# Patient Record
Sex: FEMALE | Race: WHITE | Employment: OTHER | URBAN - METROPOLITAN AREA
[De-identification: names, ages, dates, MRNs, and addresses within clinical notes are randomized per-mention and may not be internally consistent; named-entity substitution may affect disease eponyms.]

---

## 2017-01-05 ENCOUNTER — OFFICE VISIT (OUTPATIENT)
Dept: ORTHOPEDIC SURGERY | Age: 67
End: 2017-01-05

## 2017-01-05 VITALS — HEIGHT: 64 IN | WEIGHT: 190 LBS | BODY MASS INDEX: 32.44 KG/M2

## 2017-01-05 DIAGNOSIS — M25.511 RIGHT SHOULDER PAIN, UNSPECIFIED CHRONICITY: ICD-10-CM

## 2017-01-05 DIAGNOSIS — Z98.890 STATUS POST ARTHROSCOPY OF SHOULDER: Primary | ICD-10-CM

## 2017-01-05 PROCEDURE — 99024 POSTOP FOLLOW-UP VISIT: CPT | Performed by: ORTHOPAEDIC SURGERY

## 2017-01-05 PROCEDURE — 73030 X-RAY EXAM OF SHOULDER: CPT | Performed by: ORTHOPAEDIC SURGERY

## 2017-01-10 ENCOUNTER — TELEPHONE (OUTPATIENT)
Dept: ORTHOPEDIC SURGERY | Age: 67
End: 2017-01-10

## 2017-01-11 ENCOUNTER — OFFICE VISIT (OUTPATIENT)
Dept: ORTHOPEDIC SURGERY | Age: 67
End: 2017-01-11

## 2017-01-11 VITALS — WEIGHT: 190 LBS | BODY MASS INDEX: 32.44 KG/M2 | HEIGHT: 64 IN

## 2017-01-11 DIAGNOSIS — Z98.890 S/P ARTHROSCOPY OF SHOULDER: Primary | ICD-10-CM

## 2017-01-11 DIAGNOSIS — T14.8XXA SUBCUTANEOUS HEMATOMA: ICD-10-CM

## 2017-01-11 PROCEDURE — 99024 POSTOP FOLLOW-UP VISIT: CPT | Performed by: ORTHOPAEDIC SURGERY

## 2017-01-11 RX ORDER — METHYLPREDNISOLONE 4 MG/1
TABLET ORAL
Qty: 1 KIT | Refills: 0 | Status: SHIPPED | OUTPATIENT
Start: 2017-01-11 | End: 2017-03-12

## 2017-01-18 ENCOUNTER — HOSPITAL ENCOUNTER (OUTPATIENT)
Dept: OTHER | Age: 67
Discharge: HOME | End: 2017-01-31
Attending: ORTHOPAEDIC SURGERY | Admitting: ORTHOPAEDIC SURGERY

## 2017-01-23 ENCOUNTER — HOSPITAL ENCOUNTER (OUTPATIENT)
Dept: PHYSICAL THERAPY | Age: 67
Discharge: HOME | End: 2017-01-23
Admitting: ORTHOPAEDIC SURGERY

## 2017-01-25 ENCOUNTER — HOSPITAL ENCOUNTER (OUTPATIENT)
Dept: PHYSICAL THERAPY | Age: 67
Discharge: HOME | End: 2017-01-25
Admitting: ORTHOPAEDIC SURGERY

## 2017-01-26 ENCOUNTER — OFFICE VISIT (OUTPATIENT)
Dept: ORTHOPEDIC SURGERY | Age: 67
End: 2017-01-26

## 2017-01-26 VITALS — HEIGHT: 64 IN | BODY MASS INDEX: 32.44 KG/M2 | WEIGHT: 190 LBS | RESPIRATION RATE: 16 BRPM

## 2017-01-26 DIAGNOSIS — Z98.890 S/P ARTHROSCOPY OF SHOULDER: ICD-10-CM

## 2017-01-26 DIAGNOSIS — T14.8XXA SUBCUTANEOUS HEMATOMA: Primary | ICD-10-CM

## 2017-01-26 PROCEDURE — 99024 POSTOP FOLLOW-UP VISIT: CPT | Performed by: ORTHOPAEDIC SURGERY

## 2017-01-26 RX ORDER — SULFAMETHOXAZOLE AND TRIMETHOPRIM 800; 160 MG/1; MG/1
1 TABLET ORAL 2 TIMES DAILY
Qty: 10 TABLET | Refills: 0 | Status: SHIPPED | OUTPATIENT
Start: 2017-01-26 | End: 2017-01-31

## 2017-01-31 ENCOUNTER — HOSPITAL ENCOUNTER (OUTPATIENT)
Dept: PHYSICAL THERAPY | Age: 67
Discharge: HOME | End: 2017-01-31
Admitting: ORTHOPAEDIC SURGERY

## 2017-02-02 ENCOUNTER — HOSPITAL ENCOUNTER (OUTPATIENT)
Dept: PHYSICAL THERAPY | Age: 67
Discharge: HOME | End: 2017-02-02
Admitting: ORTHOPAEDIC SURGERY

## 2017-02-07 ENCOUNTER — HOSPITAL ENCOUNTER (OUTPATIENT)
Dept: PHYSICAL THERAPY | Age: 67
Discharge: HOME | End: 2017-02-07
Admitting: ORTHOPAEDIC SURGERY

## 2017-02-09 ENCOUNTER — HOSPITAL ENCOUNTER (OUTPATIENT)
Dept: PHYSICAL THERAPY | Age: 67
Discharge: HOME | End: 2017-02-09
Admitting: ORTHOPAEDIC SURGERY

## 2017-02-14 ENCOUNTER — HOSPITAL ENCOUNTER (OUTPATIENT)
Dept: PHYSICAL THERAPY | Age: 67
Discharge: HOME | End: 2017-02-14
Admitting: ORTHOPAEDIC SURGERY

## 2017-02-22 ENCOUNTER — HOSPITAL ENCOUNTER (OUTPATIENT)
Dept: PHYSICAL THERAPY | Age: 67
Discharge: HOME | End: 2017-02-22
Admitting: ORTHOPAEDIC SURGERY

## 2017-02-23 ENCOUNTER — OFFICE VISIT (OUTPATIENT)
Dept: ORTHOPEDIC SURGERY | Age: 67
End: 2017-02-23

## 2017-02-23 VITALS — WEIGHT: 190.04 LBS | BODY MASS INDEX: 32.44 KG/M2 | RESPIRATION RATE: 16 BRPM | HEIGHT: 64 IN

## 2017-02-23 DIAGNOSIS — Z98.890 S/P ARTHROSCOPY OF SHOULDER: Primary | ICD-10-CM

## 2017-02-23 PROCEDURE — 99024 POSTOP FOLLOW-UP VISIT: CPT | Performed by: NURSE PRACTITIONER

## 2017-03-16 ENCOUNTER — HOSPITAL ENCOUNTER (OUTPATIENT)
Dept: OTHER | Age: 67
Discharge: HOME | End: 2017-03-16
Attending: NURSE PRACTITIONER | Admitting: NURSE PRACTITIONER

## 2017-03-16 DIAGNOSIS — J18.9 PNEUMONIA DUE TO INFECTIOUS ORGANISM, UNSPECIFIED LATERALITY, UNSPECIFIED PART OF LUNG: ICD-10-CM

## 2017-03-20 ENCOUNTER — TELEPHONE (OUTPATIENT)
Dept: CASE MANAGEMENT | Age: 67
End: 2017-03-20

## 2017-05-16 ENCOUNTER — HOSPITAL ENCOUNTER (OUTPATIENT)
Dept: WOMENS IMAGING | Age: 67
Discharge: HOME | End: 2017-05-16
Attending: OBSTETRICS & GYNECOLOGY | Admitting: OBSTETRICS & GYNECOLOGY

## 2017-05-16 DIAGNOSIS — Z12.31 ENCOUNTER FOR MAMMOGRAM TO ESTABLISH BASELINE MAMMOGRAM: ICD-10-CM

## 2017-06-08 ENCOUNTER — OFFICE VISIT (OUTPATIENT)
Dept: ORTHOPEDIC SURGERY | Age: 67
End: 2017-06-08

## 2017-06-08 VITALS
HEART RATE: 82 BPM | HEIGHT: 64 IN | WEIGHT: 194 LBS | DIASTOLIC BLOOD PRESSURE: 79 MMHG | BODY MASS INDEX: 33.12 KG/M2 | SYSTOLIC BLOOD PRESSURE: 137 MMHG

## 2017-06-08 DIAGNOSIS — Z98.890 S/P ARTHROSCOPY OF SHOULDER: Primary | ICD-10-CM

## 2017-06-08 DIAGNOSIS — M19.011 GLENOHUMERAL ARTHRITIS, RIGHT: ICD-10-CM

## 2017-06-08 DIAGNOSIS — M25.511 ACUTE PAIN OF RIGHT SHOULDER: ICD-10-CM

## 2017-06-08 DIAGNOSIS — Z98.1 S/P CERVICAL SPINAL FUSION: ICD-10-CM

## 2017-06-08 PROCEDURE — 99213 OFFICE O/P EST LOW 20 MIN: CPT | Performed by: ORTHOPAEDIC SURGERY

## 2017-06-08 PROCEDURE — 20610 DRAIN/INJ JOINT/BURSA W/O US: CPT | Performed by: ORTHOPAEDIC SURGERY

## 2017-06-08 RX ORDER — CELECOXIB 200 MG/1
200 CAPSULE ORAL DAILY
Qty: 30 CAPSULE | Refills: 0 | Status: SHIPPED | OUTPATIENT
Start: 2017-06-08 | End: 2019-01-15

## 2017-06-09 ENCOUNTER — TELEPHONE (OUTPATIENT)
Dept: PHARMACY | Facility: CLINIC | Age: 67
End: 2017-06-09

## 2017-09-29 ENCOUNTER — HOSPITAL ENCOUNTER (OUTPATIENT)
Dept: CT IMAGING | Age: 67
Discharge: HOME | End: 2017-09-29
Admitting: NURSE PRACTITIONER

## 2017-09-29 DIAGNOSIS — G89.4 CHRONIC PAIN SYNDROME: ICD-10-CM

## 2017-09-29 DIAGNOSIS — G89.4 CHRONIC PAIN ASSOCIATED WITH SIGNIFICANT PSYCHOSOCIAL DYSFUNCTION: ICD-10-CM

## 2017-09-29 LAB
A/G RATIO: 1.6 (ref 1.1–2.2)
ALBUMIN SERPL-MCNC: 4.4 G/DL (ref 3.4–5)
ALP BLD-CCNC: 70 U/L (ref 40–129)
ALT SERPL-CCNC: 14 U/L (ref 10–40)
ANION GAP SERPL CALCULATED.3IONS-SCNC: 13 MMOL/L (ref 3–16)
AST SERPL-CCNC: 14 U/L (ref 15–37)
BILIRUB SERPL-MCNC: 0.4 MG/DL (ref 0–1)
BUN BLDV-MCNC: 10 MG/DL (ref 7–20)
CALCIUM SERPL-MCNC: 9.4 MG/DL (ref 8.3–10.6)
CHLORIDE BLD-SCNC: 100 MMOL/L (ref 99–110)
CO2: 29 MMOL/L (ref 21–32)
CREAT SERPL-MCNC: 0.6 MG/DL (ref 0.6–1.2)
ESTIMATED AVERAGE GLUCOSE: 139.9 MG/DL
GFR AFRICAN AMERICAN: >60
GFR AFRICAN AMERICAN: >60
GFR NON-AFRICAN AMERICAN: >60
GFR NON-AFRICAN AMERICAN: >60
GLOBULIN: 2.8 G/DL
GLUCOSE BLD-MCNC: 77 MG/DL (ref 70–99)
HBA1C MFR BLD: 6.5 %
HCT VFR BLD CALC: 37.7 % (ref 36–48)
HEMOGLOBIN: 12.9 G/DL (ref 12–16)
INR BLD: 4.45 (ref 0.85–1.15)
MCH RBC QN AUTO: 29.4 PG (ref 26–34)
MCHC RBC AUTO-ENTMCNC: 34.1 G/DL (ref 31–36)
MCV RBC AUTO: 86.2 FL (ref 80–100)
PDW BLD-RTO: 14.4 % (ref 12.4–15.4)
PERFORMED ON: NORMAL
PLATELET # BLD: 209 K/UL (ref 135–450)
PMV BLD AUTO: 7.8 FL (ref 5–10.5)
POC CREATININE: 0.6 MG/DL (ref 0.6–1.2)
POC SAMPLE TYPE: NORMAL
POTASSIUM SERPL-SCNC: 4.4 MMOL/L (ref 3.5–5.1)
PROTHROMBIN TIME: 50.3 SEC (ref 9.6–13)
RBC # BLD: 4.38 M/UL (ref 4–5.2)
SODIUM BLD-SCNC: 142 MMOL/L (ref 136–145)
TOTAL PROTEIN: 7.2 G/DL (ref 6.4–8.2)
TSH SERPL DL<=0.05 MIU/L-ACNC: 1.67 UIU/ML (ref 0.27–4.2)
WBC # BLD: 5.3 K/UL (ref 4–11)

## 2018-03-07 ENCOUNTER — OFFICE VISIT (OUTPATIENT)
Dept: ORTHOPEDIC SURGERY | Age: 68
End: 2018-03-07

## 2018-03-07 VITALS
HEIGHT: 64 IN | WEIGHT: 190 LBS | DIASTOLIC BLOOD PRESSURE: 80 MMHG | HEART RATE: 81 BPM | SYSTOLIC BLOOD PRESSURE: 133 MMHG | BODY MASS INDEX: 32.44 KG/M2

## 2018-03-07 DIAGNOSIS — Z98.890 S/P ARTHROSCOPY OF SHOULDER: ICD-10-CM

## 2018-03-07 DIAGNOSIS — M19.011 OSTEOARTHRITIS OF RIGHT GLENOHUMERAL JOINT: Primary | ICD-10-CM

## 2018-03-07 PROCEDURE — 20610 DRAIN/INJ JOINT/BURSA W/O US: CPT | Performed by: ORTHOPAEDIC SURGERY

## 2018-03-07 PROCEDURE — 99213 OFFICE O/P EST LOW 20 MIN: CPT | Performed by: ORTHOPAEDIC SURGERY

## 2018-03-07 NOTE — PROGRESS NOTES
Sarah Saldivar  [id number]  March 7, 2018    Chief Complaint   Patient presents with    Follow-up     Left shoulder scope 12/28/16. Rigth shoulder inj 6/8/17       History: The patient is here follow-up regarding her right shoulder arthroscopy subacromial decompression, labral debridement biceps tenotomy with chondroplasty of the humeral head and glenoid and open david procedure completed on 12/28/16. She reports the injection as a last office visit provided relief for 3 months. The Celebrex unfortunately did not help her very much. She is now taking tramadol at night and Tylenol during the day. She is on Coumadin and is not able to take other anti-inflammatories. She denies any neck pain today were numbness or tingling or weakness in the right arm. Her pain is located in her right shoulder extending into her right mid humerus. It is worse with overhead activities such as doing her hair. She denies any new injuries. She does use Voltaren gel her medically with some relief as well. The patient's  past medical history, medications, allergies,  family history, social history, and review of systems have been reviewed, and dated and are recorded in the chart. /80   Pulse 81   Ht 5' 4\" (1.626 m)   Wt 190 lb (86.2 kg)   Breastfeeding? No   BMI 32.61 kg/m²     Physical:  Ms. Sarah Saldivar appears well, she is in no apparent distress, she demonstrates appropriate mood & affect. She is alert and oriented to person, place and time. She has no further swelling. The hematoma has resolved. The stitch abscess has resolved as well. There is no erythema. The wounds have closed. There is no evidence of drainage. She is neurovascularly intact distally. Sensation is intact in the axillary nerve distribution. right shoulder range of motion: 175 degrees abduction, 175 degrees forward flexion, 40 degrees external rotation and internal rotation to L5.  She has minimal discomfort with light range of

## 2018-06-14 ENCOUNTER — HOSPITAL ENCOUNTER (OUTPATIENT)
Dept: WOMENS IMAGING | Age: 68
Discharge: HOME | End: 2018-06-14
Attending: OBSTETRICS & GYNECOLOGY | Admitting: OBSTETRICS & GYNECOLOGY

## 2018-06-14 DIAGNOSIS — Z12.39 BREAST CANCER SCREENING: ICD-10-CM

## 2018-07-20 ENCOUNTER — OFFICE VISIT (OUTPATIENT)
Dept: ORTHOPEDIC SURGERY | Age: 68
End: 2018-07-20

## 2018-07-20 VITALS
DIASTOLIC BLOOD PRESSURE: 82 MMHG | SYSTOLIC BLOOD PRESSURE: 146 MMHG | HEART RATE: 83 BPM | HEIGHT: 64 IN | BODY MASS INDEX: 32.44 KG/M2 | WEIGHT: 190 LBS

## 2018-07-20 DIAGNOSIS — M75.80 ROTATOR CUFF TENDINITIS, UNSPECIFIED LATERALITY: ICD-10-CM

## 2018-07-20 DIAGNOSIS — M54.12 CERVICAL RADICULITIS: ICD-10-CM

## 2018-07-20 DIAGNOSIS — Z98.1 STATUS POST CERVICAL SPINAL FUSION: ICD-10-CM

## 2018-07-20 DIAGNOSIS — M25.511 RIGHT SHOULDER PAIN, UNSPECIFIED CHRONICITY: Primary | ICD-10-CM

## 2018-07-20 DIAGNOSIS — M19.019 OSTEOARTHRITIS OF GLENOHUMERAL JOINT, UNSPECIFIED LATERALITY: ICD-10-CM

## 2018-07-20 PROCEDURE — 99213 OFFICE O/P EST LOW 20 MIN: CPT | Performed by: NURSE PRACTITIONER

## 2018-07-20 PROCEDURE — 20610 DRAIN/INJ JOINT/BURSA W/O US: CPT | Performed by: NURSE PRACTITIONER

## 2018-07-20 NOTE — PROGRESS NOTES
Lelia Candelario  [id number]  July 20, 2018    Chief Complaint   Patient presents with    Follow-up     Right shoulder       History: The patient is here follow-up regarding her right shoulder pain. The patient tells me that she experienced a return of her shoulder pain approximately 5-6 weeks after she received her last shoulder injection on re-/7/18. She also notes an increase in her pain extending from her neck down into her right forearm and hand which is described as aching. She denies any numbness or tingling or weakness in the right arm. The pain is worse with doing overhead activities. She is currently taking Tylenol and must avoid NSAIDs as she is on Coumadin. The patient's  past medical history, medications, allergies,  family history, social history, and review of systems have been reviewed, and dated and are recorded in the chart. BP (!) 146/82   Pulse 83   Ht 5' 4\" (1.626 m)   Wt 190 lb (86.2 kg)   Breastfeeding? No   BMI 32.61 kg/m²     Physical:  Ms. Lelia Candelario appears well, she is in no apparent distress, she demonstrates appropriate mood & affect. She is alert and oriented to person, place and time. She has no further swelling. The incisions have healed. There is no evidence of drainage. She is neurovascularly intact distally. Sensation is intact in the axillary nerve distribution. right shoulder range of motion: 175 degrees abduction, 175 degrees forward flexion, 40 degrees external rotation and internal rotation to L5. She has minimal discomfort with light range of motion of the shoulder. The incisions are clean, dry and intact and without erythema. Georges impingement sign is slightly positive on the right. Strength in the shoulder is: 4+/5  Abduction and 4+/5 external rotation. Otherwise, she has 5/5 motor strength of her bilateral upper extremities. Cindy sign is negative. She has minimal to no tenderness to palpation over the right-sided pericervical musculature.

## 2018-07-25 ENCOUNTER — TELEPHONE (OUTPATIENT)
Dept: ORTHOPEDIC SURGERY | Age: 68
End: 2018-07-25

## 2018-07-25 DIAGNOSIS — F40.240 CLAUSTROPHOBIA: Primary | ICD-10-CM

## 2018-07-25 RX ORDER — DIAZEPAM 5 MG/1
5 TABLET ORAL
Qty: 2 TABLET | Refills: 0 | Status: SHIPPED | OUTPATIENT
Start: 2018-07-25 | End: 2018-07-25

## 2018-07-25 NOTE — TELEPHONE ENCOUNTER
Gomez Roberts ordered a Cervical MRI for patient  Patient states that she will need medication to help her relax for the procedure    She states Gomez Roberts told her he would prescribe valium for her but this has not been done yet( patient MRI has not scheduled as of the time of this phone call)     Patient preferred pharmacy is [facility] on [place]- [phone]    Patient can be reached at [phone]

## 2018-07-30 ENCOUNTER — TELEPHONE (OUTPATIENT)
Dept: ORTHOPEDIC SURGERY | Age: 68
End: 2018-07-30

## 2018-07-30 ENCOUNTER — HOSPITAL ENCOUNTER (OUTPATIENT)
Dept: MRI IMAGING | Age: 68
Discharge: HOME | End: 2018-07-30
Attending: NURSE PRACTITIONER | Admitting: NURSE PRACTITIONER

## 2018-07-30 DIAGNOSIS — M54.12 CERVICAL RADICULITIS: ICD-10-CM

## 2018-07-30 DIAGNOSIS — Z98.1 STATUS POST CERVICAL SPINAL FUSION: ICD-10-CM

## 2018-08-07 ENCOUNTER — OFFICE VISIT (OUTPATIENT)
Dept: ORTHOPEDIC SURGERY | Age: 68
End: 2018-08-07

## 2018-08-07 VITALS
SYSTOLIC BLOOD PRESSURE: 143 MMHG | HEART RATE: 90 BPM | DIASTOLIC BLOOD PRESSURE: 80 MMHG | BODY MASS INDEX: 32.44 KG/M2 | HEIGHT: 64 IN | WEIGHT: 190 LBS

## 2018-08-07 DIAGNOSIS — M46.92 CERVICAL SPONDYLITIS WITH RADICULITIS (HCC): Primary | ICD-10-CM

## 2018-08-07 DIAGNOSIS — M54.12 CERVICAL SPONDYLITIS WITH RADICULITIS (HCC): Primary | ICD-10-CM

## 2018-08-07 PROCEDURE — 99213 OFFICE O/P EST LOW 20 MIN: CPT | Performed by: ORTHOPAEDIC SURGERY

## 2018-11-14 ENCOUNTER — PRE-EVALUATION (OUTPATIENT)
Dept: ORTHOPEDIC SURGERY | Age: 68
End: 2018-11-14

## 2018-11-14 ENCOUNTER — OFFICE VISIT (OUTPATIENT)
Dept: ORTHOPEDIC SURGERY | Age: 68
End: 2018-11-14
Payer: MEDICARE

## 2018-11-14 VITALS
SYSTOLIC BLOOD PRESSURE: 125 MMHG | RESPIRATION RATE: 16 BRPM | BODY MASS INDEX: 32.44 KG/M2 | DIASTOLIC BLOOD PRESSURE: 85 MMHG | HEIGHT: 64 IN | WEIGHT: 190 LBS | HEART RATE: 81 BPM

## 2018-11-14 DIAGNOSIS — M19.011 GLENOHUMERAL ARTHRITIS, RIGHT: Primary | ICD-10-CM

## 2018-11-14 DIAGNOSIS — M75.81 TENDINITIS OF RIGHT ROTATOR CUFF: ICD-10-CM

## 2018-11-14 DIAGNOSIS — M19.019 GLENOHUMERAL ARTHRITIS: ICD-10-CM

## 2018-11-14 PROCEDURE — 99213 OFFICE O/P EST LOW 20 MIN: CPT | Performed by: ORTHOPAEDIC SURGERY

## 2018-11-14 PROCEDURE — APPSS15 APP SPLIT SHARED TIME 0-15 MINUTES: Performed by: NURSE PRACTITIONER

## 2018-11-14 PROCEDURE — 20610 DRAIN/INJ JOINT/BURSA W/O US: CPT | Performed by: ORTHOPAEDIC SURGERY

## 2018-11-14 RX ORDER — METHYLPREDNISOLONE ACETATE 40 MG/ML
80 INJECTION, SUSPENSION INTRA-ARTICULAR; INTRALESIONAL; INTRAMUSCULAR; SOFT TISSUE ONCE
Status: COMPLETED | OUTPATIENT
Start: 2018-11-14 | End: 2018-11-14

## 2018-11-14 RX ADMIN — METHYLPREDNISOLONE ACETATE 80 MG: 40 INJECTION, SUSPENSION INTRA-ARTICULAR; INTRALESIONAL; INTRAMUSCULAR; SOFT TISSUE at 10:34

## 2018-12-10 ENCOUNTER — HOSPITAL ENCOUNTER (OUTPATIENT)
Age: 68
Discharge: HOME | End: 2018-12-10
Payer: MEDICARE

## 2018-12-10 ENCOUNTER — HOSPITAL ENCOUNTER (OUTPATIENT)
Dept: GENERAL RADIOLOGY | Age: 68
Discharge: HOME | End: 2018-12-10
Payer: MEDICARE

## 2018-12-10 DIAGNOSIS — R06.02 BREATH SHORTNESS: ICD-10-CM

## 2018-12-10 DIAGNOSIS — R05.9 COUGH: ICD-10-CM

## 2018-12-10 PROCEDURE — 71046 X-RAY EXAM CHEST 2 VIEWS: CPT

## 2019-01-15 ENCOUNTER — OFFICE VISIT (OUTPATIENT)
Dept: ORTHOPEDIC SURGERY | Age: 69
End: 2019-01-15
Payer: MEDICARE

## 2019-01-15 VITALS
BODY MASS INDEX: 32.44 KG/M2 | HEIGHT: 64 IN | SYSTOLIC BLOOD PRESSURE: 132 MMHG | WEIGHT: 190 LBS | DIASTOLIC BLOOD PRESSURE: 81 MMHG | HEART RATE: 82 BPM

## 2019-01-15 DIAGNOSIS — M48.02 CERVICAL STENOSIS OF SPINAL CANAL: ICD-10-CM

## 2019-01-15 DIAGNOSIS — Z98.1 S/P CERVICAL SPINAL FUSION: Primary | ICD-10-CM

## 2019-01-15 PROCEDURE — 99213 OFFICE O/P EST LOW 20 MIN: CPT | Performed by: PHYSICIAN ASSISTANT

## 2019-01-15 RX ORDER — TRAMADOL HYDROCHLORIDE 50 MG/1
50 TABLET ORAL EVERY 6 HOURS PRN
COMMUNITY
End: 2022-05-02

## 2019-01-15 RX ORDER — ACETAMINOPHEN 325 MG/1
650 TABLET ORAL EVERY 6 HOURS PRN
Status: ON HOLD | COMMUNITY
End: 2019-11-04 | Stop reason: HOSPADM

## 2019-01-17 ENCOUNTER — HOSPITAL ENCOUNTER (OUTPATIENT)
Dept: PHYSICAL THERAPY | Age: 69
Setting detail: THERAPIES SERIES
Discharge: HOME | End: 2019-01-17
Payer: MEDICARE

## 2019-01-17 PROCEDURE — 97110 THERAPEUTIC EXERCISES: CPT

## 2019-01-17 PROCEDURE — 97161 PT EVAL LOW COMPLEX 20 MIN: CPT

## 2019-01-17 PROCEDURE — 97140 MANUAL THERAPY 1/> REGIONS: CPT

## 2019-01-17 PROCEDURE — G0283 ELEC STIM OTHER THAN WOUND: HCPCS

## 2019-01-22 ENCOUNTER — HOSPITAL ENCOUNTER (OUTPATIENT)
Dept: PHYSICAL THERAPY | Age: 69
Setting detail: THERAPIES SERIES
Discharge: HOME | End: 2019-01-22
Payer: MEDICARE

## 2019-01-22 PROCEDURE — 97110 THERAPEUTIC EXERCISES: CPT

## 2019-01-22 PROCEDURE — G0283 ELEC STIM OTHER THAN WOUND: HCPCS

## 2019-01-22 PROCEDURE — 97140 MANUAL THERAPY 1/> REGIONS: CPT

## 2019-01-24 ENCOUNTER — HOSPITAL ENCOUNTER (OUTPATIENT)
Dept: PHYSICAL THERAPY | Age: 69
Setting detail: THERAPIES SERIES
Discharge: HOME | End: 2019-01-24
Payer: MEDICARE

## 2019-01-24 PROCEDURE — 97110 THERAPEUTIC EXERCISES: CPT

## 2019-01-24 PROCEDURE — 97140 MANUAL THERAPY 1/> REGIONS: CPT

## 2019-01-24 PROCEDURE — G0283 ELEC STIM OTHER THAN WOUND: HCPCS

## 2019-01-29 ENCOUNTER — HOSPITAL ENCOUNTER (OUTPATIENT)
Dept: PHYSICAL THERAPY | Age: 69
Setting detail: THERAPIES SERIES
Discharge: HOME | End: 2019-01-29
Payer: MEDICARE

## 2019-01-29 PROCEDURE — 97110 THERAPEUTIC EXERCISES: CPT

## 2019-01-29 PROCEDURE — 97140 MANUAL THERAPY 1/> REGIONS: CPT

## 2019-01-29 PROCEDURE — G0283 ELEC STIM OTHER THAN WOUND: HCPCS

## 2019-01-31 ENCOUNTER — HOSPITAL ENCOUNTER (OUTPATIENT)
Dept: PHYSICAL THERAPY | Age: 69
Setting detail: THERAPIES SERIES
Discharge: HOME | End: 2019-01-31
Payer: MEDICARE

## 2019-01-31 PROCEDURE — G0283 ELEC STIM OTHER THAN WOUND: HCPCS

## 2019-01-31 PROCEDURE — 97140 MANUAL THERAPY 1/> REGIONS: CPT

## 2019-01-31 PROCEDURE — 97110 THERAPEUTIC EXERCISES: CPT

## 2019-01-31 PROCEDURE — 97530 THERAPEUTIC ACTIVITIES: CPT

## 2019-02-05 ENCOUNTER — HOSPITAL ENCOUNTER (OUTPATIENT)
Dept: PHYSICAL THERAPY | Age: 69
Setting detail: THERAPIES SERIES
Discharge: HOME | End: 2019-02-05
Payer: MEDICARE

## 2019-02-05 PROCEDURE — 97110 THERAPEUTIC EXERCISES: CPT

## 2019-02-05 PROCEDURE — G0283 ELEC STIM OTHER THAN WOUND: HCPCS

## 2019-02-05 PROCEDURE — 97140 MANUAL THERAPY 1/> REGIONS: CPT

## 2019-02-07 ENCOUNTER — HOSPITAL ENCOUNTER (OUTPATIENT)
Dept: PHYSICAL THERAPY | Age: 69
Setting detail: THERAPIES SERIES
Discharge: HOME | End: 2019-02-07
Payer: MEDICARE

## 2019-02-07 PROCEDURE — 97110 THERAPEUTIC EXERCISES: CPT

## 2019-02-07 PROCEDURE — G0283 ELEC STIM OTHER THAN WOUND: HCPCS

## 2019-02-07 PROCEDURE — 97140 MANUAL THERAPY 1/> REGIONS: CPT

## 2019-03-26 ENCOUNTER — HOSPITAL ENCOUNTER (OUTPATIENT)
Dept: PULMONOLOGY | Age: 69
Discharge: HOME | End: 2019-03-26
Payer: MEDICARE

## 2019-03-26 PROCEDURE — 94060 EVALUATION OF WHEEZING: CPT

## 2019-03-26 PROCEDURE — 94664 DEMO&/EVAL PT USE INHALER: CPT

## 2019-03-26 PROCEDURE — 94729 DIFFUSING CAPACITY: CPT | Performed by: INTERNAL MEDICINE

## 2019-03-26 PROCEDURE — 94726 PLETHYSMOGRAPHY LUNG VOLUMES: CPT

## 2019-03-26 PROCEDURE — 94729 DIFFUSING CAPACITY: CPT

## 2019-03-26 PROCEDURE — 94060 EVALUATION OF WHEEZING: CPT | Performed by: INTERNAL MEDICINE

## 2019-03-26 PROCEDURE — 94640 AIRWAY INHALATION TREATMENT: CPT

## 2019-03-26 PROCEDURE — 6370000000 HC RX 637 (ALT 250 FOR IP): Performed by: INTERNAL MEDICINE

## 2019-03-26 PROCEDURE — 94727 GAS DIL/WSHOT DETER LNG VOL: CPT | Performed by: INTERNAL MEDICINE

## 2019-03-26 RX ORDER — ALBUTEROL SULFATE 90 UG/1
4 AEROSOL, METERED RESPIRATORY (INHALATION) ONCE
Status: COMPLETED | OUTPATIENT
Start: 2019-03-26 | End: 2019-03-26

## 2019-03-26 RX ADMIN — ALBUTEROL SULFATE 4 PUFF: 90 AEROSOL, METERED RESPIRATORY (INHALATION) at 09:57

## 2019-03-27 LAB
DLCO %PRED: 87 %
DLCO PRED: NORMAL ML/MIN/MMHG
DLCO/VA %PRED: NORMAL %
DLCO/VA PRED: NORMAL ML/MIN/MMHG
DLCO/VA: NORMAL ML/MIN/MMHG
DLCO: NORMAL ML/MIN/MMHG
EXPIRATORY TIME-POST: NORMAL SEC
EXPIRATORY TIME: NORMAL SEC
FEF 25-75% %CHNG: NORMAL
FEF 25-75% %PRED-POST: NORMAL %
FEF 25-75% %PRED-PRE: NORMAL L/SEC
FEF 25-75% PRED: NORMAL L/SEC
FEF 25-75%-POST: NORMAL L/SEC
FEF 25-75%-PRE: NORMAL L/SEC
FEV1 %PRED-POST: 132 %
FEV1 %PRED-PRE: 128 %
FEV1 PRED: NORMAL L
FEV1-POST: NORMAL L
FEV1-PRE: NORMAL L
FEV1/FVC %PRED-POST: NORMAL %
FEV1/FVC %PRED-PRE: NORMAL %
FEV1/FVC PRED: NORMAL %
FEV1/FVC-POST: 87 %
FEV1/FVC-PRE: 84 %
FVC %PRED-POST: NORMAL L
FVC %PRED-PRE: NORMAL %
FVC PRED: NORMAL L
FVC-POST: NORMAL L
FVC-PRE: NORMAL L
GAW %PRED: NORMAL %
GAW PRED: NORMAL L/S/CMH2O
GAW: NORMAL L/S/CMH2O
IC %PRED: NORMAL %
IC PRED: NORMAL L
IC: NORMAL L
MEP: NORMAL
MIP: NORMAL
MVV %PRED-PRE: NORMAL %
MVV PRED: NORMAL L/MIN
MVV-PRE: NORMAL L/MIN
PEF %PRED-POST: NORMAL %
PEF %PRED-PRE: NORMAL L/SEC
PEF PRED: NORMAL L/SEC
PEF%CHNG: NORMAL
PEF-POST: NORMAL L/SEC
PEF-PRE: NORMAL L/SEC
RAW %PRED: NORMAL %
RAW PRED: NORMAL CMH2O/L/S
RAW: NORMAL CMH2O/L/S
RV %PRED: NORMAL %
RV PRED: NORMAL L
RV: NORMAL L
SVC %PRED: NORMAL %
SVC PRED: NORMAL L
SVC: NORMAL L
TLC %PRED: 138 %
TLC PRED: NORMAL L
TLC: NORMAL L
VA %PRED: NORMAL %
VA PRED: NORMAL L
VA: NORMAL L
VTG %PRED: NORMAL %
VTG PRED: NORMAL L
VTG: NORMAL L

## 2019-03-27 ASSESSMENT — PULMONARY FUNCTION TESTS
FEV1_PERCENT_PREDICTED_POST: 132
FEV1/FVC_PRE: 84
FEV1/FVC_POST: 87
FEV1_PERCENT_PREDICTED_PRE: 128

## 2019-05-09 ENCOUNTER — OFFICE VISIT (OUTPATIENT)
Dept: ORTHOPEDIC SURGERY | Age: 69
End: 2019-05-09
Payer: MEDICARE

## 2019-05-09 VITALS
WEIGHT: 190 LBS | RESPIRATION RATE: 16 BRPM | SYSTOLIC BLOOD PRESSURE: 124 MMHG | HEART RATE: 93 BPM | BODY MASS INDEX: 32.44 KG/M2 | DIASTOLIC BLOOD PRESSURE: 82 MMHG | HEIGHT: 64 IN

## 2019-05-09 DIAGNOSIS — M19.011 GLENOHUMERAL ARTHRITIS, RIGHT: Primary | ICD-10-CM

## 2019-05-09 DIAGNOSIS — M75.81 TENDINITIS OF RIGHT ROTATOR CUFF: ICD-10-CM

## 2019-05-09 PROCEDURE — 20610 DRAIN/INJ JOINT/BURSA W/O US: CPT | Performed by: ORTHOPAEDIC SURGERY

## 2019-05-09 PROCEDURE — 99213 OFFICE O/P EST LOW 20 MIN: CPT | Performed by: ORTHOPAEDIC SURGERY

## 2019-05-09 RX ORDER — METHYLPREDNISOLONE ACETATE 40 MG/ML
80 INJECTION, SUSPENSION INTRA-ARTICULAR; INTRALESIONAL; INTRAMUSCULAR; SOFT TISSUE ONCE
Status: COMPLETED | OUTPATIENT
Start: 2019-05-09 | End: 2019-05-09

## 2019-05-09 RX ADMIN — METHYLPREDNISOLONE ACETATE 80 MG: 40 INJECTION, SUSPENSION INTRA-ARTICULAR; INTRALESIONAL; INTRAMUSCULAR; SOFT TISSUE at 11:04

## 2019-05-09 NOTE — PROGRESS NOTES
Jagruti Crowder  [id number]  May 9, 2019    Chief Complaint   Patient presents with    Shoulder Pain     right shoulder        History: The patient is here follow-up regarding her right shoulder pain. Injection at last office visit provided moderate relief. She is right-hand dominant. The patient denies any new injuries. The right shoulder pain has returned. She has difficulty with overhead activities. She has difficulty sleeping on her right side. The patient's  past medical history, medications, allergies,  family history, social history, and review of systems have been reviewed, and dated and are recorded in the chart. /82   Pulse 93   Resp 16   Ht 5' 3.5\" (1.613 m)   Wt 190 lb (86.2 kg)   BMI 33.13 kg/m²     Physical:  Ms. Jagruti Crowder appears well, she is in no apparent distress, she demonstrates appropriate mood & affect. She is alert and oriented to person, place and time. She has no further swelling. The incisions have healed. There is no evidence of drainage. She is neurovascularly intact distally. Sensation is intact in the axillary nerve distribution. right shoulder range of motion: 155 degrees abduction, 155 degrees forward flexion, 40 degrees external rotation and internal rotation to L5. She has minimal discomfort with light range of motion of the shoulder. The incisions are clean, dry and intact and without erythema. Georges impingement sign is slightly positive on the right. Strength in the shoulder is: 4/5  Abduction and 4/5 external rotation. Otherwise, she has 5/5 motor strength of her bilateral upper extremities. Cindy sign is negative. She has minimal to no tenderness to palpation over the right-sided pericervical musculature. Spurling sign is negative. Imagin views right shoulder obtained previously were again reviewed in the office today. Acceptable decompression noted. There is moderate glenohumeral arthritis.      Impression: #1 right shoulder glenohumeral arthritis #2 status post right shoulder scope with subacromial decompression, open Shannon, biceps tenotomy, and chondroplasty of the humeral head/glenoid. #3 status post C5-6 fusion with C4-5 stenosis     Plan: At this time, we will inject the right shoulder under sterile conditions. After a Betadine prep of the shoulder, 3cc of 0.25% Marcaine and 2cc of 40 mg Depo-Medrol were injected into the shoulder. The patient tolerated the injection rather well. The patient was instructed to follow up immediately for any signs of infection. The patient continues to have pain, we will a reverse total shoulder arthroplasty given her ongoing arthritic pain as well as her rotator cuff arthropathy. She will follow-up with us in approximately 3 months and we will reassess her then.

## 2019-06-25 ENCOUNTER — HOSPITAL ENCOUNTER (OUTPATIENT)
Dept: WOMENS IMAGING | Age: 69
Discharge: HOME | End: 2019-06-25
Payer: MEDICARE

## 2019-06-25 DIAGNOSIS — Z12.39 BREAST CANCER SCREENING: ICD-10-CM

## 2019-06-25 PROCEDURE — 77063 BREAST TOMOSYNTHESIS BI: CPT

## 2019-08-19 ENCOUNTER — TELEPHONE (OUTPATIENT)
Dept: ORTHOPEDIC SURGERY | Age: 69
End: 2019-08-19

## 2019-08-19 DIAGNOSIS — M47.812 CERVICAL SPONDYLOSIS WITHOUT MYELOPATHY: Primary | ICD-10-CM

## 2019-08-26 ENCOUNTER — HOSPITAL ENCOUNTER (OUTPATIENT)
Dept: MRI IMAGING | Age: 69
Discharge: HOME | End: 2019-08-26
Payer: MEDICARE

## 2019-08-26 DIAGNOSIS — M47.812 CERVICAL SPONDYLOSIS WITHOUT MYELOPATHY: ICD-10-CM

## 2019-08-26 PROCEDURE — A9577 INJ MULTIHANCE: HCPCS | Performed by: NURSE PRACTITIONER

## 2019-08-26 PROCEDURE — 6360000004 HC RX CONTRAST MEDICATION: Performed by: NURSE PRACTITIONER

## 2019-08-26 PROCEDURE — 72156 MRI NECK SPINE W/O & W/DYE: CPT

## 2019-08-26 RX ADMIN — GADOBENATE DIMEGLUMINE 18 ML: 529 INJECTION, SOLUTION INTRAVENOUS at 12:23

## 2019-08-30 ENCOUNTER — TELEPHONE (OUTPATIENT)
Dept: ORTHOPEDIC SURGERY | Age: 69
End: 2019-08-30

## 2019-09-03 ENCOUNTER — HOSPITAL ENCOUNTER (OUTPATIENT)
Age: 69
Discharge: HOME | End: 2019-09-03
Payer: MEDICARE

## 2019-09-03 ENCOUNTER — HOSPITAL ENCOUNTER (OUTPATIENT)
Dept: GENERAL RADIOLOGY | Age: 69
Discharge: HOME | End: 2019-09-03
Payer: MEDICARE

## 2019-09-03 DIAGNOSIS — M79.672 LEFT FOOT PAIN: ICD-10-CM

## 2019-09-03 PROCEDURE — 73630 X-RAY EXAM OF FOOT: CPT

## 2019-09-06 ENCOUNTER — OFFICE VISIT (OUTPATIENT)
Dept: ORTHOPEDIC SURGERY | Age: 69
End: 2019-09-06
Payer: MEDICARE

## 2019-09-06 VITALS
WEIGHT: 190 LBS | DIASTOLIC BLOOD PRESSURE: 86 MMHG | TEMPERATURE: 98.2 F | BODY MASS INDEX: 32.44 KG/M2 | SYSTOLIC BLOOD PRESSURE: 144 MMHG | HEART RATE: 99 BPM | HEIGHT: 64 IN

## 2019-09-06 DIAGNOSIS — S92.412A CLOSED DISPLACED FRACTURE OF PROXIMAL PHALANX OF LEFT GREAT TOE, INITIAL ENCOUNTER: Primary | ICD-10-CM

## 2019-09-06 PROCEDURE — 99213 OFFICE O/P EST LOW 20 MIN: CPT | Performed by: PHYSICIAN ASSISTANT

## 2019-09-06 PROCEDURE — 28510 TREATMENT OF TOE FRACTURE: CPT | Performed by: PHYSICIAN ASSISTANT

## 2019-09-06 NOTE — PROGRESS NOTES
Subjective:      Patient ID: Lan Cooper is a 68 y.o.  female. Chief Complaint   Patient presents with    Foot Injury     Left        HPI: She is here for an initial evaluation of left 5th toe pain after an injury-landed on the left foot after a fall. Onset of symptoms 2 weeks ago. These symptoms have not been progressive in nature. Pain is located over the fifth toes. Pain is on average to/10. Pain is worse with weight bearing and the pain improves with elevation. There is not associated numbness/ tingling. Previous treatments have included: Evaluation by PCP, outpatient x-rays were obtained. Review of Systems:   A 14 point review of systems and history form completed by the patient has been reviewed. This form is scanned in the media tab of the patient's chart under today's date. Past Medical History:   Diagnosis Date    Asthma     Colon polyp     Depression     DVT (deep venous thrombosis) (HCC)     as a teenager    Esophageal reflux     GERD with stricture     Hx of blood clots     Hyperlipidemia     Hypertension     Insomnia     Migraine 12/29/1995    Osteoarthritis     Protein deficiency (HCC)     protein S defic.     Pulmonary embolism (HCC)     x`s 2 post-op    Thyroid adenoma     Type II or unspecified type diabetes mellitus without mention of complication, not stated as uncontrolled        Family History   Problem Relation Age of Onset    Diabetes Maternal Grandmother        Past Surgical History:   Procedure Laterality Date    APPENDECTOMY      BACK SURGERY      CERVICAL DISC SURGERY      CERVICAL FUSION  1993    COLONOSCOPY      ENDOSCOPY, COLON, DIAGNOSTIC      HIATAL HERNIA REPAIR      HYSTERECTOMY      OVARIAN CYST SURGERY      PARTIAL HYSTERECTOMY      SHOULDER ARTHROSCOPY Right 12/28/2016    SAD, labral debridement    SHOULDER SURGERY Right     THYROIDECTOMY, PARTIAL         Social History     Occupational History    Occupation: retired through the distal portion of the 2nd metatarsal from   a prior procedure.  No evidence of complication. Blu Auguste is a moderately   displaced avulsion fracture involving the medial base of the 5th proximal   phalanx.  The base of the 5th metatarsal shows a smooth, well corticated   margin.  However, there is an osseous fragment immediately adjacent that   could potentially represent an avulsion fracture.  This would be suspected to   be remote based upon margins.  A more acute avulsion fracture can not be   definitively excluded.  Diffuse osteopenia and chronic degenerative changes   are otherwise present.  Prominent enthesophyte at the origin of the plantar   aponeurosis involving the posterior calcaneus.           Impression   1. Moderately displaced avulsion fracture at the medial base of the 5th   proximal phalanx. 2. Probable secondary fracture at the base of the 5th metatarsal, although   this may be remote in nature.  Correlation with physical exam is advised.                 X Rays: not performed in the office today:     Assessment:       ICD-10-CM    1. Closed displaced fracture of proximal phalanx of left great toe, initial encounter S92.412A         Plan:   She has a moderately displaced avulsion fracture of the medial base of the fifth proximal phalanx. The natural history of the patient's diagnosis as well as the treatment options were discussed in full and questions were answered. Risks and benefits of the treatment options also reviewed in detail. The risk of delayed union, non union, mal union, the need for future surgical repair, continued pain were discussed with the patient in detail today. She understands some underlying medical conditions may also have an effect on bone healing as dose smoking. She was instructed in proper nutrition, the need to stop smoking, proper immobilization and the importance to adhere to my recommendations. PO Shoe was recommended today.    She states she has a postop shoe at home. Weight Bearing as tolerated on the affected extremity. Rest, Ice, Compression and Elevation    Extra strength Tylenol for pain. Activity Restriction/ Modification discussed. Follow Up:3 weeks. Call or return to clinic prn if these symptoms worsen or fail to improve as anticipated.

## 2019-09-11 ENCOUNTER — OFFICE VISIT (OUTPATIENT)
Dept: ORTHOPEDIC SURGERY | Age: 69
End: 2019-09-11
Payer: MEDICARE

## 2019-09-11 VITALS
WEIGHT: 186 LBS | HEIGHT: 64 IN | DIASTOLIC BLOOD PRESSURE: 78 MMHG | BODY MASS INDEX: 31.76 KG/M2 | SYSTOLIC BLOOD PRESSURE: 131 MMHG | HEART RATE: 103 BPM

## 2019-09-11 DIAGNOSIS — M47.22 OSTEOARTHRITIS OF SPINE WITH RADICULOPATHY, CERVICAL REGION: Primary | ICD-10-CM

## 2019-09-11 PROCEDURE — 99213 OFFICE O/P EST LOW 20 MIN: CPT | Performed by: ORTHOPAEDIC SURGERY

## 2019-09-20 ENCOUNTER — TELEPHONE (OUTPATIENT)
Dept: ORTHOPEDIC SURGERY | Age: 69
End: 2019-09-20

## 2019-10-01 ENCOUNTER — OFFICE VISIT (OUTPATIENT)
Dept: ORTHOPEDIC SURGERY | Age: 69
End: 2019-10-01

## 2019-10-01 VITALS — WEIGHT: 190 LBS | HEIGHT: 64 IN | BODY MASS INDEX: 32.44 KG/M2 | TEMPERATURE: 98.1 F

## 2019-10-01 DIAGNOSIS — S92.412A CLOSED DISPLACED FRACTURE OF PROXIMAL PHALANX OF LEFT GREAT TOE, INITIAL ENCOUNTER: Primary | ICD-10-CM

## 2019-10-01 PROCEDURE — 99024 POSTOP FOLLOW-UP VISIT: CPT | Performed by: PHYSICIAN ASSISTANT

## 2019-10-31 RX ORDER — DIPHENHYDRAMINE HCL 25 MG
25 CAPSULE ORAL NIGHTLY
COMMUNITY
End: 2022-06-02 | Stop reason: ALTCHOICE

## 2019-11-01 ENCOUNTER — ANESTHESIA EVENT (OUTPATIENT)
Dept: OPERATING ROOM | Age: 69
DRG: 473 | End: 2019-11-01
Payer: MEDICARE

## 2019-11-04 ENCOUNTER — APPOINTMENT (OUTPATIENT)
Dept: GENERAL RADIOLOGY | Age: 69
DRG: 473 | End: 2019-11-04
Attending: ORTHOPAEDIC SURGERY
Payer: MEDICARE

## 2019-11-04 ENCOUNTER — HOSPITAL ENCOUNTER (INPATIENT)
Age: 69
LOS: 1 days | Discharge: HOME | DRG: 473 | End: 2019-11-05
Attending: ORTHOPAEDIC SURGERY | Admitting: ORTHOPAEDIC SURGERY
Payer: MEDICARE

## 2019-11-04 ENCOUNTER — TELEPHONE (OUTPATIENT)
Dept: ORTHOPEDIC SURGERY | Age: 69
End: 2019-11-04

## 2019-11-04 ENCOUNTER — ANESTHESIA (OUTPATIENT)
Dept: OPERATING ROOM | Age: 69
DRG: 473 | End: 2019-11-04
Payer: MEDICARE

## 2019-11-04 VITALS
RESPIRATION RATE: 5 BRPM | OXYGEN SATURATION: 97 % | SYSTOLIC BLOOD PRESSURE: 160 MMHG | DIASTOLIC BLOOD PRESSURE: 67 MMHG | TEMPERATURE: 97.7 F

## 2019-11-04 DIAGNOSIS — I48.0 PAROXYSMAL ATRIAL FIBRILLATION (HCC): ICD-10-CM

## 2019-11-04 DIAGNOSIS — M48.02 CERVICAL STENOSIS OF SPINAL CANAL: Primary | ICD-10-CM

## 2019-11-04 PROBLEM — M47.22 OSTEOARTHRITIS OF SPINE WITH RADICULOPATHY, CERVICAL REGION: Status: ACTIVE | Noted: 2019-11-04

## 2019-11-04 LAB
ANION GAP SERPL CALCULATED.3IONS-SCNC: 14 MMOL/L (ref 3–16)
APTT: 32.9 SEC (ref 26–36)
BASOPHILS ABSOLUTE: 0 K/UL (ref 0–0.2)
BASOPHILS RELATIVE PERCENT: 0.4 %
BUN BLDV-MCNC: 12 MG/DL (ref 7–20)
CALCIUM SERPL-MCNC: 9.1 MG/DL (ref 8.3–10.6)
CHLORIDE BLD-SCNC: 103 MMOL/L (ref 99–110)
CO2: 23 MMOL/L (ref 21–32)
CREAT SERPL-MCNC: <0.5 MG/DL (ref 0.6–1.2)
EOSINOPHILS ABSOLUTE: 0.1 K/UL (ref 0–0.6)
EOSINOPHILS RELATIVE PERCENT: 2.3 %
GFR AFRICAN AMERICAN: >60
GFR NON-AFRICAN AMERICAN: >60
GLUCOSE BLD-MCNC: 139 MG/DL (ref 70–99)
GLUCOSE BLD-MCNC: 143 MG/DL (ref 70–99)
GLUCOSE BLD-MCNC: 155 MG/DL (ref 70–99)
GLUCOSE BLD-MCNC: 203 MG/DL (ref 70–99)
GLUCOSE BLD-MCNC: 230 MG/DL (ref 70–99)
HCT VFR BLD CALC: 40.5 % (ref 36–48)
HEMOGLOBIN: 13.4 G/DL (ref 12–16)
INR BLD: 1.02 (ref 0.86–1.14)
LYMPHOCYTES ABSOLUTE: 1.8 K/UL (ref 1–5.1)
LYMPHOCYTES RELATIVE PERCENT: 29.4 %
MCH RBC QN AUTO: 28.4 PG (ref 26–34)
MCHC RBC AUTO-ENTMCNC: 33 G/DL (ref 31–36)
MCV RBC AUTO: 85.9 FL (ref 80–100)
MONOCYTES ABSOLUTE: 0.3 K/UL (ref 0–1.3)
MONOCYTES RELATIVE PERCENT: 5.4 %
NEUTROPHILS ABSOLUTE: 3.8 K/UL (ref 1.7–7.7)
NEUTROPHILS RELATIVE PERCENT: 62.5 %
PDW BLD-RTO: 15.6 % (ref 12.4–15.4)
PERFORMED ON: ABNORMAL
PLATELET # BLD: 224 K/UL (ref 135–450)
PMV BLD AUTO: 7.3 FL (ref 5–10.5)
POTASSIUM SERPL-SCNC: 4.5 MMOL/L (ref 3.5–5.1)
PROTHROMBIN TIME: 11.6 SEC (ref 9.8–13)
RBC # BLD: 4.71 M/UL (ref 4–5.2)
SODIUM BLD-SCNC: 140 MMOL/L (ref 136–145)
WBC # BLD: 6 K/UL (ref 4–11)

## 2019-11-04 PROCEDURE — 7100000001 HC PACU RECOVERY - ADDTL 15 MIN: Performed by: ORTHOPAEDIC SURGERY

## 2019-11-04 PROCEDURE — 2780000010 HC IMPLANT OTHER: Performed by: ORTHOPAEDIC SURGERY

## 2019-11-04 PROCEDURE — 2709999900 HC NON-CHARGEABLE SUPPLY: Performed by: ORTHOPAEDIC SURGERY

## 2019-11-04 PROCEDURE — 6370000000 HC RX 637 (ALT 250 FOR IP): Performed by: ORTHOPAEDIC SURGERY

## 2019-11-04 PROCEDURE — 2500000003 HC RX 250 WO HCPCS: Performed by: NURSE ANESTHETIST, CERTIFIED REGISTERED

## 2019-11-04 PROCEDURE — 85730 THROMBOPLASTIN TIME PARTIAL: CPT

## 2019-11-04 PROCEDURE — 2580000003 HC RX 258: Performed by: ANESTHESIOLOGY

## 2019-11-04 PROCEDURE — 72020 X-RAY EXAM OF SPINE 1 VIEW: CPT

## 2019-11-04 PROCEDURE — 85025 COMPLETE CBC W/AUTO DIFF WBC: CPT

## 2019-11-04 PROCEDURE — 1200000000 HC SEMI PRIVATE

## 2019-11-04 PROCEDURE — 6370000000 HC RX 637 (ALT 250 FOR IP): Performed by: PHYSICIAN ASSISTANT

## 2019-11-04 PROCEDURE — 3700000001 HC ADD 15 MINUTES (ANESTHESIA): Performed by: ORTHOPAEDIC SURGERY

## 2019-11-04 PROCEDURE — 0RG10A0 FUSION OF CERVICAL VERTEBRAL JOINT WITH INTERBODY FUSION DEVICE, ANTERIOR APPROACH, ANTERIOR COLUMN, OPEN APPROACH: ICD-10-PCS | Performed by: ORTHOPAEDIC SURGERY

## 2019-11-04 PROCEDURE — 7100000000 HC PACU RECOVERY - FIRST 15 MIN: Performed by: ORTHOPAEDIC SURGERY

## 2019-11-04 PROCEDURE — 80048 BASIC METABOLIC PNL TOTAL CA: CPT

## 2019-11-04 PROCEDURE — 2720000010 HC SURG SUPPLY STERILE: Performed by: ORTHOPAEDIC SURGERY

## 2019-11-04 PROCEDURE — 2500000003 HC RX 250 WO HCPCS: Performed by: ORTHOPAEDIC SURGERY

## 2019-11-04 PROCEDURE — 3600000005 HC SURGERY LEVEL 5 BASE: Performed by: ORTHOPAEDIC SURGERY

## 2019-11-04 PROCEDURE — 6360000002 HC RX W HCPCS: Performed by: ORTHOPAEDIC SURGERY

## 2019-11-04 PROCEDURE — 2580000003 HC RX 258: Performed by: ORTHOPAEDIC SURGERY

## 2019-11-04 PROCEDURE — 3700000000 HC ANESTHESIA ATTENDED CARE: Performed by: ORTHOPAEDIC SURGERY

## 2019-11-04 PROCEDURE — 85610 PROTHROMBIN TIME: CPT

## 2019-11-04 PROCEDURE — 2500000003 HC RX 250 WO HCPCS: Performed by: PHYSICIAN ASSISTANT

## 2019-11-04 PROCEDURE — 2580000003 HC RX 258: Performed by: NURSE ANESTHETIST, CERTIFIED REGISTERED

## 2019-11-04 PROCEDURE — 3600000015 HC SURGERY LEVEL 5 ADDTL 15MIN: Performed by: ORTHOPAEDIC SURGERY

## 2019-11-04 PROCEDURE — 94760 N-INVAS EAR/PLS OXIMETRY 1: CPT

## 2019-11-04 PROCEDURE — 3209999900 FLUORO FOR SURGICAL PROCEDURES

## 2019-11-04 PROCEDURE — 6360000002 HC RX W HCPCS: Performed by: NURSE ANESTHETIST, CERTIFIED REGISTERED

## 2019-11-04 PROCEDURE — 0RG20K0 FUSION OF 2 OR MORE CERVICAL VERTEBRAL JOINTS WITH NONAUTOLOGOUS TISSUE SUBSTITUTE, ANTERIOR APPROACH, ANTERIOR COLUMN, OPEN APPROACH: ICD-10-PCS | Performed by: ORTHOPAEDIC SURGERY

## 2019-11-04 PROCEDURE — 6360000002 HC RX W HCPCS: Performed by: ANESTHESIOLOGY

## 2019-11-04 PROCEDURE — 2580000003 HC RX 258: Performed by: PHYSICIAN ASSISTANT

## 2019-11-04 PROCEDURE — C1713 ANCHOR/SCREW BN/BN,TIS/BN: HCPCS | Performed by: ORTHOPAEDIC SURGERY

## 2019-11-04 PROCEDURE — C1729 CATH, DRAINAGE: HCPCS | Performed by: ORTHOPAEDIC SURGERY

## 2019-11-04 PROCEDURE — 0RB30ZZ EXCISION OF CERVICAL VERTEBRAL DISC, OPEN APPROACH: ICD-10-PCS | Performed by: ORTHOPAEDIC SURGERY

## 2019-11-04 DEVICE — GRAFT HUM TISS W14XH7MM D11MM CANC CORT LORD SPNL SPCR BLK: Type: IMPLANTABLE DEVICE | Site: NECK | Status: FUNCTIONAL

## 2019-11-04 DEVICE — PLATE 3001019 ZEVO 19MM 1 LVL
Type: IMPLANTABLE DEVICE | Site: NECK | Status: FUNCTIONAL
Brand: ZEVO™ ANTERIOR CERVICAL PLATE SYSTEM

## 2019-11-04 DEVICE — GRAFT HUM TISS W14XH6MM D11MM CANC CORT LORD SPNL SPCR BLK: Type: IMPLANTABLE DEVICE | Site: NECK | Status: FUNCTIONAL

## 2019-11-04 RX ORDER — ATORVASTATIN CALCIUM 40 MG/1
40 TABLET, FILM COATED ORAL NIGHTLY
Status: DISCONTINUED | OUTPATIENT
Start: 2019-11-04 | End: 2019-11-05 | Stop reason: HOSPADM

## 2019-11-04 RX ORDER — BUPIVACAINE HYDROCHLORIDE AND EPINEPHRINE 2.5; 5 MG/ML; UG/ML
INJECTION, SOLUTION INFILTRATION; PERINEURAL
Status: COMPLETED | OUTPATIENT
Start: 2019-11-04 | End: 2019-11-04

## 2019-11-04 RX ORDER — LOSARTAN POTASSIUM 25 MG/1
25 TABLET ORAL NIGHTLY
Status: DISCONTINUED | OUTPATIENT
Start: 2019-11-04 | End: 2019-11-05 | Stop reason: HOSPADM

## 2019-11-04 RX ORDER — SERTRALINE HYDROCHLORIDE 100 MG/1
100 TABLET, FILM COATED ORAL NIGHTLY
Status: DISCONTINUED | OUTPATIENT
Start: 2019-11-04 | End: 2019-11-05 | Stop reason: HOSPADM

## 2019-11-04 RX ORDER — MONTELUKAST SODIUM 10 MG/1
10 TABLET ORAL NIGHTLY
Status: DISCONTINUED | OUTPATIENT
Start: 2019-11-04 | End: 2019-11-05 | Stop reason: HOSPADM

## 2019-11-04 RX ORDER — HYDROMORPHONE HCL 110MG/55ML
PATIENT CONTROLLED ANALGESIA SYRINGE INTRAVENOUS PRN
Status: DISCONTINUED | OUTPATIENT
Start: 2019-11-04 | End: 2019-11-04 | Stop reason: SDUPTHER

## 2019-11-04 RX ORDER — CLINDAMYCIN PHOSPHATE 900 MG/50ML
900 INJECTION INTRAVENOUS EVERY 8 HOURS
Status: COMPLETED | OUTPATIENT
Start: 2019-11-04 | End: 2019-11-05

## 2019-11-04 RX ORDER — PROPOFOL 10 MG/ML
INJECTION, EMULSION INTRAVENOUS CONTINUOUS PRN
Status: DISCONTINUED | OUTPATIENT
Start: 2019-11-04 | End: 2019-11-04 | Stop reason: SDUPTHER

## 2019-11-04 RX ORDER — DEXTROSE MONOHYDRATE 50 MG/ML
100 INJECTION, SOLUTION INTRAVENOUS PRN
Status: DISCONTINUED | OUTPATIENT
Start: 2019-11-04 | End: 2019-11-05 | Stop reason: HOSPADM

## 2019-11-04 RX ORDER — OXYCODONE HYDROCHLORIDE 5 MG/1
5 TABLET ORAL PRN
Status: DISCONTINUED | OUTPATIENT
Start: 2019-11-04 | End: 2019-11-04 | Stop reason: HOSPADM

## 2019-11-04 RX ORDER — INSULIN GLARGINE 100 [IU]/ML
50 INJECTION, SOLUTION SUBCUTANEOUS NIGHTLY
Status: DISCONTINUED | OUTPATIENT
Start: 2019-11-04 | End: 2019-11-05 | Stop reason: HOSPADM

## 2019-11-04 RX ORDER — MEPERIDINE HYDROCHLORIDE 25 MG/ML
12.5 INJECTION INTRAMUSCULAR; INTRAVENOUS; SUBCUTANEOUS EVERY 5 MIN PRN
Status: DISCONTINUED | OUTPATIENT
Start: 2019-11-04 | End: 2019-11-04 | Stop reason: HOSPADM

## 2019-11-04 RX ORDER — FENTANYL CITRATE 50 UG/ML
50 INJECTION, SOLUTION INTRAMUSCULAR; INTRAVENOUS EVERY 5 MIN PRN
Status: DISCONTINUED | OUTPATIENT
Start: 2019-11-04 | End: 2019-11-04 | Stop reason: HOSPADM

## 2019-11-04 RX ORDER — SODIUM CHLORIDE 0.9 % (FLUSH) 0.9 %
10 SYRINGE (ML) INJECTION EVERY 12 HOURS SCHEDULED
Status: DISCONTINUED | OUTPATIENT
Start: 2019-11-04 | End: 2019-11-04 | Stop reason: HOSPADM

## 2019-11-04 RX ORDER — OXYCODONE HYDROCHLORIDE AND ACETAMINOPHEN 5; 325 MG/1; MG/1
1 TABLET ORAL EVERY 4 HOURS PRN
Status: DISCONTINUED | OUTPATIENT
Start: 2019-11-04 | End: 2019-11-05 | Stop reason: HOSPADM

## 2019-11-04 RX ORDER — DIPHENHYDRAMINE HCL 25 MG
25 TABLET ORAL NIGHTLY
Status: DISCONTINUED | OUTPATIENT
Start: 2019-11-04 | End: 2019-11-05 | Stop reason: HOSPADM

## 2019-11-04 RX ORDER — ONDANSETRON 2 MG/ML
INJECTION INTRAMUSCULAR; INTRAVENOUS PRN
Status: DISCONTINUED | OUTPATIENT
Start: 2019-11-04 | End: 2019-11-04 | Stop reason: SDUPTHER

## 2019-11-04 RX ORDER — DOCUSATE SODIUM 100 MG/1
100 CAPSULE, LIQUID FILLED ORAL 2 TIMES DAILY PRN
Qty: 30 CAPSULE | Refills: 0 | Status: SHIPPED | OUTPATIENT
Start: 2019-11-04 | End: 2019-11-19

## 2019-11-04 RX ORDER — WARFARIN SODIUM 1 MG/1
TABLET ORAL
Qty: 1 TABLET | Refills: 0 | Status: SHIPPED | OUTPATIENT
Start: 2019-11-07 | End: 2022-10-05

## 2019-11-04 RX ORDER — SODIUM CHLORIDE, SODIUM LACTATE, POTASSIUM CHLORIDE, CALCIUM CHLORIDE 600; 310; 30; 20 MG/100ML; MG/100ML; MG/100ML; MG/100ML
INJECTION, SOLUTION INTRAVENOUS CONTINUOUS
Status: DISCONTINUED | OUTPATIENT
Start: 2019-11-04 | End: 2019-11-05 | Stop reason: HOSPADM

## 2019-11-04 RX ORDER — FENTANYL CITRATE 50 UG/ML
INJECTION, SOLUTION INTRAMUSCULAR; INTRAVENOUS PRN
Status: DISCONTINUED | OUTPATIENT
Start: 2019-11-04 | End: 2019-11-04 | Stop reason: SDUPTHER

## 2019-11-04 RX ORDER — NICOTINE POLACRILEX 4 MG
15 LOZENGE BUCCAL PRN
Status: DISCONTINUED | OUTPATIENT
Start: 2019-11-04 | End: 2019-11-05 | Stop reason: HOSPADM

## 2019-11-04 RX ORDER — OXYCODONE HYDROCHLORIDE 5 MG/1
10 TABLET ORAL PRN
Status: DISCONTINUED | OUTPATIENT
Start: 2019-11-04 | End: 2019-11-04 | Stop reason: HOSPADM

## 2019-11-04 RX ORDER — OXYCODONE HYDROCHLORIDE AND ACETAMINOPHEN 5; 325 MG/1; MG/1
1-2 TABLET ORAL
Qty: 50 TABLET | Refills: 0 | Status: SHIPPED | OUTPATIENT
Start: 2019-11-04 | End: 2019-11-11

## 2019-11-04 RX ORDER — FENTANYL CITRATE 50 UG/ML
25 INJECTION, SOLUTION INTRAMUSCULAR; INTRAVENOUS EVERY 5 MIN PRN
Status: DISCONTINUED | OUTPATIENT
Start: 2019-11-04 | End: 2019-11-04 | Stop reason: HOSPADM

## 2019-11-04 RX ORDER — ACETAMINOPHEN 10 MG/ML
1000 INJECTION, SOLUTION INTRAVENOUS ONCE
Status: COMPLETED | OUTPATIENT
Start: 2019-11-04 | End: 2019-11-04

## 2019-11-04 RX ORDER — SODIUM CHLORIDE 0.9 % (FLUSH) 0.9 %
10 SYRINGE (ML) INJECTION EVERY 12 HOURS SCHEDULED
Status: DISCONTINUED | OUTPATIENT
Start: 2019-11-04 | End: 2019-11-05 | Stop reason: HOSPADM

## 2019-11-04 RX ORDER — PROPOFOL 10 MG/ML
INJECTION, EMULSION INTRAVENOUS PRN
Status: DISCONTINUED | OUTPATIENT
Start: 2019-11-04 | End: 2019-11-04 | Stop reason: SDUPTHER

## 2019-11-04 RX ORDER — AMITRIPTYLINE HYDROCHLORIDE 50 MG/1
50 TABLET, FILM COATED ORAL NIGHTLY
Status: DISCONTINUED | OUTPATIENT
Start: 2019-11-04 | End: 2019-11-05 | Stop reason: HOSPADM

## 2019-11-04 RX ORDER — SUCCINYLCHOLINE/SOD CL,ISO/PF 200MG/10ML
SYRINGE (ML) INTRAVENOUS PRN
Status: DISCONTINUED | OUTPATIENT
Start: 2019-11-04 | End: 2019-11-04 | Stop reason: SDUPTHER

## 2019-11-04 RX ORDER — CYCLOBENZAPRINE HCL 5 MG
5 TABLET ORAL 2 TIMES DAILY PRN
Qty: 30 TABLET | Refills: 0 | Status: SHIPPED | OUTPATIENT
Start: 2019-11-04 | End: 2019-11-19

## 2019-11-04 RX ORDER — DEXTROSE MONOHYDRATE 25 G/50ML
12.5 INJECTION, SOLUTION INTRAVENOUS PRN
Status: DISCONTINUED | OUTPATIENT
Start: 2019-11-04 | End: 2019-11-05 | Stop reason: HOSPADM

## 2019-11-04 RX ORDER — ONDANSETRON 2 MG/ML
4 INJECTION INTRAMUSCULAR; INTRAVENOUS EVERY 6 HOURS PRN
Status: DISCONTINUED | OUTPATIENT
Start: 2019-11-04 | End: 2019-11-05 | Stop reason: HOSPADM

## 2019-11-04 RX ORDER — LIDOCAINE HYDROCHLORIDE 20 MG/ML
INJECTION, SOLUTION INFILTRATION; PERINEURAL PRN
Status: DISCONTINUED | OUTPATIENT
Start: 2019-11-04 | End: 2019-11-04 | Stop reason: SDUPTHER

## 2019-11-04 RX ORDER — ONDANSETRON 2 MG/ML
4 INJECTION INTRAMUSCULAR; INTRAVENOUS
Status: DISCONTINUED | OUTPATIENT
Start: 2019-11-04 | End: 2019-11-04 | Stop reason: HOSPADM

## 2019-11-04 RX ORDER — FLUTICASONE PROPIONATE 50 MCG
1 SPRAY, SUSPENSION (ML) NASAL NIGHTLY
Status: DISCONTINUED | OUTPATIENT
Start: 2019-11-04 | End: 2019-11-05 | Stop reason: HOSPADM

## 2019-11-04 RX ORDER — OXYCODONE HYDROCHLORIDE AND ACETAMINOPHEN 5; 325 MG/1; MG/1
2 TABLET ORAL EVERY 4 HOURS PRN
Status: DISCONTINUED | OUTPATIENT
Start: 2019-11-04 | End: 2019-11-05 | Stop reason: HOSPADM

## 2019-11-04 RX ORDER — DOCUSATE SODIUM 100 MG/1
100 CAPSULE, LIQUID FILLED ORAL 2 TIMES DAILY
Status: DISCONTINUED | OUTPATIENT
Start: 2019-11-04 | End: 2019-11-05 | Stop reason: HOSPADM

## 2019-11-04 RX ORDER — SODIUM CHLORIDE 9 MG/ML
INJECTION, SOLUTION INTRAVENOUS CONTINUOUS
Status: DISCONTINUED | OUTPATIENT
Start: 2019-11-04 | End: 2019-11-04

## 2019-11-04 RX ORDER — PANTOPRAZOLE SODIUM 40 MG/1
40 TABLET, DELAYED RELEASE ORAL
Status: DISCONTINUED | OUTPATIENT
Start: 2019-11-04 | End: 2019-11-05 | Stop reason: HOSPADM

## 2019-11-04 RX ORDER — SODIUM CHLORIDE 0.9 % (FLUSH) 0.9 %
10 SYRINGE (ML) INJECTION PRN
Status: DISCONTINUED | OUTPATIENT
Start: 2019-11-04 | End: 2019-11-04 | Stop reason: HOSPADM

## 2019-11-04 RX ORDER — SODIUM CHLORIDE 0.9 % (FLUSH) 0.9 %
10 SYRINGE (ML) INJECTION PRN
Status: DISCONTINUED | OUTPATIENT
Start: 2019-11-04 | End: 2019-11-05 | Stop reason: HOSPADM

## 2019-11-04 RX ORDER — CYCLOBENZAPRINE HCL 10 MG
10 TABLET ORAL 3 TIMES DAILY PRN
Status: DISCONTINUED | OUTPATIENT
Start: 2019-11-04 | End: 2019-11-05 | Stop reason: HOSPADM

## 2019-11-04 RX ORDER — WARFARIN SODIUM 2 MG/1
TABLET ORAL
Qty: 1 TABLET | Refills: 0 | Status: SHIPPED | OUTPATIENT
Start: 2019-11-07

## 2019-11-04 RX ORDER — DEXAMETHASONE SODIUM PHOSPHATE 10 MG/ML
INJECTION, SOLUTION INTRAMUSCULAR; INTRAVENOUS PRN
Status: DISCONTINUED | OUTPATIENT
Start: 2019-11-04 | End: 2019-11-04 | Stop reason: SDUPTHER

## 2019-11-04 RX ORDER — ONDANSETRON 4 MG/1
4 TABLET, ORALLY DISINTEGRATING ORAL EVERY 8 HOURS PRN
Status: DISCONTINUED | OUTPATIENT
Start: 2019-11-04 | End: 2019-11-05 | Stop reason: HOSPADM

## 2019-11-04 RX ADMIN — PANTOPRAZOLE SODIUM 40 MG: 40 TABLET, DELAYED RELEASE ORAL at 17:05

## 2019-11-04 RX ADMIN — INSULIN GLARGINE 50 UNITS: 100 INJECTION, SOLUTION SUBCUTANEOUS at 22:10

## 2019-11-04 RX ADMIN — DOCUSATE SODIUM 100 MG: 100 CAPSULE, LIQUID FILLED ORAL at 21:12

## 2019-11-04 RX ADMIN — FENTANYL CITRATE 50 MCG: 50 INJECTION, SOLUTION INTRAMUSCULAR; INTRAVENOUS at 13:55

## 2019-11-04 RX ADMIN — PHENYLEPHRINE HYDROCHLORIDE 200 MCG: 10 INJECTION INTRAVENOUS at 11:37

## 2019-11-04 RX ADMIN — INSULIN LISPRO 5 UNITS: 100 INJECTION, SOLUTION INTRAVENOUS; SUBCUTANEOUS at 18:00

## 2019-11-04 RX ADMIN — INSULIN LISPRO 2 UNITS: 100 INJECTION, SOLUTION INTRAVENOUS; SUBCUTANEOUS at 22:10

## 2019-11-04 RX ADMIN — PHENYLEPHRINE HYDROCHLORIDE 100 MCG: 10 INJECTION INTRAVENOUS at 11:47

## 2019-11-04 RX ADMIN — FENTANYL CITRATE 100 MCG: 50 INJECTION INTRAMUSCULAR; INTRAVENOUS at 11:22

## 2019-11-04 RX ADMIN — FENTANYL CITRATE 25 MCG: 50 INJECTION, SOLUTION INTRAMUSCULAR; INTRAVENOUS at 14:31

## 2019-11-04 RX ADMIN — LIDOCAINE HYDROCHLORIDE 100 MG: 20 INJECTION, SOLUTION INFILTRATION; PERINEURAL at 11:13

## 2019-11-04 RX ADMIN — DEXAMETHASONE SODIUM PHOSPHATE 10 MG: 10 INJECTION, SOLUTION INTRAMUSCULAR; INTRAVENOUS at 11:13

## 2019-11-04 RX ADMIN — SODIUM CHLORIDE, POTASSIUM CHLORIDE, SODIUM LACTATE AND CALCIUM CHLORIDE: 600; 310; 30; 20 INJECTION, SOLUTION INTRAVENOUS at 17:05

## 2019-11-04 RX ADMIN — METFORMIN HYDROCHLORIDE 1000 MG: 500 TABLET ORAL at 17:05

## 2019-11-04 RX ADMIN — SODIUM CHLORIDE: 9 INJECTION, SOLUTION INTRAVENOUS at 11:09

## 2019-11-04 RX ADMIN — OXYCODONE HYDROCHLORIDE AND ACETAMINOPHEN 2 TABLET: 5; 325 TABLET ORAL at 17:05

## 2019-11-04 RX ADMIN — ATORVASTATIN CALCIUM 40 MG: 40 TABLET, FILM COATED ORAL at 21:12

## 2019-11-04 RX ADMIN — CLINDAMYCIN PHOSPHATE 900 MG: 900 INJECTION, SOLUTION INTRAVENOUS at 17:54

## 2019-11-04 RX ADMIN — FENTANYL CITRATE 50 MCG: 50 INJECTION, SOLUTION INTRAMUSCULAR; INTRAVENOUS at 13:26

## 2019-11-04 RX ADMIN — Medication 100 MG: at 11:13

## 2019-11-04 RX ADMIN — ACETAMINOPHEN 1000 MG: 10 INJECTION, SOLUTION INTRAVENOUS at 10:35

## 2019-11-04 RX ADMIN — OXYCODONE HYDROCHLORIDE AND ACETAMINOPHEN 2 TABLET: 5; 325 TABLET ORAL at 21:12

## 2019-11-04 RX ADMIN — INSULIN LISPRO 4 UNITS: 100 INJECTION, SOLUTION INTRAVENOUS; SUBCUTANEOUS at 17:54

## 2019-11-04 RX ADMIN — DIPHENHYDRAMINE HCL 25 MG: 25 TABLET ORAL at 21:12

## 2019-11-04 RX ADMIN — Medication 1500 MG: at 10:35

## 2019-11-04 RX ADMIN — LOSARTAN POTASSIUM 25 MG: 25 TABLET ORAL at 21:12

## 2019-11-04 RX ADMIN — MONTELUKAST SODIUM 10 MG: 10 TABLET, FILM COATED ORAL at 21:12

## 2019-11-04 RX ADMIN — SERTRALINE 100 MG: 100 TABLET, FILM COATED ORAL at 21:12

## 2019-11-04 RX ADMIN — HYDROMORPHONE HYDROCHLORIDE 1 MG: 2 INJECTION INTRAMUSCULAR; INTRAVENOUS; SUBCUTANEOUS at 12:58

## 2019-11-04 RX ADMIN — PROPOFOL 140 MCG/KG/MIN: 10 INJECTION, EMULSION INTRAVENOUS at 11:13

## 2019-11-04 RX ADMIN — PROPOFOL 150 MG: 10 INJECTION, EMULSION INTRAVENOUS at 11:13

## 2019-11-04 RX ADMIN — PHENYLEPHRINE HYDROCHLORIDE 100 MCG: 10 INJECTION INTRAVENOUS at 11:32

## 2019-11-04 RX ADMIN — FENTANYL CITRATE 25 MCG: 50 INJECTION, SOLUTION INTRAMUSCULAR; INTRAVENOUS at 14:07

## 2019-11-04 RX ADMIN — FENTANYL CITRATE 50 MCG: 50 INJECTION, SOLUTION INTRAMUSCULAR; INTRAVENOUS at 13:37

## 2019-11-04 RX ADMIN — FLUTICASONE PROPIONATE 1 SPRAY: 50 SPRAY, METERED NASAL at 21:00

## 2019-11-04 RX ADMIN — ONDANSETRON 4 MG: 2 INJECTION INTRAMUSCULAR; INTRAVENOUS at 12:57

## 2019-11-04 RX ADMIN — AMITRIPTYLINE HYDROCHLORIDE 50 MG: 50 TABLET, FILM COATED ORAL at 21:12

## 2019-11-04 RX ADMIN — REMIFENTANIL HYDROCHLORIDE 0.12 MCG/KG/MIN: 1 INJECTION, POWDER, LYOPHILIZED, FOR SOLUTION INTRAVENOUS at 11:13

## 2019-11-04 ASSESSMENT — PULMONARY FUNCTION TESTS
PIF_VALUE: 20
PIF_VALUE: 0
PIF_VALUE: 21
PIF_VALUE: 23
PIF_VALUE: 21
PIF_VALUE: 21
PIF_VALUE: 18
PIF_VALUE: 22
PIF_VALUE: 22
PIF_VALUE: 19
PIF_VALUE: 20
PIF_VALUE: 9
PIF_VALUE: 20
PIF_VALUE: 3
PIF_VALUE: 4
PIF_VALUE: 20
PIF_VALUE: 20
PIF_VALUE: 21
PIF_VALUE: 20
PIF_VALUE: 19
PIF_VALUE: 23
PIF_VALUE: 23
PIF_VALUE: 1
PIF_VALUE: 19
PIF_VALUE: 20
PIF_VALUE: 19
PIF_VALUE: 19
PIF_VALUE: 22
PIF_VALUE: 7
PIF_VALUE: 19
PIF_VALUE: 23
PIF_VALUE: 20
PIF_VALUE: 20
PIF_VALUE: 21
PIF_VALUE: 23
PIF_VALUE: 20
PIF_VALUE: 20
PIF_VALUE: 0
PIF_VALUE: 19
PIF_VALUE: 22
PIF_VALUE: 21
PIF_VALUE: 21
PIF_VALUE: 23
PIF_VALUE: 18
PIF_VALUE: 22
PIF_VALUE: 21
PIF_VALUE: 3
PIF_VALUE: 22
PIF_VALUE: 22
PIF_VALUE: 18
PIF_VALUE: 3
PIF_VALUE: 19
PIF_VALUE: 20
PIF_VALUE: 18
PIF_VALUE: 21
PIF_VALUE: 18
PIF_VALUE: 23
PIF_VALUE: 1
PIF_VALUE: 19
PIF_VALUE: 23
PIF_VALUE: 23
PIF_VALUE: 1
PIF_VALUE: 5
PIF_VALUE: 4
PIF_VALUE: 20
PIF_VALUE: 23
PIF_VALUE: 19
PIF_VALUE: 4
PIF_VALUE: 20
PIF_VALUE: 21
PIF_VALUE: 24
PIF_VALUE: 22
PIF_VALUE: 2
PIF_VALUE: 19
PIF_VALUE: 21
PIF_VALUE: 23
PIF_VALUE: 22
PIF_VALUE: 23
PIF_VALUE: 23
PIF_VALUE: 22
PIF_VALUE: 20
PIF_VALUE: 23
PIF_VALUE: 23
PIF_VALUE: 19
PIF_VALUE: 2
PIF_VALUE: 19
PIF_VALUE: 3
PIF_VALUE: 20
PIF_VALUE: 3
PIF_VALUE: 23
PIF_VALUE: 21
PIF_VALUE: 17
PIF_VALUE: 0
PIF_VALUE: 21
PIF_VALUE: 20
PIF_VALUE: 20
PIF_VALUE: 3
PIF_VALUE: 4
PIF_VALUE: 22
PIF_VALUE: 1
PIF_VALUE: 1
PIF_VALUE: 17
PIF_VALUE: 3
PIF_VALUE: 20
PIF_VALUE: 20
PIF_VALUE: 21
PIF_VALUE: 23
PIF_VALUE: 4
PIF_VALUE: 4
PIF_VALUE: 22
PIF_VALUE: 21
PIF_VALUE: 3
PIF_VALUE: 19
PIF_VALUE: 21
PIF_VALUE: 1
PIF_VALUE: 21
PIF_VALUE: 20
PIF_VALUE: 19
PIF_VALUE: 19
PIF_VALUE: 21
PIF_VALUE: 23
PIF_VALUE: 2

## 2019-11-04 ASSESSMENT — PAIN DESCRIPTION - LOCATION
LOCATION: NECK;HEAD
LOCATION: NECK
LOCATION: NECK;SHOULDER
LOCATION: NECK
LOCATION: NECK
LOCATION: NECK;SHOULDER
LOCATION: NECK
LOCATION: NECK;SHOULDER

## 2019-11-04 ASSESSMENT — PAIN SCALES - GENERAL
PAINLEVEL_OUTOF10: 10
PAINLEVEL_OUTOF10: 5
PAINLEVEL_OUTOF10: 10
PAINLEVEL_OUTOF10: 10
PAINLEVEL_OUTOF10: 7
PAINLEVEL_OUTOF10: 3
PAINLEVEL_OUTOF10: 3
PAINLEVEL_OUTOF10: 5
PAINLEVEL_OUTOF10: 7
PAINLEVEL_OUTOF10: 0
PAINLEVEL_OUTOF10: 5
PAINLEVEL_OUTOF10: 10
PAINLEVEL_OUTOF10: 7
PAINLEVEL_OUTOF10: 4

## 2019-11-04 ASSESSMENT — PAIN DESCRIPTION - DESCRIPTORS
DESCRIPTORS: ACHING;DISCOMFORT
DESCRIPTORS: ACHING
DESCRIPTORS: ACHING
DESCRIPTORS: ACHING;DISCOMFORT
DESCRIPTORS: ACHING
DESCRIPTORS: ACHING
DESCRIPTORS: ACHING;DISCOMFORT
DESCRIPTORS: SORE
DESCRIPTORS: ACHING
DESCRIPTORS: ACHING;DISCOMFORT

## 2019-11-04 ASSESSMENT — PAIN DESCRIPTION - FREQUENCY
FREQUENCY: CONTINUOUS

## 2019-11-04 ASSESSMENT — PAIN DESCRIPTION - ORIENTATION
ORIENTATION: LEFT;ANTERIOR
ORIENTATION: LEFT;ANTERIOR
ORIENTATION: LEFT;RIGHT
ORIENTATION: LEFT
ORIENTATION: LEFT;RIGHT
ORIENTATION: LEFT;ANTERIOR
ORIENTATION: LEFT;ANTERIOR
ORIENTATION: LEFT;RIGHT
ORIENTATION: ANTERIOR;LEFT
ORIENTATION: LEFT;ANTERIOR
ORIENTATION: MID
ORIENTATION: LEFT;ANTERIOR
ORIENTATION: LEFT;ANTERIOR

## 2019-11-04 ASSESSMENT — PAIN DESCRIPTION - PROGRESSION
CLINICAL_PROGRESSION: GRADUALLY WORSENING
CLINICAL_PROGRESSION: GRADUALLY IMPROVING
CLINICAL_PROGRESSION: NOT CHANGED
CLINICAL_PROGRESSION: NOT CHANGED
CLINICAL_PROGRESSION: GRADUALLY IMPROVING
CLINICAL_PROGRESSION: GRADUALLY IMPROVING
CLINICAL_PROGRESSION: NOT CHANGED
CLINICAL_PROGRESSION: NOT CHANGED
CLINICAL_PROGRESSION: GRADUALLY IMPROVING

## 2019-11-04 ASSESSMENT — PAIN DESCRIPTION - PAIN TYPE
TYPE: SURGICAL PAIN
TYPE: ACUTE PAIN

## 2019-11-04 ASSESSMENT — PAIN - FUNCTIONAL ASSESSMENT
PAIN_FUNCTIONAL_ASSESSMENT: PREVENTS OR INTERFERES SOME ACTIVE ACTIVITIES AND ADLS
PAIN_FUNCTIONAL_ASSESSMENT: 0-10
PAIN_FUNCTIONAL_ASSESSMENT: PREVENTS OR INTERFERES SOME ACTIVE ACTIVITIES AND ADLS
PAIN_FUNCTIONAL_ASSESSMENT: PREVENTS OR INTERFERES SOME ACTIVE ACTIVITIES AND ADLS

## 2019-11-04 ASSESSMENT — PAIN DESCRIPTION - ONSET
ONSET: ON-GOING

## 2019-11-04 ASSESSMENT — LIFESTYLE VARIABLES: SMOKING_STATUS: 0

## 2019-11-04 ASSESSMENT — ENCOUNTER SYMPTOMS: SHORTNESS OF BREATH: 0

## 2019-11-05 VITALS
RESPIRATION RATE: 12 BRPM | DIASTOLIC BLOOD PRESSURE: 76 MMHG | HEART RATE: 68 BPM | SYSTOLIC BLOOD PRESSURE: 117 MMHG | HEIGHT: 63 IN | BODY MASS INDEX: 33.79 KG/M2 | OXYGEN SATURATION: 95 % | WEIGHT: 190.7 LBS | TEMPERATURE: 98.3 F

## 2019-11-05 LAB
GLUCOSE BLD-MCNC: 103 MG/DL (ref 70–99)
HCT VFR BLD CALC: 36.9 % (ref 36–48)
HEMOGLOBIN: 12.5 G/DL (ref 12–16)
PERFORMED ON: ABNORMAL

## 2019-11-05 PROCEDURE — 36415 COLL VENOUS BLD VENIPUNCTURE: CPT

## 2019-11-05 PROCEDURE — 2500000003 HC RX 250 WO HCPCS: Performed by: PHYSICIAN ASSISTANT

## 2019-11-05 PROCEDURE — 6360000002 HC RX W HCPCS: Performed by: PHYSICIAN ASSISTANT

## 2019-11-05 PROCEDURE — 97165 OT EVAL LOW COMPLEX 30 MIN: CPT

## 2019-11-05 PROCEDURE — 6370000000 HC RX 637 (ALT 250 FOR IP): Performed by: PHYSICIAN ASSISTANT

## 2019-11-05 PROCEDURE — 97530 THERAPEUTIC ACTIVITIES: CPT

## 2019-11-05 PROCEDURE — 85014 HEMATOCRIT: CPT

## 2019-11-05 PROCEDURE — 85018 HEMOGLOBIN: CPT

## 2019-11-05 PROCEDURE — 96374 THER/PROPH/DIAG INJ IV PUSH: CPT

## 2019-11-05 PROCEDURE — 97161 PT EVAL LOW COMPLEX 20 MIN: CPT

## 2019-11-05 RX ORDER — GEMFIBROZIL 600 MG/1
600 TABLET, FILM COATED ORAL
COMMUNITY

## 2019-11-05 RX ORDER — WARFARIN SODIUM 2 MG/1
2 TABLET ORAL
COMMUNITY
End: 2020-07-06

## 2019-11-05 RX ORDER — BUPROPION HYDROCHLORIDE 150 MG/1
150 TABLET ORAL EVERY MORNING
COMMUNITY

## 2019-11-05 RX ORDER — TOPIRAMATE 25 MG/1
25 TABLET ORAL 2 TIMES DAILY
COMMUNITY
End: 2022-10-05

## 2019-11-05 RX ADMIN — OXYCODONE HYDROCHLORIDE AND ACETAMINOPHEN 2 TABLET: 5; 325 TABLET ORAL at 05:14

## 2019-11-05 RX ADMIN — HYDROMORPHONE HYDROCHLORIDE 0.5 MG: 1 INJECTION, SOLUTION INTRAMUSCULAR; INTRAVENOUS; SUBCUTANEOUS at 00:11

## 2019-11-05 RX ADMIN — DOCUSATE SODIUM 100 MG: 100 CAPSULE, LIQUID FILLED ORAL at 09:23

## 2019-11-05 RX ADMIN — CLINDAMYCIN PHOSPHATE 900 MG: 900 INJECTION, SOLUTION INTRAVENOUS at 02:00

## 2019-11-05 RX ADMIN — METFORMIN HYDROCHLORIDE 1000 MG: 500 TABLET ORAL at 09:23

## 2019-11-05 RX ADMIN — CYCLOBENZAPRINE HYDROCHLORIDE 10 MG: 10 TABLET, FILM COATED ORAL at 05:15

## 2019-11-05 RX ADMIN — OXYCODONE HYDROCHLORIDE AND ACETAMINOPHEN 2 TABLET: 5; 325 TABLET ORAL at 09:23

## 2019-11-05 RX ADMIN — PANTOPRAZOLE SODIUM 40 MG: 40 TABLET, DELAYED RELEASE ORAL at 05:14

## 2019-11-05 ASSESSMENT — PAIN DESCRIPTION - LOCATION
LOCATION: NECK;SHOULDER
LOCATION: NECK
LOCATION: NECK;SHOULDER
LOCATION: HEAD;NECK

## 2019-11-05 ASSESSMENT — PAIN DESCRIPTION - FREQUENCY
FREQUENCY: CONTINUOUS

## 2019-11-05 ASSESSMENT — PAIN DESCRIPTION - PAIN TYPE
TYPE: ACUTE PAIN
TYPE: SURGICAL PAIN
TYPE: ACUTE PAIN
TYPE: SURGICAL PAIN

## 2019-11-05 ASSESSMENT — PAIN DESCRIPTION - DESCRIPTORS
DESCRIPTORS: ACHING

## 2019-11-05 ASSESSMENT — PAIN SCALES - GENERAL
PAINLEVEL_OUTOF10: 7
PAINLEVEL_OUTOF10: 7
PAINLEVEL_OUTOF10: 5
PAINLEVEL_OUTOF10: 0
PAINLEVEL_OUTOF10: 10
PAINLEVEL_OUTOF10: 0

## 2019-11-05 ASSESSMENT — PAIN DESCRIPTION - PROGRESSION
CLINICAL_PROGRESSION: GRADUALLY IMPROVING
CLINICAL_PROGRESSION: GRADUALLY WORSENING
CLINICAL_PROGRESSION: GRADUALLY WORSENING

## 2019-11-05 ASSESSMENT — PAIN DESCRIPTION - ONSET
ONSET: ON-GOING

## 2019-11-05 ASSESSMENT — PAIN - FUNCTIONAL ASSESSMENT
PAIN_FUNCTIONAL_ASSESSMENT: PREVENTS OR INTERFERES SOME ACTIVE ACTIVITIES AND ADLS
PAIN_FUNCTIONAL_ASSESSMENT: PREVENTS OR INTERFERES SOME ACTIVE ACTIVITIES AND ADLS

## 2019-11-05 ASSESSMENT — PAIN DESCRIPTION - ORIENTATION
ORIENTATION: MID
ORIENTATION: MID

## 2019-11-19 ENCOUNTER — OFFICE VISIT (OUTPATIENT)
Dept: ORTHOPEDIC SURGERY | Age: 69
End: 2019-11-19

## 2019-11-19 VITALS
HEIGHT: 63 IN | WEIGHT: 190 LBS | DIASTOLIC BLOOD PRESSURE: 74 MMHG | BODY MASS INDEX: 33.66 KG/M2 | SYSTOLIC BLOOD PRESSURE: 122 MMHG | HEART RATE: 84 BPM

## 2019-11-19 DIAGNOSIS — M47.22 OSTEOARTHRITIS OF SPINE WITH RADICULOPATHY, CERVICAL REGION: ICD-10-CM

## 2019-11-19 DIAGNOSIS — M47.812 CERVICAL SPONDYLOSIS WITHOUT MYELOPATHY: Primary | ICD-10-CM

## 2019-11-19 PROCEDURE — 99024 POSTOP FOLLOW-UP VISIT: CPT | Performed by: ORTHOPAEDIC SURGERY

## 2019-11-25 ENCOUNTER — OFFICE VISIT (OUTPATIENT)
Dept: ORTHOPEDIC SURGERY | Age: 69
End: 2019-11-25
Payer: MEDICARE

## 2019-11-25 VITALS
WEIGHT: 186 LBS | SYSTOLIC BLOOD PRESSURE: 135 MMHG | DIASTOLIC BLOOD PRESSURE: 87 MMHG | HEIGHT: 63 IN | BODY MASS INDEX: 32.96 KG/M2 | HEART RATE: 91 BPM

## 2019-11-25 DIAGNOSIS — M19.011 GLENOHUMERAL ARTHRITIS, RIGHT: Primary | ICD-10-CM

## 2019-11-25 PROCEDURE — 99213 OFFICE O/P EST LOW 20 MIN: CPT | Performed by: ORTHOPAEDIC SURGERY

## 2019-11-25 PROCEDURE — 20610 DRAIN/INJ JOINT/BURSA W/O US: CPT | Performed by: ORTHOPAEDIC SURGERY

## 2019-11-25 RX ORDER — METHYLPREDNISOLONE ACETATE 40 MG/ML
40 INJECTION, SUSPENSION INTRA-ARTICULAR; INTRALESIONAL; INTRAMUSCULAR; SOFT TISSUE ONCE
Status: COMPLETED | OUTPATIENT
Start: 2019-11-25 | End: 2019-11-25

## 2019-11-25 RX ORDER — BETAMETHASONE SODIUM PHOSPHATE AND BETAMETHASONE ACETATE 3; 3 MG/ML; MG/ML
12 INJECTION, SUSPENSION INTRA-ARTICULAR; INTRALESIONAL; INTRAMUSCULAR; SOFT TISSUE ONCE
Status: DISCONTINUED | OUTPATIENT
Start: 2019-11-25 | End: 2019-11-25

## 2019-11-25 RX ADMIN — METHYLPREDNISOLONE ACETATE 40 MG: 40 INJECTION, SUSPENSION INTRA-ARTICULAR; INTRALESIONAL; INTRAMUSCULAR; SOFT TISSUE at 11:32

## 2020-01-21 ENCOUNTER — OFFICE VISIT (OUTPATIENT)
Dept: ORTHOPEDIC SURGERY | Age: 70
End: 2020-01-21

## 2020-01-21 VITALS — WEIGHT: 186.07 LBS | HEIGHT: 63 IN | BODY MASS INDEX: 32.97 KG/M2

## 2020-01-21 PROCEDURE — 99024 POSTOP FOLLOW-UP VISIT: CPT | Performed by: ORTHOPAEDIC SURGERY

## 2020-01-21 NOTE — PROGRESS NOTES
Ms. Soumya Bianchi returns today 2.5 months s/p C4-5 and C6-7 ACDF. She reports marked improvement of her arm symptoms. She has mild interscapular pain and swallowing discomfort. Her incisions show no signs of infection. She has a normal gait and 5/5 strength of her wrist DFs/VFs and biceps/triceps bilaterally. Her sensation is intact from C5 to C8 bilaterally. I reviewed AP and LAT x-rays of her cervical spine that were obtained in the office today. They show good position of her plate, screws and bone graft. She will return prn.

## 2020-03-04 ENCOUNTER — OFFICE VISIT (OUTPATIENT)
Dept: ORTHOPEDIC SURGERY | Age: 70
End: 2020-03-04
Payer: MEDICARE

## 2020-03-04 VITALS — HEIGHT: 63 IN | BODY MASS INDEX: 32.96 KG/M2 | WEIGHT: 186 LBS

## 2020-03-04 PROCEDURE — 20610 DRAIN/INJ JOINT/BURSA W/O US: CPT | Performed by: ORTHOPAEDIC SURGERY

## 2020-03-04 PROCEDURE — 99213 OFFICE O/P EST LOW 20 MIN: CPT | Performed by: ORTHOPAEDIC SURGERY

## 2020-03-04 RX ORDER — BUPIVACAINE HYDROCHLORIDE 2.5 MG/ML
3 INJECTION, SOLUTION INFILTRATION; PERINEURAL ONCE
Status: COMPLETED | OUTPATIENT
Start: 2020-03-04 | End: 2020-03-04

## 2020-03-04 RX ORDER — METHYLPREDNISOLONE ACETATE 40 MG/ML
80 INJECTION, SUSPENSION INTRA-ARTICULAR; INTRALESIONAL; INTRAMUSCULAR; SOFT TISSUE ONCE
Status: COMPLETED | OUTPATIENT
Start: 2020-03-04 | End: 2020-03-04

## 2020-03-04 RX ADMIN — METHYLPREDNISOLONE ACETATE 80 MG: 40 INJECTION, SUSPENSION INTRA-ARTICULAR; INTRALESIONAL; INTRAMUSCULAR; SOFT TISSUE at 10:45

## 2020-03-04 RX ADMIN — BUPIVACAINE HYDROCHLORIDE 7.5 MG: 2.5 INJECTION, SOLUTION INFILTRATION; PERINEURAL at 10:44

## 2020-03-04 NOTE — PROGRESS NOTES
Maeve Bower  [id number]  2020    Chief Complaint   Patient presents with    Follow-up     right shoulder       History: The patient is here follow-up regarding her right shoulder pain. The injection received back in November provided moderate relief. Her symptoms returned a few weeks ago. She is right-hand dominant. The patient denies any new injuries. She has difficulty with overhead activities. She has difficulty sleeping on her right side. The patient's  past medical history, medications, allergies,  family history, social history, and review of systems have been reviewed, and dated and are recorded in the chart. Ht 5' 3\" (1.6 m)   Wt 186 lb (84.4 kg)   BMI 32.95 kg/m²     Physical:  Ms. Maeve Bower appears well, she is in no apparent distress, she demonstrates appropriate mood & affect. She is alert and oriented to person, place and time. She has no further swelling. The incisions have healed. There is no evidence of drainage. She is neurovascularly intact distally. Sensation is intact in the axillary nerve distribution. right shoulder range of motion: 140 degrees abduction, 155 degrees forward flexion, 40 degrees external rotation and internal rotation to L4. She has minimal discomfort with light range of motion of the shoulder. The incisions are clean, dry and intact and without erythema. Georges impingement sign is slightly positive on the right. Strength in the shoulder is: 4/5  Abduction and 4/5 external rotation. Otherwise, she has 5/5 motor strength of her bilateral upper extremities. Cindy sign is negative. She has minimal to no tenderness to palpation over the right-sided pericervical musculature. Spurling sign is negative. Imagin views right shoulder obtained previously were again reviewed in the office today. Acceptable decompression noted. There is moderate glenohumeral arthritis.      Impression: #1 right shoulder glenohumeral arthritis #2 status

## 2020-06-02 ENCOUNTER — OFFICE VISIT (OUTPATIENT)
Dept: ORTHOPEDIC SURGERY | Age: 70
End: 2020-06-02
Payer: MEDICARE

## 2020-06-02 VITALS — WEIGHT: 186 LBS | HEIGHT: 63 IN | TEMPERATURE: 96.9 F | BODY MASS INDEX: 32.96 KG/M2

## 2020-06-02 PROCEDURE — 99213 OFFICE O/P EST LOW 20 MIN: CPT | Performed by: ORTHOPAEDIC SURGERY

## 2020-06-02 PROCEDURE — 20610 DRAIN/INJ JOINT/BURSA W/O US: CPT | Performed by: ORTHOPAEDIC SURGERY

## 2020-06-02 RX ORDER — BUPIVACAINE HYDROCHLORIDE 2.5 MG/ML
3 INJECTION, SOLUTION INFILTRATION; PERINEURAL ONCE
Status: COMPLETED | OUTPATIENT
Start: 2020-06-02 | End: 2020-06-02

## 2020-06-02 RX ORDER — METHYLPREDNISOLONE ACETATE 40 MG/ML
80 INJECTION, SUSPENSION INTRA-ARTICULAR; INTRALESIONAL; INTRAMUSCULAR; SOFT TISSUE ONCE
Status: COMPLETED | OUTPATIENT
Start: 2020-06-02 | End: 2020-06-02

## 2020-06-02 RX ADMIN — BUPIVACAINE HYDROCHLORIDE 7.5 MG: 2.5 INJECTION, SOLUTION INFILTRATION; PERINEURAL at 14:53

## 2020-06-02 RX ADMIN — METHYLPREDNISOLONE ACETATE 80 MG: 40 INJECTION, SUSPENSION INTRA-ARTICULAR; INTRALESIONAL; INTRAMUSCULAR; SOFT TISSUE at 14:54

## 2020-06-02 NOTE — PROGRESS NOTES
Horace Hernandez  [id number]  2020    Chief Complaint   Patient presents with    Follow-up     right shoulder       History: The patient is here follow-up regarding her right shoulder pain. The injection received back in March provided moderate relief. Her symptoms returned a few weeks ago. She is right-hand dominant. The patient denies any new injuries. She has difficulty with overhead activities. She has difficulty sleeping on her right side. The patient's  past medical history, medications, allergies,  family history, social history, and review of systems have been reviewed, and dated and are recorded in the chart. There were no vitals taken for this visit. Physical:  Ms. Horace Hernandez appears well, she is in no apparent distress, she demonstrates appropriate mood & affect. She is alert and oriented to person, place and time. She has no further swelling. The incisions have healed. There is no evidence of drainage. She is neurovascularly intact distally. Sensation is intact in the axillary nerve distribution. right shoulder range of motion: 150 degrees abduction, 160 degrees forward flexion, 40 degrees external rotation and internal rotation to L4. She has minimal discomfort with light range of motion of the shoulder. The incisions are clean, dry and intact and without erythema. Georges impingement sign is slightly positive on the right. Strength in the shoulder is: 4/5  Abduction and 4/5 external rotation. Otherwise, she has 5/5 motor strength of her bilateral upper extremities. Cindy sign is negative. She has minimal to no tenderness to palpation over the right-sided pericervical musculature. Spurling sign is negative. Imagin views right shoulder obtained previously were again reviewed in the office today. Acceptable decompression noted. There is moderate glenohumeral arthritis.      Impression: #1 right shoulder glenohumeral arthritis #2 status post right shoulder scope with subacromial decompression, open Shannon, biceps tenotomy, and chondroplasty of the humeral head/glenoid. #3 status post C5-6 fusion with C4-5 stenosis     Plan: At this time, we will inject the right shoulder under sterile conditions. After a Betadine prep of the shoulder, 3cc of 0.25% Marcaine and 2cc of 40 mg Depo-Medrol were injected into the shoulder. The patient tolerated the injection rather well. The patient was instructed to follow up immediately for any signs of infection. If the patient continues to have pain, we will consider a reverse total shoulder arthroplasty given her ongoing arthritic pain as well as her rotator cuff arthropathy. She will follow-up with us in approximately 3 months and we will reassess her then.

## 2020-06-29 ENCOUNTER — HOSPITAL ENCOUNTER (OUTPATIENT)
Dept: WOMENS IMAGING | Age: 70
Discharge: HOME | End: 2020-06-29
Payer: MEDICARE

## 2020-06-29 PROCEDURE — 77067 SCR MAMMO BI INCL CAD: CPT

## 2020-07-03 ENCOUNTER — OFFICE VISIT (OUTPATIENT)
Dept: PRIMARY CARE CLINIC | Age: 70
End: 2020-07-03
Payer: MEDICARE

## 2020-07-03 PROCEDURE — 99211 OFF/OP EST MAY X REQ PHY/QHP: CPT | Performed by: NURSE PRACTITIONER

## 2020-07-06 NOTE — PROGRESS NOTES
[address] [facility] time_____0730_______        Surgery time____________    Take the following medications with a sip of water: Follow your Doctor's pre procedure instructions regarding medications    Do not eat or drink anything after 12:00 midnight prior to your surgery. This includes water chewing gum, mints and ice chips. You may brush your teeth and gargle the morning of your surgery, but do not swallow the water     Please see your family doctor/pediatrician for a history and physical and/or concerning medications. Bring any test results/reports from your physicians office. If you are under the care of a heart doctor or specialist doctor, please be aware that you may be asked to them for clearance    You may be asked to stop blood thinners such as Coumadin, Plavix, Fragmin, Lovenox, etc., or any anti-inflammatories such as:  Aspirin, Ibuprofen, Advil, Naproxen prior to your surgery. We also ask that you stop any OTC medications such as fish oil, vitamin E, glucosamine, garlic, Multivitamins, COQ 10, etc.    We ask that you do not smoke 24 hours prior to surgery  We ask that you do not  drink any alcoholic beverages 24 hours prior to surgery     You must make arrangements for a responsible adult to take you home after your surgery. For your safety you will not be allowed to leave alone or drive yourself home. Your surgery will be cancelled if you do not have a ride home. Also for your safety, it is strongly suggested that someone stay with you the first 24 hours after your surgery. A parent or legal guardian must accompany a child scheduled for surgery and plan to stay at the hospital until the child is discharged. Please do not bring other children with you. For your comfort, please wear simple loose fitting clothing to the hospital.  Please do not bring valuables.     Do not wear any make-up or nail polish on your fingers or toes      For your safety, please do not wear any jewelry or body piercing's on the day of surgery. All jewelry must be removed. If you have dentures, they will be removed before going to operating room. For your convenience, we will provide you with a container. If you wear contact lenses or glasses, they will be removed, please bring a case for them. If you have a living will and a durable power of  for healthcare, please bring in a copy. As part of our patient safety program to minimize surgical site infections, we ask you to do the following:    · Please notify your surgeon if you develop any illness between         now and the  day of your surgery. · This includes a cough, cold, fever, sore throat, nausea,         or vomiting, and diarrhea, etc.  ·  Please notify your surgeon if you experience dizziness, shortness         of breath or blurred vision between now and the time of your surgery. Do not shave your operative site 96 hours prior to surgery. For face and neck surgery, men may use an electric razor 48 hours   prior to surgery. You may shower the night before surgery or the morning of   your surgery with an antibacterial soap. You will need to bring a photo ID and insurance card    [facility] has an onsite pharmacy, would you like to utilize our pharmacy     If you will be staying overnight and use a C-pap machine, please bring   your C-pap to hospital     Our goal is to provide you with excellent care, therefore, visitors will be limited to two(2) in the room at a time so that we may focus on providing this care for you. Please contact pre-admission testing if you have any further questions. [facility] phone number:  [phone] PAT fax number:  [phone]  Please note these are generalized instructions for all surgical cases, you may be provided with more specific instructions according to your surgery. Preoperative Screening for Elective Surgery/Invasive Procedures While COVID-19 present in the community     Have you tested positive or have been told to self-isolate for COVID-19 like symptoms within the past 28 days? no   Do you currently have any of the following symptoms? o Fever >100.0 F or 99.9 F in immunocompromised patients?no  o New onset cough, shortness of breath or difficulty breathing?no  o New onset sore throat, myalgia (muscle aches and pains), headache, loss of taste/smell or diarrhea? no   Have you had a potential exposure to COVID-19 within the past 14 days by:  o Close contact with a confirmed case?no  o Close contact with a healthcare worker,  or essential infrastructure worker (grocery store, [employer], gas station, public utilities or transportation)? o Do you reside in a congregate setting such as; skilled nursing facility, adult home, correctional facility, homeless shelter or other institutional setting?no  o Have you had recent travel to a known COVID-19 hotspot? no    Indicate if the patient has a positive screen by answering yes to one or more of the above questions. Patients who test positive or screen positive prior to surgery or on the day of surgery should be evaluated in conjunction with the surgeon/proceduralist/anesthesiologist to determine the urgency of the procedure.

## 2020-07-07 LAB
SARS-COV-2: NOT DETECTED
SOURCE: NORMAL

## 2020-07-08 ENCOUNTER — ANESTHESIA EVENT (OUTPATIENT)
Dept: ENDOSCOPY | Age: 70
End: 2020-07-08
Payer: MEDICARE

## 2020-07-09 ENCOUNTER — HOSPITAL ENCOUNTER (OUTPATIENT)
Age: 70
Setting detail: OUTPATIENT SURGERY
Discharge: HOME | End: 2020-07-09
Attending: INTERNAL MEDICINE | Admitting: INTERNAL MEDICINE
Payer: MEDICARE

## 2020-07-09 ENCOUNTER — ANESTHESIA (OUTPATIENT)
Dept: ENDOSCOPY | Age: 70
End: 2020-07-09
Payer: MEDICARE

## 2020-07-09 VITALS
OXYGEN SATURATION: 99 % | SYSTOLIC BLOOD PRESSURE: 132 MMHG | DIASTOLIC BLOOD PRESSURE: 65 MMHG | RESPIRATION RATE: 21 BRPM

## 2020-07-09 VITALS
OXYGEN SATURATION: 98 % | WEIGHT: 185.19 LBS | DIASTOLIC BLOOD PRESSURE: 80 MMHG | BODY MASS INDEX: 31.62 KG/M2 | RESPIRATION RATE: 18 BRPM | TEMPERATURE: 97.1 F | HEIGHT: 64 IN | SYSTOLIC BLOOD PRESSURE: 133 MMHG | HEART RATE: 69 BPM

## 2020-07-09 LAB
GLUCOSE BLD-MCNC: 163 MG/DL (ref 70–99)
GLUCOSE BLD-MCNC: 168 MG/DL (ref 70–99)
PERFORMED ON: ABNORMAL
PERFORMED ON: ABNORMAL

## 2020-07-09 PROCEDURE — 3700000001 HC ADD 15 MINUTES (ANESTHESIA): Performed by: INTERNAL MEDICINE

## 2020-07-09 PROCEDURE — 2580000003 HC RX 258: Performed by: ANESTHESIOLOGY

## 2020-07-09 PROCEDURE — 2500000003 HC RX 250 WO HCPCS: Performed by: NURSE ANESTHETIST, CERTIFIED REGISTERED

## 2020-07-09 PROCEDURE — 3609010600 HC COLONOSCOPY POLYPECTOMY SNARE/COLD BIOPSY: Performed by: INTERNAL MEDICINE

## 2020-07-09 PROCEDURE — 2709999900 HC NON-CHARGEABLE SUPPLY: Performed by: INTERNAL MEDICINE

## 2020-07-09 PROCEDURE — 7100000000 HC PACU RECOVERY - FIRST 15 MIN: Performed by: INTERNAL MEDICINE

## 2020-07-09 PROCEDURE — 7100000011 HC PHASE II RECOVERY - ADDTL 15 MIN: Performed by: INTERNAL MEDICINE

## 2020-07-09 PROCEDURE — 7100000001 HC PACU RECOVERY - ADDTL 15 MIN: Performed by: INTERNAL MEDICINE

## 2020-07-09 PROCEDURE — 3700000000 HC ANESTHESIA ATTENDED CARE: Performed by: INTERNAL MEDICINE

## 2020-07-09 PROCEDURE — 88305 TISSUE EXAM BY PATHOLOGIST: CPT

## 2020-07-09 PROCEDURE — 6360000002 HC RX W HCPCS: Performed by: NURSE ANESTHETIST, CERTIFIED REGISTERED

## 2020-07-09 PROCEDURE — 7100000010 HC PHASE II RECOVERY - FIRST 15 MIN: Performed by: INTERNAL MEDICINE

## 2020-07-09 RX ORDER — SODIUM CHLORIDE 0.9 % (FLUSH) 0.9 %
10 SYRINGE (ML) INJECTION EVERY 12 HOURS SCHEDULED
Status: DISCONTINUED | OUTPATIENT
Start: 2020-07-09 | End: 2020-07-09 | Stop reason: HOSPADM

## 2020-07-09 RX ORDER — PROPOFOL 10 MG/ML
INJECTION, EMULSION INTRAVENOUS CONTINUOUS PRN
Status: DISCONTINUED | OUTPATIENT
Start: 2020-07-09 | End: 2020-07-09 | Stop reason: SDUPTHER

## 2020-07-09 RX ORDER — LIDOCAINE HYDROCHLORIDE 20 MG/ML
INJECTION, SOLUTION EPIDURAL; INFILTRATION; INTRACAUDAL; PERINEURAL PRN
Status: DISCONTINUED | OUTPATIENT
Start: 2020-07-09 | End: 2020-07-09 | Stop reason: SDUPTHER

## 2020-07-09 RX ORDER — PROPOFOL 10 MG/ML
INJECTION, EMULSION INTRAVENOUS PRN
Status: DISCONTINUED | OUTPATIENT
Start: 2020-07-09 | End: 2020-07-09 | Stop reason: SDUPTHER

## 2020-07-09 RX ORDER — SODIUM CHLORIDE 0.9 % (FLUSH) 0.9 %
10 SYRINGE (ML) INJECTION PRN
Status: DISCONTINUED | OUTPATIENT
Start: 2020-07-09 | End: 2020-07-09 | Stop reason: HOSPADM

## 2020-07-09 RX ORDER — SODIUM CHLORIDE 9 MG/ML
INJECTION, SOLUTION INTRAVENOUS CONTINUOUS
Status: DISCONTINUED | OUTPATIENT
Start: 2020-07-09 | End: 2020-07-09 | Stop reason: HOSPADM

## 2020-07-09 RX ADMIN — PROPOFOL 80 MG: 10 INJECTION, EMULSION INTRAVENOUS at 09:06

## 2020-07-09 RX ADMIN — PROPOFOL 140 MCG/KG/MIN: 10 INJECTION, EMULSION INTRAVENOUS at 09:07

## 2020-07-09 RX ADMIN — LIDOCAINE HYDROCHLORIDE 100 MG: 20 INJECTION, SOLUTION EPIDURAL; INFILTRATION; INTRACAUDAL; PERINEURAL at 09:06

## 2020-07-09 RX ADMIN — SODIUM CHLORIDE: 9 INJECTION, SOLUTION INTRAVENOUS at 07:45

## 2020-07-09 ASSESSMENT — PULMONARY FUNCTION TESTS
PIF_VALUE: 0
PIF_VALUE: 1
PIF_VALUE: 0

## 2020-07-09 ASSESSMENT — PAIN SCALES - GENERAL
PAINLEVEL_OUTOF10: 0

## 2020-07-09 ASSESSMENT — ENCOUNTER SYMPTOMS: SHORTNESS OF BREATH: 0

## 2020-07-09 ASSESSMENT — PAIN - FUNCTIONAL ASSESSMENT: PAIN_FUNCTIONAL_ASSESSMENT: 0-10

## 2020-07-09 NOTE — ANESTHESIA POSTPROCEDURE EVALUATION
12.5                11/05/2019 05:40 AM        HCT                      36.9                11/05/2019 05:40 AM        PLT                      224                 11/04/2019 10:20 AM   RENAL  Lab Results       Component                Value               Date/Time                  NA                       140                 11/04/2019 10:20 AM        K                        4.5                 11/04/2019 10:20 AM        CL                       103                 11/04/2019 10:20 AM        CO2                      23                  11/04/2019 10:20 AM        BUN                      12                  11/04/2019 10:20 AM        CREATININE               <0.5 (L)            11/04/2019 10:20 AM        GLUCOSE                  143 (H)             11/04/2019 10:20 AM   COAGS  Lab Results       Component                Value               Date/Time                  PROTIME                  11.6                11/04/2019 10:20 AM        PROTIME                  1.5                 12/16/2016                 INR                      1.02                11/04/2019 10:20 AM        APTT                     32.9                11/04/2019 10:20 AM     Intake & Output:  @21HGID@    Nausea & Vomiting:  No    Level of Consciousness:  Awake    Pain Assessment:  Adequate analgesia    Anesthesia Complications:  No apparent anesthetic complications    SUMMARY      Vital signs stable  OK to discharge from Stage I post anesthesia care.   Care transferred from Anesthesiology department on discharge from perioperative area

## 2020-07-09 NOTE — H&P
(ULTRAM) 50 MG tablet Take 50 mg by mouth every 6 hours as needed for Pain. Lorean Ro amitriptyline (ELAVIL) 50 MG tablet TAKE ONE TABLET BY MOUTH DAILY 90 tablet 2    metFORMIN (GLUCOPHAGE) 1000 MG tablet Take 1 tablet by mouth 2 times daily (with meals) 60 tablet 5    insulin detemir (LEVEMIR FLEXTOUCH) 100 UNIT/ML injection pen Inject 58 Units into the skin nightly (Patient taking differently: Inject 50 Units into the skin nightly ) 5 Pen 3    sertraline (ZOLOFT) 100 MG tablet Take 1 tablet by mouth daily 90 tablet 3    losartan (COZAAR) 25 MG tablet Take 25 mg by mouth every evening  30 tablet 0    omeprazole (PRILOSEC) 40 MG capsule Take 1 capsule by mouth 2 times daily (Patient taking differently: Take 40 mg by mouth daily ) 180 capsule 3    atorvastatin (LIPITOR) 40 MG tablet Take 1 tablet by mouth daily. 30 tablet 3    ondansetron (ZOFRAN-ODT) 4 MG disintegrating tablet DISSOLVE ONE TABLET BY MOUTH EVERY 6 HOURS AS NEEDED 90 tablet 2    JANTOVEN 1 MG tablet Restart 11/7/19. Take 2 tabs daily. On 3.5 to 4 mg daily depending on INR 1 tablet 0    JANTOVEN 2 MG tablet Restart 11/7/19. Please dispense Jantoven and not Warfarin 1 tablet 0    NOVOLOG FLEXPEN 100 UNIT/ML injection pen Inject 5 Units into the skin 3 times daily (before meals) Or as directed      glucose blood VI test strips (ASCENSIA AUTODISC VI;ONE TOUCH ULTRA TEST VI) strip 1 each by In Vitro route 3 times daily. As needed. 100 each 3    ONE TOUCH LANCETS MISC 1 each by Does not apply route 3 times daily. 100 each 3    fluticasone (FLONASE) 50 MCG/ACT nasal spray 1 spray by Nasal route daily. (Patient taking differently: 1 spray by Nasal route nightly ) 1 Bottle 3        Allergies:  Keflex [cephalexin] and Morphine    Social History:   TOBACCO:   reports that she has never smoked. She has never used smokeless tobacco.  ETOH:   reports no history of alcohol use. DRUGS:   reports no history of drug use.     PHYSICAL EXAM:      Vital Signs: BP (!) 142/92   Pulse 82   Temp 97.2 °F (36.2 °C) (Temporal)   Resp 20   Ht 5' 4\" (1.626 m)   Wt 185 lb 3 oz (84 kg)   SpO2 96%   BMI 31.79 kg/m²    Airway: No stridor or wheezing noted. Good air movement  Pulmonary: without wheezes. Clear to auscultation  Cardiac:regular rate and rhythm without loud murmurs  Abdomen:soft, nontender,  Bowel sounds present    Pre-Procedure Assessment / Plan:  1) History of colon polyps. Surveillance colonoscopy. ASA Grade:  ASA 3 - Patient with moderate systemic disease with functional limitations  Mallampati Classification:  Class II    Level of Sedation Plan:Deep sedation    Post Procedure plan: Return to same level of care    I assessed the patient and find that the patient is in satisfactory condition to proceed with the planned procedure and sedation plan. I have explained the risk, benefits, and alternatives to the procedure; the patient understands and agrees to proceed. The patient was counseled at length about the risks of calli Covid-19 during their perioperative period and any recovery window from their procedure. The patient was made aware that calli Covid-19  may worsen their prognosis for recovering from their procedure  and lend to a higher morbidity and/or mortality risk. All material risks, benefits, and reasonable alternatives including postponing the procedure were discussed. The patient does wish to proceed with the procedure at this time.       Jade Rao, DO  7/9/2020

## 2020-07-09 NOTE — PROGRESS NOTES
Assisted up to BR to void and then to chair. Discharge instructions given to patient and . Verbalize understanding.

## 2020-07-09 NOTE — ANESTHESIA PRE PROCEDURE
[facility] Department of Anesthesiology  Pre-Anesthesia Evaluation/Consultation       Name:  Vale Fagan  : 1950  Age:  65 y.o.                                            MRN:  [id number]  Date: 2020           Surgeon: Surgeon(s):  Zeyad Downing.,     Procedure: Procedure(s):  COLONOSCOPY     Allergies   Allergen Reactions    Keflex [Cephalexin] Rash     Patient states she no longer has reactions to keflex    Morphine Itching and Rash     Patient Active Problem List   Diagnosis    Migraine    Depression    Hypertension    Asthma    Insomnia    Osteoarthritis    Emphysema of lung ([facility] [address]    Hyperlipidemia    GERD with stricture    Esophageal reflux    Type II or unspecified type diabetes mellitus without mention of complication, not stated as uncontrolled    Thyroid adenoma    DVT (deep venous thrombosis) (HCC)    Colon polyp    Protein S deficiency (HCC)    Myalgia    Fracture of phalanx of toe    Need for prophylactic vaccination and inoculation against influenza    AB (asthmatic bronchitis)    Tendonitis of shoulder    Autonomic postural hypotension    Shoulder bursitis    Constipated    Need for prophylactic vaccination against Streptococcus pneumoniae (pneumococcus)    Bronchitis    Bronchospasm    Allergic rhinitis    Otitis media    Diarrhea    Bacterial gastroenteritis    GI bleed    Diverticulosis    Rotator cuff tendinitis    Biceps tendinitis    Glenohumeral arthritis    Acromioclavicular joint arthritis    Precordial pain    Systolic murmur    Coronary artery calcification seen on CAT scan    Closed displaced fracture of proximal phalanx of left great toe    Osteoarthritis of spine with radiculopathy, cervical region    Cervical stenosis of spinal canal     Past Medical History:   Diagnosis Date    Asthma     Colon polyp     Depression     DVT (deep venous thrombosis) ([facility] [address]     as a teenager    Esophageal reflux     GERD with stricture     Hx of blood clots     Hyperlipidemia     Hypertension     Insomnia     Migraine 12/29/1995    Osteoarthritis     Protein deficiency (HCC)     protein S defic.  Pulmonary embolism (HCC)     x`s 2 post-op after Hysterectomy & Gall Bladder    Thyroid adenoma     Type II or unspecified type diabetes mellitus without mention of complication, not stated as uncontrolled      Past Surgical History:   Procedure Laterality Date    APPENDECTOMY      BACK SURGERY      CERVICAL FUSION  1993    CERVICAL FUSION N/A 11/4/2019    ANTERIOR CERVICAL DISCECTOMY FUSION C4-5 AND C6-7 WITH MEDTRONIC PLATE AND SCREWS, AND DONOR BONE WITH C-ARM performed by Veronique Morris MD at [address] [place] [address], , [place], DIAGNOSTIC      HIATAL HERNIA REPAIR      HYSTERECTOMY      OVARIAN CYST SURGERY      SHOULDER ARTHROSCOPY Right 12/28/2016    SAD, labral debridement    THYROIDECTOMY, PARTIAL       Social History     Tobacco Use    Smoking status: Never Smoker    Smokeless tobacco: Never Used   Substance Use Topics    Alcohol use: No    Drug use: No     Medications  No current facility-administered medications on file prior to encounter. Current Outpatient Medications on File Prior to Encounter   Medication Sig Dispense Refill    gemfibrozil (LOPID) 600 MG tablet Take 600 mg by mouth 2 times daily (before meals)      topiramate (TOPAMAX) 25 MG tablet Take 25 mg by mouth 2 times daily      buPROPion (WELLBUTRIN XL) 150 MG extended release tablet Take 150 mg by mouth every morning      diphenhydrAMINE (BENADRYL) 25 MG capsule Take 25 mg by mouth nightly      traMADol (ULTRAM) 50 MG tablet Take 50 mg by mouth every 6 hours as needed for Pain. Johanny Lemming amitriptyline (ELAVIL) 50 MG tablet TAKE ONE TABLET BY MOUTH DAILY 90 tablet 2    metFORMIN (GLUCOPHAGE) 1000 MG tablet Take 1 tablet by mouth 2 times daily (with meals) 60 tablet 5    insulin detemir (LEVEMIR FLEXTOUCH) 100 UNIT/ML injection pen Inject 58 Units into the skin nightly (Patient taking differently: Inject 50 Units into the skin nightly ) 5 Pen 3    sertraline (ZOLOFT) 100 MG tablet Take 1 tablet by mouth daily 90 tablet 3    losartan (COZAAR) 25 MG tablet Take 25 mg by mouth every evening  30 tablet 0    omeprazole (PRILOSEC) 40 MG capsule Take 1 capsule by mouth 2 times daily (Patient taking differently: Take 40 mg by mouth daily ) 180 capsule 3    atorvastatin (LIPITOR) 40 MG tablet Take 1 tablet by mouth daily. 30 tablet 3    ondansetron (ZOFRAN-ODT) 4 MG disintegrating tablet DISSOLVE ONE TABLET BY MOUTH EVERY 6 HOURS AS NEEDED 90 tablet 2    JANTOVEN 1 MG tablet Restart 11/7/19. Take 2 tabs daily. On 3.5 to 4 mg daily depending on INR 1 tablet 0    JANTOVEN 2 MG tablet Restart 11/7/19. Please dispense Jantoven and not Warfarin 1 tablet 0    NOVOLOG FLEXPEN 100 UNIT/ML injection pen Inject 5 Units into the skin 3 times daily (before meals) Or as directed      glucose blood VI test strips (ASCENSIA AUTODISC VI;ONE TOUCH ULTRA TEST VI) strip 1 each by In Vitro route 3 times daily. As needed. 100 each 3    ONE TOUCH LANCETS MISC 1 each by Does not apply route 3 times daily. 100 each 3    fluticasone (FLONASE) 50 MCG/ACT nasal spray 1 spray by Nasal route daily.  (Patient taking differently: 1 spray by Nasal route nightly ) 1 Bottle 3     Current Facility-Administered Medications   Medication Dose Route Frequency Provider Last Rate Last Dose    0.9 % sodium chloride infusion   Intravenous Continuous Lacey Garcia MD 75 mL/hr at 07/09/20 0745      sodium chloride flush 0.9 % injection 10 mL  10 mL Intravenous 2 times per day Lacey Garcia MD        sodium chloride flush 0.9 % injection 10 mL  10 mL Intravenous PRN Lacey Garcia MD         Vital Signs (Current)   Vitals:    07/06/20 0917 07/09/20 0732   BP:  (!) 142/92   Pulse:  82   Resp:  20   Temp:  97.2 °F (36.2 °C)   TempSrc:  Temporal   SpO2:  96% Weight: 180 lb (81.6 kg) 185 lb 3 oz (84 kg)   Height: 5' 4\" (1.626 m)                                           BP Readings from Last 3 Encounters:   20 (!) 142/92   19 135/87   19 122/74     Vital Signs Statistics (for past 48 hrs)     Temp  Av.2 °F (36.2 °C)  Min: 97.2 °F (36.2 °C)   Min taken time: 20 0732  Max: 97.2 °F (36.2 °C)   Max taken time: 20 0732  Pulse  Av  Min: 82   Min taken time: 20 0732  Max: 82   Max taken time: 20 0732  Resp  Av  Min: 21   Min taken time: 20 0732  Max: 21   Max taken time: 20 0732  BP  Min: 142/92   Min taken time: 20 0732  Max: 142/92   Max taken time: 20 0732  SpO2  Av %  Min: 96 %   Min taken time: 20 0732  Max: 96 %   Max taken time: 20 0732  BP Readings from Last 3 Encounters:   20 (!) 142/92   19 135/87   19 122/74       BMI  Body mass index is 31.79 kg/m². Estimated body mass index is 31.79 kg/m² as calculated from the following:    Height as of this encounter: 5' 4\" (1.626 m). Weight as of this encounter: 185 lb 3 oz (84 kg).     CBC   Lab Results   Component Value Date    WBC 6.0 2019    RBC 4.71 2019    HGB 12.5 2019    HCT 36.9 2019    MCV 85.9 2019    RDW 15.6 2019     2019     CMP    Lab Results   Component Value Date     2019    K 4.5 2019     2019    CO2 23 2019    BUN 12 2019    CREATININE <0.5 2019    GFRAA >60 2019    GFRAA >60 2013    AGRATIO 1.6 2017    LABGLOM >60 2019    GLUCOSE 143 2019    PROT 7.2 2017    PROT 6.8 2012    CALCIUM 9.1 2019    BILITOT 0.4 2017    ALKPHOS 70 2017    AST 14 2017    ALT 14 2017     BMP    Lab Results   Component Value Date     2019    K 4.5 2019     2019    CO2 23 2019    BUN 12 2019    CREATININE <0.5 has no further questions. JOLYNN BRANDON )  Induction: intravenous. Anesthetic plan and risks discussed with patient. Plan discussed with CRNA. This pre-anesthesia assessment may be used as a history and physical.    DOS STAFF ADDENDUM:    Pt seen and examined, chart reviewed (including anesthesia, drug and allergy history). No interval changes to history and physical examination. Anesthetic plan, risks, benefits, alternatives, and personnel involved discussed with patient. Patient verbalized an understanding and agrees to proceed.       Jessica Uriarte MD  July 9, 2020  7:50 AM

## 2020-07-09 NOTE — OP NOTE
Colonoscopy Procedure Note      Patient: Miguelangel Panchal  : 1950  Acct#:     Procedure: Colonoscopy with polypectomy (cold snare), polypectomy (cold biopsy)    Date:  2020    Surgeon:  Seble Gunter DO    Referring Physician:  JACK Bourgeois CNP    Previous Colonoscopy: YES  Date:   Greater than 3 years: YES    Preoperative Diagnosis:  1) History of colon polyps. Surveillance colonoscopy. Postoperative Diagnosis:  1) A 2 mm polyp in the transverse colon was removed completely with cold snare polypectomy. 2) A 4 mm polyp in the transverse colon was removed completely with biopsy polypectomy. 3) A 4 mm polyp in the sigmoid colon was removed completely with cold snare polypectomy. 4) Moderate internal hemorrhoids were noted. Consent:  The patient or their legal guardian has signed a consent, and is aware of the potential risks, benefits, alternatives, and potential complications of this procedure. These include, but are not limited to hemorrhage, bleeding, post procedural pain, perforation, phlebitis, aspiration, hypotension, hypoxia, cardiovascular events such as arryhthmia, and possibly death. Additionally, the possibility of missed colonic polyps and interval colon cancer was discussed in the consent. Anesthesia:  The patient was administered TIVA per anesthesiology team.  Please see their operative records for full details of medications administered. Procedure: An informed consent was obtained from the patient after explanation of indications, benefits, possible risks and complications of the procedure. The patient was then taken to the endoscopy suite, placed in the left lateral decubitus position, and the above IV anesthesia was administered. A digital rectal examination was performed and revealed negative without mass, lesions or tenderness, internal hemorrhoids noted.       The Olympus video colonoscope was placed in the patient's rectum under digital direction and advanced to the cecum. The cecum was identified by characteristic anatomy and ballottment. The ileocecal valve was identified. The preparation was fair. The terminal ileum was not visualized. The scope was then withdrawn back through the cecum, ascending, transverse, descending, sigmoid colon, and rectum. Careful circumferential examination of the mucosa in these areas demonstrated:     1) A 2 mm polyp in the transverse colon was removed completely with cold snare polypectomy. 2) A 4 mm polyp in the transverse colon was removed completely with biopsy polypectomy. 3) A 4 mm polyp in the sigmoid colon was removed completely with cold snare polypectomy. The scope was then withdrawn into the rectum and retroflexed. The retroflexed view of the anal verge and rectum demonstrates moderate internal hemorrhoids. The scope was straightened, the colon was decompressed and the scope was withdrawn from the patient. The patient tolerated the procedure well and was taken to the PACU in good condition. Estimated blood loss: <5ml    ID Type Source Tests Collected by Time Destination   A : sigmoid colon polyp Tissue Colon SURGICAL PATHOLOGY Hardik Parker., DO 7/9/2020 0915    B : transverse colon polyp x 2 Tissue Colon SURGICAL PATHOLOGY Hardik Parker., DO 7/9/2020 3636          Impression:  See post-procedure diagnoses. Recommendations:  Await pathology. Repeat colonoscopy in 2 years. The patient had colon polyp(s) successfully removed today. Eleonore Nephew is a small risk for these sites to bleed for up to several weeks out from the procedure. The patient is instructed to call if there is any significant amounts of  rectal bleeding, abdominal pain, dizziness, or other complaints thought to be related to the procedure.       Hardik Parker, [address] [facility] [address] [place]  7/9/2020  [phone]

## 2020-09-02 ENCOUNTER — OFFICE VISIT (OUTPATIENT)
Dept: ORTHOPEDIC SURGERY | Age: 70
End: 2020-09-02
Payer: MEDICARE

## 2020-09-02 VITALS — TEMPERATURE: 97.2 F | WEIGHT: 185 LBS | BODY MASS INDEX: 31.58 KG/M2 | HEIGHT: 64 IN

## 2020-09-02 PROCEDURE — 20610 DRAIN/INJ JOINT/BURSA W/O US: CPT | Performed by: ORTHOPAEDIC SURGERY

## 2020-09-02 PROCEDURE — 99213 OFFICE O/P EST LOW 20 MIN: CPT | Performed by: ORTHOPAEDIC SURGERY

## 2020-09-02 RX ORDER — METHYLPREDNISOLONE ACETATE 40 MG/ML
80 INJECTION, SUSPENSION INTRA-ARTICULAR; INTRALESIONAL; INTRAMUSCULAR; SOFT TISSUE ONCE
Status: COMPLETED | OUTPATIENT
Start: 2020-09-02 | End: 2020-09-02

## 2020-09-02 RX ORDER — BUPIVACAINE HYDROCHLORIDE 2.5 MG/ML
3 INJECTION, SOLUTION INFILTRATION; PERINEURAL ONCE
Status: COMPLETED | OUTPATIENT
Start: 2020-09-02 | End: 2020-09-02

## 2020-09-02 RX ADMIN — METHYLPREDNISOLONE ACETATE 80 MG: 40 INJECTION, SUSPENSION INTRA-ARTICULAR; INTRALESIONAL; INTRAMUSCULAR; SOFT TISSUE at 09:41

## 2020-09-02 RX ADMIN — BUPIVACAINE HYDROCHLORIDE 7.5 MG: 2.5 INJECTION, SOLUTION INFILTRATION; PERINEURAL at 09:41

## 2020-09-02 NOTE — PROGRESS NOTES
Vale Fagan  [id number]  2020    Chief Complaint   Patient presents with    Follow-up     right shoulder       History: The patient is here follow-up regarding her right shoulder pain. The injection received back in  provided moderate relief. Her symptoms returned a few weeks ago. She is right-hand dominant. The patient denies any new injuries. She has difficulty with overhead activities. She has difficulty sleeping on her right side. She does remain extremely active. The patient's  past medical history, medications, allergies,  family history, social history, and review of systems have been reviewed, and dated and are recorded in the chart. Temp 97.2 °F (36.2 °C)   Ht 5' 4\" (1.626 m)   Wt 185 lb (83.9 kg)   BMI 31.76 kg/m²     Physical:  Ms. Vale Fagan appears well, she is in no apparent distress, she demonstrates appropriate mood & affect. She is alert and oriented to person, place and time. She has no further swelling. The incisions have healed. There is no evidence of drainage. She is neurovascularly intact distally. Sensation is intact in the axillary nerve distribution. right shoulder range of motion: 160 degrees abduction, 165 degrees forward flexion, 40 degrees external rotation and internal rotation to L4. She has minimal discomfort with light range of motion of the shoulder. The incisions are clean, dry and intact and without erythema. Georges impingement sign is slightly positive on the right. Strength in the shoulder is: 4/5  Abduction and 4/5 external rotation. Otherwise, she has 5/5 motor strength of her bilateral upper extremities. Cindy sign is negative. She has minimal to no tenderness to palpation over the right-sided pericervical musculature. Spurling sign is negative. Imagin views right shoulder obtained previously were again reviewed in the office today. Acceptable decompression noted. There is moderate glenohumeral arthritis. Impression: #1 right shoulder glenohumeral arthritis #2 status post right shoulder scope with subacromial decompression, open Shannon, biceps tenotomy, and chondroplasty of the humeral head/glenoid. #3 status post C5-6 fusion with C4-5 stenosis     Plan: At this time, we will inject the right shoulder under sterile conditions. After a Betadine prep of the shoulder, 3cc of 0.25% Marcaine and 2cc of 40 mg Depo-Medrol were injected into the shoulder. The patient tolerated the injection rather well. The patient was instructed to follow up immediately for any signs of infection. If the patient continues to have pain, we will consider a reverse total shoulder arthroplasty given her ongoing arthritic pain as well as her rotator cuff arthropathy. She will follow-up with us in approximately 3 months and we will reassess her then. We encouraged the patient to modify her activities. We instructed the patient on a home exercise program.  The patient would like to hold off on any surgical intervention until the pandemic has resolved.

## 2021-01-04 ENCOUNTER — HOSPITAL ENCOUNTER (OUTPATIENT)
Dept: GENERAL RADIOLOGY | Age: 71
Discharge: HOME | End: 2021-01-04
Payer: MEDICARE

## 2021-01-04 ENCOUNTER — HOSPITAL ENCOUNTER (OUTPATIENT)
Age: 71
Discharge: HOME | End: 2021-01-04
Payer: MEDICARE

## 2021-01-04 DIAGNOSIS — R06.02 SOB (SHORTNESS OF BREATH): ICD-10-CM

## 2021-01-04 PROCEDURE — 71046 X-RAY EXAM CHEST 2 VIEWS: CPT

## 2021-01-13 ENCOUNTER — OFFICE VISIT (OUTPATIENT)
Dept: ORTHOPEDIC SURGERY | Age: 71
End: 2021-01-13
Payer: MEDICARE

## 2021-01-13 VITALS — WEIGHT: 185 LBS | TEMPERATURE: 97 F | HEIGHT: 64 IN | BODY MASS INDEX: 31.58 KG/M2

## 2021-01-13 DIAGNOSIS — M19.011 GLENOHUMERAL ARTHRITIS, RIGHT: Primary | ICD-10-CM

## 2021-01-13 PROCEDURE — 99213 OFFICE O/P EST LOW 20 MIN: CPT | Performed by: ORTHOPAEDIC SURGERY

## 2021-01-13 PROCEDURE — 20610 DRAIN/INJ JOINT/BURSA W/O US: CPT | Performed by: ORTHOPAEDIC SURGERY

## 2021-01-13 RX ORDER — BUPIVACAINE HYDROCHLORIDE 2.5 MG/ML
3 INJECTION, SOLUTION INFILTRATION; PERINEURAL ONCE
Status: COMPLETED | OUTPATIENT
Start: 2021-01-13 | End: 2021-01-13

## 2021-01-13 RX ORDER — METHYLPREDNISOLONE ACETATE 40 MG/ML
80 INJECTION, SUSPENSION INTRA-ARTICULAR; INTRALESIONAL; INTRAMUSCULAR; SOFT TISSUE ONCE
Status: COMPLETED | OUTPATIENT
Start: 2021-01-13 | End: 2021-01-13

## 2021-01-13 RX ADMIN — BUPIVACAINE HYDROCHLORIDE 7.5 MG: 2.5 INJECTION, SOLUTION INFILTRATION; PERINEURAL at 11:19

## 2021-01-13 RX ADMIN — METHYLPREDNISOLONE ACETATE 80 MG: 40 INJECTION, SUSPENSION INTRA-ARTICULAR; INTRALESIONAL; INTRAMUSCULAR; SOFT TISSUE at 11:20

## 2021-01-13 NOTE — PROGRESS NOTES
Dewayne Sousa  [id number]  2021    Chief Complaint   Patient presents with    Shoulder Pain     R shld pain        History: The patient is here follow-up regarding her right shoulder pain. The injection received back in September provided moderate relief. Her symptoms returned a few weeks ago. She is right-hand dominant. The patient denies any new injuries. She has difficulty with overhead activities. She has difficulty sleeping on her right side. She does remain extremely active. The patient's  past medical history, medications, allergies,  family history, social history, and review of systems have been reviewed, and dated and are recorded in the chart. Temp 97 °F (36.1 °C) (Temporal)   Ht 5' 4\" (1.626 m)   Wt 185 lb (83.9 kg)   BMI 31.76 kg/m²     Physical:  Ms. Dewayne Sousa appears well, she is in no apparent distress, she demonstrates appropriate mood & affect. She is alert and oriented to person, place and time. She has no further swelling. The incisions have healed. There is no evidence of drainage. She is neurovascularly intact distally. Sensation is intact in the axillary nerve distribution. right shoulder range of motion: 155 degrees abduction, 160 degrees forward flexion, 40 degrees external rotation and internal rotation to L4. She has minimal discomfort with light range of motion of the shoulder. The incisions are clean, dry and intact and without erythema. Georges impingement sign is slightly positive on the right. Strength in the shoulder is: 4/5  Abduction and 4/5 external rotation. Otherwise, she has 5/5 motor strength of her bilateral upper extremities. Cindy sign is negative. She has minimal to no tenderness to palpation over the right-sided pericervical musculature. Spurling sign is negative. Imagin views right shoulder obtained previously were again reviewed in the office today. Acceptable decompression noted.   There is moderate glenohumeral

## 2021-06-03 ENCOUNTER — OFFICE VISIT (OUTPATIENT)
Dept: ORTHOPEDIC SURGERY | Age: 71
End: 2021-06-03
Payer: MEDICARE

## 2021-06-03 VITALS
HEART RATE: 79 BPM | SYSTOLIC BLOOD PRESSURE: 154 MMHG | DIASTOLIC BLOOD PRESSURE: 66 MMHG | WEIGHT: 180 LBS | HEIGHT: 63 IN | BODY MASS INDEX: 31.89 KG/M2

## 2021-06-03 DIAGNOSIS — M19.011 GLENOHUMERAL ARTHRITIS, RIGHT: Primary | ICD-10-CM

## 2021-06-03 PROCEDURE — 99214 OFFICE O/P EST MOD 30 MIN: CPT | Performed by: ORTHOPAEDIC SURGERY

## 2021-06-03 PROCEDURE — 20610 DRAIN/INJ JOINT/BURSA W/O US: CPT | Performed by: ORTHOPAEDIC SURGERY

## 2021-06-03 RX ORDER — BUPIVACAINE HYDROCHLORIDE 2.5 MG/ML
3 INJECTION, SOLUTION INFILTRATION; PERINEURAL ONCE
Status: COMPLETED | OUTPATIENT
Start: 2021-06-03 | End: 2021-06-03

## 2021-06-03 RX ORDER — METHYLPREDNISOLONE ACETATE 40 MG/ML
80 INJECTION, SUSPENSION INTRA-ARTICULAR; INTRALESIONAL; INTRAMUSCULAR; SOFT TISSUE ONCE
Status: COMPLETED | OUTPATIENT
Start: 2021-06-03 | End: 2021-06-03

## 2021-06-03 RX ADMIN — BUPIVACAINE HYDROCHLORIDE 7.5 MG: 2.5 INJECTION, SOLUTION INFILTRATION; PERINEURAL at 11:02

## 2021-06-03 RX ADMIN — METHYLPREDNISOLONE ACETATE 80 MG: 40 INJECTION, SUSPENSION INTRA-ARTICULAR; INTRALESIONAL; INTRAMUSCULAR; SOFT TISSUE at 11:02

## 2021-06-03 NOTE — PROGRESS NOTES
Dago Warner  [id number]  Diana 3, 2023    Chief Complaint   Patient presents with    Follow-up     Rt shoulder       History: The patient is here follow-up regarding her right shoulder pain. The injection received 5 months ago provided moderate relief. Her symptoms returned a few weeks ago. She is right-hand dominant. The patient denies any new injuries. She has difficulty with overhead activities. She has difficulty sleeping on her right side. She does remain extremely active. The patient's  past medical history, medications, allergies,  family history, social history, and review of systems have been reviewed, and dated and are recorded in the chart. BP (!) 154/66   Pulse 79   Ht 5' 3\" (1.6 m)   Wt 180 lb (81.6 kg)   BMI 31.89 kg/m²     Physical:  Ms. Dago Warner appears well, she is in no apparent distress, she demonstrates appropriate mood & affect. She is alert and oriented to person, place and time. She has no further swelling. The incisions have healed. There is no evidence of drainage. She is neurovascularly intact distally. Sensation is intact in the axillary nerve distribution. right shoulder range of motion: 155 degrees abduction, 160 degrees forward flexion, 40 degrees external rotation and internal rotation to L4. She has minimal discomfort with light range of motion of the shoulder. The incisions are clean, dry and intact and without erythema. Georges impingement sign is slightly positive on the right. Strength in the shoulder is: 4/5  Abduction and 4/5 external rotation. Otherwise, she has 5/5 motor strength of her bilateral upper extremities. Cindy sign is negative. She has minimal to no tenderness to palpation over the right-sided pericervical musculature. Spurling sign is negative. Imagin views right shoulder obtained in the office today were extensively reviewed. There has been a moderate progression in the glenohumeral arthritis.   The humeral head is slightly elevated. There is moderate glenohumeral arthritis. Impression: #1 right shoulder glenohumeral arthritis #2 status post right shoulder scope with subacromial decompression, open Shannon, biceps tenotomy, and chondroplasty of the humeral head/glenoid. #3 status post C5-6 fusion with C4-5 stenosis     Plan: At this time, we will inject the right shoulder under sterile conditions. After a Betadine prep of the shoulder, 3cc of 0.25% Marcaine and 2cc of 40 mg Depo-Medrol were injected into the shoulder. The patient tolerated the injection rather well. The patient was instructed to follow up immediately for any signs of infection. If the patient continues to have pain, we will consider a reverse total shoulder arthroplasty given her ongoing arthritic pain as well as her rotator cuff arthropathy. She will follow-up with us in approximately 2-3 months and we will reassess her then. We encouraged the patient to modify her activities. We instructed the patient on a home exercise program.  If she has continued pain, we will consider reverse total shoulder arthroplasty. We did discuss our treatment options at length.

## 2021-07-14 ENCOUNTER — HOSPITAL ENCOUNTER (OUTPATIENT)
Dept: WOMENS IMAGING | Age: 71
Discharge: HOME | End: 2021-07-14
Payer: MEDICARE

## 2021-07-14 DIAGNOSIS — Z12.31 VISIT FOR SCREENING MAMMOGRAM: ICD-10-CM

## 2021-07-14 PROCEDURE — 77067 SCR MAMMO BI INCL CAD: CPT

## 2021-08-05 ENCOUNTER — OFFICE VISIT (OUTPATIENT)
Dept: ORTHOPEDIC SURGERY | Age: 71
End: 2021-08-05
Payer: MEDICARE

## 2021-08-05 VITALS
BODY MASS INDEX: 31.89 KG/M2 | SYSTOLIC BLOOD PRESSURE: 141 MMHG | DIASTOLIC BLOOD PRESSURE: 70 MMHG | HEIGHT: 63 IN | WEIGHT: 180 LBS | HEART RATE: 82 BPM

## 2021-08-05 DIAGNOSIS — M19.011 GLENOHUMERAL ARTHRITIS, RIGHT: Primary | ICD-10-CM

## 2021-08-05 PROCEDURE — 99213 OFFICE O/P EST LOW 20 MIN: CPT | Performed by: ORTHOPAEDIC SURGERY

## 2021-08-05 RX ORDER — METHYLPREDNISOLONE 4 MG/1
TABLET ORAL
Qty: 1 KIT | Refills: 0 | Status: SHIPPED | OUTPATIENT
Start: 2021-08-05 | End: 2021-08-11

## 2021-08-05 NOTE — PROGRESS NOTES
Neetu Martin  [id number]  2021    Chief Complaint   Patient presents with    Follow-up     Rt shoulder       History: The patient is here follow-up regarding her right shoulder pain. The injection received back in  only provided relief briefly. Typically the injections give her a very favorable response. He has difficulty with overhead activities and doing her hair. She is right-hand dominant. The patient denies any new injuries. She has difficulty with overhead activities. She has difficulty sleeping on her right side. She does remain extremely active. The patient's  past medical history, medications, allergies,  family history, social history, and review of systems have been reviewed, and dated and are recorded in the chart. BP (!) 141/70   Pulse 82   Ht 5' 3\" (1.6 m)   Wt 180 lb (81.6 kg)   BMI 31.89 kg/m²     Physical:  Ms. Neetu Martin appears well, she is in no apparent distress, she demonstrates appropriate mood & affect. She is alert and oriented to person, place and time. She has no further swelling. The incisions have healed. There is no evidence of drainage. She is neurovascularly intact distally. Sensation is intact in the axillary nerve distribution. right shoulder range of motion: 165 degrees abduction, 165 degrees forward flexion, 40 degrees external rotation and internal rotation to L4. She has minimal discomfort with light range of motion of the shoulder. The incisions are clean, dry and intact and without erythema. Georges impingement sign is slightly positive on the right. Strength in the shoulder is: 4+/5  Abduction and 4+/5 external rotation. Otherwise, she has 5/5 motor strength of her bilateral upper extremities. Cindy sign is negative. She has minimal to no tenderness to palpation over the right-sided pericervical musculature. Spurling sign is negative.       Imagin views right shoulder obtained in the office previously were extensively

## 2021-08-18 ENCOUNTER — HOSPITAL ENCOUNTER (OUTPATIENT)
Dept: PHYSICAL THERAPY | Age: 71
Setting detail: THERAPIES SERIES
Discharge: HOME | End: 2021-08-18
Payer: MEDICARE

## 2021-08-18 PROCEDURE — 97161 PT EVAL LOW COMPLEX 20 MIN: CPT | Performed by: CHIROPRACTOR

## 2021-08-18 PROCEDURE — G0283 ELEC STIM OTHER THAN WOUND: HCPCS | Performed by: CHIROPRACTOR

## 2021-08-18 PROCEDURE — 97530 THERAPEUTIC ACTIVITIES: CPT | Performed by: CHIROPRACTOR

## 2021-08-18 NOTE — FLOWSHEET NOTE
[facility]  [address] [facility]  Phone: [phone]   Fax:     [phone]        Physical Therapy Treatment Note/ Progress Report:       Date:  2021    Patient Name:  Minh Pickard    :  1950  MRN: [id number]    Pertinent Medical History:Additional Pertinent Hx: Asthma, OA, DM, HTN, C-fusion    Medical/Treatment Diagnosis Information:  · Diagnosis: R Glenohumeral Arthritis  · Treatment Diagnosis: Shoulder pain, decreased ROM/ strength    Insurance/Certification information:  [employer] Medicare  Physician Information:   Dr. Michelle Martínez of care signed (Y/N): Inbox    Date of Patient follow up with Physician:      Progress Report: []  Yes  [x]  No     Date Range for reporting period:  Beginnin2021  Ending:      Progress report due (10 Rx/or 30 days whichever is less):     Recertification due (POC duration/ or 90 days whichever is less):      Visit # POC/Insurance Allowable Auth Needed    BOMN []Yes    [x]No     Functional Outcomes Measure:    Date Assessed: at eval  Test: UEFS  Score:  50    Pain level:  Ranges 2-10/10     History of Injury:   Had shoulder arthroscopy ~ 4 yrs ago and has been getting cortisone injections    SUBJECTIVE:   C/o R shoulder pain increased  with activity    OBJECTIVE:    Observation:    Test measurements:      RESTRICTIONS/PRECAUTIONS:     Exercises/Interventions:   Therapeutic Ex (22641)  Min: Resistance/Reps Notes/Cues        UBE          T-slide          Supine flex     Supine protraction     S/L abd     S/L Horiz abd                         Therapeutic Activity (10526) Min:                     NMR re-education (92142)  Min:                    Manual Intervention (48246) Min:      Passive stretch X 5 min              Modalities:  Min          E-stilm IFC with CP x 15 min           Other Therapeutic Activities:  Pt was educated on PT POC, Diagnosis, Prognosis, pathomechanics as well as frequency and duration of scheduling future physical therapy appointments. Time was also taken on this day to answer all patient questions and participation in PT. Reviewed appointment policy in detail with patient and patient verbalized understanding. Home Exercise Program: Patient instructed in the following for HEP:          .   Patient verbalized/demonstrated understanding and was issued written handout. Therapeutic Exercise and NMR EXR  [] (70394) Provided verbal/tactile cueing for activities related to strengthening, flexibility, endurance, ROM  for improvements in scapular, scapulothoracic and UE control with self care, reaching, carrying, lifting, house/yardwork, driving/computer work.    [] (66227) Provided verbal/tactile cueing for activities related to improving balance, coordination, kinesthetic sense, posture, motor skill, proprioception  to assist with  scapular, scapulothoracic and UE control with self care, reaching, carrying, lifting, house/yardwork, driving/computer work. Therapeutic Activities:    [] (32601 or 74455) Provided verbal/tactile cueing for activities related to improving balance, coordination, kinesthetic sense, posture, motor skill, proprioception and motor activation to allow for proper function of scapular, scapulothoracic and UE control with self care, carrying, lifting, driving/computer work.      Home Exercise Program:    [x] (93677) Reviewed/Progressed HEP activities related to strengthening, flexibility, endurance, ROM of scapular, scapulothoracic and UE control with self care, reaching, carrying, lifting, house/yardwork, driving/computer work  [] (09002) Reviewed/Progressed HEP activities related to improving balance, coordination, kinesthetic sense, posture, motor skill, proprioception of scapular, scapulothoracic and UE control with self care, reaching, carrying, lifting, house/yardwork, driving/computer work Manual Treatments:  PROM / STM / Oscillations-Mobs:  G-I, II, III, IV (PA's, Inf., Post.)  [] (78896) Provided manual therapy to mobilize soft tissue/joints of cervical/CT, scapular GHJ and UE for the purpose of modulating pain, promoting relaxation,  increasing ROM, reducing/eliminating soft tissue swelling/inflammation/restriction, improving soft tissue extensibility and allowing for proper ROM for normal function with self care, reaching, carrying, lifting, house/yardwork, driving/computer work    Charges:  Timed Code Treatment Minutes: 15   Total Treatment Minutes: 45       [x] EVAL (LOW) 50789 (typically 20 minutes face-to-face)  [] EVAL (MOD) 10091 (typically 30 minutes face-to-face)  [] EVAL (HIGH) 21283 (typically 45 minutes face-to-face)  [] RE-EVAL     [] JOHNNY(47466) x     [] Dry needle 1 or 2 Muscles (66389)  [] NMR (84124) x     [] Dry needle 3+ Muscles (08936)  [] Manual (92443) x     [] Ultrasound (75833) x  [x] TA (17395) x     [] Mech Traction (86019)  [] ES(attended) (77745)     [x] ES (un) (22379):   [] Vasopump (48155) [] Ionto (91319)   [] Other:    If [facility] Please Indicate Time In/Out  CPT Code Time in Time out                                   Approval Dates:  CPT Code Units Approved Units Used  Date Updated:                     GOALS:    Patient stated goal: Less pain  [] Progressing: [] Met: [] Not Met: [] Adjusted    Therapist goals for Patient:   Short Term Goals: To be achieved in: 2 weeks  1. Independent in HEP and progression per patient tolerance, in order to prevent re-injury. [] Progressing: [] Met: [] Not Met: [] Adjusted  2. Patient will have a decrease in pain to facilitate improvement in movement, function, and ADLs as indicated by Functional Deficits. [] Progressing: [] Met: [] Not Met: [] Adjusted    Long Term Goals: To be achieved in: 6 weeks  1. Disability index score of 20% or less for the Quick DASH to assist with reaching prior level of function.    [] Progressing: []

## 2021-08-18 NOTE — PLAN OF CARE
[facility]  [address] [place] [address]  Phone: [phone]   Fax:     [phone]                                                       Physical Therapy Certification    Dear Referring Practitioner: Dr. Pepe Hines,    We had the pleasure of evaluating the following patient for physical therapy services at [facility]. A summary of our findings can be found in the initial assessment below. This includes our plan of care. If you have any questions or concerns regarding these findings, please do not hesitate to contact me at the office phone number checked above. Thank you for the referral.       Physician Signature:_______________________________Date:__________________  By signing above (or electronic signature), therapists plan is approved by physician        Patient: Betty Pop   : 1950   MRN: [id number]  Referring Physician: Referring Practitioner: Dr. Pepe Hines      Evaluation Date: 2021      Medical Diagnosis Information:  Diagnosis: R Glenohumeral Arthritis   Treatment Diagnosis: Shoulder pain, decreased ROM/ strength                                         Insurance information: PT Insurance Information: Aetna Medicare    Precautions/ Contra-indications:   Latex Allergy:   [x]  NO      []YES  Preferred Language for Healthcare:   [x]English       []other:    C-SSRS Triggered by Intake questionnaire (Past 2 wk assessment ):   [x] No, Questionnaire did not trigger screening.   [] Yes, Patient intake triggered C-SSRS Screening      [] C-SSRS Screening completed  [] PCP notified via Epic     Subjective: C/o R shoulder pain                            Relevant Medical History:Additional Pertinent Hx: Asthma, OA, DM, HTN, C-fusion  Functional Scale/Score: UEFS= 50    Pain Scale: Ranges 2-10/10  Easing factors: Rest  Provocative factors:  Activity / Reaching    Type: []Constant   []Intermittent  []Radiating []Localized []other:     Numbness/Tingling: No    Occupation/School: Retired    Living Status/Prior Level of Function: Independent with ADLs    OBJECTIVE:   Palpation:TTP                      Sensation is grossly intact to light touch    Functional Mobility/Transfers: Independent    Posture: Good    Bandages/Dressings/Incisions: NA    Gait: (include devices/WB status): WNL     CERV ROM     Cervical Flexion WFL    Cervical Extension WFL    Cervical SB WFL    Cervical rotation WFL         ROM Left Right   Shoulder Flex    Shoulder Abd    Shoulder ER 90 80   Shoulder IR 60 55             Strength  Left Right   Shoulder Flex WNL 4/5   Shoulder Scap WNL 4/5   Shoulder ER WNL 4/5   Shoulder IR WNL 4/5           Reflexes Normal Abnormal Comments   []ALL NORMAL            S1-2 Seated achilles [] []    S1-2 Prone knee bend [] []    L3-4 Patellar tendon [] []    C5-6 Biceps [] []    C6 Brachioradialis [] []    C7-8 Triceps [] []    Clonus [] []    Babinski [] []    Nair's [] []      Reflexes/Sensation:    [x]Dermatomes/Myotomes intact    [x]Reflexes equal and normal bilaterally   []Other:    Joint mobility:    []Normal    []Hypo   []Hyper    Orthopedic Special Tests:                        [x] Patient history, allergies, meds reviewed. Medical chart reviewed. See intake form. Review Of Systems (ROS):  [x]Performed Review of systems (Integumentary, CardioPulmonary, Neurological) by intake and observation. Intake form has been scanned into medical record. Patient has been instructed to contact their primary care physician regarding ROS issues if not already being addressed at this time.       Co-morbidities/Complexities (which will affect course of rehabilitation):   []None           Arthritic conditions   []Rheumatoid arthritis (M05.9)  [x]Osteoarthritis (M19.91)   Cardiovascular conditions   []Hypertension (I10)  []Hyperlipidemia (E78.5)  []Angina pectoris (I20)  []Atherosclerosis (I70)  []CVA Musculoskeletal conditions   []Disc pathology   []Congenital spine pathologies   []Prior surgical intervention  []Osteoporosis (M81.8)  []Osteopenia (M85.8)   Endocrine conditions   []Hypothyroid (E03.9)  []Hyperthyroid Gastrointestinal conditions   []Constipation (Y39.70)   Metabolic conditions   []Morbid obesity (E66.01)  [x]Diabetes type 1(E10.65) or 2 (E11.65)   []Neuropathy (G60.9)     Pulmonary conditions   []Asthma (J45)  []Coughing   []COPD (J44.9)   Psychological Disorders  [x]Anxiety (F41.9)  [x]Depression (F32.9)   []Other:   []Other:          Barriers to/and or personal factors that will affect rehab potential:              []Age  []Sex   []Smoker              []Motivation/Lack of Motivation                        [x]Co-Morbidities              []Cognitive Function, education/learning barriers              []Environmental, home barriers              []profession/work barriers  []past PT/medical experience  []other:  Justification:     Falls Risk Assessment (30 days):   [x] Falls Risk assessed and no intervention required.   [] Falls Risk assessed and Patient requires intervention due to being higher risk   TUG score (>12s at risk):     [] Falls education provided, including         ASSESSMENT:  Functional Impairments:     []Noted spinal or UE joint hypomobility   []Noted spinal or UE joint hypermobility   [x]Decreased spinal/UE functional ROM   []Abnormal reflexes/sensation/myotomal/dermatomal deficits   []Decreased RC/scapular/core strength and neuromuscular control    [x]Decreased UE functional strength   []other:      Functional Activity Limitations (from functional questionnaire and intake)   [x]Reduced ability to tolerate prolonged functional positions   [x]Reduced ability or difficulty with changes of positions or transfers between positions   [x]Reduced ability to maintain good posture and demonstrate good body mechanics with sitting, bending, and lifting   [x] Reduced ability or tolerance with driving and/or computer work   [x]Reduced ability to perform lifting, reaching, carrying tasks   [x]Reduced ability to reach behind back   []Reduced ability to sleep    [x]Reduced ability to tolerate any impact through UE or spine   []Reduced ability to  or hold objects   [x]Reduced ability to throw or toss an object   []other:    Participation Restrictions   [x]Reduced participation in self care activities   []Reduced participation in home management activities   []Reduced participation in work activities   []Reduced participation in social activities. []Reduced participation in sport/recreational activities. Classification/Subgrouping:   []signs/symptoms consistent with post-surgical status including decreased ROM, strength and function.     []signs/symptoms consistent with joint sprain/strain    [x]signs/symptoms consistent with shoulder impingement (internal, external, primary or secondary)   []signs/symptoms consistent with shoulder/elbow/wrist tendinopathy   []Signs/symptoms consistent with Rotator cuff tear   []sign/symptoms consistent with labral tear   []signs/symptoms consistent with rib dysfunction   []signs/symptoms consistent with postural dysfunction   []signs/symptoms consistent with Glenohumeral IR Deficit - <45 degrees   []signs/symptoms consistent with facet dysfunction of cervical/thoracic spine   []signs/symptoms consistent with pathology which may benefit from Dry Needling   []signs/symptoms which may limit the use of advanced manual therapy techniques: (Elevated CV risk profile, recent trauma, intolerance to end range positions, prior TIA, visual issues, UE neurological compromise )     Prognosis/Rehab Potential:      []Excellent   [x]Good    []Fair   []Poor    Tolerance of evaluation/treatment:    []Excellent   [x]Good    []Fair   []Poor    Physical Therapy Evaluation Complexity Justification  [x] A history of present problem with:  [] no personal factors and/or comorbidities that impact the plan of care;  []1-2 personal factors and/or comorbidities that impact the plan of care  []3 personal factors and/or comorbidities that impact the plan of care  [x] An examination of body systems using standardized tests and measures addressing any of the following: body structures and functions (impairments), activity limitations, and/or participation restrictions;:  [x] a total of 1-2 or more elements   [] a total of 3 or more elements   [] a total of 4 or more elements   [x] A clinical presentation with:  [x] stable and/or uncomplicated characteristics   [] evolving clinical presentation with changing characteristics  [] unstable and unpredictable characteristics;   [x] Clinical decision making of [] low, [] moderate, [] high complexity using standardized patient assessment instrument and/or measurable assessment of functional outcome. [x] EVAL (LOW) 70937 (typically 20 minutes face-to-face)  [] EVAL (MOD) 20023 (typically 30 minutes face-to-face)  [] EVAL (HIGH) 45451 (typically 45 minutes face-to-face)  [] RE-EVAL     PLAN: ROM/ Strengthening of R shoulder  Frequency/Duration:  2 days per week for 6 Weeks:  Interventions:  [x]  Therapeutic exercise including: strength training, ROM, for scapula, core and Upper extremity, including postural re-education. [x]  NMR activation and proprioception for UE, periscapular and RC muscles and Core, including postural re-education. [x]  Manual therapy as indicated for shoulder, scapula, spine and associated soft tissue including: Dry Needling/IASTM, STM, PROM, Gr I-IV mobilizations, manipulation. [x] Modalities as needed that may include: thermal agents, E-stim, Biofeedback, US, iontophoresis as indicated  [x] Patient education on joint protection, postural re-education, activity modification, progression of HEP.   [] Aquatic exercise including: strength training, ROM, for scapula, core and Upper extremity, including postural re-education. HEP instruction: Voiced understanding  of Home exer    GOALS:  Patient stated goal: Less pain  [] Progressing: [] Met: [] Not Met: [] Adjusted    Therapist goals for Patient:   Short Term Goals: To be achieved in: 2 weeks  1. Independent in HEP and progression per patient tolerance, in order to prevent re-injury. [] Progressing: [] Met: [] Not Met: [] Adjusted  2. Patient will have a decrease in pain to facilitate improvement in movement, function, and ADLs as indicated by Functional Deficits. [] Progressing: [] Met: [] Not Met: [] Adjusted    Long Term Goals: To be achieved in: 6 weeks  1. Disability index score of 20% or less for the Quick DASH to assist with reaching prior level of function. [] Progressing: [] Met: [] Not Met: [] Adjusted  2. Patient will demonstrate increased AROM to  to allow for proper joint functioning as indicated by Functional Deficits. [] Progressing: [] Met: [] Not Met: [] Adjusted  3. Patient will demonstrate an increase in NM recruitment/activation and overall GH and scapular strength to within n5lbs HHD or WNL for proper functional mobility as indicated by patients Functional Deficits. [] Progressing: [] Met:   [] Not Met: [] Adjusted    Electronically signed by:  Vida [id number]      Note: If patient does not return for scheduled/recommended follow up visits, this note will serve as a discharge from care along with the most recent update on progress.

## 2021-08-20 ENCOUNTER — HOSPITAL ENCOUNTER (OUTPATIENT)
Dept: PHYSICAL THERAPY | Age: 71
Setting detail: THERAPIES SERIES
Discharge: HOME | End: 2021-08-20
Payer: MEDICARE

## 2021-08-20 PROCEDURE — 97140 MANUAL THERAPY 1/> REGIONS: CPT | Performed by: CHIROPRACTOR

## 2021-08-20 PROCEDURE — 97110 THERAPEUTIC EXERCISES: CPT | Performed by: CHIROPRACTOR

## 2021-08-20 NOTE — FLOWSHEET NOTE
(80099) Min:      Passive stretch X 10 min              Modalities:  Min          E-stilm IFC with CP x 15 min           Other Therapeutic Activities:  Pt was educated on PT POC, Diagnosis, Prognosis, pathomechanics as well as frequency and duration of scheduling future physical therapy appointments. Time was also taken on this day to answer all patient questions and participation in PT. Reviewed appointment policy in detail with patient and patient verbalized understanding. Home Exercise Program: Patient instructed in the following for HEP:          .   Patient verbalized/demonstrated understanding and was issued written handout. Therapeutic Exercise and NMR EXR  [] (77000) Provided verbal/tactile cueing for activities related to strengthening, flexibility, endurance, ROM  for improvements in scapular, scapulothoracic and UE control with self care, reaching, carrying, lifting, house/yardwork, driving/computer work.    [] (26175) Provided verbal/tactile cueing for activities related to improving balance, coordination, kinesthetic sense, posture, motor skill, proprioception  to assist with  scapular, scapulothoracic and UE control with self care, reaching, carrying, lifting, house/yardwork, driving/computer work. Therapeutic Activities:    [] (20925 or 81266) Provided verbal/tactile cueing for activities related to improving balance, coordination, kinesthetic sense, posture, motor skill, proprioception and motor activation to allow for proper function of scapular, scapulothoracic and UE control with self care, carrying, lifting, driving/computer work.      Home Exercise Program:    [x] (39572) Reviewed/Progressed HEP activities related to strengthening, flexibility, endurance, ROM of scapular, scapulothoracic and UE control with self care, reaching, carrying, lifting, house/yardwork, driving/computer work  [] (38061) Reviewed/Progressed HEP activities related to improving balance, coordination, kinesthetic sense, posture, motor skill, proprioception of scapular, scapulothoracic and UE control with self care, reaching, carrying, lifting, house/yardwork, driving/computer work      Manual Treatments:  PROM / STM / Oscillations-Mobs:  G-I, II, III, IV (PA's, Inf., Post.)  [] (10914) Provided manual therapy to mobilize soft tissue/joints of cervical/CT, scapular GHJ and UE for the purpose of modulating pain, promoting relaxation,  increasing ROM, reducing/eliminating soft tissue swelling/inflammation/restriction, improving soft tissue extensibility and allowing for proper ROM for normal function with self care, reaching, carrying, lifting, house/yardwork, driving/computer work    Charges:  Timed Code Treatment Minutes: 25   Total Treatment Minutes: 40       [x] EVAL (LOW) 72169 (typically 20 minutes face-to-face)  [] EVAL (MOD) 79513 (typically 30 minutes face-to-face)  [] EVAL (HIGH) 84693 (typically 45 minutes face-to-face)  [] RE-EVAL     [x] XG(75120) x     [] Dry needle 1 or 2 Muscles (07538)  [] NMR (43319) x     [] Dry needle 3+ Muscles (82478)  [x] Manual (04625) x     [] Ultrasound (94989) x  [] TA (09485) x     [] Mech Traction (18032)  [] ES(attended) (56152)     [x] ES (un) (82404):   [] Vasopump (02916) [] Ionto (11193)   [] Other:    If [facility] Please Indicate Time In/Out  CPT Code Time in Time out                                   Approval Dates:  CPT Code Units Approved Units Used  Date Updated:                     GOALS:    Patient stated goal: Less pain  [] Progressing: [] Met: [] Not Met: [] Adjusted    Therapist goals for Patient:   Short Term Goals: To be achieved in: 2 weeks  1. Independent in HEP and progression per patient tolerance, in order to prevent re-injury. [] Progressing: [] Met: [] Not Met: [] Adjusted  2. Patient will have a decrease in pain to facilitate improvement in movement, function, and ADLs as indicated by Functional Deficits.   [] Progressing: [] Met: [] Not Met: [] Adjusted    Long Term Goals: To be achieved in: 6 weeks  1. Disability index score of 20% or less for the Quick DASH to assist with reaching prior level of function. [] Progressing: [] Met: [] Not Met: [] Adjusted  2. Patient will demonstrate increased AROM to  to allow for proper joint functioning as indicated by Functional Deficits. [] Progressing: [] Met: [] Not Met: [] Adjusted  3. Patient will demonstrate an increase in NM recruitment/activation and overall GH and scapular strength to within n5lbs HHD or WNL for proper functional mobility as indicated by patients Functional Deficits. [] Progressing: [] Met:   [] Not Met: [] Adjusted  ASSESSMENT:  See eval    Treatment/Activity Tolerance:  [x] Patient tolerated treatment well [] Patient limited by fatique  [] Patient limited by pain  [] Patient limited by other medical complications  [] Other:     Overall Progression Towards Functional goals/ Treatment Progress Update:  [] Patient is progressing as expected towards functional goals listed. [] Progression is slowed due to complexities/Impairments listed. [] Progression has been slowed due to co-morbidities. [x] Plan just implemented, too soon to assess goals progression <30days   [] Goals require adjustment due to lack of progress  [] Patient is not progressing as expected and requires additional follow up with physician  [] Other    Prognosis for POC: [x] Good [] Fair  [] Poor    Patient requires continued skilled intervention: [x] Yes  [] No      PLAN: Pt wishes to switch to 1x/wk due to high Co-pay  [] Continue per plan of care [] Alter current plan (see comments)  [x] Plan of care initiated [] Hold pending MD visit [] Discharge    Electronically signed by: Esperanza CORONA#[id number]    Note: If patient does not return for scheduled/recommended follow up visits, this note will serve as a discharge from care along with the most recent update on progress.

## 2021-08-23 ENCOUNTER — APPOINTMENT (OUTPATIENT)
Dept: PHYSICAL THERAPY | Age: 71
End: 2021-08-23
Payer: MEDICARE

## 2021-08-25 ENCOUNTER — HOSPITAL ENCOUNTER (OUTPATIENT)
Dept: PHYSICAL THERAPY | Age: 71
Setting detail: THERAPIES SERIES
Discharge: HOME | End: 2021-08-25
Payer: MEDICARE

## 2021-08-25 PROCEDURE — 97140 MANUAL THERAPY 1/> REGIONS: CPT

## 2021-08-25 PROCEDURE — G0283 ELEC STIM OTHER THAN WOUND: HCPCS

## 2021-08-25 PROCEDURE — 97110 THERAPEUTIC EXERCISES: CPT

## 2021-08-25 NOTE — FLOWSHEET NOTE
[facility]  [address] [facility]  Phone: [phone]   Fax:     [phone]        Physical Therapy Treatment Note/ Progress Report:       Date:  2021    Patient Name:  Raymondo Galeazzi    :  1950  MRN: [id number]    Pertinent Medical History:Additional Pertinent Hx: Asthma, OA, DM, HTN, C-fusion    Medical/Treatment Diagnosis Information:  · Diagnosis: R Glenohumeral Arthritis  · Treatment Diagnosis: Shoulder pain, decreased ROM/ strength    Insurance/Certification information:  [employer]  Physician Information:   Dr. Estefani Perdomo of care signed (Y/N): Inbox    Date of Patient follow up with Physician:      Progress Report: []  Yes  [x]  No     Date Range for reporting period:  Beginnin2021  Ending:      Progress report due (10 Rx/or 30 days whichever is less):     Recertification due (POC duration/ or 90 days whichever is less):      Visit # POC/Insurance Allowable Auth Needed   3/12 BOMN []Yes    [x]No     Functional Outcomes Measure:    Date Assessed: at eval  Test: UEFS  Score:  50    Pain level:  Ranges 2/10     History of Injury:   Had shoulder arthroscopy ~ 4 yrs ago and has been getting cortisone injections    SUBJECTIVE:     21   Tolerated lat visit ok. Reports has no stamina with R arm. OBJECTIVE:    Observation:    Test measurements:      RESTRICTIONS/PRECAUTIONS:     Exercises/Interventions:   Therapeutic Ex (20613)  Min: Resistance/Reps Notes/Cues        UBE  fwds  X 2 min Did better with going slowly, backwards worse.          T-slide        Rows                      Ext                      IR/ER Red - 1x10 B  Red - 1x10 B  Red - 1x10  ea   R/L         Supine flex 1# - 1x10   R    Supine protraction 1# - 1x10   R    S/L abd 0# - 1x10    R    S/L Horiz abd 0# - 1x10   R    S-L ER 0# 1 x 10 R                   Therapeutic Activity (82117) Min:                     NMR re-education (02287)  Min:                    Manual Intervention (51799) Min:      Passive stretch X 10 min R              Modalities:  Min          E-stilm IFC with CP x 15 min recliner          Other Therapeutic Activities:  Pt was educated on PT POC, Diagnosis, Prognosis, pathomechanics as well as frequency and duration of scheduling future physical therapy appointments. Time was also taken on this day to answer all patient questions and participation in PT. Reviewed appointment policy in detail with patient and patient verbalized understanding. Home Exercise Program: Patient instructed in the following for HEP:          .   Patient verbalized/demonstrated understanding and was issued written handout. Therapeutic Exercise and NMR EXR  [x] (39256) Provided verbal/tactile cueing for activities related to strengthening, flexibility, endurance, ROM  for improvements in scapular, scapulothoracic and UE control with self care, reaching, carrying, lifting, house/yardwork, driving/computer work.    [] (26814) Provided verbal/tactile cueing for activities related to improving balance, coordination, kinesthetic sense, posture, motor skill, proprioception  to assist with  scapular, scapulothoracic and UE control with self care, reaching, carrying, lifting, house/yardwork, driving/computer work. Therapeutic Activities:    [] (22337 or 76834) Provided verbal/tactile cueing for activities related to improving balance, coordination, kinesthetic sense, posture, motor skill, proprioception and motor activation to allow for proper function of scapular, scapulothoracic and UE control with self care, carrying, lifting, driving/computer work.      Home Exercise Program:    [x] (05495) Reviewed/Progressed HEP activities related to strengthening, flexibility, endurance, ROM of scapular, scapulothoracic and UE control with self care, reaching, carrying, lifting, house/yardwork, driving/computer work  [] (52515) Reviewed/Progressed HEP activities related to improving balance, coordination, kinesthetic sense, posture, motor skill, proprioception of scapular, scapulothoracic and UE control with self care, reaching, carrying, lifting, house/yardwork, driving/computer work      Manual Treatments:  PROM / STM / Oscillations-Mobs:  G-I, II, III, IV (PA's, Inf., Post.)  [x] (39758) Provided manual therapy to mobilize soft tissue/joints of cervical/CT, scapular GHJ and UE for the purpose of modulating pain, promoting relaxation,  increasing ROM, reducing/eliminating soft tissue swelling/inflammation/restriction, improving soft tissue extensibility and allowing for proper ROM for normal function with self care, reaching, carrying, lifting, house/yardwork, driving/computer work    Charges:  Timed Code Treatment Minutes: 25   Total Treatment Minutes: 40       [] EVAL (LOW) 92044 (typically 20 minutes face-to-face)  [] EVAL (MOD) 88720 (typically 30 minutes face-to-face)  [] EVAL (HIGH) 13158 (typically 45 minutes face-to-face)  [] RE-EVAL     [x] YM(76078) x     [] Dry needle 1 or 2 Muscles (82777)  [] NMR (90480) x     [] Dry needle 3+ Muscles (60256)  [x] Manual (50584) x     [] Ultrasound (26703) x  [] TA (57257) x     [] Mech Traction (66832)  [] ES(attended) (93509)     [x] ES (un) (96336):   [] Vasopump (25078) [] Ionto (98655)   [] Other:        GOALS:    Patient stated goal: Less pain  [] Progressing: [] Met: [] Not Met: [] Adjusted    Therapist goals for Patient:   Short Term Goals: To be achieved in: 2 weeks  1. Independent in HEP and progression per patient tolerance, in order to prevent re-injury. [] Progressing: [] Met: [] Not Met: [] Adjusted  2. Patient will have a decrease in pain to facilitate improvement in movement, function, and ADLs as indicated by Functional Deficits. [] Progressing: [] Met: [] Not Met: [] Adjusted    Long Term Goals: To be achieved in: 6 weeks  1.  Disability index score of

## 2021-08-30 ENCOUNTER — APPOINTMENT (OUTPATIENT)
Dept: PHYSICAL THERAPY | Age: 71
End: 2021-08-30
Payer: MEDICARE

## 2021-09-01 ENCOUNTER — HOSPITAL ENCOUNTER (OUTPATIENT)
Dept: PHYSICAL THERAPY | Age: 71
Setting detail: THERAPIES SERIES
Discharge: HOME | End: 2021-09-01
Payer: MEDICARE

## 2021-09-01 PROCEDURE — G0283 ELEC STIM OTHER THAN WOUND: HCPCS

## 2021-09-01 PROCEDURE — 97110 THERAPEUTIC EXERCISES: CPT

## 2021-09-01 PROCEDURE — 97140 MANUAL THERAPY 1/> REGIONS: CPT

## 2021-09-01 NOTE — FLOWSHEET NOTE
abd 0# - 1x10    R    S/L Horiz abd 0# - 1x10   R    S-L ER 0# 1 x 10 R    Supine Chest press / cane  0# x 10     Standing cane ext  x10    Scapular retraction 3 sec x 10     Therapeutic Activity (64354) Min:                       NMR re-education (61451)  Min:                    Manual Intervention (85964) Min:      Passive stretch X 10 min R              Modalities:  Min          E-stilm IFC with CP x 15 min recliner          Other Therapeutic Activities:  Pt was educated on PT POC, Diagnosis, Prognosis, pathomechanics as well as frequency and duration of scheduling future physical therapy appointments. Time was also taken on this day to answer all patient questions and participation in PT. Reviewed appointment policy in detail with patient and patient verbalized understanding. Home Exercise Program: Patient instructed in the following for HEP:          .   Patient verbalized/demonstrated understanding and was issued written handout. Therapeutic Exercise and NMR EXR  [x] (13412) Provided verbal/tactile cueing for activities related to strengthening, flexibility, endurance, ROM  for improvements in scapular, scapulothoracic and UE control with self care, reaching, carrying, lifting, house/yardwork, driving/computer work.    [] (07642) Provided verbal/tactile cueing for activities related to improving balance, coordination, kinesthetic sense, posture, motor skill, proprioception  to assist with  scapular, scapulothoracic and UE control with self care, reaching, carrying, lifting, house/yardwork, driving/computer work. Therapeutic Activities:    [] (43880 or 51856) Provided verbal/tactile cueing for activities related to improving balance, coordination, kinesthetic sense, posture, motor skill, proprioception and motor activation to allow for proper function of scapular, scapulothoracic and UE control with self care, carrying, lifting, driving/computer work.      Home Exercise Program:    [x] (16070) Reviewed/Progressed HEP activities related to strengthening, flexibility, endurance, ROM of scapular, scapulothoracic and UE control with self care, reaching, carrying, lifting, house/yardwork, driving/computer work  [] (73246) Reviewed/Progressed HEP activities related to improving balance, coordination, kinesthetic sense, posture, motor skill, proprioception of scapular, scapulothoracic and UE control with self care, reaching, carrying, lifting, house/yardwork, driving/computer work      Manual Treatments:  PROM / STM / Oscillations-Mobs:  G-I, II, III, IV (PA's, Inf., Post.)  [x] (16949) Provided manual therapy to mobilize soft tissue/joints of cervical/CT, scapular GHJ and UE for the purpose of modulating pain, promoting relaxation,  increasing ROM, reducing/eliminating soft tissue swelling/inflammation/restriction, improving soft tissue extensibility and allowing for proper ROM for normal function with self care, reaching, carrying, lifting, house/yardwork, driving/computer work    Charges:  Timed Code Treatment Minutes: 35   Total Treatment Minutes: 50       [] EVAL (LOW) 14188 (typically 20 minutes face-to-face)  [] EVAL (MOD) 37323 (typically 30 minutes face-to-face)  [] EVAL (HIGH) 30334 (typically 45 minutes face-to-face)  [] RE-EVAL     [x] RT(81821) x     [] Dry needle 1 or 2 Muscles (70515)  [] NMR (52265) x     [] Dry needle 3+ Muscles (19836)  [x] Manual (73236) x     [] Ultrasound (82516) x  [] TA (69511) x     [] Mech Traction (19107)  [] ES(attended) (23774)     [x] ES (un) (70836):   [] Vasopump (07195) [] Ionto (29108)   [] Other:        GOALS:    Patient stated goal: Less pain  [] Progressing: [] Met: [] Not Met: [] Adjusted    Therapist goals for Patient:   Short Term Goals: To be achieved in: 2 weeks  1. Independent in HEP and progression per patient tolerance, in order to prevent re-injury. [] Progressing: [] Met: [] Not Met: [] Adjusted  2.  Patient will have a decrease in pain to facilitate improvement in movement, function, and ADLs as indicated by Functional Deficits. [] Progressing: [] Met: [] Not Met: [] Adjusted    Long Term Goals: To be achieved in: 6 weeks  1. Disability index score of 20% or less for the Quick DASH to assist with reaching prior level of function. [] Progressing: [] Met: [] Not Met: [] Adjusted  2. Patient will demonstrate increased AROM to  to allow for proper joint functioning as indicated by Functional Deficits. [] Progressing: [] Met: [] Not Met: [] Adjusted  3. Patient will demonstrate an increase in NM recruitment/activation and overall GH and scapular strength to within n5lbs HHD or WNL for proper functional mobility as indicated by patients Functional Deficits. [] Progressing: [] Met:   [] Not Met: [] Adjusted    ASSESSMENT:  See eval    Treatment/Activity Tolerance:  [x] Patient tolerated treatment well [] Patient limited by fatique  [] Patient limited by pain  [] Patient limited by other medical complications  [x] Other:  Some relief after E-stim/ CP    Overall Progression Towards Functional goals/ Treatment Progress Update:  [] Patient is progressing as expected towards functional goals listed. [] Progression is slowed due to complexities/Impairments listed. [] Progression has been slowed due to co-morbidities.   [x] Plan just implemented, too soon to assess goals progression <30days   [] Goals require adjustment due to lack of progress  [] Patient is not progressing as expected and requires additional follow up with physician  [] Other    Prognosis for POC: [x] Good [] Fair  [] Poor    Patient requires continued skilled intervention: [x] Yes  [] No      PLAN: Pt wishes to switch to 1x/wk due to high Co-pay  [] Continue per plan of care [] Alter current plan (see comments)  [x] Plan of care initiated [] Hold pending MD visit [] Discharge    Electronically signed by: Aleyda Remy, PTA   [id number]    Note: If patient does not return for

## 2021-09-08 ENCOUNTER — HOSPITAL ENCOUNTER (OUTPATIENT)
Dept: PHYSICAL THERAPY | Age: 71
Setting detail: THERAPIES SERIES
Discharge: HOME | End: 2021-09-08
Payer: MEDICARE

## 2021-09-08 PROCEDURE — G0283 ELEC STIM OTHER THAN WOUND: HCPCS | Performed by: CHIROPRACTOR

## 2021-09-08 PROCEDURE — 97140 MANUAL THERAPY 1/> REGIONS: CPT | Performed by: CHIROPRACTOR

## 2021-09-08 PROCEDURE — 97110 THERAPEUTIC EXERCISES: CPT | Performed by: CHIROPRACTOR

## 2021-09-08 NOTE — FLOWSHEET NOTE
[place] [facility], [facility]  [address] [facility] [place], [facility]  Phone: [phone]   Fax:     [phone]        Physical Therapy Treatment Note/ Progress Report:       Date:  2021    Patient Name:  Rocio Vasquez    :  1950  MRN: [id number]    Pertinent Medical History:Additional Pertinent Hx: Asthma, OA, DM, HTN, C-fusion    Medical/Treatment Diagnosis Information:  · Diagnosis: R Glenohumeral Arthritis  · Treatment Diagnosis: Shoulder pain, decreased ROM/ strength    Insurance/Certification information:  [employer] Medicare  Physician Information:   Dr. Venkata Griggs of care signed (Y/N): Inbox    Date of Patient follow up with Physician:      Progress Report: []  Yes  [x]  No     Date Range for reporting period:  Beginnin2021  Ending:      Progress report due (10 Rx/or 30 days whichever is less):     Recertification due (POC duration/ or 90 days whichever is less):      Visit # POC/Insurance Allowable Auth Needed    BOMN []Yes    [x]No     Functional Outcomes Measure:    Date Assessed: at eval  Test: UEFS  Score:  50     Pain level:   5/10     History of Injury:   Had shoulder arthroscopy ~ 4 yrs ago and has been getting cortisone injections    SUBJECTIVE:     21   Tolerated lat visit ok. Reports has no stamina with R arm.   21 about the same , depends on day and direction moving. Difficulty fixing hair and reaching behind back   21 - Attributes increased pain to making cookies yesterday      OBJECTIVE:    Observation:    Test measurements:  Shoulder flex 155, abd 130, ER 95, IR 55 - Strength 4+/5 (21)    RESTRICTIONS/PRECAUTIONS:     Exercises/Interventions:   Therapeutic Ex (42322)  Min: Resistance/Reps Notes/Cues        UBE  fwds  X 2 min Did better with going slowly, backwards worse.     Pulleys flex  x10    T-slide        Rows                      Ext IR/ER Red - 1x10 B  Red - 1x10 B  Red - 1x10  ea   R         Supine flex 1# - 1x10   R    Supine single arm press 1# - 1x10   R    Supine protraction 1# - 1x10   R    S/L abd 0# - 1x10    R    S/L Horiz abd 0# - 1x10   R    S-L ER 0# 1 x 10 R         Standing cane ext  x10    Scapular retraction 3 sec x 10     Therapeutic Activity (29935) Min:                       NMR re-education (55533)  Min:                    Manual Intervention (01780) Min:      Passive stretch X 10 min R              Modalities:  Min          E-stilm IFC with CP x 15 min recliner          Other Therapeutic Activities:  Pt was educated on PT POC, Diagnosis, Prognosis, pathomechanics as well as frequency and duration of scheduling future physical therapy appointments. Time was also taken on this day to answer all patient questions and participation in PT. Reviewed appointment policy in detail with patient and patient verbalized understanding. Home Exercise Program: Patient instructed in the following for HEP:          .   Patient verbalized/demonstrated understanding and was issued written handout. Therapeutic Exercise and NMR EXR  [x] (20336) Provided verbal/tactile cueing for activities related to strengthening, flexibility, endurance, ROM  for improvements in scapular, scapulothoracic and UE control with self care, reaching, carrying, lifting, house/yardwork, driving/computer work.    [] (63448) Provided verbal/tactile cueing for activities related to improving balance, coordination, kinesthetic sense, posture, motor skill, proprioception  to assist with  scapular, scapulothoracic and UE control with self care, reaching, carrying, lifting, house/yardwork, driving/computer work.     Therapeutic Activities:    [] (78255 or 19769) Provided verbal/tactile cueing for activities related to improving balance, coordination, kinesthetic sense, posture, motor skill, proprioception and motor activation to allow for proper function of scapular, scapulothoracic and UE control with self care, carrying, lifting, driving/computer work. Home Exercise Program:    [x] (86406) Reviewed/Progressed HEP activities related to strengthening, flexibility, endurance, ROM of scapular, scapulothoracic and UE control with self care, reaching, carrying, lifting, house/yardwork, driving/computer work  [] (26152) Reviewed/Progressed HEP activities related to improving balance, coordination, kinesthetic sense, posture, motor skill, proprioception of scapular, scapulothoracic and UE control with self care, reaching, carrying, lifting, house/yardwork, driving/computer work      Manual Treatments:  PROM / STM / Oscillations-Mobs:  G-I, II, III, IV (PA's, Inf., Post.)  [x] (75612) Provided manual therapy to mobilize soft tissue/joints of cervical/CT, scapular GHJ and UE for the purpose of modulating pain, promoting relaxation,  increasing ROM, reducing/eliminating soft tissue swelling/inflammation/restriction, improving soft tissue extensibility and allowing for proper ROM for normal function with self care, reaching, carrying, lifting, house/yardwork, driving/computer work    Charges:  Timed Code Treatment Minutes: 35   Total Treatment Minutes: 50       [] EVAL (LOW) 69864 (typically 20 minutes face-to-face)  [] EVAL (MOD) 43454 (typically 30 minutes face-to-face)  [] EVAL (HIGH) 87220 (typically 45 minutes face-to-face)  [] RE-EVAL     [x] UY(55593) x     [] Dry needle 1 or 2 Muscles (14486)  [] NMR (00283) x     [] Dry needle 3+ Muscles (74339)  [x] Manual (52985) x     [] Ultrasound (46514) x  [] TA (50273) x     [] Mech Traction (12763)  [] ES(attended) (86507)     [x] ES (un) (54839):   [] Vasopump (61108) [] Ionto (99396)   [] Other:        GOALS:    Patient stated goal: Less pain  [] Progressing: [] Met: [] Not Met: [] Adjusted    Therapist goals for Patient:   Short Term Goals: To be achieved in: 2 weeks  1.  Independent in HEP and progression per patient tolerance, in order to prevent re-injury. [] Progressing: [] Met: [] Not Met: [] Adjusted  2. Patient will have a decrease in pain to facilitate improvement in movement, function, and ADLs as indicated by Functional Deficits. [] Progressing: [] Met: [] Not Met: [] Adjusted    Long Term Goals: To be achieved in: 6 weeks  1. Disability index score of 20% or less for the Quick DASH to assist with reaching prior level of function. [] Progressing: [] Met: [] Not Met: [] Adjusted  2. Patient will demonstrate increased AROM to  to allow for proper joint functioning as indicated by Functional Deficits. [] Progressing: [] Met: [] Not Met: [] Adjusted  3. Patient will demonstrate an increase in NM recruitment/activation and overall GH and scapular strength to within n5lbs HHD or WNL for proper functional mobility as indicated by patients Functional Deficits. [] Progressing: [] Met:   [] Not Met: [] Adjusted    ASSESSMENT:  See eval    Treatment/Activity Tolerance:  [x] Patient tolerated treatment well [] Patient limited by fatique  [] Patient limited by pain  [] Patient limited by other medical complications  [x] Other:  Some relief after E-stim/ CP    Overall Progression Towards Functional goals/ Treatment Progress Update:  [] Patient is progressing as expected towards functional goals listed. [] Progression is slowed due to complexities/Impairments listed. [] Progression has been slowed due to co-morbidities.   [x] Plan just implemented, too soon to assess goals progression <30days   [] Goals require adjustment due to lack of progress  [] Patient is not progressing as expected and requires additional follow up with physician  [] Other    Prognosis for POC: [x] Good [] Fair  [] Poor    Patient requires continued skilled intervention: [x] Yes  [] No      PLAN: Pt wishes to switch to 1x/wk due to high Co-pay  [] Continue per plan of care [] Alter current plan (see comments)  [x] Plan of care initiated [] Hold pending MD visit [] Discharge    Electronically signed by: Angelina Edmondson    Note: If patient does not return for scheduled/recommended follow up visits, this note will serve as a discharge from care along with the most recent update on progress.

## 2021-09-15 ENCOUNTER — HOSPITAL ENCOUNTER (OUTPATIENT)
Dept: PHYSICAL THERAPY | Age: 71
Setting detail: THERAPIES SERIES
Discharge: HOME | End: 2021-09-15
Payer: MEDICARE

## 2021-09-15 PROCEDURE — 97110 THERAPEUTIC EXERCISES: CPT | Performed by: CHIROPRACTOR

## 2021-09-15 PROCEDURE — G0283 ELEC STIM OTHER THAN WOUND: HCPCS | Performed by: CHIROPRACTOR

## 2021-09-15 PROCEDURE — 97140 MANUAL THERAPY 1/> REGIONS: CPT | Performed by: CHIROPRACTOR

## 2021-09-15 NOTE — FLOWSHEET NOTE
[facility]  [address] [facility]  Phone: [phone]   Fax:     [phone]        Physical Therapy Treatment Note/ Progress Report:       Date:  9/15/2021    Patient Name:  Debbie Hemphill    :  1950  MRN: [id number]    Pertinent Medical History:Additional Pertinent Hx: Asthma, OA, DM, HTN, C-fusion    Medical/Treatment Diagnosis Information:  · Diagnosis: R Glenohumeral Arthritis  · Treatment Diagnosis: Shoulder pain, decreased ROM/ strength    Insurance/Certification information:  [employer] Medicare  Physician Information:   Dr. Ashish Lyn of care signed (Y/N): Inbox    Date of Patient follow up with Physician:      Progress Report: []  Yes  [x]  No     Date Range for reporting period:  Beginnin/15/2021  Ending:      Progress report due (10 Rx/or 30 days whichever is less):     Recertification due (POC duration/ or 90 days whichever is less):      Visit # POC/Insurance Allowable Auth Needed    BOMN []Yes    [x]No     Functional Outcomes Measure:    Date Assessed: at eval  Test: UEFS  Score:  50     Pain level:   6/10     History of Injury:   Had shoulder arthroscopy ~ 4 yrs ago and has been getting cortisone injections    SUBJECTIVE:     21   Tolerated lat visit ok. Reports has no stamina with R arm.   21 about the same , depends on day and direction moving. Difficulty fixing hair and reaching behind back   21 - Attributes increased pain to making cookies yesterday  9/15/21 - Slight increased pain from increased use      OBJECTIVE:    Observation:    Test measurements:  Shoulder flex 165, abd 165, ER 95, IR 65 - Strength 4+/5 (9/15/21)    RESTRICTIONS/PRECAUTIONS:     Exercises/Interventions:   Therapeutic Ex (43679)  Min: Resistance/Reps Notes/Cues        UBE  fwds  X 2 min Did better with going slowly, backwards worse.     Pulleys flex  x10    T-slide Rows                      Ext                      IR/ER Red - 1x10 B  Red - 1x10 B  Red - 1x10  ea   R         Supine flex 1# - 1x10   R    Supine single arm press 1# - 1x10   R    Supine protraction 1# - 1x10   R    S/L abd 0# - 1x10    R    S/L Horiz abd 0# - 1x10   R    S-L ER 0# 1 x 10 R         Standing cane ext  x10    Scapular retraction 3 sec x 10     Therapeutic Activity (03118) Min:                       NMR re-education (26890)  Min:                    Manual Intervention (92418) Min:      Passive stretch X 10 min R              Modalities:  Min          E-stilm IFC with CP x 15 min recliner          Other Therapeutic Activities:  Pt was educated on PT POC, Diagnosis, Prognosis, pathomechanics as well as frequency and duration of scheduling future physical therapy appointments. Time was also taken on this day to answer all patient questions and participation in PT. Reviewed appointment policy in detail with patient and patient verbalized understanding. Home Exercise Program: Patient instructed in the following for HEP:          .   Patient verbalized/demonstrated understanding and was issued written handout. Therapeutic Exercise and NMR EXR  [x] (17834) Provided verbal/tactile cueing for activities related to strengthening, flexibility, endurance, ROM  for improvements in scapular, scapulothoracic and UE control with self care, reaching, carrying, lifting, house/yardwork, driving/computer work.    [] (49007) Provided verbal/tactile cueing for activities related to improving balance, coordination, kinesthetic sense, posture, motor skill, proprioception  to assist with  scapular, scapulothoracic and UE control with self care, reaching, carrying, lifting, house/yardwork, driving/computer work.     Therapeutic Activities:    [] (51043 or 47831) Provided verbal/tactile cueing for activities related to improving balance, coordination, kinesthetic sense, posture, motor skill, proprioception and motor activation to allow for proper function of scapular, scapulothoracic and UE control with self care, carrying, lifting, driving/computer work. Home Exercise Program:    [x] (97160) Reviewed/Progressed HEP activities related to strengthening, flexibility, endurance, ROM of scapular, scapulothoracic and UE control with self care, reaching, carrying, lifting, house/yardwork, driving/computer work  [] (61610) Reviewed/Progressed HEP activities related to improving balance, coordination, kinesthetic sense, posture, motor skill, proprioception of scapular, scapulothoracic and UE control with self care, reaching, carrying, lifting, house/yardwork, driving/computer work      Manual Treatments:  PROM / STM / Oscillations-Mobs:  G-I, II, III, IV (PA's, Inf., Post.)  [x] (14885) Provided manual therapy to mobilize soft tissue/joints of cervical/CT, scapular GHJ and UE for the purpose of modulating pain, promoting relaxation,  increasing ROM, reducing/eliminating soft tissue swelling/inflammation/restriction, improving soft tissue extensibility and allowing for proper ROM for normal function with self care, reaching, carrying, lifting, house/yardwork, driving/computer work    Charges:  Timed Code Treatment Minutes: 35   Total Treatment Minutes: 50       [] EVAL (LOW) 03258 (typically 20 minutes face-to-face)  [] EVAL (MOD) 61475 (typically 30 minutes face-to-face)  [] EVAL (HIGH) 38321 (typically 45 minutes face-to-face)  [] RE-EVAL     [x] UX(21656) x     [] Dry needle 1 or 2 Muscles (27162)  [] NMR (83725) x     [] Dry needle 3+ Muscles (46449)  [x] Manual (59634) x     [] Ultrasound (18070) x  [] TA (19365) x     [] Mech Traction (92119)  [] ES(attended) (26966)     [x] ES (un) (92940):   [] Vasopump (53951) [] Ionto (24857)   [] Other:        GOALS:    Patient stated goal: Less pain  [] Progressing: [] Met: [] Not Met: [] Adjusted    Therapist goals for Patient:   Short Term Goals: To be achieved in: 2 weeks  1. Independent in HEP and progression per patient tolerance, in order to prevent re-injury. [] Progressing: [] Met: [] Not Met: [] Adjusted  2. Patient will have a decrease in pain to facilitate improvement in movement, function, and ADLs as indicated by Functional Deficits. [] Progressing: [] Met: [] Not Met: [] Adjusted    Long Term Goals: To be achieved in: 6 weeks  1. Disability index score of 20% or less for the Quick DASH to assist with reaching prior level of function. [] Progressing: [] Met: [] Not Met: [] Adjusted  2. Patient will demonstrate increased AROM to  to allow for proper joint functioning as indicated by Functional Deficits. [] Progressing: [] Met: [] Not Met: [] Adjusted  3. Patient will demonstrate an increase in NM recruitment/activation and overall GH and scapular strength to within n5lbs HHD or WNL for proper functional mobility as indicated by patients Functional Deficits. [] Progressing: [] Met:   [] Not Met: [] Adjusted    ASSESSMENT:  Strength and ROM improving, but still with a fair amount of pain    Treatment/Activity Tolerance:  [x] Patient tolerated treatment well [] Patient limited by fatique  [] Patient limited by pain  [] Patient limited by other medical complications  [] Other:     Overall Progression Towards Functional goals/ Treatment Progress Update:  [] Patient is progressing as expected towards functional goals listed. [] Progression is slowed due to complexities/Impairments listed. [] Progression has been slowed due to co-morbidities.   [x] Plan just implemented, too soon to assess goals progression <30days   [] Goals require adjustment due to lack of progress  [] Patient is not progressing as expected and requires additional follow up with physician  [] Other    Prognosis for POC: [x] Good [] Fair  [] Poor    Patient requires continued skilled intervention: [x] Yes  [] No      PLAN: Return to MD    [] Continue per plan of care [] Alter current plan (see comments)  [x] Plan of care initiated [] Hold pending MD visit [] Discharge    Electronically signed by: Solange JENSEN#[id number]    Note: If patient does not return for scheduled/recommended follow up visits, this note will serve as a discharge from care along with the most recent update on progress.

## 2021-09-16 ENCOUNTER — OFFICE VISIT (OUTPATIENT)
Dept: ORTHOPEDIC SURGERY | Age: 71
End: 2021-09-16
Payer: MEDICARE

## 2021-09-16 VITALS — WEIGHT: 180 LBS | BODY MASS INDEX: 31.89 KG/M2 | HEIGHT: 63 IN

## 2021-09-16 DIAGNOSIS — M19.011 GLENOHUMERAL ARTHRITIS, RIGHT: Primary | ICD-10-CM

## 2021-09-16 PROCEDURE — 99213 OFFICE O/P EST LOW 20 MIN: CPT | Performed by: ORTHOPAEDIC SURGERY

## 2021-09-16 PROCEDURE — 20610 DRAIN/INJ JOINT/BURSA W/O US: CPT | Performed by: ORTHOPAEDIC SURGERY

## 2021-09-16 RX ORDER — BUPIVACAINE HYDROCHLORIDE 2.5 MG/ML
3 INJECTION, SOLUTION INFILTRATION; PERINEURAL ONCE
Status: COMPLETED | OUTPATIENT
Start: 2021-09-16 | End: 2021-09-16

## 2021-09-16 RX ORDER — METHYLPREDNISOLONE ACETATE 40 MG/ML
80 INJECTION, SUSPENSION INTRA-ARTICULAR; INTRALESIONAL; INTRAMUSCULAR; SOFT TISSUE ONCE
Status: COMPLETED | OUTPATIENT
Start: 2021-09-16 | End: 2021-09-16

## 2021-09-16 RX ADMIN — METHYLPREDNISOLONE ACETATE 80 MG: 40 INJECTION, SUSPENSION INTRA-ARTICULAR; INTRALESIONAL; INTRAMUSCULAR; SOFT TISSUE at 11:34

## 2021-09-16 RX ADMIN — BUPIVACAINE HYDROCHLORIDE 7.5 MG: 2.5 INJECTION, SOLUTION INFILTRATION; PERINEURAL at 11:34

## 2021-09-16 NOTE — PROGRESS NOTES
Tosin Denney  [id number]  2021    Chief Complaint   Patient presents with    Follow-up     Right shoulder       History: The patient is here follow-up regarding her right shoulder pain. The Medrol Dosepak provided minimal relief after last visit. The injection received back in  only provided relief briefly. Typically the injections give her a very favorable response. She does have difficulty with overhead activities and doing her hair. She is right-hand dominant. The patient denies any new injuries. She does remain extremely active. She has been participating in therapy. The patient's  past medical history, medications, allergies,  family history, social history, and review of systems have been reviewed, and dated and are recorded in the chart. Ht 5' 3\" (1.6 m)   Wt 180 lb (81.6 kg)   BMI 31.89 kg/m²     Physical:  Ms. Tosin Denney appears well, she is in no apparent distress, she demonstrates appropriate mood & affect. She is alert and oriented to person, place and time. She has no further swelling. The incisions have healed. There is no evidence of drainage. She is neurovascularly intact distally. Sensation is intact in the axillary nerve distribution. right shoulder range of motion: 165 degrees abduction, 165 degrees forward flexion, 40 degrees external rotation and internal rotation to L4. She has minimal discomfort with light range of motion of the shoulder. The incisions are clean, dry and intact and without erythema. Georges impingement sign is slightly positive on the right. Strength in the shoulder is: 4+/5  Abduction and 4+ to 5/5 external rotation. Otherwise, she has 5/5 motor strength of her bilateral upper extremities. Cindy sign is negative. She has minimal to no tenderness to palpation over the right-sided pericervical musculature. Spurling sign is negative.       Imagin views right shoulder obtained in the office previously were extensively reviewed. There has been a moderate progression in the glenohumeral arthritis. The humeral head is slightly elevated. There is moderate glenohumeral arthritis. Impression: #1 right shoulder glenohumeral arthritis #2 status post right shoulder scope with subacromial decompression, open Shannon, biceps tenotomy, and chondroplasty of the humeral head/glenoid. 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 After a Betadine prep of the shoulder, 3cc of 0.25% Marcaine and 2cc of 40 mg Depo-Medrol were injected into the shoulder. The patient tolerated the injection rather well. The patient was instructed to follow up immediately for any signs of infection. The patient will follow up with me in 3 month(s). The treatment plan was discussed in detail with the patient and includes activity modification and avoidance of repetitive overhead activities. Home exercises were explained to the patient. Considering her range of motion and strength, I do not feel reverse total shoulder arthroplasty is indicated for the patient. If the patient has continued pain, we certainly could consider referring her to Dr. Charmaine Banda for the [facility] procedure on the shoulder.

## 2021-09-21 ENCOUNTER — APPOINTMENT (OUTPATIENT)
Dept: PHYSICAL THERAPY | Age: 71
End: 2021-09-21
Payer: MEDICARE

## 2021-09-29 ENCOUNTER — APPOINTMENT (OUTPATIENT)
Dept: PHYSICAL THERAPY | Age: 71
End: 2021-09-29
Payer: MEDICARE

## 2021-12-16 ENCOUNTER — OFFICE VISIT (OUTPATIENT)
Dept: ORTHOPEDIC SURGERY | Age: 71
End: 2021-12-16
Payer: MEDICARE

## 2021-12-16 VITALS — WEIGHT: 180 LBS | BODY MASS INDEX: 31.89 KG/M2 | HEIGHT: 63 IN

## 2021-12-16 DIAGNOSIS — M19.011 GLENOHUMERAL ARTHRITIS, RIGHT: Primary | ICD-10-CM

## 2021-12-16 PROCEDURE — 20610 DRAIN/INJ JOINT/BURSA W/O US: CPT | Performed by: ORTHOPAEDIC SURGERY

## 2021-12-16 RX ORDER — BUPIVACAINE HYDROCHLORIDE 2.5 MG/ML
3 INJECTION, SOLUTION INFILTRATION; PERINEURAL ONCE
Status: COMPLETED | OUTPATIENT
Start: 2021-12-16 | End: 2021-12-16

## 2021-12-16 RX ORDER — TRIAMCINOLONE ACETONIDE 40 MG/ML
80 INJECTION, SUSPENSION INTRA-ARTICULAR; INTRAMUSCULAR ONCE
Status: COMPLETED | OUTPATIENT
Start: 2021-12-16 | End: 2021-12-16

## 2021-12-16 RX ADMIN — TRIAMCINOLONE ACETONIDE 80 MG: 40 INJECTION, SUSPENSION INTRA-ARTICULAR; INTRAMUSCULAR at 11:02

## 2021-12-16 RX ADMIN — BUPIVACAINE HYDROCHLORIDE 7.5 MG: 2.5 INJECTION, SOLUTION INFILTRATION; PERINEURAL at 11:01

## 2021-12-16 NOTE — PROGRESS NOTES
Lazaro Bautista  [id number]  2021    Chief Complaint   Patient presents with    Follow-up     [facility] shoulder        History: The patient is here follow-up regarding her right shoulder pain. She received a right shoulder injection back in September. The injection provided significant relief for approximately 2 months. The pain returned after she was doing some leaf work in her yard. The pain has been rather severe and progressive. It is much worse than it has ever been. She does have difficulty with overhead activities and doing her hair. She is right-hand dominant. The patient denies any new injuries. She does remain extremely active. The patient's  past medical history, medications, allergies,  family history, social history, and review of systems have been reviewed, and dated and are recorded in the chart. Ht 5' 3\" (1.6 m)   Wt 180 lb (81.6 kg)   BMI 31.89 kg/m²     Physical:  Ms. Lazaro Bautista appears well, she is in no apparent distress, she demonstrates appropriate mood & affect. She is alert and oriented to person, place and time. She has no further swelling. The incisions have healed. There is no evidence of drainage. She is neurovascularly intact distally. Sensation is intact in the axillary nerve distribution. right shoulder range of motion: 125 degrees abduction, 145 degrees forward flexion, 40 degrees external rotation and internal rotation to L4. She has severe pain with abduction of the right arm. The incisions are clean, dry and intact and without erythema. Georges impingement sign is slightly positive on the right. Strength in the shoulder is: 3+/5  Abduction and 4+ to 5/5 external rotation. Otherwise, she has 5/5 motor strength of her bilateral upper extremities. Cindy sign is negative. She has minimal to no tenderness to palpation over the right-sided pericervical musculature. Spurling sign is negative.       Imagin views right shoulder obtained in the office previously were extensively reviewed. There has been a moderate progression in the glenohumeral arthritis. The humeral head is slightly elevated. There is moderate glenohumeral arthritis. Impression: #1 right shoulder glenohumeral arthritis #2 status post right shoulder scope with subacromial decompression, open Shannon, biceps tenotomy, and chondroplasty of the humeral head/glenoid. 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 After a Betadine prep of the shoulder, 3cc of 0.25% Marcaine and 2cc of 40 mg Depo-Medrol were injected into the shoulder. The patient tolerated the injection rather well. The patient was instructed to follow up immediately for any signs of infection. The patient will follow up with me in 3 month(s). The treatment plan was discussed in detail with the patient and includes activity modification and avoidance of repetitive overhead activities. Home exercises were explained to the patient. If the patient continues to have severe pain, we will consider reverse total shoulder arthroplasty.

## 2022-03-21 ENCOUNTER — HOSPITAL ENCOUNTER (OUTPATIENT)
Dept: CT IMAGING | Age: 72
Discharge: HOME | End: 2022-03-21
Payer: MEDICARE

## 2022-03-21 DIAGNOSIS — R42 DIZZINESS AND GIDDINESS: ICD-10-CM

## 2022-03-21 DIAGNOSIS — R51.0 ORTHOSTATIC HEADACHE: ICD-10-CM

## 2022-03-21 DIAGNOSIS — R68.84 JAW PAIN: ICD-10-CM

## 2022-03-21 PROCEDURE — 70450 CT HEAD/BRAIN W/O DYE: CPT

## 2022-04-07 ENCOUNTER — OFFICE VISIT (OUTPATIENT)
Dept: CARDIOLOGY CLINIC | Age: 72
End: 2022-04-07
Payer: MEDICARE

## 2022-04-07 ENCOUNTER — TELEPHONE (OUTPATIENT)
Dept: CARDIOLOGY CLINIC | Age: 72
End: 2022-04-07

## 2022-04-07 VITALS
HEIGHT: 63 IN | OXYGEN SATURATION: 100 % | SYSTOLIC BLOOD PRESSURE: 136 MMHG | BODY MASS INDEX: 33.31 KG/M2 | WEIGHT: 188 LBS | DIASTOLIC BLOOD PRESSURE: 70 MMHG | HEART RATE: 89 BPM

## 2022-04-07 DIAGNOSIS — R94.31 ABNORMAL EKG: ICD-10-CM

## 2022-04-07 DIAGNOSIS — R68.84 JAW PAIN: ICD-10-CM

## 2022-04-07 DIAGNOSIS — E11.9 TYPE 2 DIABETES MELLITUS WITHOUT COMPLICATION, WITH LONG-TERM CURRENT USE OF INSULIN (HCC): ICD-10-CM

## 2022-04-07 DIAGNOSIS — R06.09 DOE (DYSPNEA ON EXERTION): ICD-10-CM

## 2022-04-07 DIAGNOSIS — E66.9 CLASS 1 OBESITY WITH SERIOUS COMORBIDITY AND BODY MASS INDEX (BMI) OF 33.0 TO 33.9 IN ADULT, UNSPECIFIED OBESITY TYPE: ICD-10-CM

## 2022-04-07 DIAGNOSIS — Z79.4 TYPE 2 DIABETES MELLITUS WITHOUT COMPLICATION, WITH LONG-TERM CURRENT USE OF INSULIN (HCC): ICD-10-CM

## 2022-04-07 DIAGNOSIS — I10 HYPERTENSION, UNSPECIFIED TYPE: Primary | ICD-10-CM

## 2022-04-07 DIAGNOSIS — Z91.89 AT RISK FOR CORONARY ARTERY DISEASE: Primary | ICD-10-CM

## 2022-04-07 PROCEDURE — 99205 OFFICE O/P NEW HI 60 MIN: CPT | Performed by: INTERNAL MEDICINE

## 2022-04-07 PROCEDURE — 93000 ELECTROCARDIOGRAM COMPLETE: CPT | Performed by: INTERNAL MEDICINE

## 2022-04-07 NOTE — PATIENT INSTRUCTIONS
Patient Education        Transthoracic Echocardiogram: About This Test  What is it? An echocardiogram (also called an echo) uses sound waves to make an image of your heart. A device called a transducer sends sound waves that echo off your heart and back to the transducer. These echoes are turned into moving picturesof your heart that can be seen on a video screen. In a transthoracic echocardiogram (TTE), the transducer is moved across yourchest or belly. A TTE is the most common type of echocardiogram.  Why is this test done? This test is done to check your heart health. It's used for many reasons. Forexample, it may be done to:   Check a heart murmur.  Look for the cause of shortness of breath or unexplained chest pain or pressure.  Check how well your heart is pumping blood.  Check to see how well your heart valves are working.  Look for blood clots inside your heart.  Measure the size and shape of the heart's chambers.  Measure the blood pressure and speed of blood flow through the heart. How is the test done?  You may be asked to remove your clothes above your waist. You may be given a cloth or paper covering to use during the test.   You will lie on your back or on your left side on a bed or table.  You may receive medicine through a vein (intravenously, or IV). The IV can be used to give you a contrast material. This helps your doctor get good views of your heart.  Small pads or patches (electrodes) will be placed on the skin of your chest to record your heart rate during the test.   A small amount of gel will be rubbed on the side of your chest to help  the sound waves.  The transducer will be pressed firmly against your chest and moved slowly back and forth. It is usually moved to different areas on your chest or belly to get specific views of your heart.    You will be asked to do several things, such as hold very still, breathe in and out very slowly, hold your breath, or lie on your left side.  When the test is over, the gel is wiped off and the electrodes are removed. What are the risks of the test?  There are no known risks from having this test.   No electricity passes through your body during the test. There is no danger of getting an electrical shock.  You do not receive any radiation. What happens after the test?   You will probably be able to go home right away. It depends on the reason for the test.   You can go back to your usual activities right away. Follow-up care is a key part of your treatment and safety. Be sure to make and go to all appointments, and call your doctor if you are having problems. It's also a good idea to keep a list of the medicines youtake. Ask your doctor when you can expect to have your test results. Where can you learn more? Go to https://Qui.ltpesharmineweb.TuManitas. org and sign in to your Toopher account. Enter T235 in the [employer] box to learn more about \"Transthoracic Echocardiogram: About This Test.\"     If you do not have an account, please click on the \"Sign Up Now\" link. Current as of: January 10, 2022               Content Version: 13.2  © 4831-5240 [employer]. Care instructions adapted under license by [facility] ([facility]). If you have questions about a medical condition or this instruction, always ask your healthcare professional. [place] [address] any warranty or liability for your use of this information. Patient Education        Cardiac Perfusion Scan (Medicine): About This Test  What is it? A cardiac perfusion scan measures the amount of blood in your heart muscle at rest and after your heart has been made to work hard. Either medicine or exercise can be used to stress the heart. This information is about using medicine for this test.  During the scan, a camera takes pictures of your heart after a radioactive tracer is injected into a vein in your arm.  The tracer travels through the blood and into your heart. As the tracer moves through your heart, areas that have good blood flow absorb the tracer. Areas that don't absorb the tracer may not be getting enough blood or may have been damaged by a heart attack. The pictures show this difference. Two sets of pictures may be made during the test. One set is taken while you are resting. Another set is taken after your heart has been made to work harder (called a stress test). Why is this test done? The test is often done to find out what may be causing chest pain or pressure. It may be done after a heart attack to see if areas of the heart are not getting enough blood or to find out how much your heart has been damaged from the heart attack. How do you prepare for the test?  Tell your doctor ALL the medicines, vitamins, supplements, and herbal remedies you take. Some may increase the risk of problems during your test. Your doctor will tell you if you should stop taking any of them before the test and how soon to do it. If you take aspirin or some other blood thinner, ask your doctor if you should stop taking it before your test. Make sure that you understand exactly what your doctor wants you to do. These medicines increase the risk of bleeding. Tell your doctor if you are taking any erection-enhancing medicines (such as Cialis, Levitra, or Viagra). You may need to take nitroglycerin during this test, which can cause a serious reaction if you have taken an erection-enhancing medicine within the previous 48 hours. Do not have any caffeine, such as coffee or tea, for 24 hours before the test.  If you are breastfeeding, you may want to pump enough breast milk before the test to get through 1 to 2 days of feeding. The radioactive tracer used in this test can get into your breast milk and is not good for the baby. How is the test done?   Resting or baseline scan  · You will take your top off and be given a gown to wear.  · Electrodes will be attached to your chest to keep track of your heartbeats. · You will have a tube, called an IV, going into your arm. A small amount of the radioactive tracer will be put in the IV. · You will lie on your back or your stomach on a table with a large camera positioned above your chest. The camera records the tracer's signals as it moves through your blood. · You will be asked to remain very still during each scan, which takes about 5 to 10 minutes. Several scans will be taken. Stress scan using medicine  The stress scan is done in two parts. In many hospitals, you first have the resting scan. You are then given a medicine that makes your heart work harder and you have another scan. Sometimes the stress scan is done first.  · You will have a test called an electrocardiogram (EKG or ECG), which takes about 5 to 10 minutes. You may have other EKGs during and after the stress test.  · Medicine will be put in your IV. It will make your heart work harder. You may get a headache and feel dizzy, flushed, and nauseated from the medicine, but these symptoms usually do not last long. · Your heartbeat and blood pressure may be checked. · Your symptoms such as chest pain and shortness of breath will be checked. · A few minutes after you get the medicine, another small amount of radioactive tracer is injected. You may be given something to reverse the medicine used to make your heart work harder. · You will wait for 30 to 40 minutes and then have another resting scan. What else should you know about the test?  · Sometimes more pictures are taken 2 to 4 hours after the stress scan. · No electricity passes through your body during the test. There is no danger of getting an electrical shock. · Anytime you're exposed to radiation, there's a small chance of damage to cells or tissue.  That's the case even with the low-level radioactive tracer used for this test. But the chance of damage is very low compared with the benefits of the test.  · Most of the tracer will leave your body through your urine or stool within a day. So be sure to flush the toilet right after you use it, and wash your hands well with soap and water. The amount of radiation in the tracer is very small. This means it isn't a risk for people to be around you after the test.  What happens after the test?  · You will probably be able to go home right away. · You can go back to your usual activities right away. When should you call for help? Call 911 anytime you think you may need emergency care. For example, call if:  · You passed out (lost consciousness). · You have been diagnosed with angina, and you have angina symptoms that do not go away with rest or are not getting better within 5 minutes after you take a dose of nitroglycerin. · You have symptoms of a heart attack. These may include:  ? Chest pain or pressure, or a strange feeling in the chest.  ? Sweating. ? Shortness of breath. ? Nausea or vomiting. ? Pain, pressure, or a strange feeling in the back, neck, jaw, or upper belly or in one or both shoulders or arms. ? Lightheadedness or sudden weakness. ? A fast or irregular heartbeat. After you call 911, the  may tell you to chew 1 adult-strength or 2 to 4 low-dose aspirin. Wait for an ambulance. Do not try to drive yourself. Watch closely for changes in your health, and be sure to contact your doctor if you have any problems. Follow-up care is a key part of your treatment and safety. Be sure to make and go to all appointments, and call your doctor if you are having problems. It's also a good idea to keep a list of the medicines you take. Ask your doctor when you can expect to have your test results. Where can you learn more? Go to https://SpoonRocketvirginia.Nipendo. org and sign in to your fuseSPORTt account.  Enter R320 in the StrangeLogic box to learn more about \"Cardiac Perfusion Scan (Medicine): About This Test.\"     If you do not have an account, please click on the \"Sign Up Now\" link. Current as of: April 29, 2021               Content Version: 13.1  © 6833-4373 Healthwise, Incorporated. Care instructions adapted under license by [facility] ([facility]). If you have questions about a medical condition or this instruction, always ask your healthcare professional. [address] any warranty or liability for your use of this information.

## 2022-04-07 NOTE — TELEPHONE ENCOUNTER
Called Alanis Crespo NP office and had to leave a message to call the office regarding faxing over lab results.    Will try back tomorrow

## 2022-04-07 NOTE — PROGRESS NOTES
[address]      Cardiology Consult    Tiffanie De La Rosa  1950    April 7, 2022    Referring Physician: Jovani Calix CNP  Reason for Referral: abnormal EKG and chest pain    Chief Complaint   Patient presents with    New Patient     SOB, couple weeks ago had some jaw pain, abnormal EKG     HPI:  The patient is 74 y.o. female with a past medical history significant for obesity, DM, PE, protein S deficient, lung nodules and HTN. She initially presented 10/2016 for  evaluation of chest pain and abnormal EKG. Patient states she has had dull left sided chest pain. She denies any radiating pain to her jaw. She does get some shortness of breath. She states she was on a hay ride over the past weekend and after sitting she began to walk and became very short of breath. She denies orthopnea. She states she takes Coumadin for her history of PE after a hysterectomy over 20 yrs ago. She denies a history of atrial fibrillation. She has a \"spot\" on her lung and has yearly scans to monitor this, she has never smoked. She denies any other cardiac symptoms. Systolic murmur per physical exam. Completed an echo and stress test 10/2016 which were both normal.     Today, returns 6 years who has been referred back to our office for an abnormal EKG and jaw pain, lasting for 2 week, off/on with complete resolution. MAXWELL with climbing stairs and also cant walk and cough at the same time, reports chronic Pain neck/back. Otherwise, patient specifically denies exertional chest pain/pressure, dyspnea at rest, MAXWELL, PND, orthopnea, palpitations, lightheadedness, weight changes, changes in LE edema, and syncope. Non smoker, socially ETOH use. Father MI -massive, age 67 y.o. Reviewed allergies and PSH.        Past Medical History:   Diagnosis Date    Asthma     Colon polyp     Depression     DVT (deep venous thrombosis) (HCC)     as a teenager    Esophageal reflux     GERD with stricture     Hx of blood clots     Hyperlipidemia     Hypertension     Insomnia     Migraine 12/29/1995    Osteoarthritis     Protein deficiency (HCC)     protein S defic.  Pulmonary embolism (HCC)     x`s 2 post-op after Hysterectomy & Gall Bladder    Thyroid adenoma     Type II or unspecified type diabetes mellitus without mention of complication, not stated as uncontrolled      Past Surgical History:   Procedure Laterality Date    APPENDECTOMY      BACK SURGERY      CERVICAL FUSION  1993    CERVICAL FUSION N/A 11/4/2019    ANTERIOR CERVICAL DISCECTOMY FUSION C4-5 AND C6-7 WITH MEDTRONIC PLATE AND SCREWS, AND DONOR BONE WITH C-ARM performed by Skye Arce MD at [address] [place] [address] COLONOSCOPY N/A 7/9/2020    COLONOSCOPY POLYPECTOMY SNARE/COLD BIOPSY performed by Ab Koch DO at [facility] ENDOSCOPY    ENDOSCOPY, COLON, DIAGNOSTIC      HIATAL HERNIA REPAIR      HYSTERECTOMY      OVARIAN CYST SURGERY      SHOULDER ARTHROSCOPY Right 12/28/2016    SAD, labral debridement    THYROIDECTOMY, PARTIAL       Family History   Problem Relation Age of Onset    Diabetes Maternal Grandmother      Social History     Tobacco Use    Smoking status: Never Smoker    Smokeless tobacco: Never Used   Vaping Use    Vaping Use: Never used   Substance Use Topics    Alcohol use: No    Drug use: No       Allergies   Allergen Reactions    Keflex [Cephalexin] Rash     Patient states she no longer has reactions to keflex    Morphine Itching and Rash     Current Outpatient Medications   Medication Sig Dispense Refill    gemfibrozil (LOPID) 600 MG tablet Take 600 mg by mouth 2 times daily (before meals)      topiramate (TOPAMAX) 25 MG tablet Take 25 mg by mouth 2 times daily      buPROPion (WELLBUTRIN XL) 150 MG extended release tablet Take 150 mg by mouth every morning      JANTOVEN 1 MG tablet Restart 11/7/19. Take 2 tabs daily. On 3.5 to 4 mg daily depending on INR 1 tablet 0    JANTOVEN 2 MG tablet Restart 11/7/19.  Please dispense Jantoven and not Warfarin 1 tablet 0    diphenhydrAMINE (BENADRYL) 25 MG capsule Take 25 mg by mouth nightly      traMADol (ULTRAM) 50 MG tablet Take 50 mg by mouth every 6 hours as needed for Pain. Yohana Xie amitriptyline (ELAVIL) 50 MG tablet TAKE ONE TABLET BY MOUTH DAILY 90 tablet 2    metFORMIN (GLUCOPHAGE) 1000 MG tablet Take 1 tablet by mouth 2 times daily (with meals) 60 tablet 5    insulin detemir (LEVEMIR FLEXTOUCH) 100 UNIT/ML injection pen Inject 58 Units into the skin nightly (Patient taking differently: Inject 50 Units into the skin nightly ) 5 Pen 3    sertraline (ZOLOFT) 100 MG tablet Take 1 tablet by mouth daily 90 tablet 3    losartan (COZAAR) 25 MG tablet Take 25 mg by mouth every evening  30 tablet 0    NOVOLOG FLEXPEN 100 UNIT/ML injection pen Inject 5 Units into the skin 3 times daily (before meals) Or as directed      omeprazole (PRILOSEC) 40 MG capsule Take 1 capsule by mouth 2 times daily (Patient taking differently: Take 40 mg by mouth daily ) 180 capsule 3    glucose blood VI test strips (ASCENSIA AUTODISC VI;ONE TOUCH ULTRA TEST VI) strip 1 each by In Vitro route 3 times daily. As needed. 100 each 3    ONE TOUCH LANCETS MISC 1 each by Does not apply route 3 times daily. 100 each 3    atorvastatin (LIPITOR) 40 MG tablet Take 1 tablet by mouth daily. 30 tablet 3    fluticasone (FLONASE) 50 MCG/ACT nasal spray 1 spray by Nasal route daily. (Patient taking differently: 1 spray by Nasal route nightly ) 1 Bottle 3    ondansetron (ZOFRAN-ODT) 4 MG disintegrating tablet DISSOLVE ONE TABLET BY MOUTH EVERY 6 HOURS AS NEEDED 90 tablet 2     No current facility-administered medications for this visit. Review of Systems:  · Constitutional: no unanticipated weight loss. There's been no change in energy level, sleep pattern, or activity level. No fevers, chills. · Eyes: No visual changes or diplopia. No scleral icterus. · ENT: No Headaches, hearing loss or vertigo.  No mouth sores or sore throat. · Cardiovascular: as reviewed in HPI  · Respiratory: No cough or wheezing, no sputum production. No hematemesis. · Gastrointestinal: No abdominal pain, appetite loss, blood in stools. No change in bowel or bladder habits. · Genitourinary: No dysuria, trouble voiding, or hematuria. · Musculoskeletal:  No gait disturbance, no joint complaints. · Integumentary: No rash or pruritis. · Neurological: No headache, diplopia, change in muscle strength, numbness or tingling. · Psychiatric: No anxiety or depression. · Endocrine: No temperature intolerance. No excessive thirst, fluid intake, or urination. No tremor. · Hematologic/Lymphatic: No abnormal bruising or bleeding, blood clots or swollen lymph nodes. · Allergic/Immunologic: No nasal congestion or hives. Physical Exam:   /70 (Site: Left Upper Arm, Position: Sitting)   Pulse 89   Ht 5' 3\" (1.6 m)   Wt 188 lb (85.3 kg)   SpO2 100%   BMI 33.30 kg/m²   Wt Readings from Last 3 Encounters:   04/07/22 188 lb (85.3 kg)   12/16/21 180 lb (81.6 kg)   09/16/21 180 lb (81.6 kg)     Constitutional: She is oriented to person, place, and time. She appears well-developed and well-nourished. In no acute distress. Head: Normocephalic and atraumatic. Pupils equal and round. Neck: Neck supple. No JVP or carotid bruit appreciated. No mass and no thyromegaly present. No lymphadenopathy present. Cardiovascular: Normal rate. Normal heart sounds. Exam reveals no gallop and no friction rub. 2/6 SM heard at the base of the heart. Pulmonary/Chest: Effort normal and breath sounds normal. No respiratory distress. She has no wheezes, rhonchi or rales. Abdominal: Soft, non-tender. Bowel sounds are normal. She exhibits no organomegaly, mass or bruit. Extremities: No edema, cyanosis, or clubbing. Pulses are 2+ radial/dorsalis pedis/posterior tibial/carotid bilaterally. Neurological: No gross cranial nerve deficit.  Coordination normal. Skin: Skin is warm and dry. There is no rash or diaphoresis. Psychiatric: She has a normal mood and affect. Her speech is normal and behavior is normal.     Lab Review:     Lab Results   Component Value Date    TRIG 154 11/14/2016    HDL 48 11/14/2016    LDLCALC 75 11/14/2016    LDLDIRECT 192 04/15/2014    LABVLDL 31 11/14/2016     Lab Results   Component Value Date    BUN 12 11/04/2019    CREATININE <0.5 11/04/2019     EKG Interpretation: 4/7/22:  Sinus  Rhythm  -First degree A-V block, -Right sided conduction defect and right axis -possible right ventricular hypertrophy or posterior fascicular block  Low voltage -possible pulmonary disease. ~87 bpm.     Image Review:     Echo: 2016   Left ventricle size is normal.   Normal left ventricular wall thickness. Ejection fraction is visually estimated to be 65%. No regional wall motion abnormalities are noted. There is reversal of E/A inflow velocities across the mitral valve   suggesting impaired left ventricular relaxation. Calcification of the mitral valve noted. Mitral annular calcification is present. The aortic valve appears sclerotic but opens well. No evidence of aortic valve regurgitation. Tricuspid aortic valve. Stress test: 2016      Normal myocardial perfusion study.    Normal LV size and systolic function.  ECG portion of stress test is normal.    Tucker score equals 6.    Overall findings represent a low risk scan. Assessment/Plan:     Jaw pain/MAXWELL   Seen today as a new patient. Returns per PCP after 6 years for an abnormal EKG and jaw pain, lasting for 2 week, off/on with complete resolution. MAXWELL with climbing stairs and also cant walk and cough at the same time, reports chronic Pain neck/back. Luis Hdez echo and stress tst 2016 normal. Will complete Lexiscan myoview to assess MAXWELL/Jaw pain to rule out ischemic heart disease.   Since she has multiple risk factors(DM, hypertension, obesity)    Abnormal EKG   Initially evaluated 2016  CP at that time as well  Normal stress test and echo 2016  EKG today Sinus  Rhythm  -First degree A-V block, -Right sided conduction defect and right axis -possible right ventricular hypertrophy or posterior fascicular block  Low voltage -possible pulmonary disease. ~87 bpm.   Will complete an echo     The ASCVD Risk score (Rhea Wang., et al., 2013) failed to calculate for the following reasons:    Cannot find a previous HDL lab    Cannot find a previous total cholesterol lab    Systolic murmur  Patient has 2/6 systolic murmur on clinical exam which appears benign in nature. Prior physical exam 9-6/9 systolic murmur 4253. Echo 2016 normal.  Will repeat an echo to rule out structural and valvular heart disease. Hypertension  BP is stable today. Hyperlipidemia  Patient had lipid profile done at [facility] office. Obesity  Encouraged weight management. DM-II  On medical therapy. Managed per PCP. We will obtain a copy from [place], [place] of recent labs. We will call with test results and plan f/u as needed. Thank you very much for allowing me to participate in the care of your patient. Please do not hesitate to contact me if you have any questions. Sincerely,  Yuliet Stark MD      [place] [address]  Ph: [phone]  Fax: [phone]    This note was scribed in the presence of Dr. Bernardo Alfonso MD by Brandy Dockery RN.

## 2022-04-08 NOTE — TELEPHONE ENCOUNTER
Called Vibha Bae's, NP office, spoke to Layton Sutherland who stated she will be faxing over Jillian's lab results. Once I receive them, I will scan them into her chart.

## 2022-04-13 NOTE — TELEPHONE ENCOUNTER
Please call patient to let her know that we received labs and her blood counts and kidney function, electrolytes are wnl. We do need a liver and lipid profile, I placed orders. Remind her that she will need to fast 10-12 hour prior over night and let her know our lab days/hours. Any questions, let me know. Thanks.

## 2022-04-15 DIAGNOSIS — R06.09 DOE (DYSPNEA ON EXERTION): ICD-10-CM

## 2022-04-15 DIAGNOSIS — E66.9 CLASS 1 OBESITY WITH SERIOUS COMORBIDITY AND BODY MASS INDEX (BMI) OF 33.0 TO 33.9 IN ADULT, UNSPECIFIED OBESITY TYPE: ICD-10-CM

## 2022-04-15 DIAGNOSIS — R94.31 ABNORMAL EKG: ICD-10-CM

## 2022-04-15 DIAGNOSIS — R68.84 JAW PAIN: ICD-10-CM

## 2022-04-15 DIAGNOSIS — Z79.4 TYPE 2 DIABETES MELLITUS WITHOUT COMPLICATION, WITH LONG-TERM CURRENT USE OF INSULIN (HCC): ICD-10-CM

## 2022-04-15 DIAGNOSIS — E11.9 TYPE 2 DIABETES MELLITUS WITHOUT COMPLICATION, WITH LONG-TERM CURRENT USE OF INSULIN (HCC): ICD-10-CM

## 2022-04-15 LAB
ALBUMIN SERPL-MCNC: 4.5 G/DL (ref 3.4–5)
ALP BLD-CCNC: 83 U/L (ref 40–129)
ALT SERPL-CCNC: 25 U/L (ref 10–40)
AST SERPL-CCNC: 23 U/L (ref 15–37)
BILIRUB SERPL-MCNC: 0.3 MG/DL (ref 0–1)
BILIRUBIN DIRECT: <0.2 MG/DL (ref 0–0.3)
BILIRUBIN, INDIRECT: NORMAL MG/DL (ref 0–1)
CHOLESTEROL, FASTING: 173 MG/DL (ref 0–199)
HDLC SERPL-MCNC: 59 MG/DL (ref 40–60)
LDL CHOLESTEROL CALCULATED: 97 MG/DL
TOTAL PROTEIN: 6.8 G/DL (ref 6.4–8.2)
TRIGLYCERIDE, FASTING: 87 MG/DL (ref 0–150)
VLDLC SERPL CALC-MCNC: 17 MG/DL

## 2022-04-18 ENCOUNTER — HOSPITAL ENCOUNTER (OUTPATIENT)
Dept: GENERAL RADIOLOGY | Age: 72
Discharge: HOME | End: 2022-04-18
Payer: MEDICARE

## 2022-04-18 ENCOUNTER — HOSPITAL ENCOUNTER (OUTPATIENT)
Age: 72
Discharge: HOME | End: 2022-04-18
Payer: MEDICARE

## 2022-04-18 DIAGNOSIS — M25.551 RIGHT HIP PAIN: ICD-10-CM

## 2022-04-18 DIAGNOSIS — M54.50 LOW BACK PAIN, UNSPECIFIED BACK PAIN LATERALITY, UNSPECIFIED CHRONICITY, UNSPECIFIED WHETHER SCIATICA PRESENT: ICD-10-CM

## 2022-04-18 PROCEDURE — 73502 X-RAY EXAM HIP UNI 2-3 VIEWS: CPT

## 2022-04-18 PROCEDURE — 72100 X-RAY EXAM L-S SPINE 2/3 VWS: CPT

## 2022-04-20 ENCOUNTER — HOSPITAL ENCOUNTER (OUTPATIENT)
Dept: NON INVASIVE DIAGNOSTICS | Age: 72
Discharge: HOME | End: 2022-04-20
Payer: MEDICARE

## 2022-04-20 LAB
LV EF: 75 %
LVEF MODALITY: NORMAL

## 2022-04-20 PROCEDURE — 93017 CV STRESS TEST TRACING ONLY: CPT

## 2022-04-20 PROCEDURE — 6360000002 HC RX W HCPCS: Performed by: INTERNAL MEDICINE

## 2022-04-20 PROCEDURE — 78452 HT MUSCLE IMAGE SPECT MULT: CPT

## 2022-04-20 PROCEDURE — 3430000000 HC RX DIAGNOSTIC RADIOPHARMACEUTICAL: Performed by: INTERNAL MEDICINE

## 2022-04-20 PROCEDURE — A9502 TC99M TETROFOSMIN: HCPCS | Performed by: INTERNAL MEDICINE

## 2022-04-20 RX ADMIN — TETROFOSMIN 10 MILLICURIE: 1.38 INJECTION, POWDER, LYOPHILIZED, FOR SOLUTION INTRAVENOUS at 09:08

## 2022-04-20 RX ADMIN — TETROFOSMIN 30 MILLICURIE: 1.38 INJECTION, POWDER, LYOPHILIZED, FOR SOLUTION INTRAVENOUS at 10:17

## 2022-04-20 RX ADMIN — REGADENOSON 0.4 MG: 0.08 INJECTION, SOLUTION INTRAVENOUS at 10:15

## 2022-04-25 ENCOUNTER — HOSPITAL ENCOUNTER (OUTPATIENT)
Dept: NON INVASIVE DIAGNOSTICS | Age: 72
Discharge: HOME | End: 2022-04-25
Payer: MEDICARE

## 2022-04-25 DIAGNOSIS — R06.09 DOE (DYSPNEA ON EXERTION): ICD-10-CM

## 2022-04-25 DIAGNOSIS — I10 HYPERTENSION, UNSPECIFIED TYPE: ICD-10-CM

## 2022-04-25 DIAGNOSIS — R68.84 JAW PAIN: ICD-10-CM

## 2022-04-25 DIAGNOSIS — Z79.4 TYPE 2 DIABETES MELLITUS WITHOUT COMPLICATION, WITH LONG-TERM CURRENT USE OF INSULIN (HCC): ICD-10-CM

## 2022-04-25 DIAGNOSIS — R94.31 ABNORMAL EKG: ICD-10-CM

## 2022-04-25 DIAGNOSIS — E11.9 TYPE 2 DIABETES MELLITUS WITHOUT COMPLICATION, WITH LONG-TERM CURRENT USE OF INSULIN (HCC): ICD-10-CM

## 2022-04-25 LAB
LV EF: 63 %
LVEF MODALITY: NORMAL

## 2022-04-25 PROCEDURE — 93306 TTE W/DOPPLER COMPLETE: CPT

## 2022-05-02 ENCOUNTER — APPOINTMENT (OUTPATIENT)
Dept: GENERAL RADIOLOGY | Age: 72
End: 2022-05-02
Payer: MEDICARE

## 2022-05-02 ENCOUNTER — HOSPITAL ENCOUNTER (EMERGENCY)
Age: 72
Discharge: HOME | End: 2022-05-02
Payer: MEDICARE

## 2022-05-02 VITALS
HEART RATE: 83 BPM | SYSTOLIC BLOOD PRESSURE: 146 MMHG | DIASTOLIC BLOOD PRESSURE: 86 MMHG | RESPIRATION RATE: 18 BRPM | HEIGHT: 63 IN | OXYGEN SATURATION: 97 % | TEMPERATURE: 99.1 F | WEIGHT: 187.83 LBS | BODY MASS INDEX: 33.28 KG/M2

## 2022-05-02 DIAGNOSIS — S20.211A CONTUSION OF RIGHT CHEST WALL, INITIAL ENCOUNTER: Primary | ICD-10-CM

## 2022-05-02 DIAGNOSIS — M79.641 RIGHT HAND PAIN: ICD-10-CM
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
## 2022-05-02 PROCEDURE — 71046 X-RAY EXAM CHEST 2 VIEWS: CPT

## 2022-05-02 PROCEDURE — 73130 X-RAY EXAM OF HAND: CPT

## 2022-05-02 PROCEDURE — 6370000000 HC RX 637 (ALT 250 FOR IP): Performed by: NURSE PRACTITIONER

## 2022-05-02 PROCEDURE — 99283 EMERGENCY DEPT VISIT LOW MDM: CPT

## 2022-05-02 RX ORDER — LIDOCAINE 4 G/G
1 PATCH TOPICAL ONCE
Status: DISCONTINUED | OUTPATIENT
Start: 2022-05-02 | End: 2022-05-02 | Stop reason: HOSPADM

## 2022-05-02 RX ORDER — HYDROCODONE BITARTRATE AND ACETAMINOPHEN 5; 325 MG/1; MG/1
1 TABLET ORAL EVERY 6 HOURS PRN
Qty: 12 TABLET | Refills: 0 | Status: SHIPPED | OUTPATIENT
Start: 2022-05-02 | End: 2022-05-05

## 2022-05-02 RX ORDER — LIDOCAINE 50 MG/G
1 PATCH TOPICAL DAILY
Qty: 10 PATCH | Refills: 0 | Status: SHIPPED | OUTPATIENT
Start: 2022-05-02 | End: 2022-05-12

## 2022-05-02 RX ORDER — METHOCARBAMOL 500 MG/1
500 TABLET, FILM COATED ORAL 3 TIMES DAILY PRN
Qty: 30 TABLET | Refills: 0 | Status: SHIPPED | OUTPATIENT
Start: 2022-05-02 | End: 2022-05-12

## 2022-05-02 RX ORDER — HYDROCODONE BITARTRATE AND ACETAMINOPHEN 5; 325 MG/1; MG/1
1 TABLET ORAL ONCE
Status: COMPLETED | OUTPATIENT
Start: 2022-05-02 | End: 2022-05-02

## 2022-05-02 RX ORDER — NAPROXEN 250 MG/1
500 TABLET ORAL ONCE
Status: DISCONTINUED | OUTPATIENT
Start: 2022-05-02 | End: 2022-05-02

## 2022-05-02 RX ADMIN — HYDROCODONE BITARTRATE AND ACETAMINOPHEN 1 TABLET: 5; 325 TABLET ORAL at 18:59

## 2022-05-02 ASSESSMENT — PAIN DESCRIPTION - ORIENTATION
ORIENTATION: RIGHT
ORIENTATION: MID

## 2022-05-02 ASSESSMENT — PAIN DESCRIPTION - LOCATION
LOCATION: CHEST
LOCATION: CHEST

## 2022-05-02 ASSESSMENT — PAIN - FUNCTIONAL ASSESSMENT: PAIN_FUNCTIONAL_ASSESSMENT: 0-10

## 2022-05-02 ASSESSMENT — PAIN SCALES - GENERAL
PAINLEVEL_OUTOF10: 10
PAINLEVEL_OUTOF10: 8

## 2022-05-02 ASSESSMENT — PAIN DESCRIPTION - DESCRIPTORS
DESCRIPTORS: PATIENT UNABLE TO DESCRIBE
DESCRIPTORS: SHARP

## 2022-05-02 NOTE — ED PROVIDER NOTES
[address]  [address] [address]  Dept: [phone]  Loc: [address] ENCOUNTER        This patient was not seen or evaluated by the attending physician. I evaluated this patient, the attending physician was available for consultation. CHIEF COMPLAINT    Chief Complaint   Patient presents with    Chest Injury     Pt c/o chest pain  and right hand pain d/t injury when she was pulled down by her dog on a leash and landed on a chair with chest. Visible bruising, is on blood thinners.  Hand Pain       HPI    Leti Diamond is a 74 y.o. female who presents with chest pain. Onset was just pTA. The duration has been constant since the onset. The quality of the pain is sharp. The pain is localized in the right chest and breast, has some bruising in this area per pt. Also some right hand pain. Severity is 10/10. The pain is aggravated by respirations. The pain is not associated with a cough. There are no alleviating factors. The patient denies any associated radiation of the pain, vomiting, diaphoresis, or increased pain with exertion. Context as the patient was walking her family members [facility], she states that it wanted to run, and pulled her down and she fell onto the right chest wall onto a chair. She tried to catch herself with her right hand. Symptoms are aggravated by movements. Alleviated by rest.  No cough, fevers or chills. No substernal chest pain. Came to the emergency department for further evaluation and treatment. No specific head injury or trauma. No associated neck pain. No post injury nausea or vomiting, retrograde amnesia. No bowel or bladder dysfunction or saddle anesthesia.     REVIEW OF SYSTEMS    Cardiac: see HPI, No syncope  Respiratory: no cough or sputum production, No hemoptysis  GI: No Vomiting or Diarrhea  General: No Fever  All other systems reviewed and are negative. PAST MEDICAL & SURGICAL HISTORY    Past Medical History:   Diagnosis Date    Asthma     Colon polyp     Depression     DVT (deep venous thrombosis) ([facility] [address]     as a teenager    Esophageal reflux     GERD with stricture     Hx of blood clots     Hyperlipidemia     Hypertension     Insomnia     Migraine 12/29/1995    Osteoarthritis     Protein deficiency (HCC)     protein S defic.  Pulmonary embolism (HCC)     x`s 2 post-op after Hysterectomy & Gall Bladder    Thyroid adenoma     Type II or unspecified type diabetes mellitus without mention of complication, not stated as uncontrolled      Past Surgical History:   Procedure Laterality Date    APPENDECTOMY      BACK SURGERY      CERVICAL FUSION  1993    CERVICAL FUSION N/A 11/4/2019    ANTERIOR CERVICAL DISCECTOMY FUSION C4-5 AND C6-7 WITH MEDTRONIC PLATE AND SCREWS, AND DONOR BONE WITH C-ARM performed by Skye Arce MD at [address]  COLONOSCOPY N/A 7/9/2020    COLONOSCOPY POLYPECTOMY SNARE/COLD BIOPSY performed by Ab Kcoh DO at [facility], COLON, DIAGNOSTIC      HIATAL HERNIA REPAIR      HYSTERECTOMY      OVARIAN CYST SURGERY      SHOULDER ARTHROSCOPY Right 12/28/2016    SAD, labral debridement    THYROIDECTOMY, PARTIAL         CURRENT MEDICATIONS  (may include discharge medications prescribed in the ED)  Current Outpatient Rx   Medication Sig Dispense Refill    HYDROcodone-acetaminophen (NORCO) 5-325 MG per tablet Take 1 tablet by mouth every 6 hours as needed for Pain for up to 3 days. Intended supply: 3 days. Take lowest dose possible to manage pain 12 tablet 0    lidocaine (LIDODERM) 5 % Place 1 patch onto the skin daily for 10 days 12 hours on, 12 hours off.  10 patch 0    methocarbamol (ROBAXIN) 500 MG tablet Take 1 tablet by mouth 3 times daily as needed (muscle pain/spasm) 30 tablet 0    gemfibrozil (LOPID) 600 MG tablet Take 600 mg by mouth 2 times daily (before meals)      topiramate (TOPAMAX) 25 MG tablet Take 25 mg by mouth 2 times daily      buPROPion (WELLBUTRIN XL) 150 MG extended release tablet Take 150 mg by mouth every morning      JANTOVEN 1 MG tablet Restart 11/7/19. Take 2 tabs daily. On 3.5 to 4 mg daily depending on INR 1 tablet 0    JANTOVEN 2 MG tablet Restart 11/7/19. Please dispense Jantoven and not Warfarin 1 tablet 0    diphenhydrAMINE (BENADRYL) 25 MG capsule Take 25 mg by mouth nightly      amitriptyline (ELAVIL) 50 MG tablet TAKE ONE TABLET BY MOUTH DAILY 90 tablet 2    metFORMIN (GLUCOPHAGE) 1000 MG tablet Take 1 tablet by mouth 2 times daily (with meals) 60 tablet 5    insulin detemir (LEVEMIR FLEXTOUCH) 100 UNIT/ML injection pen Inject 58 Units into the skin nightly (Patient taking differently: Inject 50 Units into the skin nightly ) 5 Pen 3    sertraline (ZOLOFT) 100 MG tablet Take 1 tablet by mouth daily 90 tablet 3    losartan (COZAAR) 25 MG tablet Take 25 mg by mouth every evening  30 tablet 0    NOVOLOG FLEXPEN 100 UNIT/ML injection pen Inject 5 Units into the skin 3 times daily (before meals) Or as directed      omeprazole (PRILOSEC) 40 MG capsule Take 1 capsule by mouth 2 times daily (Patient taking differently: Take 40 mg by mouth daily ) 180 capsule 3    glucose blood VI test strips (ASCENSIA AUTODISC VI;ONE TOUCH ULTRA TEST VI) strip 1 each by In Vitro route 3 times daily. As needed. 100 each 3    ONE TOUCH LANCETS MISC 1 each by Does not apply route 3 times daily. 100 each 3    atorvastatin (LIPITOR) 40 MG tablet Take 1 tablet by mouth daily. 30 tablet 3    fluticasone (FLONASE) 50 MCG/ACT nasal spray 1 spray by Nasal route daily.  (Patient taking differently: 1 spray by Nasal route nightly ) 1 Bottle 3    ondansetron (ZOFRAN-ODT) 4 MG disintegrating tablet DISSOLVE ONE TABLET BY MOUTH EVERY 6 HOURS AS NEEDED 90 tablet 2       ALLERGIES    Allergies   Allergen Reactions    Keflex [Cephalexin] Rash     Patient states she no longer has reactions to keflex    Morphine Itching and Rash       SOCIAL & FAMILY HISTORY    Social History     Socioeconomic History    Marital status:      Spouse name: None    Number of children: None    Years of education: None    Highest education level: None   Occupational History    Occupation: retired   Tobacco Use    Smoking status: Never Smoker    Smokeless tobacco: Never Used   Vaping Use    Vaping Use: Never used   Substance and Sexual Activity    Alcohol use: No    Drug use: No    Sexual activity: Yes   Other Topics Concern    None   Social History Narrative    None     Social Determinants of Health     Financial Resource Strain:     Difficulty of Paying Living Expenses: Not on file   Food Insecurity:     Worried About Running Out of Food in the Last Year: Not on file    Gregoria of Food in the Last Year: Not on file   Transportation Needs:     Lack of Transportation (Medical): Not on file    Lack of Transportation (Non-Medical):  Not on file   Physical Activity:     Days of Exercise per Week: Not on file    Minutes of Exercise per Session: Not on file   Stress:     Feeling of Stress : Not on file   Social Connections:     Frequency of Communication with Friends and Family: Not on file    Frequency of Social Gatherings with Friends and Family: Not on file    Attends Mandaen Services: Not on file    Active Member of [address] [place] [address] Crocs or Organizations: Not on file    Attends Club or Organization Meetings: Not on file    Marital Status: Not on file   Intimate Partner Violence:     Fear of Current or Ex-Partner: Not on file    Emotionally Abused: Not on file    Physically Abused: Not on file    Sexually Abused: Not on file   Housing Stability:     Unable to Pay for Housing in the Last Year: Not on file    Number of [place] in the Last Year: Not on file    Unstable Housing in the Last Year: Not on file     Family History   Problem Relation Age of Onset    Diabetes Maternal Grandmother        PHYSICAL EXAM    VITAL SIGNS: BP (!) 146/86   Pulse 83   Temp 99.1 °F (37.3 °C) (Oral)   Resp 18   Ht 5' 3\" (1.6 m)   Wt 187 lb 13.3 oz (85.2 kg)   SpO2 97%   BMI 33.27 kg/m²    Constitutional:  Well developed, well nourished, no acute distress   HENT:  Atraumatic, moist mucus membranes  Neck: supple, no JVD   Respiratory:  Lungs clear to auscultation bilaterally, no retractions. Reproducible chest wall tenderness along the right breast area with no crepitus, some mild bruising of the right breast, but no deformities noted in this area. Cardiovascular:  regular rate, no murmurs, rubs or gallops  Vascular: Radial and DP pulses 2+ and equal bilaterally  GI:  Soft, nontender, normal bowel sounds  Musculoskeletal:  no acute deformities, no lower extremity edema, no lower extremity asymmetry, no calf tenderness, no thigh tenderness  Integument:  Skin warm and dry, no petechiae   Neurologic:  Alert & oriented, no slurred speech  Psych: Pleasant affect, no hallucinations      RADIOLOGY/PROCEDURES    XR CHEST (2 VW)   Final Result   No acute cardiopulmonary disease. No fractures. XR HAND RIGHT (MIN 3 VIEWS)   Final Result   No acute fracture. Severe osteoarthritis. ED COURSE & MEDICAL DECISION MAKING    Pertinent tests interpreted. (See chart for details)  See chart for details of medications given during the ED stay.     Vitals:    05/02/22 1748 05/02/22 1750   BP:  (!) 146/86   Pulse: 83    Resp: 18    Temp: 99.1 °F (37.3 °C)    TempSrc: Oral    SpO2: 97%    Weight: 187 lb 13.3 oz (85.2 kg)    Height: 5' 3\" (1.6 m)        Differential Diagnosis: URI, Musculoskeletal chest pain, Pleurisy, Pulmonary edema, Congestive Heart Failure, ACS, Pulmonary Embolus, Thoracic Dissection, Pericarditis, Pericardial Effusion, Pneumonia, Pneumothorax, Anxiety, GERD, arrhythmia, electrolyte derangement, anemia, ligamental injury, pleurisy, rib fracture, contusion, other Patient is afebrile and nontoxic in appearance. Plain films as read by the radiologist as above. Given that this pain started after a specific direct injury to the chest wall I do not believe that further evaluation and imaging is warranted at this point in time. I have a low suspicion for any cardiac etiology for the patient's pain as it is very much reproducible, vital signs are stable and there is no clinical signs of hypoxia or any respiratory compromise. No evidence clinically to suggest any additional trauma was sustained. I discussed this with the patient, through shared decision making between the patient myself they are in agreement that no further work-up in the form of blood work or further imaging is warranted. I do believe that the patient is stable for discharge home . has remained afebrile and hemodynamically stable throughout her entire ED course. Her pain is consistent with her fall trauma of the chest wall. Lung sounds are clear to auscultation bilaterally, no evidence for pneumothorax or hemopneumothorax on chest x-ray. I do believe that she is stable for discharge home and she is in agreement. We will give her medications for symptoms and have her follow-up with PCP. She was given strict return parameters back to the ED for any new or worsening symptoms. Has no further questions or concerns, verbalized understanding of all the above will be discharged home in stable condition    I estimate there is LOW risk for PULMONARY EMBOLISM, ACUTE CORONARY SYNDROME, OR THORACIC AORTIC DISSECTION, thus I consider the discharge disposition reasonable. Arian Renner and I have discussed the diagnosis and risks, and we agree with discharging home to follow-up with their primary doctor in 2-3 days. We also discussed returning to the Emergency Department immediately if new or worsening symptoms occur.  We have discussed the symptoms which are most concerning (e.g., bloody sputum, fever, worsening pain or shortness of breath, vomiting) that necessitate immediate return. The patient verbalized understanding, has no further questions or concerns and her agreement with this plan as well as the plan of discharge. FINAL Impression    1. Contusion of right chest wall, initial encounter    2. Right hand pain    3. Fall, initial encounter        Blood pressure (!) 146/86, pulse 83, temperature 99.1 °F (37.3 °C), temperature source Oral, resp. rate 18, height 5' 3\" (1.6 m), weight 187 lb 13.3 oz (85.2 kg), SpO2 97 %, not currently breastfeeding.        PLAN  Discharge with outpatient follow-up      (Please note that this note was completed with a voice recognition program.  Every attempt was made to edit the dictations, but inevitably there remain words that are mis-transcribed.)             JACK Encarnacion - IGOR  05/02/22 [id number]

## 2022-05-02 NOTE — ED NOTES
Discharge and education instructions reviewed. Patient verbalized understanding, teach-back successful. Patient denied questions at this time. No acute distress noted. Patient instructed to follow-up as noted - return to emergency department if symptoms worsen. Patient verbalized understanding. Discharged per EDMD with discharge instructions.           Sunni Morrissey RN  05/02/22 [id number]

## 2022-05-03 ENCOUNTER — TELEPHONE (OUTPATIENT)
Dept: ORTHOPEDIC SURGERY | Age: 72
End: 2022-05-03

## 2022-05-03 ENCOUNTER — PREP FOR PROCEDURE (OUTPATIENT)
Dept: ORTHOPEDIC SURGERY | Age: 72
End: 2022-05-03

## 2022-05-03 ENCOUNTER — OFFICE VISIT (OUTPATIENT)
Dept: ORTHOPEDIC SURGERY | Age: 72
End: 2022-05-03

## 2022-05-03 VITALS — BODY MASS INDEX: 31.89 KG/M2 | HEIGHT: 63 IN | WEIGHT: 180 LBS

## 2022-05-03 DIAGNOSIS — M12.811 ROTATOR CUFF ARTHROPATHY OF RIGHT SHOULDER: Primary | ICD-10-CM

## 2022-05-03 DIAGNOSIS — Z01.818 PREOP TESTING: Primary | ICD-10-CM

## 2022-05-03 PROCEDURE — 99214 OFFICE O/P EST MOD 30 MIN: CPT | Performed by: ORTHOPAEDIC SURGERY

## 2022-05-03 NOTE — TELEPHONE ENCOUNTER
CT RIGHT SHOULDER approved Auth# [id number] - [id number]       Patient informed her CT scan was approved. She was instructed to have it done this week for next.

## 2022-05-03 NOTE — LETTER
[facility]: [phone]: [phone]  Surgery Scheduling Form:  DEMOGRAPHICS:                                                                                                              .    Patient Name:  Valerie Hewitt    Patient :  1950   Patient SS#:  [id number]      Patient Phone:  [phone] (home)      Patient Address:  [address] [place] [address]    Insurance:   Payor/Plan Subscr  Sex Relation Sub. Ins. ID Effective Group Num   1. Aleene Councilman* Miquel Kidney 1950 Female Self [id number]                                   [id number]     DIAGNOSIS & PROCEDURE:                                                                                            .    Diagnosis:  Rotator cuff arthropathy- right shoulder M12.811    Operation:  Reverse total shoulder replacement- right shoulder [id number]    Location:  [address] [place] [address]    Surgeon:  Moy Boggs    SCHEDULING INFORMATION:                                                                                         .    Requested Date:  22 OR Time: 7:30 am  Patient Arrival Time: 6:00 am     OR Time Required:  105  Minutes     1 hour 45 minutes    Anesthesia:  General    SA Required:  Yes x 2    Equipment:  Tornier    Status:  Same day admit      PAT Required:  Yes COVID19 test:  N/A    Latex Allergy:  no Defibrilator or Pacemaker:  no    Isolation Precautions:  no                      Yohan PBradley Griffin MD     5/3/22   BILLING INFORMATION:                                                                                                    .                          CPT Code Modifier  Total shoulder    Prior auth:pending   Name: Valerie Hewitt  : 1950  Procedure:  Total shoulder replacement- right        Date of Surgery:22                Date of JET:22    Allergies: Keflex [cephalexin] and Morphine    Ht Readings from Last 1 Encounters:   22 5' 3\" (1.6 m)      Wt Readings from Last 1 Encounters: 05/03/22 180 lb (81.6 kg)       TOTAL SHOULDER REPLACEMENT PHYSICIANS ORDERS   I hereby authorize the pharmacy, under the formulary system to dispense a different brand of drug identical composition and comparable quality unless the brand name I have prescribed is circled on this order sheet  All orders without checkboxes will be processed automatically unless crossed out. Orders with checkboxes MUST be checked in order to be carried out. PRE-OP [place]  [ ] X-ray operative site-standing view  Qi.Jihan ] CBC without differential [X] Albumin  Qi.Stuck ] BMP      [ ] Coag profile  [X] PT/INR   [ ] Sed rate on revisions of total joints only  Qi.Jihan ] Type and screen match 2 units  Qi.Stuck ] EKG      Qi.Stuck ] UAR     [X] A1C  Qi.Stuck ] Clean catch urine for routine analysis, if positive for nitrites and/or leukocytes, do urine for C & S  Qi.Jihan ] Nasal culture for MRSA  Qi.Jihan ] Physical therapy evaluation and teaching  Qi.Jihan ] Occupational therapy evaluation and teaching  Qi.Jihan ] Inform patient to stop all anti-inflammatory medications including aspirin for 7 days before surgery    DAY OF SURGERY  Qi.Jihan ] Cefazolin 1 gram IVPB; if patient weighs > 80 kg and serum creatinine <2.5 mg/dl give 2 gram dose within 1 hour of incision. If patient has a minor allergy to Penicillin, still give this. OR  If the pre-op nasal culture for MRSA was positive, repeat nasal swab before surgery and give: Vancomycin 2 gram IVPB, reduce dose of Vancomycin to 500 mg IVPB if PT < 55 kg or serum creatinine > 2 mg/dl (Vancomycin must be administered over 1 hour& Cefazolin 1 gram IVPB; if patient weighs>80 kg and serum creatinine <2.3 mg/dl give 2 gram dose within 1 hour of incision)  OR  If patient has a true severe allergy and underwent allergy testing  to Penicillin or Cephalosporins give: Clindamycin 1 gram IVPB, then administer 2 more doses post op.     Qi.Stuck ] Apply thigh high antiembolic hose and pneumonboots to unoperative leg    Other order: Mobic 15 mg pre-op         Qi.Jihan ] Type and screen match 2 units    Other order: Incentive spirometry nursing: initiate in preoperative area to obtain patient baseline    T.O: _____________________________/___________________Date:_______Time:_______   Physician    RN    Physician Signature:               Date/Time:  5/3/2022 2:17 PM

## 2022-05-03 NOTE — PROGRESS NOTES
Pasquale Rodriguez  [id number]  May 3, 2022    Chief Complaint   Patient presents with    Follow-up     Right shoulder       History: The patient is here follow-up regarding her right shoulder pain. The right shoulder injection received back in December provided relief for approximately 3 weeks. Her symptoms have returned. She is quite discouraged with the pain. She has pain at rest.  She has severe pain with motion of the shoulder. She cannot perform daily tasks due to the right shoulder issues. She does remain extremely active. The patient's  past medical history, medications, allergies,  family history, social history, and review of systems have been reviewed, and dated and are recorded in the chart. Ht 5' 3\" (1.6 m)   Wt 180 lb (81.6 kg)   BMI 31.89 kg/m²     Physical:  Ms. Pasquale Rodriguez appears well, she is in no apparent distress, she demonstrates appropriate mood & affect. She is alert and oriented to person, place and time. She has no further swelling. The incisions have healed. There is no evidence of drainage. She is neurovascularly intact distally. Sensation is intact in the axillary nerve distribution. right shoulder range of motion: 95 degrees abduction, 115 degrees forward flexion, 40 degrees external rotation and internal rotation to L4. She has severe pain with abduction of the right arm. The incisions are clean, dry and intact and without erythema. Georges impingement sign is slightly positive on the right. Strength in the shoulder is: 3+/5  Abduction and 4+ to 5/5 external rotation. Otherwise, she has 5/5 motor strength of her bilateral upper extremities. Cindy sign is negative. She has minimal to no tenderness to palpation over the right-sided pericervical musculature. Spurling sign is negative. Imagin views of the right shoulder obtained in the office today were extensively reviewed. There has been a moderate progression in the glenohumeral arthritis.   The humeral head is elevated. There is severe glenohumeral arthritis. Impression: #1 right shoulder rotator cuff arthropathy  #2 status post right shoulder scope with subacromial decompression, open Shannon, biceps tenotomy, and chondroplasty of the humeral head/glenoid. #3 status post C5-6 fusion with C4-5 stenosis     Plan: The patient clearly has failed all conservative measures. Despite these measures she has severe persistent pain. At this time we will schedule the patient for a right reverse total shoulder arthroplasty. All risks including but not limited to blood loss, infection, persistent pain, increased pain, stiffness, weakness, neurovascular injury and the risks of anesthesia were discussed. The patient understands all risks and benefits of the procedure and agrees to proceed. A total of 25 minutes was spent on this patient. The time spent consisted of the following: MRI/X-ray review, patient examination and patient counseling. A total of approximately 18 minutes was spent with the patient reviewing the MRI and preoperative counseling.

## 2022-05-10 ENCOUNTER — HOSPITAL ENCOUNTER (OUTPATIENT)
Dept: CT IMAGING | Age: 72
Discharge: HOME | End: 2022-05-10
Payer: MEDICARE

## 2022-05-10 DIAGNOSIS — M12.811 ROTATOR CUFF ARTHROPATHY OF RIGHT SHOULDER: ICD-10-CM

## 2022-05-10 PROCEDURE — 73200 CT UPPER EXTREMITY W/O DYE: CPT

## 2022-05-23 ENCOUNTER — TELEPHONE (OUTPATIENT)
Dept: ORTHOPEDIC SURGERY | Age: 72
End: 2022-05-23

## 2022-05-23 NOTE — TELEPHONE ENCOUNTER
CPT: [id number]  BODY PART: right shoulder  STATUS: outpatient  LOCATION: [facility]  AUTHORIZATION: approved    Submitted online with [employer] 5/23/22    Surgery approved # [id number]  5/25/22-11/25/22

## 2022-05-25 ENCOUNTER — PREP FOR PROCEDURE (OUTPATIENT)
Dept: ORTHOPEDICS UNIT | Age: 72
End: 2022-05-25

## 2022-05-25 RX ORDER — MELOXICAM 7.5 MG/1
15 TABLET ORAL ONCE
Status: CANCELLED | OUTPATIENT
Start: 2022-05-25 | End: 2022-05-25

## 2022-05-26 NOTE — DISCHARGE INSTR - ACTIVITY
Activity:  Elevate your leg if swelling occurs in your ankle. Use elastic wraps/hose until swelling decreases. Continue the exercise program as prescribed by physical therapists. Take frequent walks. Use sling with weight bearing instructions as indicated by the physical therapists. Take rest periods often. Elevate leg during rest period.

## 2022-06-02 RX ORDER — ASCORBIC ACID 500 MG
1000 TABLET ORAL DAILY
COMMUNITY

## 2022-06-02 RX ORDER — ALBUTEROL SULFATE 90 UG/1
2 AEROSOL, METERED RESPIRATORY (INHALATION) EVERY 4 HOURS PRN
COMMUNITY
End: 2022-10-05

## 2022-06-02 RX ORDER — MULTIVIT-MIN/IRON/FOLIC ACID/K 18-600-40
1 CAPSULE ORAL DAILY
COMMUNITY
End: 2022-10-05

## 2022-06-02 RX ORDER — MULTIVIT WITH MINERALS/LUTEIN
1000 TABLET ORAL DAILY
Status: ON HOLD | COMMUNITY
End: 2022-06-13 | Stop reason: SDUPTHER

## 2022-06-02 RX ORDER — CHLORAL HYDRATE 500 MG
1000 CAPSULE ORAL DAILY
Status: ON HOLD | COMMUNITY
End: 2022-06-13 | Stop reason: SDUPTHER

## 2022-06-02 NOTE — PROGRESS NOTES
C-Difficile admission screening and protocol:       * Admitted with diarrhea? [] YES    [x]  NOFollow your MD/Surgeons pre-procedure instructions regarding your medications   *Prior history of C-Diff. In last 3 months? [] YES    [x]  NO     *Antibiotic use in the past 6-8 weeks? [x]  NO    []  YES                 If yes, which ANTIBIOTIC AND REASON______     *Prior hospitalization or nursing home in the last month? []  YES    [x]  NO  PRE-OP INSTRUCTIONS     · Do not eat or drink anything after 12:00 midnight prior to surgery. This includes water, chewing gum, mints and ice chips. You may brush your teeth and gargle the morning of surgery but DO  NOT SWALLOW THE WATER. Take the following medications with a small sip of water on the morning of surgery: Follow your MD/Surgeons pre-procedure instructions regarding your medications    · If you use an inhaler, please use it the morning of surgery and bring with you to hospital.    · You may be asked to stop blood thinners such as:  Coumadin, Plavix, Fragmin and lovenox. Please check with your doctor before stopping these or any other medications. · Aspirin, ibuprofen, advil and naproxen, any anti-inflammatory products should be stopped for a week prior to your surgery. · Do not smoke and do not drink any alcoholic beverages 24 hours prior to your surgery. · Please do not wear any jewelry or body piercings on the day of surgery. · Please wear something simple, loose fitting clothing to the hospital.  Do not wear any make-up(including eye make-up) or nail polish on your fingers and toes. · As part of our patient safety program to minimize surgical infections, we ask you to do the followin. Please notify your surgeon if you develop any illness between now                          and the day of your surgery.   This includes a cough, cold, fever, sore                       throat, nausea, vomiting, diarrhea, etc.  Also please notify your                                  surgeon if you experience dizziness, shortness of breath or                       Blurred vision between now and the time of your surgery. 2.  Please notify your surgeon of any open or redden areas that may                        look infected                     3.  DO NOT shave your operative site 96 hours(four days) prior to                                  surgery. 4.  Shower the week before surgery with an antibacterial soap, such as                       dial, safeguard, etc.                         5.  Three(3) days prior to your surgery, cleanse the operative site with                          Hibiclens(anti-microbial soap). This soap may dry your skin, please                          do not apply any oils or lotions 6/10,6/11,6/12    · Please bring your insurance card and picture ID day of surgery    · If you have a living will or durable power of attorny. Please bring in a copy of your advanced directives. · If you have dentures, they will be removed before going to the OR, we will provide you with a container. If you wear contact lenses or glasses, they will be removes, please bring a case for them. · Have you seen your family doctor for a pre-op history and physical.      · Surgery scheduler will call you 48 hours prior to your surgery to notify you of the time of your surgery and the time you will need to be at hospital...patients are asked to arrive 21/2 hours prior to surgery. ·  Please call Pre-Admission testing if you have any further questions.                   [id number] [facility] Melanie testing phone number:  [phone]      Thank You for choosing [facility]!!

## 2022-06-02 NOTE — PROGRESS NOTES
22--JET labs checked--UAR showing E-coli-Dr. Kaitlin Serna office notified    DOS 22   [birth year]    H&P IN Epic UNDER MEDIA PATIENT CLEARED FOR SURGERY.    JET CLASS ON 22 PLEASE MAKE SURE ALL LABS ARE DONE AND REVIEW

## 2022-06-06 ENCOUNTER — HOSPITAL ENCOUNTER (OUTPATIENT)
Dept: PREADMISSION TESTING | Age: 72
Discharge: HOME | End: 2022-06-06
Payer: MEDICARE

## 2022-06-06 DIAGNOSIS — Z01.818 PREOP TESTING: ICD-10-CM

## 2022-06-06 LAB
ABO/RH: NORMAL
ALBUMIN SERPL-MCNC: 4.9 G/DL (ref 3.4–5)
ANION GAP SERPL CALCULATED.3IONS-SCNC: 13 MMOL/L (ref 3–16)
ANTIBODY SCREEN: NORMAL
BACTERIA: ABNORMAL /HPF
BASOPHILS ABSOLUTE: 0 K/UL (ref 0–0.2)
BASOPHILS RELATIVE PERCENT: 0.6 %
BILIRUBIN URINE: NEGATIVE
BLOOD, URINE: NEGATIVE
BUN BLDV-MCNC: 11 MG/DL (ref 7–20)
CALCIUM SERPL-MCNC: 9.5 MG/DL (ref 8.3–10.6)
CHLORIDE BLD-SCNC: 103 MMOL/L (ref 99–110)
CLARITY: CLEAR
CO2: 22 MMOL/L (ref 21–32)
COLOR: YELLOW
CREAT SERPL-MCNC: <0.5 MG/DL (ref 0.6–1.2)
EOSINOPHILS ABSOLUTE: 0.1 K/UL (ref 0–0.6)
EOSINOPHILS RELATIVE PERCENT: 1.8 %
EPITHELIAL CELLS, UA: 0 /HPF (ref 0–5)
ESTIMATED AVERAGE GLUCOSE: 154.2 MG/DL
GFR AFRICAN AMERICAN: >60
GFR NON-AFRICAN AMERICAN: >60
GLUCOSE BLD-MCNC: 207 MG/DL (ref 70–99)
GLUCOSE URINE: NEGATIVE MG/DL
HBA1C MFR BLD: 7 %
HCT VFR BLD CALC: 38.3 % (ref 36–48)
HEMOGLOBIN: 12.6 G/DL (ref 12–16)
HYALINE CASTS: 0 /LPF (ref 0–8)
INR BLD: 1.43 (ref 0.87–1.14)
KETONES, URINE: NEGATIVE MG/DL
LEUKOCYTE ESTERASE, URINE: ABNORMAL
LYMPHOCYTES ABSOLUTE: 2.1 K/UL (ref 1–5.1)
LYMPHOCYTES RELATIVE PERCENT: 36.8 %
MCH RBC QN AUTO: 26.5 PG (ref 26–34)
MCHC RBC AUTO-ENTMCNC: 32.8 G/DL (ref 31–36)
MCV RBC AUTO: 80.9 FL (ref 80–100)
MICROSCOPIC EXAMINATION: YES
MONOCYTES ABSOLUTE: 0.4 K/UL (ref 0–1.3)
MONOCYTES RELATIVE PERCENT: 6.9 %
NEUTROPHILS ABSOLUTE: 3.1 K/UL (ref 1.7–7.7)
NEUTROPHILS RELATIVE PERCENT: 53.9 %
NITRITE, URINE: NEGATIVE
PDW BLD-RTO: 16.1 % (ref 12.4–15.4)
PH UA: 7 (ref 5–8)
PLATELET # BLD: 242 K/UL (ref 135–450)
PMV BLD AUTO: 7 FL (ref 5–10.5)
POTASSIUM SERPL-SCNC: 4.1 MMOL/L (ref 3.5–5.1)
PROTEIN UA: NEGATIVE MG/DL
PROTHROMBIN TIME: 17.4 SEC (ref 11.7–14.5)
RBC # BLD: 4.73 M/UL (ref 4–5.2)
RBC UA: 1 /HPF (ref 0–4)
SODIUM BLD-SCNC: 138 MMOL/L (ref 136–145)
SPECIFIC GRAVITY UA: 1.01 (ref 1–1.03)
URINE REFLEX TO CULTURE: YES
URINE TYPE: ABNORMAL
UROBILINOGEN, URINE: 0.2 E.U./DL
WBC # BLD: 5.7 K/UL (ref 4–11)
WBC UA: 59 /HPF (ref 0–5)

## 2022-06-06 PROCEDURE — 85610 PROTHROMBIN TIME: CPT

## 2022-06-06 PROCEDURE — 87186 SC STD MICRODIL/AGAR DIL: CPT

## 2022-06-06 PROCEDURE — 80048 BASIC METABOLIC PNL TOTAL CA: CPT

## 2022-06-06 PROCEDURE — 86850 RBC ANTIBODY SCREEN: CPT

## 2022-06-06 PROCEDURE — 87641 MR-STAPH DNA AMP PROBE: CPT

## 2022-06-06 PROCEDURE — 83036 HEMOGLOBIN GLYCOSYLATED A1C: CPT

## 2022-06-06 PROCEDURE — 87077 CULTURE AEROBIC IDENTIFY: CPT

## 2022-06-06 PROCEDURE — 86901 BLOOD TYPING SEROLOGIC RH(D): CPT

## 2022-06-06 PROCEDURE — 82040 ASSAY OF SERUM ALBUMIN: CPT

## 2022-06-06 PROCEDURE — 86900 BLOOD TYPING SEROLOGIC ABO: CPT

## 2022-06-06 PROCEDURE — 87086 URINE CULTURE/COLONY COUNT: CPT

## 2022-06-06 PROCEDURE — 81001 URINALYSIS AUTO W/SCOPE: CPT

## 2022-06-06 PROCEDURE — 85025 COMPLETE CBC W/AUTO DIFF WBC: CPT

## 2022-06-06 NOTE — PROGRESS NOTES
Patient attended JET class on 6/6/22. Patient verified surgery for Total shoulder replacement. Patient received patient information and educational JET folder including the following handouts: jet powerpoint, covid-19 restrictions, ERAS, incentive spirometry including purpose and how to perform, case management contact information, hand hygiene, preventing constipation, home health care agency list, skilled nursing facility list, pre-operative showering techniques and the use of anti-septic 3 days before surgery. Interviews completed by PT, OT, and PAT. Labs and Tests completed as ordered/necessary. Anti-septic bottle given to patient to take home. Patient states no further questions or concerns. Patient provided orthopedic office and nurse navigator contact information. DOS: 6/13/22  Dr Jb Salazar: Home ;if applicable. Patient open to home care covered by insurance  Transportation:Harley ACEVEDO needs:none  Per Patient Will see/Saw PCP on 6/16/22.

## 2022-06-07 LAB — MRSA SCREEN RT-PCR: NORMAL

## 2022-06-07 NOTE — CARE COORDINATION
DATE OF JET PHONE INTERVIEW: 6/7/22    HISTORY OF JOINT REPLACEMENT(S):  None    DC TRANSPORTATION:  [facility]  Ph:  [phone]    STEPS INTO HOME:  None    STEPS TO BATHROOM/BEDROOM:  2-story with handrail    DME NEEDS:  None    HOME ASSISTANCE - WHO WILL BE STAYING WITH YOU AT HOME FOR FIRST SEVERAL DAYS? , Sunita Felix and daughter will be home. LENGTH OF STAY HAS BEEN DISCUSSED WITH THE PT, APPROPRIATE TO HIS/ HER PROCEDURE. PT HAS BEEN INFORMED THAT THEY WILL BE DISCHARGED WHEN THE PHYSICIAN DEEMS THEM MEDICALLY READY. MOST PATIENTS CAN EXPECT  TO BE IN THE HOSPITAL ONE NIGHT AS AN OBSERVATION ONLY, OR 1-2 DAYS AS AN ADMISSION FOR THOSE WITH MEDICAL HEALTH  ISSUES/COMPLICATIONS. CHOICES FOR HHC, DME VENDORS AND SKILLED/ REHAB FACILITIES PROVIDED TO PT DURING THIS INTERVIEW. HOME CARE CHOICE(S):  OP therapy. Will call therapy department to set up appointments. SNF/REHAB CHOICES (S): n/a    MEDS TO BEDS FROM [facility]:  Yes     Contact information for case management provided to pt. Will follow with therapies and social service. Geovanna Arana Sr.  Administrative Assist, Case Management  [phone]  Electronically signed by Geovanna Arana on 6/7/2022 at 10:11 AM

## 2022-06-08 LAB
ORGANISM: ABNORMAL
URINE CULTURE, ROUTINE: ABNORMAL

## 2022-06-10 ENCOUNTER — ANESTHESIA EVENT (OUTPATIENT)
Dept: OPERATING ROOM | Age: 72
End: 2022-06-10
Payer: MEDICARE

## 2022-06-13 ENCOUNTER — ANESTHESIA (OUTPATIENT)
Dept: OPERATING ROOM | Age: 72
End: 2022-06-13
Payer: MEDICARE

## 2022-06-13 ENCOUNTER — APPOINTMENT (OUTPATIENT)
Dept: GENERAL RADIOLOGY | Age: 72
End: 2022-06-13
Attending: ORTHOPAEDIC SURGERY
Payer: MEDICARE

## 2022-06-13 ENCOUNTER — HOSPITAL ENCOUNTER (OUTPATIENT)
Age: 72
Setting detail: OBSERVATION
Discharge: HOME | End: 2022-06-14
Attending: ORTHOPAEDIC SURGERY | Admitting: ORTHOPAEDIC SURGERY
Payer: MEDICARE

## 2022-06-13 DIAGNOSIS — M12.811 ROTATOR CUFF ARTHROPATHY OF RIGHT SHOULDER: Primary | ICD-10-CM

## 2022-06-13 DIAGNOSIS — M12.811 ROTATOR CUFF ARTHROPATHY, RIGHT: ICD-10-CM

## 2022-06-13 LAB
ABO/RH: NORMAL
ANTIBODY SCREEN: NORMAL
ESTIMATED AVERAGE GLUCOSE: 151.3 MG/DL
GLUCOSE BLD-MCNC: 141 MG/DL (ref 70–99)
GLUCOSE BLD-MCNC: 181 MG/DL (ref 70–99)
GLUCOSE BLD-MCNC: 192 MG/DL (ref 70–99)
GLUCOSE BLD-MCNC: 230 MG/DL (ref 70–99)
GLUCOSE BLD-MCNC: 287 MG/DL (ref 70–99)
HBA1C MFR BLD: 6.9 %
PERFORMED ON: ABNORMAL

## 2022-06-13 PROCEDURE — 7100000001 HC PACU RECOVERY - ADDTL 15 MIN: Performed by: ORTHOPAEDIC SURGERY

## 2022-06-13 PROCEDURE — 6370000000 HC RX 637 (ALT 250 FOR IP): Performed by: ORTHOPAEDIC SURGERY

## 2022-06-13 PROCEDURE — 2580000003 HC RX 258: Performed by: ANESTHESIOLOGY

## 2022-06-13 PROCEDURE — 83036 HEMOGLOBIN GLYCOSYLATED A1C: CPT

## 2022-06-13 PROCEDURE — 6360000002 HC RX W HCPCS: Performed by: NURSE PRACTITIONER

## 2022-06-13 PROCEDURE — 2580000003 HC RX 258: Performed by: ORTHOPAEDIC SURGERY

## 2022-06-13 PROCEDURE — 86850 RBC ANTIBODY SCREEN: CPT

## 2022-06-13 PROCEDURE — 6360000002 HC RX W HCPCS: Performed by: ANESTHESIOLOGY

## 2022-06-13 PROCEDURE — 86900 BLOOD TYPING SEROLOGIC ABO: CPT

## 2022-06-13 PROCEDURE — 2709999900 HC NON-CHARGEABLE SUPPLY: Performed by: ORTHOPAEDIC SURGERY

## 2022-06-13 PROCEDURE — 6360000002 HC RX W HCPCS: Performed by: NURSE ANESTHETIST, CERTIFIED REGISTERED

## 2022-06-13 PROCEDURE — 2580000003 HC RX 258: Performed by: NURSE PRACTITIONER

## 2022-06-13 PROCEDURE — A4217 STERILE WATER/SALINE, 500 ML: HCPCS | Performed by: ORTHOPAEDIC SURGERY

## 2022-06-13 PROCEDURE — 97162 PT EVAL MOD COMPLEX 30 MIN: CPT

## 2022-06-13 PROCEDURE — 97166 OT EVAL MOD COMPLEX 45 MIN: CPT

## 2022-06-13 PROCEDURE — 97530 THERAPEUTIC ACTIVITIES: CPT

## 2022-06-13 PROCEDURE — 6360000002 HC RX W HCPCS: Performed by: ORTHOPAEDIC SURGERY

## 2022-06-13 PROCEDURE — 88304 TISSUE EXAM BY PATHOLOGIST: CPT

## 2022-06-13 PROCEDURE — C1776 JOINT DEVICE (IMPLANTABLE): HCPCS | Performed by: ORTHOPAEDIC SURGERY

## 2022-06-13 PROCEDURE — 73030 X-RAY EXAM OF SHOULDER: CPT

## 2022-06-13 PROCEDURE — 3700000001 HC ADD 15 MINUTES (ANESTHESIA): Performed by: ORTHOPAEDIC SURGERY

## 2022-06-13 PROCEDURE — 2700000000 HC OXYGEN THERAPY PER DAY

## 2022-06-13 PROCEDURE — L3650 SO 8 ABD RESTRAINT PRE OTS: HCPCS | Performed by: ORTHOPAEDIC SURGERY

## 2022-06-13 PROCEDURE — 2500000003 HC RX 250 WO HCPCS: Performed by: NURSE ANESTHETIST, CERTIFIED REGISTERED

## 2022-06-13 PROCEDURE — 2500000003 HC RX 250 WO HCPCS: Performed by: ORTHOPAEDIC SURGERY

## 2022-06-13 PROCEDURE — 23472 RECONSTRUCT SHOULDER JOINT: CPT | Performed by: ORTHOPAEDIC SURGERY

## 2022-06-13 PROCEDURE — 7100000000 HC PACU RECOVERY - FIRST 15 MIN: Performed by: ORTHOPAEDIC SURGERY

## 2022-06-13 PROCEDURE — 3600000015 HC SURGERY LEVEL 5 ADDTL 15MIN: Performed by: ORTHOPAEDIC SURGERY

## 2022-06-13 PROCEDURE — 3700000000 HC ANESTHESIA ATTENDED CARE: Performed by: ORTHOPAEDIC SURGERY

## 2022-06-13 PROCEDURE — 64415 NJX AA&/STRD BRCH PLXS IMG: CPT | Performed by: ANESTHESIOLOGY

## 2022-06-13 PROCEDURE — 3600000005 HC SURGERY LEVEL 5 BASE: Performed by: ORTHOPAEDIC SURGERY

## 2022-06-13 PROCEDURE — G0378 HOSPITAL OBSERVATION PER HR: HCPCS

## 2022-06-13 PROCEDURE — 88311 DECALCIFY TISSUE: CPT

## 2022-06-13 PROCEDURE — 94761 N-INVAS EAR/PLS OXIMETRY MLT: CPT

## 2022-06-13 PROCEDURE — 86901 BLOOD TYPING SEROLOGIC RH(D): CPT

## 2022-06-13 PROCEDURE — 36415 COLL VENOUS BLD VENIPUNCTURE: CPT

## 2022-06-13 DEVICE — TRAY HUM THK+0MM 3.5MM OFFSET SHLDR HI REVERSED AEQUALIS: Type: IMPLANTABLE DEVICE | Site: SHOULDER | Status: FUNCTIONAL

## 2022-06-13 DEVICE — SCREW BNE PERIPH 5X38 MM SHLDR REVERSED NS AEQUALIS PERFORM: Type: IMPLANTABLE DEVICE | Site: SHOULDER | Status: FUNCTIONAL

## 2022-06-13 DEVICE — BASEPLATE GLEN DIA25MM STD REVERSED AEQUALIS PERFORM: Type: IMPLANTABLE DEVICE | Site: SHOULDER | Status: FUNCTIONAL

## 2022-06-13 DEVICE — SCREW BONE L35MM DIA6.5MM TI ST FULL THRD CTRL FOR GLEN: Type: IMPLANTABLE DEVICE | Site: SHOULDER | Status: FUNCTIONAL

## 2022-06-13 DEVICE — IMPLANTABLE DEVICE: Type: IMPLANTABLE DEVICE | Site: SHOULDER | Status: FUNCTIONAL

## 2022-06-13 DEVICE — SCREW BONE L26MM DIA5MM TI ST FULL THRD PERIPH FOR GLEN: Type: IMPLANTABLE DEVICE | Site: SHOULDER | Status: FUNCTIONAL

## 2022-06-13 DEVICE — SPHERE GLEN DIA36MM STD REVERSED AEQUALIS PERFORM: Type: IMPLANTABLE DEVICE | Site: SHOULDER | Status: FUNCTIONAL

## 2022-06-13 DEVICE — INSERT HUM DIA36MM THK+6MM 12.5DEG RETENTIVE REVERSED SHLDR: Type: IMPLANTABLE DEVICE | Site: SHOULDER | Status: FUNCTIONAL

## 2022-06-13 DEVICE — SCREW BONE L18MM DIA5MM TI ST FULL THRD PERIPH FOR GLEN: Type: IMPLANTABLE DEVICE | Site: SHOULDER | Status: FUNCTIONAL

## 2022-06-13 RX ORDER — SODIUM CHLORIDE 9 MG/ML
INJECTION, SOLUTION INTRAVENOUS PRN
Status: DISCONTINUED | OUTPATIENT
Start: 2022-06-13 | End: 2022-06-13 | Stop reason: HOSPADM

## 2022-06-13 RX ORDER — MEPERIDINE HYDROCHLORIDE 25 MG/ML
12.5 INJECTION INTRAMUSCULAR; INTRAVENOUS; SUBCUTANEOUS
Status: DISCONTINUED | OUTPATIENT
Start: 2022-06-13 | End: 2022-06-13 | Stop reason: HOSPADM

## 2022-06-13 RX ORDER — ALBUTEROL SULFATE 90 UG/1
2 AEROSOL, METERED RESPIRATORY (INHALATION) EVERY 4 HOURS PRN
Status: DISCONTINUED | OUTPATIENT
Start: 2022-06-13 | End: 2022-06-14 | Stop reason: HOSPADM

## 2022-06-13 RX ORDER — MELOXICAM 7.5 MG/1
7.5 TABLET ORAL DAILY
Qty: 2 TABLET | Refills: 0 | Status: SHIPPED | OUTPATIENT
Start: 2022-06-14 | End: 2022-10-05

## 2022-06-13 RX ORDER — ASPIRIN 81 MG/1
81 TABLET ORAL 2 TIMES DAILY
Qty: 28 TABLET | Refills: 0 | Status: SHIPPED | OUTPATIENT
Start: 2022-06-14 | End: 2022-06-14 | Stop reason: HOSPADM

## 2022-06-13 RX ORDER — TRANEXAMIC ACID 100 MG/ML
INJECTION, SOLUTION INTRAVENOUS
Status: COMPLETED | OUTPATIENT
Start: 2022-06-13 | End: 2022-06-13

## 2022-06-13 RX ORDER — GRANULES FOR ORAL 3 G/1
3 POWDER ORAL ONCE
Qty: 1 EACH | Refills: 0 | Status: SHIPPED | OUTPATIENT
Start: 2022-06-13 | End: 2022-06-13

## 2022-06-13 RX ORDER — OXYCODONE HYDROCHLORIDE 5 MG/1
5-10 TABLET ORAL EVERY 6 HOURS PRN
Qty: 40 TABLET | Refills: 0 | Status: SHIPPED | OUTPATIENT
Start: 2022-06-13 | End: 2022-06-20 | Stop reason: SDUPTHER

## 2022-06-13 RX ORDER — FLUTICASONE PROPIONATE 50 MCG
1 SPRAY, SUSPENSION (ML) NASAL DAILY PRN
Status: DISCONTINUED | OUTPATIENT
Start: 2022-06-14 | End: 2022-06-14 | Stop reason: HOSPADM

## 2022-06-13 RX ORDER — BUPIVACAINE HYDROCHLORIDE AND EPINEPHRINE 5; 5 MG/ML; UG/ML
INJECTION, SOLUTION EPIDURAL; INTRACAUDAL; PERINEURAL
Status: COMPLETED | OUTPATIENT
Start: 2022-06-13 | End: 2022-06-13

## 2022-06-13 RX ORDER — DEXTROSE MONOHYDRATE 50 MG/ML
100 INJECTION, SOLUTION INTRAVENOUS PRN
Status: DISCONTINUED | OUTPATIENT
Start: 2022-06-13 | End: 2022-06-14 | Stop reason: HOSPADM

## 2022-06-13 RX ORDER — ONDANSETRON 4 MG/1
4 TABLET, ORALLY DISINTEGRATING ORAL EVERY 8 HOURS PRN
Status: DISCONTINUED | OUTPATIENT
Start: 2022-06-13 | End: 2022-06-14 | Stop reason: HOSPADM

## 2022-06-13 RX ORDER — ACETAMINOPHEN 325 MG/1
650 TABLET ORAL EVERY 6 HOURS
Status: DISCONTINUED | OUTPATIENT
Start: 2022-06-13 | End: 2022-06-14 | Stop reason: HOSPADM

## 2022-06-13 RX ORDER — INSULIN LISPRO 100 [IU]/ML
0-9 INJECTION, SOLUTION INTRAVENOUS; SUBCUTANEOUS NIGHTLY
Status: DISCONTINUED | OUTPATIENT
Start: 2022-06-13 | End: 2022-06-14 | Stop reason: HOSPADM

## 2022-06-13 RX ORDER — SODIUM CHLORIDE 0.9 % (FLUSH) 0.9 %
5-40 SYRINGE (ML) INJECTION PRN
Status: DISCONTINUED | OUTPATIENT
Start: 2022-06-13 | End: 2022-06-14 | Stop reason: HOSPADM

## 2022-06-13 RX ORDER — SODIUM CHLORIDE 9 MG/ML
INJECTION, SOLUTION INTRAVENOUS PRN
Status: DISCONTINUED | OUTPATIENT
Start: 2022-06-13 | End: 2022-06-14 | Stop reason: HOSPADM

## 2022-06-13 RX ORDER — SODIUM CHLORIDE 9 MG/ML
INJECTION, SOLUTION INTRAVENOUS CONTINUOUS
Status: DISCONTINUED | OUTPATIENT
Start: 2022-06-13 | End: 2022-06-14

## 2022-06-13 RX ORDER — SENNA AND DOCUSATE SODIUM 50; 8.6 MG/1; MG/1
1 TABLET, FILM COATED ORAL 2 TIMES DAILY
Status: DISCONTINUED | OUTPATIENT
Start: 2022-06-13 | End: 2022-06-14 | Stop reason: HOSPADM

## 2022-06-13 RX ORDER — SODIUM CHLORIDE 0.9 % (FLUSH) 0.9 %
5-40 SYRINGE (ML) INJECTION EVERY 12 HOURS SCHEDULED
Status: DISCONTINUED | OUTPATIENT
Start: 2022-06-13 | End: 2022-06-13 | Stop reason: HOSPADM

## 2022-06-13 RX ORDER — ONDANSETRON 2 MG/ML
4 INJECTION INTRAMUSCULAR; INTRAVENOUS
Status: DISCONTINUED | OUTPATIENT
Start: 2022-06-13 | End: 2022-06-13 | Stop reason: HOSPADM

## 2022-06-13 RX ORDER — ASPIRIN 81 MG/1
81 TABLET ORAL 2 TIMES DAILY
Status: DISCONTINUED | OUTPATIENT
Start: 2022-06-14 | End: 2022-06-14 | Stop reason: HOSPADM

## 2022-06-13 RX ORDER — SERTRALINE HYDROCHLORIDE 100 MG/1
100 TABLET, FILM COATED ORAL DAILY
Status: DISCONTINUED | OUTPATIENT
Start: 2022-06-13 | End: 2022-06-14 | Stop reason: HOSPADM

## 2022-06-13 RX ORDER — LOSARTAN POTASSIUM 25 MG/1
25 TABLET ORAL NIGHTLY
Status: DISCONTINUED | OUTPATIENT
Start: 2022-06-13 | End: 2022-06-14 | Stop reason: HOSPADM

## 2022-06-13 RX ORDER — SODIUM CHLORIDE 0.9 % (FLUSH) 0.9 %
5-40 SYRINGE (ML) INJECTION EVERY 12 HOURS SCHEDULED
Status: DISCONTINUED | OUTPATIENT
Start: 2022-06-13 | End: 2022-06-14 | Stop reason: HOSPADM

## 2022-06-13 RX ORDER — PANTOPRAZOLE SODIUM 40 MG/1
40 TABLET, DELAYED RELEASE ORAL
Status: DISCONTINUED | OUTPATIENT
Start: 2022-06-14 | End: 2022-06-14 | Stop reason: HOSPADM

## 2022-06-13 RX ORDER — ONDANSETRON 2 MG/ML
4 INJECTION INTRAMUSCULAR; INTRAVENOUS EVERY 6 HOURS PRN
Status: DISCONTINUED | OUTPATIENT
Start: 2022-06-13 | End: 2022-06-14 | Stop reason: HOSPADM

## 2022-06-13 RX ORDER — MAGNESIUM HYDROXIDE 1200 MG/15ML
LIQUID ORAL CONTINUOUS PRN
Status: DISCONTINUED | OUTPATIENT
Start: 2022-06-13 | End: 2022-06-13 | Stop reason: HOSPADM

## 2022-06-13 RX ORDER — MIDAZOLAM HYDROCHLORIDE 1 MG/ML
INJECTION INTRAMUSCULAR; INTRAVENOUS PRN
Status: DISCONTINUED | OUTPATIENT
Start: 2022-06-13 | End: 2022-06-13 | Stop reason: SDUPTHER

## 2022-06-13 RX ORDER — TOPIRAMATE 25 MG/1
25 TABLET ORAL 2 TIMES DAILY
Status: DISCONTINUED | OUTPATIENT
Start: 2022-06-13 | End: 2022-06-14 | Stop reason: HOSPADM

## 2022-06-13 RX ORDER — SUCCINYLCHOLINE/SOD CL,ISO/PF 200MG/10ML
SYRINGE (ML) INTRAVENOUS PRN
Status: DISCONTINUED | OUTPATIENT
Start: 2022-06-13 | End: 2022-06-13 | Stop reason: SDUPTHER

## 2022-06-13 RX ORDER — SODIUM CHLORIDE 0.9 % (FLUSH) 0.9 %
5-40 SYRINGE (ML) INJECTION PRN
Status: DISCONTINUED | OUTPATIENT
Start: 2022-06-13 | End: 2022-06-13 | Stop reason: HOSPADM

## 2022-06-13 RX ORDER — GEMFIBROZIL 600 MG/1
600 TABLET, FILM COATED ORAL
Status: DISCONTINUED | OUTPATIENT
Start: 2022-06-13 | End: 2022-06-14 | Stop reason: HOSPADM

## 2022-06-13 RX ORDER — INSULIN LISPRO 100 [IU]/ML
0.08 INJECTION, SOLUTION INTRAVENOUS; SUBCUTANEOUS
Status: DISCONTINUED | OUTPATIENT
Start: 2022-06-13 | End: 2022-06-14 | Stop reason: HOSPADM

## 2022-06-13 RX ORDER — ONDANSETRON 2 MG/ML
INJECTION INTRAMUSCULAR; INTRAVENOUS PRN
Status: DISCONTINUED | OUTPATIENT
Start: 2022-06-13 | End: 2022-06-13 | Stop reason: SDUPTHER

## 2022-06-13 RX ORDER — HYDROXYZINE HYDROCHLORIDE 10 MG/1
10 TABLET, FILM COATED ORAL EVERY 8 HOURS PRN
Status: DISCONTINUED | OUTPATIENT
Start: 2022-06-13 | End: 2022-06-14 | Stop reason: HOSPADM

## 2022-06-13 RX ORDER — MELOXICAM 7.5 MG/1
3.75 TABLET ORAL DAILY
Status: DISCONTINUED | OUTPATIENT
Start: 2022-06-13 | End: 2022-06-13

## 2022-06-13 RX ORDER — INSULIN LISPRO 100 [IU]/ML
0-18 INJECTION, SOLUTION INTRAVENOUS; SUBCUTANEOUS
Status: DISCONTINUED | OUTPATIENT
Start: 2022-06-13 | End: 2022-06-14 | Stop reason: HOSPADM

## 2022-06-13 RX ORDER — BUPROPION HYDROCHLORIDE 150 MG/1
150 TABLET ORAL EVERY MORNING
Status: DISCONTINUED | OUTPATIENT
Start: 2022-06-13 | End: 2022-06-14 | Stop reason: HOSPADM

## 2022-06-13 RX ORDER — FENTANYL CITRATE 50 UG/ML
50 INJECTION, SOLUTION INTRAMUSCULAR; INTRAVENOUS EVERY 5 MIN PRN
Status: DISCONTINUED | OUTPATIENT
Start: 2022-06-13 | End: 2022-06-13 | Stop reason: HOSPADM

## 2022-06-13 RX ORDER — ATORVASTATIN CALCIUM 40 MG/1
40 TABLET, FILM COATED ORAL
Status: DISCONTINUED | OUTPATIENT
Start: 2022-06-13 | End: 2022-06-14 | Stop reason: HOSPADM

## 2022-06-13 RX ORDER — PROPOFOL 10 MG/ML
INJECTION, EMULSION INTRAVENOUS PRN
Status: DISCONTINUED | OUTPATIENT
Start: 2022-06-13 | End: 2022-06-13 | Stop reason: SDUPTHER

## 2022-06-13 RX ORDER — MELOXICAM 7.5 MG/1
7.5 TABLET ORAL DAILY
Status: DISCONTINUED | OUTPATIENT
Start: 2022-06-14 | End: 2022-06-14 | Stop reason: HOSPADM

## 2022-06-13 RX ORDER — OXYCODONE HYDROCHLORIDE 5 MG/1
5 TABLET ORAL EVERY 4 HOURS PRN
Status: DISCONTINUED | OUTPATIENT
Start: 2022-06-13 | End: 2022-06-14 | Stop reason: HOSPADM

## 2022-06-13 RX ORDER — OXYCODONE HYDROCHLORIDE 10 MG/1
10 TABLET ORAL EVERY 4 HOURS PRN
Status: DISCONTINUED | OUTPATIENT
Start: 2022-06-13 | End: 2022-06-14 | Stop reason: HOSPADM

## 2022-06-13 RX ORDER — ROPIVACAINE HYDROCHLORIDE 5 MG/ML
INJECTION, SOLUTION EPIDURAL; INFILTRATION; PERINEURAL
Status: COMPLETED | OUTPATIENT
Start: 2022-06-13 | End: 2022-06-13

## 2022-06-13 RX ORDER — LIDOCAINE HYDROCHLORIDE 20 MG/ML
INJECTION, SOLUTION EPIDURAL; INFILTRATION; INTRACAUDAL; PERINEURAL PRN
Status: DISCONTINUED | OUTPATIENT
Start: 2022-06-13 | End: 2022-06-13 | Stop reason: SDUPTHER

## 2022-06-13 RX ORDER — FENTANYL CITRATE 50 UG/ML
INJECTION, SOLUTION INTRAMUSCULAR; INTRAVENOUS PRN
Status: DISCONTINUED | OUTPATIENT
Start: 2022-06-13 | End: 2022-06-13 | Stop reason: SDUPTHER

## 2022-06-13 RX ORDER — INSULIN GLARGINE 100 [IU]/ML
0.25 INJECTION, SOLUTION SUBCUTANEOUS NIGHTLY
Status: DISCONTINUED | OUTPATIENT
Start: 2022-06-13 | End: 2022-06-14 | Stop reason: HOSPADM

## 2022-06-13 RX ORDER — FENTANYL CITRATE 50 UG/ML
25 INJECTION, SOLUTION INTRAMUSCULAR; INTRAVENOUS EVERY 5 MIN PRN
Status: DISCONTINUED | OUTPATIENT
Start: 2022-06-13 | End: 2022-06-13 | Stop reason: HOSPADM

## 2022-06-13 RX ORDER — CHLORAL HYDRATE 500 MG
1000 CAPSULE ORAL DAILY
Qty: 90 CAPSULE | Refills: 3
Start: 2022-06-13

## 2022-06-13 RX ORDER — ROCURONIUM BROMIDE 10 MG/ML
INJECTION, SOLUTION INTRAVENOUS PRN
Status: DISCONTINUED | OUTPATIENT
Start: 2022-06-13 | End: 2022-06-13 | Stop reason: SDUPTHER

## 2022-06-13 RX ORDER — MELOXICAM 7.5 MG/1
15 TABLET ORAL ONCE
Status: COMPLETED | OUTPATIENT
Start: 2022-06-13 | End: 2022-06-13

## 2022-06-13 RX ORDER — DEXAMETHASONE SODIUM PHOSPHATE 4 MG/ML
INJECTION, SOLUTION INTRA-ARTICULAR; INTRALESIONAL; INTRAMUSCULAR; INTRAVENOUS; SOFT TISSUE PRN
Status: DISCONTINUED | OUTPATIENT
Start: 2022-06-13 | End: 2022-06-13 | Stop reason: SDUPTHER

## 2022-06-13 RX ORDER — MULTIVIT WITH MINERALS/LUTEIN
1000 TABLET ORAL DAILY
Qty: 30 CAPSULE | Refills: 3
Start: 2022-06-13

## 2022-06-13 RX ORDER — SODIUM CHLORIDE 9 MG/ML
INJECTION, SOLUTION INTRAVENOUS CONTINUOUS
Status: DISCONTINUED | OUTPATIENT
Start: 2022-06-13 | End: 2022-06-13 | Stop reason: HOSPADM

## 2022-06-13 RX ORDER — AMITRIPTYLINE HYDROCHLORIDE 50 MG/1
50 TABLET, FILM COATED ORAL NIGHTLY
Status: DISCONTINUED | OUTPATIENT
Start: 2022-06-13 | End: 2022-06-14 | Stop reason: HOSPADM

## 2022-06-13 RX ADMIN — FENTANYL CITRATE 50 MCG: 50 INJECTION, SOLUTION INTRAMUSCULAR; INTRAVENOUS at 09:42

## 2022-06-13 RX ADMIN — TOPIRAMATE 25 MG: 25 TABLET, FILM COATED ORAL at 12:18

## 2022-06-13 RX ADMIN — ATORVASTATIN CALCIUM 40 MG: 40 TABLET, FILM COATED ORAL at 18:06

## 2022-06-13 RX ADMIN — MELOXICAM 3.75 MG: 7.5 TABLET ORAL at 12:18

## 2022-06-13 RX ADMIN — TOPIRAMATE 25 MG: 25 TABLET, FILM COATED ORAL at 21:16

## 2022-06-13 RX ADMIN — MIDAZOLAM 2 MG: 1 INJECTION INTRAMUSCULAR; INTRAVENOUS at 07:10

## 2022-06-13 RX ADMIN — INSULIN LISPRO 2 UNITS: 100 INJECTION, SOLUTION INTRAVENOUS; SUBCUTANEOUS at 21:29

## 2022-06-13 RX ADMIN — CEFAZOLIN 2000 MG: 10 INJECTION, POWDER, FOR SOLUTION INTRAVENOUS at 07:31

## 2022-06-13 RX ADMIN — ROCURONIUM BROMIDE 20 MG: 10 INJECTION INTRAVENOUS at 08:28

## 2022-06-13 RX ADMIN — SODIUM CHLORIDE: 9 INJECTION, SOLUTION INTRAVENOUS at 12:19

## 2022-06-13 RX ADMIN — MEROPENEM 500 MG: 500 INJECTION, POWDER, FOR SOLUTION INTRAVENOUS at 16:10

## 2022-06-13 RX ADMIN — DEXAMETHASONE SODIUM PHOSPHATE 8 MG: 4 INJECTION, SOLUTION INTRAMUSCULAR; INTRAVENOUS at 07:51

## 2022-06-13 RX ADMIN — MEROPENEM 500 MG: 500 INJECTION, POWDER, FOR SOLUTION INTRAVENOUS at 21:51

## 2022-06-13 RX ADMIN — SENNOSIDES AND DOCUSATE SODIUM 1 TABLET: 50; 8.6 TABLET ORAL at 12:18

## 2022-06-13 RX ADMIN — Medication 10 ML: at 21:18

## 2022-06-13 RX ADMIN — PROPOFOL 150 MG: 10 INJECTION, EMULSION INTRAVENOUS at 07:28

## 2022-06-13 RX ADMIN — SODIUM CHLORIDE: 9 INJECTION, SOLUTION INTRAVENOUS at 09:06

## 2022-06-13 RX ADMIN — INSULIN LISPRO 9 UNITS: 100 INJECTION, SOLUTION INTRAVENOUS; SUBCUTANEOUS at 18:07

## 2022-06-13 RX ADMIN — LOSARTAN POTASSIUM 25 MG: 25 TABLET, FILM COATED ORAL at 21:16

## 2022-06-13 RX ADMIN — FENTANYL CITRATE 50 MCG: 50 INJECTION INTRAMUSCULAR; INTRAVENOUS at 07:10

## 2022-06-13 RX ADMIN — ROCURONIUM BROMIDE 20 MG: 10 INJECTION INTRAVENOUS at 07:40

## 2022-06-13 RX ADMIN — Medication 120 MG: at 07:28

## 2022-06-13 RX ADMIN — LIDOCAINE HYDROCHLORIDE 100 MG: 20 INJECTION, SOLUTION EPIDURAL; INFILTRATION; INTRACAUDAL; PERINEURAL at 07:28

## 2022-06-13 RX ADMIN — ACETAMINOPHEN 650 MG: 325 TABLET ORAL at 12:18

## 2022-06-13 RX ADMIN — ROCURONIUM BROMIDE 10 MG: 10 INJECTION INTRAVENOUS at 07:28

## 2022-06-13 RX ADMIN — ROPIVACAINE HYDROCHLORIDE 30 ML: 5 INJECTION, SOLUTION EPIDURAL; INFILTRATION; PERINEURAL at 07:33

## 2022-06-13 RX ADMIN — BUPROPION HYDROCHLORIDE 150 MG: 150 TABLET, EXTENDED RELEASE ORAL at 12:18

## 2022-06-13 RX ADMIN — GEMFIBROZIL 600 MG: 600 TABLET ORAL at 16:09

## 2022-06-13 RX ADMIN — INSULIN LISPRO 7 UNITS: 100 INJECTION, SOLUTION INTRAVENOUS; SUBCUTANEOUS at 18:08

## 2022-06-13 RX ADMIN — SUGAMMADEX 200 MG: 100 INJECTION, SOLUTION INTRAVENOUS at 09:06

## 2022-06-13 RX ADMIN — AMITRIPTYLINE HYDROCHLORIDE 50 MG: 50 TABLET, FILM COATED ORAL at 21:16

## 2022-06-13 RX ADMIN — MELOXICAM 15 MG: 7.5 TABLET ORAL at 06:27

## 2022-06-13 RX ADMIN — INSULIN LISPRO 3 UNITS: 100 INJECTION, SOLUTION INTRAVENOUS; SUBCUTANEOUS at 13:10

## 2022-06-13 RX ADMIN — INSULIN GLARGINE 20 UNITS: 100 INJECTION, SOLUTION SUBCUTANEOUS at 21:29

## 2022-06-13 RX ADMIN — ACETAMINOPHEN 650 MG: 325 TABLET ORAL at 18:06

## 2022-06-13 RX ADMIN — SERTRALINE 100 MG: 100 TABLET, FILM COATED ORAL at 12:18

## 2022-06-13 RX ADMIN — ONDANSETRON 4 MG: 2 INJECTION INTRAMUSCULAR; INTRAVENOUS at 09:05

## 2022-06-13 RX ADMIN — SENNOSIDES AND DOCUSATE SODIUM 1 TABLET: 50; 8.6 TABLET ORAL at 21:16

## 2022-06-13 RX ADMIN — OXYCODONE HYDROCHLORIDE 10 MG: 10 TABLET ORAL at 21:48

## 2022-06-13 RX ADMIN — SODIUM CHLORIDE: 9 INJECTION, SOLUTION INTRAVENOUS at 06:42

## 2022-06-13 ASSESSMENT — PAIN DESCRIPTION - ONSET
ONSET: ON-GOING

## 2022-06-13 ASSESSMENT — PAIN DESCRIPTION - DESCRIPTORS
DESCRIPTORS: BURNING
DESCRIPTORS: ACHING;BURNING
DESCRIPTORS: BURNING
DESCRIPTORS: ACHING;BURNING
DESCRIPTORS: ACHING
DESCRIPTORS: ACHING;BURNING
DESCRIPTORS: ACHING

## 2022-06-13 ASSESSMENT — PAIN SCALES - GENERAL
PAINLEVEL_OUTOF10: 5
PAINLEVEL_OUTOF10: 4
PAINLEVEL_OUTOF10: 3
PAINLEVEL_OUTOF10: 0
PAINLEVEL_OUTOF10: 4
PAINLEVEL_OUTOF10: 4
PAINLEVEL_OUTOF10: 8
PAINLEVEL_OUTOF10: 3
PAINLEVEL_OUTOF10: 3
PAINLEVEL_OUTOF10: 0
PAINLEVEL_OUTOF10: 7

## 2022-06-13 ASSESSMENT — PAIN DESCRIPTION - FREQUENCY
FREQUENCY: CONTINUOUS

## 2022-06-13 ASSESSMENT — PAIN DESCRIPTION - PAIN TYPE
TYPE: SURGICAL PAIN
TYPE: ACUTE PAIN

## 2022-06-13 ASSESSMENT — PAIN DESCRIPTION - LOCATION
LOCATION: SHOULDER
LOCATION: ARM
LOCATION: SHOULDER
LOCATION: ARM

## 2022-06-13 ASSESSMENT — LIFESTYLE VARIABLES: SMOKING_STATUS: 0

## 2022-06-13 ASSESSMENT — PAIN DESCRIPTION - ORIENTATION
ORIENTATION: RIGHT
ORIENTATION: INNER
ORIENTATION: INNER;UPPER
ORIENTATION: RIGHT

## 2022-06-13 ASSESSMENT — ENCOUNTER SYMPTOMS: SHORTNESS OF BREATH: 0

## 2022-06-13 NOTE — PLAN OF CARE
Problem: Chronic Conditions and Co-morbidities  Goal: Patient's chronic conditions and co-morbidity symptoms are monitored and maintained or improved  Outcome: Progressing     Problem: Discharge Planning  Goal: Discharge to home or other facility with appropriate resources  Outcome: Progressing     Problem: Cardiovascular - Adult  Goal: Maintains optimal cardiac output and hemodynamic stability  Outcome: Progressing     Problem: Skin/Tissue Integrity - Adult  Goal: Incisions, wounds, or drain sites healing without S/S of infection  Outcome: Progressing  Flowsheets (Taken 6/13/2022 1157)  Incisions, Wounds, or Drain Sites Healing Without Sign and Symptoms of Infection: ADMISSION and DAILY: Assess and document risk factors for pressure ulcer development     Problem: Musculoskeletal - Adult  Goal: Return mobility to safest level of function  Outcome: Progressing     Problem: Musculoskeletal - Adult  Goal: Maintain proper alignment of affected body part  Outcome: Progressing     Problem: Musculoskeletal - Adult  Goal: Return ADL status to a safe level of function  Outcome: Progressing     Problem: Infection - Adult  Goal: Absence of infection at discharge  Outcome: Progressing     Problem: Metabolic/Fluid and Electrolytes - Adult  Goal: Glucose maintained within prescribed range  Outcome: Progressing     Problem: Hematologic - Adult  Goal: Maintains hematologic stability  Outcome: Progressing     Problem: ABCDS Injury Assessment  Goal: Absence of physical injury  Outcome: Progressing  Flowsheets (Taken 6/13/2022 1157)  Absence of Physical Injury: Implement safety measures based on patient assessment     Problem: Pain  Goal: Verbalizes/displays adequate comfort level or baseline comfort level  Outcome: Progressing     Problem: Safety - Adult  Goal: Free from fall injury  Outcome: Progressing

## 2022-06-13 NOTE — ANESTHESIA PROCEDURE NOTES
Peripheral Block    Patient location during procedure: pre-op  Staffing  Performed: anesthesiologist   Anesthesiologist: Ootniel Yates MD  Preanesthetic Checklist  Completed: patient identified, IV checked, site marked, risks and benefits discussed, surgical/procedural consents, equipment checked, pre-op evaluation, timeout performed, anesthesia consent given, oxygen available and monitors applied/VS acknowledged  Peripheral Block  Patient position: sitting  Prep: ChloraPrep  Patient monitoring: cardiac monitor, continuous pulse ox, frequent blood pressure checks and IV access  Block type: Brachial plexus  Laterality: right  Injection technique: single-shot  Guidance: ultrasound guided  Interscalene  Provider prep: mask and sterile gloves  Needle  Needle gauge: 21 G  Needle length: 10 cm  Needle localization: ultrasound guidance  Assessment  Injection assessment: negative aspiration for heme, no paresthesia on injection and local visualized surrounding nerve on ultrasound  Paresthesia pain: none  Slow fractionated injection: yes  Hemodynamics: stablepermanent images obtainedOutcomes: uncomplicated  Additional Notes  Immediately prior to procedure a \"time out\" was called to verify the correct patient, allergies, laterality, procedure and equipment. Time out performed with Kathy Castellanos CRNA    Local Anesthetic: 0.5 %  Ropivacaine   Amount: 30 ml  in 5 ml increments after negative aspiration each time. Anterior scalene and middle scalene muscles, upper, middle and lower trunks of the brachial plexus are identified, the tip of the needle and the spread of the local anesthetic around the brachial plexus are visualized. The Brachial plexus appeared to be anatomically normal and there were no abnormal pathologically findings seen.          Medications Administered  Ropivacaine (NAROPIN) injection 0.5%, 30 mL  Reason for block: post-op pain management and at surgeon's request

## 2022-06-13 NOTE — PROGRESS NOTES
Patient awake and alert. Resp easy unlabored on room air O2 with SaO2 95%. Patient denies C/O pain or nausea. VSS. IV patent. Moving all to command. Right arm numb and minimally moving fingers to right hand. Patient stable to transfer to room 3129.

## 2022-06-13 NOTE — PROGRESS NOTES
Pt arrived to floor from PACU via bed. Pt oriented to room, call light, policies and procedures, the menu and ordering. Call light within reach. Bed in lowest position, bed alarm on, and wheels locked. Pt verbalized understanding. No complaints, questions or concerns at this time.   Electronically signed by Giovana Davis RN on 6/13/2022

## 2022-06-13 NOTE — PROGRESS NOTES
Patient A&O. Tolerating PO. Patient denies pain at this time. Call light within reach. Will continue to monitor and reassess.  Electronically signed by Lisset Wei RN on 6/13/2022

## 2022-06-13 NOTE — PROGRESS NOTES
UTI+ ESBL preop noted, notified Carlos Villasenor NP and stated she would order an antibiotic.  Electronically signed by Yeimi Rios RN on 6/13/2022 at 2:56 PM

## 2022-06-13 NOTE — PROGRESS NOTES
Patient opens eyes to name. Resp easy unlabored on 3LNC with SAO2 92%. Right shoulder dressing dry and intact with ice pack on. IV patent to left hand. Right shoulder dressing dry and intact with ice pack on. Right arm numb per patient. Patient able to weakly move fingers to right hand and weak hand grasp. Sling intact to right arm. HOB up VSS.

## 2022-06-13 NOTE — PROGRESS NOTES
Patient resting comfortably in bed. Patient denies C/O pain or nausea. Right shoulder dressing dry and intact with ice pack on and sling intact. Patient denies C/O pain or nausea. Taking ice chips prn. Nursing supervisor called and can transfer patient to room 3129 in 45 minutes. VSS  IV patent.

## 2022-06-13 NOTE — DISCHARGE INSTR - COC
[facility] ([facility]) Continuity of Care Form    Patient Name:  Tiffanie De La Rosa  : 1950    MRN:  [id number]    Admit date:  2022  Discharge date:  2022    Code Status Order: Full Code  Advance Directives: Yes    Admitting Physician: Dl Rivera MD  PCP: Kaylee Kayser, APRN - CNP    Discharging Nurse: TORRES CROWE [facility] Unit/Room#: K5I-6874/2693-65  Discharging Unit Phone Number: [phone]    Emergency Contact:        Past Surgical History:  Past Surgical History:   Procedure Laterality Date    APPENDECTOMY      BACK SURGERY      LUMBAR BULGING [address] N/A 2019    ANTERIOR CERVICAL DISCECTOMY FUSION C4-5 AND C6-7 WITH MEDTRONIC PLATE AND SCREWS, AND DONOR BONE WITH C-ARM performed by Rubio Villafuerte MD at [place] [address] 2020    COLONOSCOPY POLYPECTOMY SNARE/COLD BIOPSY performed by Angelito Richards.DO at [address] [place] [address], [place], DIAGNOSTIC      HIATAL HERNIA REPAIR      HYSTERECTOMY (CERVIX STATUS UNKNOWN)      OVARIAN CYST SURGERY      SHOULDER ARTHROSCOPY Right 2016    SAD, labral debridement    SHOULDER SURGERY Right 2022    RIGHT REVERSE TOTAL SHOULDER REPLACEMENT performed by Dl Rivera MD at [address] [place] [address], [facility]         Immunization History:   Immunization History   Administered Date(s) Administered    COVID-19, Pfizer Purple top, DILUTE for use, 12+ yrs, 30mcg/0.3mL dose 2021, 2021    Influenza 10/05/2012, 2013    Influenza Virus Vaccine 10/17/2014       Active Problems:  Principal Problem:    Rotator cuff arthropathy of right shoulder  Active Problems:    Rotator cuff arthropathy, right  Resolved Problems:    * No resolved hospital problems.  *      Isolation/Infection:       Nurse Assessment:  Last Vital Signs:BP (!) 146/82   Pulse 83   Temp 98 °F (36.7 °C) (Oral)   Resp 18   Ht 5' 3\" (1.6 m)   Wt 179 lb 9 oz (81.4 kg)   SpO2 95%   BMI 31.81 kg/m²   Last documented pain score (0-10 scale): Pain Level: 0  Last Weight:   Wt Readings from Last 1 Encounters:   06/13/22 179 lb 9 oz (81.4 kg)     Mental Status:  oriented and alert     IV Access:  - None    Nursing Mobility/ADLs:  Walking   Independent  Transfer  Independent  Bathing  Assisted  Dressing  Assisted  Toileting  Independent  Feeding  Assisted setup  Med [facility] Delivery   whole    Wound Care Documentation and Therapy:  Keep glued Prineo dressing clean and intact. DO NOT remove. Prineo is waterproof for showering. Doctor will evaluate dressing for removal at office visit 2 weeks after surgery        Elimination:  Urinary Catheter: None   Colostomy/Ileostomy: No  Continence: Bowel: Yes  Bladder: Yes  Date of Last BM: 6/12/2022    Intake/Output Summary (Last 24 hours)     Intake/Output Summary (Last 24 hours) at 6/13/2022 1502  Last data filed at 6/13/2022 1025  Gross per 24 hour   Intake 1200 ml   Output --   Net 1200 ml     Safety Concerns:     History of Falls (last 30 days) and At Risk for Falls    Impairments/Disabilities:      Vision    Nutrition Therapy:  Current Nutrition Therapy: ADULT DIET; Regular  Routes of Feeding: Oral  Liquids: No Restrictions  Daily Fluid Restriction: no  Last Modified Barium Swallow with Video (Video Swallowing Test): not done    Treatments at the Time of Hospital Discharge:   Respiratory Treatments:   Oxygen Therapy:  is not on home oxygen therapy. Ventilator:    - No ventilator support        Lab orders for discharge:PT/INR q Mon and Thurs        Rehab Therapies: Physical Therapy, Occupational Therapy and nursing care  Weight Bearing Status/Restrictions: Non weight bearing restrictions operative arm  Other Medical Equipment (for information only, NOT a DME order): Keep arm sling on at all times unless bathing or exercising. Keep elbow close to side, NO REACHING any direction.    Other Treatments: Codman exercises operative arm 3x a day. Resume Alexiven at home. Management per PCP    Patient's personal belongings (please select all that are sent with patient):  Glasses    RN SIGNATURE:  Electronically signed by Saul Vicente RN on 6/13/22 at 3:03 PM EDT    PHYSICIAN SECTION    Prognosis: Good    Condition at Discharge: Stable    Rehab Potential (if transferring to Rehab): Good    Physician Certification: I certify the above orders, information, and transfer of Lilly Soler is necessary for the continuing treatment of the diagnosis listed and that he requires {Admit to Appropriate Level of Care:29201} for {GREATER/LESS:566078704} 30 days. Update Admission H&P: No change in H&P    PHYSICIAN SIGNATURE:  {Esignature:[id number]}    Followup Dr Erven Jeans in office 2 weeks post surgery. [facility], [address] [place] [address],   [phone]    CASE MANAGEMENT/SOCIAL WORK SECTION    Inpatient Status Date: 6/13/22    [facility] Readmission Risk Assessment Score:    Discharging to Facility/ Agency   Discharging to Facility/ Agency   Name: [address]  Address:  [address] , [facility] [address]  Phone:  [phone]  Fax:  [phone]    / signature: Electronically signed by John Lee on 6/14/22 at 9:33 AM EDT      Postoperative Shoulder Replacement Surgery Instructions    Wound Care:  Cover the wound with a sterile gauze dressing and change daily as long as there is drainage. Do not scrub wound. Pat it dry with a soft towel. Dont apply any lotions or creams to your wound. Check the incision every day for redness, swelling, or increase in drainage. Diet:  You can resume your normal diet. There are no limits on your diet due to your surgery. Pain pills and activity changes may lead to constipation. To prevent this, use prune juice or bran cereals liberally. You may need to use a laxative such as Dulcolax, Senokot, or Milk of Magnesia.   Drink plenty of fluids. Medications: Take pain pills if needed to maintain comfort. Never drive while taking pain medicine. Avoid over the counter medications until checking with your doctor. Resume previous medications as instructed by your doctor. Call Your Doctor If:  You have increased pain not controlled by medications. Excessive swelling in your ankles or wrists. You develop numbness, tingling, or decreased movement. You have a fever greater than 100 degrees for a day or over 101 degrees at any one time. Your wound becomes more reddened, starts draining, or opens. If you fall. You have any questions about your recovery. Inform your family doctor/dentist or any other doctor who cares for you in the future that you have a joint replacement. You may need antibiotics for dental or surgical procedures if there is any evidence of infection present. If you have required the use of insulin to control your blood sugar after surgery, follow up with your family doctor. Call your surgeons office to schedule your appointment to be seen after surgery. Make your appointment to continue physical therapy per doctors orders. Smoking cessation assistance can be obtained from your family doctor or by [address] @ [phone]    _______________________________   _____   _______________________  ____                Patient Signature              Date      Witness                               Date                  CODMAN EXERCISES:  Side to Side Swing      With one hand placed on the surface of a table or chair bring both legs to a parallel position to each other,  bend the knees, and lean the torso forward letting the free arm dangle toward the floor. Begin to use the body to swing the arm side to side. As stated above, note this is to be done without use of the shoulder or surrounding muscles.     Back and [facility]    With one hand placed on the surface of a table or chair reach one leg back and bend the other knee and torso forward letting the free arm dangle toward the floor begin to move the body so that the free arm swings forward and backward. Circular Swing    Using the same position as the back and forth swing shown above, begin to move the body so that the free arm swings in a [place].

## 2022-06-13 NOTE — ANESTHESIA PRE PROCEDURE
[facility] Department of Anesthesiology  Pre-Anesthesia Evaluation/Consultation       Name:  Tereso Graham  : 1950  Age:  74 y.o.                                            MRN:  [id number]  Date: 2022           Surgeon: Surgeon(s):  Linsey Roe MD    Procedure: Procedure(s):  RIGHT REVERSE TOTAL SHOULDER REPLACEMENT     Allergies   Allergen Reactions    Morphine Itching, Rash and Hives     Patient Active Problem List   Diagnosis    Migraine    Depression    Hypertension    Asthma    Insomnia    Osteoarthritis    Emphysema of lung ([facility]    Hyperlipidemia    GERD with stricture    Esophageal reflux    Type II or unspecified type diabetes mellitus without mention of complication, not stated as uncontrolled    Thyroid adenoma    DVT (deep venous thrombosis) (HCC)    Colon polyp    Protein S deficiency ([facility]    Myalgia    Fracture of phalanx of toe    Need for prophylactic vaccination and inoculation against influenza    AB (asthmatic bronchitis)    Tendonitis of shoulder    Autonomic postural hypotension    Shoulder bursitis    Constipated    Need for prophylactic vaccination against Streptococcus pneumoniae (pneumococcus)    Bronchitis    Bronchospasm    Allergic rhinitis    Otitis media    Diarrhea    Bacterial gastroenteritis    GI bleed    Diverticulosis    Rotator cuff tendinitis    Biceps tendinitis    Glenohumeral arthritis    Acromioclavicular joint arthritis    Precordial pain    Systolic murmur    Coronary artery calcification seen on CAT scan    Closed displaced fracture of proximal phalanx of left great toe    Osteoarthritis of spine with radiculopathy, cervical region    Cervical stenosis of spinal canal     Past Medical History:   Diagnosis Date    Asthma     Colon polyp     Depression     DVT (deep venous thrombosis) ([facility]     as a teenager    Esophageal reflux     GERD with stricture     Hx of blood clots     Hyperlipidemia  Hypertension     Insomnia     Migraine 12/29/1995    Osteoarthritis     Protein deficiency (HCC)     protein S defic.  Pulmonary embolism (HCC)     x`s 2 post-op after Hysterectomy & Gall Bladder    Thyroid adenoma     NO MEDS    Type II or unspecified type diabetes mellitus without mention of complication, not stated as uncontrolled      Past Surgical History:   Procedure Laterality Date    APPENDECTOMY      BACK SURGERY      LUMBAR BULGING DISC    CERVICAL FUSION  1993    CERVICAL FUSION N/A 11/4/2019    ANTERIOR CERVICAL DISCECTOMY FUSION C4-5 AND C6-7 WITH MEDTRONIC PLATE AND SCREWS, AND DONOR BONE WITH C-ARM performed by Hyun Alcantar MD at [address]  COLONOSCOPY N/A 7/9/2020    COLONOSCOPY POLYPECTOMY SNARE/COLD BIOPSY performed by Stacey Braswell.DO at [facility], COLON, DIAGNOSTIC      HIATAL HERNIA REPAIR      HYSTERECTOMY (CERVIX STATUS UNKNOWN)      OVARIAN CYST SURGERY      SHOULDER ARTHROSCOPY Right 12/28/2016    SAD, labral debridement    THYROIDECTOMY, PARTIAL       Social History     Tobacco Use    Smoking status: Never Smoker    Smokeless tobacco: Never Used   Vaping Use    Vaping Use: Never used   Substance Use Topics    Alcohol use: No    Drug use: Never     Medications  No current facility-administered medications on file prior to encounter.      Current Outpatient Medications on File Prior to Encounter   Medication Sig Dispense Refill    Omega-3 Fatty Acids (FISH OIL) 1000 MG CAPS Take 1,000 mg by mouth daily      Multiple Vitamin (MULTIVITAMIN ADULT PO) Take 1 tablet by mouth daily      zinc 50 MG CAPS Take 1 capsule by mouth daily      vitamin C (ASCORBIC ACID) 500 MG tablet Take 1,000 mg by mouth daily      Cholecalciferol (VITAMIN D) 50 MCG (2000 UT) CAPS capsule Take 1 capsule by mouth daily      vitamin E 1000 units capsule Take 1,000 Units by mouth daily      Calcium Citrate-Vitamin D (CALCIUM CITRATE + D PO) Take 1,200 mg by mouth daily      albuterol sulfate  (90 Base) MCG/ACT inhaler Inhale 2 puffs into the lungs every 4 hours as needed for Wheezing      gemfibrozil (LOPID) 600 MG tablet Take 600 mg by mouth 2 times daily (before meals)      topiramate (TOPAMAX) 25 MG tablet Take 25 mg by mouth 2 times daily      buPROPion (WELLBUTRIN XL) 150 MG extended release tablet Take 150 mg by mouth every morning      JANTOVEN 1 MG tablet Restart 11/7/19. Take 2 tabs daily. On 3.5 to 4 mg daily depending on INR 1 tablet 0    JANTOVEN 2 MG tablet Restart 11/7/19. Please dispense Jantoven and not Warfarin 1 tablet 0    amitriptyline (ELAVIL) 50 MG tablet TAKE ONE TABLET BY MOUTH DAILY (Patient taking differently: Take 50 mg by mouth nightly ) 90 tablet 2    metFORMIN (GLUCOPHAGE) 1000 MG tablet Take 1 tablet by mouth 2 times daily (with meals) (Patient taking differently: Take 1,000 mg by mouth at bedtime ) 60 tablet 5    insulin detemir (LEVEMIR FLEXTOUCH) 100 UNIT/ML injection pen Inject 58 Units into the skin nightly (Patient taking differently: Inject 50 Units into the skin nightly ) 5 Pen 3    sertraline (ZOLOFT) 100 MG tablet Take 1 tablet by mouth daily 90 tablet 3    losartan (COZAAR) 25 MG tablet Take 25 mg by mouth every evening  30 tablet 0    NOVOLOG FLEXPEN 100 UNIT/ML injection pen Inject 5 Units into the skin 3 times daily (before meals) Or as directed      omeprazole (PRILOSEC) 40 MG capsule Take 1 capsule by mouth 2 times daily (Patient taking differently: Take 40 mg by mouth at bedtime ) 180 capsule 3    glucose blood VI test strips (ASCENSIA AUTODISC VI;ONE TOUCH ULTRA TEST VI) strip 1 each by In Vitro route 3 times daily. As needed. 100 each 3    ONE TOUCH LANCETS MISC 1 each by Does not apply route 3 times daily. 100 each 3    atorvastatin (LIPITOR) 40 MG tablet Take 1 tablet by mouth daily.  (Patient taking differently: Take 40 mg by mouth Daily with supper ) 30 tablet 3    fluticasone (FLONASE) 50 MCG/ACT nasal spray 1 spray by Nasal route daily. (Patient taking differently: 1 spray by Nasal route nightly ) 1 Bottle 3    ondansetron (ZOFRAN-ODT) 4 MG disintegrating tablet DISSOLVE ONE TABLET BY MOUTH EVERY 6 HOURS AS NEEDED 90 tablet 2     Current Facility-Administered Medications   Medication Dose Route Frequency Provider Last Rate Last Admin    0.9 % sodium chloride infusion   IntraVENous Continuous Ruben Greer MD        sodium chloride flush 0.9 % injection 5-40 mL  5-40 mL IntraVENous 2 times per day Ruben Greer MD        sodium chloride flush 0.9 % injection 5-40 mL  5-40 mL IntraVENous PRN Ruben Greer MD        0.9 % sodium chloride infusion   IntraVENous PRN Ruben Greer MD        ceFAZolin (ANCEF) 2000 mg in dextrose 5 % 100 mL IVPB  2,000 mg IntraVENous Once Dax Tucker MD         Vital Signs (Current)   Vitals:    22 1218 22 0600 22   BP:   (!) 162/77   Pulse:   87   Resp:   18   Temp:   97.4 °F (36.3 °C)   TempSrc:   Temporal   SpO2:   96%   Weight: 185 lb (83.9 kg) 179 lb 9 oz (81.4 kg)    Height: 5' 3\" (1.6 m) 5' 3\" (1.6 m)                                             Vital Signs Statistics (for past 48 hrs)     Temp  Av.4 °F (36.3 °C)  Min: 97.4 °F (36.3 °C)   Min taken time: 22  Max: 97.4 °F (36.3 °C)   Max taken time: 22  Pulse  Av  Min: 87   Min taken time: 22  Max: 80   Max taken time: 22  Resp  Av  Min: 25   Min taken time: 22  Max: 25   Max taken time: 22  BP  Min: 162/77   Min taken time: 22  Max: 162/77   Max taken time: 22  SpO2  Av %  Min: 96 %   Min taken time: 06/13/22 0614  Max: 96 %   Max taken time: 22  BP Readings from Last 3 Encounters:   22 (!) 162/77   22 (!) 146/86   22 136/70       BMI  Body mass index is 31.81 kg/m².   Estimated body mass index is 31.81 kg/m² as calculated from the following:    Height as of this encounter: 5' 3\" (1.6 m). Weight as of this encounter: 179 lb 9 oz (81.4 kg). CBC   Lab Results   Component Value Date    WBC 5.7 06/06/2022    RBC 4.73 06/06/2022    HGB 12.6 06/06/2022    HCT 38.3 06/06/2022    MCV 80.9 06/06/2022    RDW 16.1 06/06/2022     06/06/2022     CMP    Lab Results   Component Value Date     06/06/2022    K 4.1 06/06/2022     06/06/2022    CO2 22 06/06/2022    BUN 11 06/06/2022    CREATININE <0.5 06/06/2022    GFRAA >60 06/06/2022    GFRAA >60 05/03/2013    AGRATIO 1.6 09/29/2017    LABGLOM >60 06/06/2022    GLUCOSE 207 06/06/2022    PROT 6.8 04/15/2022    PROT 6.8 12/17/2012    CALCIUM 9.5 06/06/2022    BILITOT 0.3 04/15/2022    ALKPHOS 83 04/15/2022    AST 23 04/15/2022    ALT 25 04/15/2022     BMP    Lab Results   Component Value Date     06/06/2022    K 4.1 06/06/2022     06/06/2022    CO2 22 06/06/2022    BUN 11 06/06/2022    CREATININE <0.5 06/06/2022    CALCIUM 9.5 06/06/2022    GFRAA >60 06/06/2022    GFRAA >60 05/03/2013    LABGLOM >60 06/06/2022    GLUCOSE 207 06/06/2022     POCGlucose  No results for input(s): GLUCOSE in the last 72 hours.    Coags    Lab Results   Component Value Date    PROTIME 17.4 06/06/2022    PROTIME 1.5 12/16/2016    INR 1.43 06/06/2022    APTT 32.9 39/15/4053     HCG (If Applicable) No results found for: Kady Jeremy, HCG, HCGQUANT   ABGs   Lab Results   Component Value Date    PHART 7.384 05/10/2010    PO2ART 97.1 05/10/2010    HVK8GNB 44.9 05/10/2010    DJR3QMQ 26.2 05/10/2010    BEART 1.3 05/10/2010    Q1KXUMPR 97.8 05/10/2010      Type & Screen (If Applicable)  No results found for: LABABO, LABRH                         BMI: Wt Readings from Last 3 Encounters:       NPO Status:   Date of last liquid consumption: 06/12/22   Time of last liquid consumption: 2300   Date of last solid food consumption: 06/12/22      Time of last solid consumption: 2000       Anesthesia Evaluation  Patient summary reviewed no history of anesthetic complications:   Airway: Mallampati: II  TM distance: >3 FB   Neck ROM: full  Mouth opening: > = 3 FB   Dental: normal exam         Pulmonary: breath sounds clear to auscultation  (+) COPD:  asthma:     (-) shortness of breath, sleep apnea and not a current smoker                           Cardiovascular:  Exercise tolerance: good (>4 METS),   (+) hypertension:, CAD:, hyperlipidemia    (-) past MI and  angina        Rate: normal                    Neuro/Psych:   (+) neuromuscular disease:, headaches:, psychiatric history:   (-) seizures, TIA and CVA           GI/Hepatic/Renal:   (+) GERD:,      (-) liver disease, no renal disease and no morbid obesity       Endo/Other:    (+) Diabetes, .    (-) hypothyroidism               Abdominal:             Vascular:   + DVT, PE. Other Findings:           Anesthesia Plan      general and regional     ASA 3       Induction: intravenous. MIPS: Postoperative opioids intended and Prophylactic antiemetics administered. Anesthetic plan and risks discussed with patient. Plan discussed with CRNA. This pre-anesthesia assessment may be used as a history and physical.    DOS STAFF ADDENDUM:    Pt seen and examined, chart reviewed (including anesthesia, drug and allergy history). No interval changes to history and physical examination. Anesthetic plan, risks, benefits, alternatives, and personnel involved discussed with patient. Patient verbalized an understanding and agrees to proceed.       Bee Diego MD  June 13, 2022  6:35 AM

## 2022-06-13 NOTE — PROGRESS NOTES
Met with patient and family at bedside, patient is alert and orientedx4 up in chair. discussed role of nurse navigator. Reviewed reasons to call with questions or concerns, importance of TEDS, Incentive spirometer, pain medication, and physical and occupational therapy. 2/4 bed rails up, bed in lowest position, fall precautions in place, call light within reach. Pulses present bilaterally +2 radial, no drainage or odor noted at surgical dressing right anterior dressing. dry Dressing clean, dry, and intact. Ice in place. Angel and scds on BLEs. Neurovascular checks performed and weak  to right hand noted and left hand moderate , fingers appear appropriate to ethnicity in color, warm to touch, patient denies numbness but does report some tingling in right hand, had block. DC Plan: home with steffanie and santos, therapy recommending [facility] and patient in agreement. Steffanie to transport patient. DME needs:denies.     Annabella Hernandez  Orthopedic Nurse Navigator  Phone number: [phone]    Future Appointments   Date Time Provider Maria C Mehta   6/28/2022  1:00 PM MD Alda Bolden     Electronically signed by Flako Gonzalez RN on 6/13/2022 at 2:56 PM

## 2022-06-13 NOTE — PROGRESS NOTES
Patient assisted to bedpan to void. Patient awake and alert. Resp easy unlabored. Pain level 5 of 10 and tolerable per patient.

## 2022-06-13 NOTE — H&P
The patient was interviewed and examined and there have been no changes since the documented History and Physical.  I have presented reasonable alternatives to the patient's proposed care, treatment and services. The discussion I have done encompassed risks, benefits and side effects related to the alternatives and the risks related to not receiving the proposed care, treatment and services.       Electronically signed by Tavia Gagnon MD on 6/13/2022 at 6:54 AM

## 2022-06-13 NOTE — PROGRESS NOTES
Pt noted with ESBL UTI preop and was not treated. Will order IV antibx here and give one time dose fosfomycin christos at [place]. Discussed with Roman Juarez in Pharmacy.

## 2022-06-13 NOTE — PROGRESS NOTES
Occupational Therapy  Facility/Department: [address] ORTHOPEDICS  Occupational Therapy Initial Assessment    Name: Anne Giordano  : 1950  MRN: [id number]  Date of Service: 2022    Discharge Recommendations:  2-3 sessions per week,Patient would benefit from continued therapy after discharge,Home with Home health OT,24 hour supervision or assist  OT Equipment Recommendations  Equipment Needed: No     Anne Giordano scored a 17/24 on the AM-PAC ADL Inpatient form. Current research shows that an AM-PAC score of 18 or greater is typically associated with a discharge to the patient's home setting. However, family able to assist as needed. Based on this and the patient's AM-PAC score, and their current ADL deficits, it is recommended that the patient have 2-3 sessions per week of Occupational Therapy at d/c to increase the patient's independence. At this time, this patient demonstrates the endurance and safety to discharge home with [address] [place] [address] (home vs OP services) and a follow up treatment frequency of 2-3x/wk. Please see assessment section for further patient specific details. If patient discharges prior to next session this note will serve as a discharge summary. Please see below for the latest assessment towards goals. Patient Diagnosis(es): The primary encounter diagnosis was Rotator cuff arthropathy of right shoulder. A diagnosis of Rotator cuff arthropathy, right was also pertinent to this visit. Past Medical History:  has a past medical history of Asthma, Colon polyp, Depression, DVT (deep venous thrombosis) (HCC), Esophageal reflux, GERD with stricture, Hx of blood clots, Hyperlipidemia, Hypertension, Insomnia, Migraine, Osteoarthritis, Protein deficiency ([address] Pulmonary embolism ([address] Thyroid adenoma, and Type II or unspecified type diabetes mellitus without mention of complication, not stated as uncontrolled.   Past Surgical History:  has a past surgical history that includes Thyroidectomy, partial; Appendectomy; Ovarian cyst surgery; hiatal hernia repair; Endoscopy, colon, diagnostic; Colonoscopy; Shoulder arthroscopy (Right, 12/28/2016); cervical fusion (1993); Hysterectomy; cervical fusion (N/A, 11/4/2019); Colonoscopy (N/A, 7/9/2020); back surgery; and shoulder surgery (Right, 6/13/2022). Treatment Diagnosis: impaired ADL/fxl mobility    Assessment   Performance deficits / Impairments: Decreased functional mobility ; Decreased ADL status; Decreased balance;Decreased ROM; Decreased sensation;Decreased fine motor control;Decreased coordination  Assessment: 75 yo female admitted for elective R TSA. PMH: HTN, Depression, OA, DM, DVT, cervical stenosis. PTA, pt lives with spouse and full IND no AD. Today, pt functioning below baseline most limited by RUE NWB and sling wear. Sling adjusted for proper alignment. Pt and spouse educated on RUE NWB, no active shoulder movement, sling all times except bathing/dressing/exercises, and beginning digit AROM as nerve block wears off over night. Pt completes fxl tx and mobility without AD household distances with SBA/CGA. Anticipate Mod A LB and Min/Mod A UB ADL based on RLE NWB and sling wear, balance, endurance, cognition, and PLOF. Cont acute OT to address above deficits. Pt would benefit from OT 2-3x/week to facilitate safe transition home with family for 24 hr SUP  Treatment Diagnosis: impaired ADL/fxl mobility  Prognosis: Good  Decision Making: Medium Complexity  REQUIRES OT FOLLOW-UP: Yes  Activity Tolerance  Activity Tolerance: Patient Tolerated treatment well        Plan   Plan  Times per Week: 2-3 sessions  Specific Instructions for Next Treatment: Pendulums.  UB dressing  Current Treatment Recommendations: Strengthening,Balance training,ROM,Functional mobility training,Endurance training,Pain management,Safety education & training,Patient/Caregiver education & training,Modalities,Positioning,Self-Care / ADL,Home management training Restrictions  Restrictions/Precautions  Restrictions/Precautions: Fall Risk,Weight Bearing,ROM Restrictions  Required Braces or Orthoses?: Yes  Upper Extremity Weight Bearing Restrictions  Right Upper Extremity Weight Bearing: Non Weight Bearing  Required Braces or Orthoses  Right Upper Extremity Brace/Splint: Sling (all times except bathing, dressing and exercises)    Subjective   General  Chart Reviewed: Yes  Patient assessed for rehabilitation services?: Yes  Additional Pertinent Hx: 73 yo female admitted for elective R TSA. PMH: HTN, Depression, OA, DM, DVT, cervical stenosis  Family / Caregiver Present: Yes (spouse)  Referring Practitioner: Jesse Joseph MD  Diagnosis: R TSA  Subjective  Subjective: Pt resting at EOB upon arrival with RN present. Pt just used restroom prior to arrival. Pt agreeable to OT/PT eval. RUE numb from nerve block, able to move digits some  General Comment  Comments: RN ok to see     Social/Functional History  Social/Functional History  Lives With: Spouse,Daughter (spouse works part time, daughter works full time)  Type of Home: House  Home Layout: Two level,Laundry in basement,1/2 bath on main level,Bed/Bath upstairs  Home Access: Level entry  Bathroom Shower/Tub: Tub/Shower unit,Shower chair without back  Bathroom Toilet: Standard  Bathroom Accessibility: Accessible  Has the patient had two or more falls in the past year or any fall with injury in the past year?: Yes (2 falls, dog)  ADL Assistance: Independent  Homemaking Assistance: Independent  Ambulation Assistance: Independent  Transfer Assistance: Independent  Active : Yes  Additional Comments: Here for elective Right TSR: Reports left shoulder \"ok\"       Objective   Vision Exceptions: Wears glasses for reading  Hearing: Within functional limits       Observation/Palpation  Observation: RUE within sling, sling too large, adjusted for support at this time.  [place], Ortho NP to obtain proper fitting sling  Safety Devices  Type of Devices: Call light within reach; Chair alarm in place;Gait belt;Patient at risk for falls; Left in chair;Nurse notified  Balance  Sitting:  (seated at EOB and recliner with SUP/SBA for sling adjustment)  Standing:  (PRN tx with CGA)  Gait  Overall Level of Assistance: Contact-guard assistance;Stand-by assistance (EOB>around foot of bed>recliner with no AD little to no unsteadiness, no LOB. RUE in sling.)     AROM: Within functional limits (RUE not tested d/t nerve block)  Strength: Within functional limits (RUE not tested d/t nerve block)  Coordination: Within functional limits (RUE not tested d/t nerve block)  Tone: Normal  ADL  Feeding: Setup (needs assist opening items)  Additional Comments: Anticipate Mod A LB and Min/Mod A UB ADL based on RLE NWB and sling wear, balance, endurance, cognition, and PLOF     Activity Tolerance  Activity Tolerance: Patient tolerated treatment well     Transfers  Sit to stand: Contact guard assistance  Stand to sit: Contact guard assistance  Transfer Comments: no AD. cues     Cognition  Overall Cognitive Status: [facility]                  Education Given To: Patient; Family  Education Provided: Role of Therapy;Plan of Care;Transfer Training;Precautions  Education Provided Comments: Pt and spouse educated on RUE NWB, no active shoulder movement, sling all times except bathing/dressing/exercises, and beginning digit AROM as nerve block wears off over night  Education Method: Verbal  Education Outcome: Verbalized understanding                      AM-PAC Score        AM-[facility] Inpatient Daily Activity Raw Score: 17 (06/13/22 1400)  AM-PAC Inpatient ADL T-Scale Score : 37.26 (06/13/22 1400)  ADL Inpatient CMS 0-100% Score: 50.11 (06/13/22 1400)  ADL Inpatient CMS G-Code Modifier : CK (06/13/22 1400)    Goals  Short Term Goals  Time Frame for Short term goals: prior to d/c  Short Term Goal 1: toileting CGA  Short Term Goal 2: UB dressing Min A (family assist)  Short Term Goal 3: LB dressing Mod A (family assist)  Short Term Goal 4: Tolerates 3 min fxl standing task with SBA  Short Term Goal 5: RUE exercises (AROM digits to elbow) (pendulums shoulder) to improve circulation and prevent stiffness for ADLs  Additional Goals?: Yes  Patient Goals   Patient goals : return home with family and get home therapy       Therapy Time   Individual Concurrent Group Co-treatment   Time In 1310         Time Out 1350         Minutes 40         Timed Code Treatment Minutes: 25 Minutes (15 eval. 25 TA)       SAVI Menjivar, OTR/L

## 2022-06-13 NOTE — CARE COORDINATION
6/13 met with patient and her  and daughter at bedside to discuss DC needs. We discussed PT/OT recommendation for continued therapy in the home- .  patient thas no preference for home care agency and is agreeable to referral to [address] -   Will follow for final DC orders.   Electronically signed by Maco Brewer on 6/13/2022 at 3:36 PM  #[phone]

## 2022-06-13 NOTE — OP NOTE
Operative Note      Patient: Allison Darnell  YOB: 1950  MRN: [id number]    Date of Procedure: 6/13/2022    Pre-Op Diagnosis: ROTATOR CUFF ARTHROPATHY RIGHT SHOULDER    Post-Op Diagnosis: Same       Procedure(s):  RIGHT REVERSE TOTAL SHOULDER REPLACEMENT    Surgeon(s):  Joan Harry MD    Assistant:   * No surgical staff found *    Anesthesia: General    Estimated Blood Loss (mL): 888     Complications: None    Specimens:   * No specimens in log *    Implants:  * No implants in log *      Drains: * No LDAs found *    Findings: Severe glenohumeral arthritis noted. Detailed Description of Procedure:   Patient:  Allison Darnell ([birth year])  MRN:  [id number]  Date:  6/13/2022    Preoperative Diagnosis:   Severe right shoulder glenohumeral arthritis with rotator cuff deficiency. Postoperative Diagnosis:  Same    Procedure:  Right shoulder reverse total shoulder arthroplasty. Surgeon:  India Singleton. Santiago Burleson MD    Anesthesia:  Interscalene block and General endotracheal anesthesia    IV Fluids: Crystalloid. Estimated blood loss:  475EH    Complications: None     Implants:        Tornier     Diaphysis-  3 B long trabecular metal reverse humeral stem    Standard trabecular metal baseplate 25 mm with 6.5 mm x 35 mm central screw 26 mm 18 mm and 38 mm peripheral screws    Glenosphere- 36 mm    Humeral/polyethylene thickness - +6 B retentive and a high offset tray    Cement- none                         CT guidance/computer assisted technique      Surgical Indications: The patient had persistent pain and limitations of motion. Preoperative physical exam and imaging studies confirmed the aforementioned diagnosis. The patient chose to proceed with reverse total shoulder arthroplasty and biceps tenodesis if required. Risks, benefits, expected outcomes and potential complications were discussed. At no time were any guarantees implied or stated.   The patient electively signed the consent form.    The patient is a 74y.o. year old female who has had right shoulder pain for several years. She had tried some conservative measures in the remote past but has had continued pain. The patient was seen in my office and evaluated. X-rays confirmed severe glenohumeral arthritis. Due to the findings the patient was ultimately cleared and scheduled for right reverse total shoulder arthroplasty. Patient Positioning and Surgical Prep:    The patient was identified preoperatively. The patient was then taken to the operating room and general anesthesia was administered by Anesthesia. The patient was administered an interscalene block in the preoperative holding area. The patient was then positioned in the beach chair position in a standard fashion. Appropriate antibiotics were administered per SCIP measures. All bony prominences were padded. The operative shoulder and upper extremity was then chloraprepped in a standard fashion. We then draped out in a standard fashion. We did seal off the skin with the  Ioban. We then were ready for incision. The patient was seen in the holding area and the appropriate extremity marked with an indelible pen. The anesthesia department administered an interscalene block. The patient was taken to the operative suite, identified and transferred to the OR table. General anesthesia was administered. The patient was placed in the semi beach chair position with the head of the bed elevated to 30 degrees. The shoulder was isolated with a sterile U drape. The upper extremity was prepped from the neck to the wrist with Chloraprep. The shoulder was draped in the normal sterile fashion. Lala Antu was secured to the exposed skin to isolate the axilla. Procedure    We did infiltrate out incision site with 0.25% marcaine with epinephrine. We used a standard deltopectoral approach. We incised skin and coagulated all bleeders with Bovie  electrocautery.  We dissected down and identified the cephalic vein. We then identified the deltoid and the pectoralis major. We retracted the cephalic vein and the deltoid laterally. We then retracted the pectoralis major inferomedially. We did release the very proximal border of the pectoralis major without difficulty. We then identified the strap muscles and retracted the strap muscles medially. We then identified the biceps tendon and took down the subscapularis and capsule approximately 1 cm medial to the biceps  tendon. We then continued to externally rotate the shoulder and completely released the subscapularis and the capsule off the humerus all the way down to the surgical neck. The axillary nerve was palpated and protected at all times. We then went ahead and extended and externally rotated the shoulder and dislocated the shoulder. We then were ready for preparation of the humerus . Using an extramedullary guide set at 10 degrees of retroversion, we were ready for humeral preparation. The guide was pinned at 10 degrees of retroversion and we were satisfied with the height of our cut. We then made a nice flat cut. All soft tissues were protected. We then opened up the humeral canal with a starter reamer. We then sequentially reamed up to 3 with the hand held reamers. We then sequentially broached the humerus up to 3. Appropriate retroversion was maintained throughout the broaching process. This worked out extremely well. We then inserted our trial stem and then began preparation of the glenoid. We did use our Darrach retractor posteriorly. We then used our 3 prong retractor anteriorly. We then circumferentially released the capsule and labrum around the glenoid. We did obtain excellent exposure. We then were ready for passage of our center hole for our glenoid. It should be noted we preoperatively obtained a CT and we referenced our templates and referenced our model intraoperatively.  We then went ahead and passed our center pin without difficulty. The Tornitavon patient specific guide based off the CT was then utilized. We then passed our pin in proper position. We then went ahead and reamed without difficulty. We then drilled and depth gauged for our central screw. We then went ahead and seated the standard 25 mm baseplate. The baseplate was seated flush. We then went ahead and drilled and depth gauged for our 2 locking screws. These were seated well. We then went ahead and seated our 36 mm glenosphere. This seated rather well. We then expressed the proximal humerus out of the wound. We removed our trial stem. We then went ahead and seated the actual long 3 B (145 degrees). We again maintained 10 degrees of retroversion. We then went ahead and attached our + 6 retentive polyethylene liner. This snapped in and was seated well. We then reduced the shoulder and the shoulder was extremely stable. We did have approximately 1 to 2 mm of shuck with longitudinal traction. We irrigated all our layers rather copiously. We closed the subscapularis with interrupted vertical mattress #2 Ethibond sutures. We then loosely reapproximated the deltoid back to the pectoralis with figure-of-eight #1 Vicryl stitches. We closed the subcutaneous tissues with running strata fix. We then closed the skin with the Prineo. Sterile dressings were applied. There were no complications. The patient was put in a shoulder immobilizer. All sponge and needle counts were correct. The patient was awakened and taken to the postoperative area in stable condition. I spoke with the patients family in the consultation room postoperatively.     Jeyson Horton MD    Electronically signed by Jeyson Horton MD on 6/13/2022 at 6:56 AM

## 2022-06-13 NOTE — ANESTHESIA POSTPROCEDURE EVALUATION
Department of Anesthesiology  Postprocedure Note    Patient: Betty Pop  MRN: [id number]  YOB: 1950  Date of evaluation: 6/13/2022  Time:  10:15 AM     Procedure Summary     Date: 06/13/22 Room / Location: [address]    Anesthesia Start: 1795 Anesthesia Stop: 9373    Procedure: RIGHT REVERSE TOTAL SHOULDER REPLACEMENT (Right Shoulder) Diagnosis:       Rotator cuff arthropathy, right      (ROTATOR CUFF ARTHROPATHY RIGHT SHOULDER)    Surgeons: Tavia Gagnon MD Responsible Provider: Zora Coffey MD    Anesthesia Type: general, regional ASA Status: 3          Anesthesia Type: No value filed. Kristie Phase I: Kristie Score: 8    Kristie Phase II:      Last vitals: Reviewed and per EMR flowsheets.        Anesthesia Post Evaluation    Patient location during evaluation: PACU  Patient participation: complete - patient participated  Level of consciousness: awake and alert  Pain score: 1  Airway patency: patent  Nausea & Vomiting: no nausea and no vomiting  Complications: no  Cardiovascular status: blood pressure returned to baseline  Respiratory status: acceptable  Hydration status: euvolemic

## 2022-06-13 NOTE — PROGRESS NOTES
Right arm sling switched to medium size. ... fits well. Pt aware of ESBL UTI and requiring fosfomycin po at home.  Aware price per retail pharmacy is 115.00 and willing to pay cost. Hopeful for discounts

## 2022-06-13 NOTE — PROGRESS NOTES
Daily PRN  gemfibrozil (LOPID) tablet 600 mg, 600 mg, Oral, BID AC  losartan (COZAAR) tablet 25 mg, 25 mg, Oral, Nightly  [START ON 6/14/2022] pantoprazole (PROTONIX) tablet 40 mg, 40 mg, Oral, QAM AC  sertraline (ZOLOFT) tablet 100 mg, 100 mg, Oral, Daily  topiramate (TOPAMAX) tablet 25 mg, 25 mg, Oral, BID  insulin glargine (LANTUS) injection vial 20 Units, 0.25 Units/kg, SubCUTAneous, Nightly  insulin lispro (HUMALOG) injection vial 7 Units, 0.08 Units/kg, SubCUTAneous, TID WC  glucose chewable tablet 16 g, 4 tablet, Oral, PRN  dextrose bolus 10% 125 mL, 125 mL, IntraVENous, PRN **OR** dextrose bolus 10% 250 mL, 250 mL, IntraVENous, PRN  glucagon (rDNA) injection 1 mg, 1 mg, IntraMUSCular, PRN  dextrose 5 % solution, 100 mL/hr, IntraVENous, PRN  sodium chloride flush 0.9 % injection 5-40 mL, 5-40 mL, IntraVENous, 2 times per day  sodium chloride flush 0.9 % injection 5-40 mL, 5-40 mL, IntraVENous, PRN  0.9 % sodium chloride infusion, , IntraVENous, PRN  acetaminophen (TYLENOL) tablet 650 mg, 650 mg, Oral, Q6H  ondansetron (ZOFRAN-ODT) disintegrating tablet 4 mg, 4 mg, Oral, Q8H PRN **OR** ondansetron (ZOFRAN) injection 4 mg, 4 mg, IntraVENous, Q6H PRN  0.9 % sodium chloride infusion, , IntraVENous, Continuous  ceFAZolin (ANCEF) 2,000 mg in dextrose 5 % 50 mL IVPB (mini-bag), 2,000 mg, IntraVENous, Q8H  oxyCODONE (ROXICODONE) immediate release tablet 5 mg, 5 mg, Oral, Q4H PRN **OR** oxyCODONE HCl (OXY-IR) immediate release tablet 10 mg, 10 mg, Oral, Q4H PRN  HYDROmorphone (DILAUDID) injection 0.25 mg, 0.25 mg, IntraVENous, Q3H PRN **OR** HYDROmorphone (DILAUDID) injection 0.5 mg, 0.5 mg, IntraVENous, Q3H PRN  hydrOXYzine HCl (ATARAX) tablet 10 mg, 10 mg, Oral, Q8H PRN  sennosides-docusate sodium (SENOKOT-S) 8.6-50 MG tablet 1 tablet, 1 tablet, Oral, BID  [START ON 6/14/2022] aspirin EC tablet 81 mg, 81 mg, Oral, BID  insulin lispro (HUMALOG) injection vial 0-18 Units, 0-18 Units, SubCUTAneous, TID WC  insulin lispro (HUMALOG) injection vial 0-9 Units, 0-9 Units, SubCUTAneous, Nightly  [START ON 6/14/2022] meloxicam (MOBIC) tablet 7.5 mg, 7.5 mg, Oral, Daily    ASSESSMENT AND PLAN      Post right reverse TSA, stable exam  DVT prophylaxis ordered, ASA 81mg twice at day for 14 days for DVT prophylaxis  PT OT for ADL's and ambulation as tolerated  SS for DC planning, home with home care tomorrow  IV or PO pain med as ordered,  Mobic added for postop pain control in an effort to reduce narcotic use per Dr Kelly Bell, APRN - CNP  6/13/2022  1:13 PM

## 2022-06-13 NOTE — PROGRESS NOTES
Physical Therapy  Facility/Department: [address] ORTHOPEDICS  Physical Therapy Initial Assessment    Name: Melissa Bansal  : 1950  MRN: [id number]  Date of Service: 2022    Assessment  / Discharge Recommendations:  -plans home tomorrow with family assist   -recommend Home PT OT for short duration to help assure initial safety with mobility in home    Melissa Bansal scored a 18/24 on the AM-PAC short mobility form. Current research shows that an AM-PAC score of 18 or greater is typically associated with a discharge to the patient's home setting. Patient Diagnosis(es): The primary encounter diagnosis was Rotator cuff arthropathy of right shoulder. A diagnosis of Rotator cuff arthropathy, right was also pertinent to this visit. Past Medical History:  has a past medical history of Asthma, Colon polyp, Depression, DVT (deep venous thrombosis) (HCC), Esophageal reflux, GERD with stricture, Hx of blood clots, Hyperlipidemia, Hypertension, Insomnia, Migraine, Osteoarthritis, Protein deficiency ([address] Pulmonary embolism ([address] Thyroid adenoma, and Type II or unspecified type diabetes mellitus without mention of complication, not stated as uncontrolled. Past Surgical History:  has a past surgical history that includes Thyroidectomy, partial; Appendectomy; Ovarian cyst surgery; hiatal hernia repair; Endoscopy, colon, diagnostic; Colonoscopy; Shoulder arthroscopy (Right, 2016); cervical fusion (); Hysterectomy; cervical fusion (N/A, 2019); Colonoscopy (N/A, 2020); back surgery; and shoulder surgery (Right, 2022). Body Structures, Functions, Activity Limitations Requiring Skilled Therapeutic Intervention: Decreased ADL status; Decreased ROM  Therapy Prognosis: Good  Decision Making: Medium Complexity  Requires PT Follow-Up: Yes  Activity Tolerance  Activity Tolerance: Patient tolerated treatment well     Plan   Plan  Current Treatment Recommendations: Functional mobility training,Therapeutic activities,Positioning,Modalities,Patient/Caregiver education & training  Plan Comment: 1-2 sessions as needed  Safety Devices  Type of Devices: Call light within reach,Chair alarm in place,Gait belt,Patient at risk for falls,Left in chair,Nurse notified     Restrictions  Restrictions/Precautions  Restrictions/Precautions: Fall Risk,Weight Bearing,ROM Restrictions  Required Braces or Orthoses?: Yes  Upper Extremity Weight Bearing Restrictions  Right Upper Extremity Weight Bearing: Non Weight Bearing  Required Braces or Orthoses  Right Upper Extremity Brace/Splint: Sling (all times except bathing, dressing and exercises)     Subjective   General  Chart Reviewed: Yes  Patient assessed for rehabilitation services?: Yes  Additional Pertinent Hx: here for elective right TSR   PMH is extensive - see chart  Response To Previous Treatment: Not applicable  Family / Caregiver Present: Yes ()  Follows Commands: Within Functional Limits  Subjective  Subjective: arrived to room to patient sitting at EOB - just returned from bathroom with nursing assist - patient agreeable to PTOT assessments in this context and to sit up OOB in recliner         Social/Functional History  Social/Functional History  Lives With: Spouse,Daughter (spouse works part time, daughter works full time)  Type of Home: House  Home Layout: Two level,Laundry in basement,1/2 bath on main level,Bed/Bath upstairs  Home Access: Level entry  Bathroom Shower/Tub: Tub/Shower unit,Shower chair without back  Bathroom Toilet: Standard  Bathroom Accessibility: Accessible  Has the patient had two or more falls in the past year or any fall with injury in the past year?: Yes (2 falls, dog)  ADL Assistance: Independent  Homemaking Assistance: Independent  Ambulation Assistance: Independent  Transfer Assistance: Independent  Active :  Yes  Additional Comments: Here for elective Right TSR: Reports left shoulder \"ok\"  Vision/Hearing  Vision  Vision: Impaired  Vision Exceptions: Wears glasses for reading  Hearing  Hearing: Within functional limits    Cognition   Orientation  Overall Orientation Status: Within Functional Limits  Cognition  Overall Cognitive Status: WFL     Objective   Observation/Palpation  Observation: RUE within sling, sling too large, adjusted for support at this time.  [place], Ortho NP to obtain proper fitting sling  Gross Assessment  Tone: Normal  Sensation: Intact (states right shoulder and arm still \"numb\")   Bed mobility  Bed Mobility Comments: not observed  Transfers  Sit to Stand: Stand by assistance;Contact guard assistance  Stand to sit: Stand by assistance;Contact guard assistance  Ambulation  Surface: level tile  Device: No Device  Assistance: Stand by assistance;Contact guard assistance  Quality of Gait: step through pattern with good base of support and symmetric step length  Distance: in room for ~30 feet  Comments: steady  Stairs/Curb  Stairs?: No  Balance  Comments: midline in sitting at the EOB and in static stance with close SBA/light cga    -dynamic is good with ambulation at light cga for safety  Goals  Short Term Goals  Time Frame for Short term goals: 1-2 days  Short term goal 1: bed mobility at [facility]  Short term goal 2: transfers at [address] term goal 3: ambulation at sba/light cga for household distances    -steps as needed for home entry at [facility] for safety  Patient Goals   Patient goals : relief of chronic right shoulder pain and good function  Education  Patient Education  Education Given To: Patient  Education Provided: Role of Therapy;Plan of Care;Precautions  Education Method: Demonstration;Verbal  Barriers to Learning: None  Education Outcome: Verbalized understanding;Continued education needed      Therapy Time   Individual Concurrent Group Co-treatment   Time In 1310         Time Out 1350         Minutes [address] Xiang Rice, PT

## 2022-06-14 VITALS
RESPIRATION RATE: 18 BRPM | OXYGEN SATURATION: 93 % | HEIGHT: 63 IN | SYSTOLIC BLOOD PRESSURE: 134 MMHG | WEIGHT: 178.35 LBS | BODY MASS INDEX: 31.6 KG/M2 | DIASTOLIC BLOOD PRESSURE: 59 MMHG | HEART RATE: 81 BPM | TEMPERATURE: 98 F

## 2022-06-14 LAB
GLUCOSE BLD-MCNC: 134 MG/DL (ref 70–99)
HCT VFR BLD CALC: 33.4 % (ref 36–48)
HEMOGLOBIN: 11 G/DL (ref 12–16)
INR BLD: 1.06 (ref 0.87–1.14)
PERFORMED ON: ABNORMAL
PROTHROMBIN TIME: 13.8 SEC (ref 11.7–14.5)

## 2022-06-14 PROCEDURE — G0378 HOSPITAL OBSERVATION PER HR: HCPCS

## 2022-06-14 PROCEDURE — 97535 SELF CARE MNGMENT TRAINING: CPT

## 2022-06-14 PROCEDURE — 36415 COLL VENOUS BLD VENIPUNCTURE: CPT

## 2022-06-14 PROCEDURE — 97530 THERAPEUTIC ACTIVITIES: CPT

## 2022-06-14 PROCEDURE — 6360000002 HC RX W HCPCS: Performed by: ORTHOPAEDIC SURGERY

## 2022-06-14 PROCEDURE — 6370000000 HC RX 637 (ALT 250 FOR IP): Performed by: ORTHOPAEDIC SURGERY

## 2022-06-14 PROCEDURE — 6360000002 HC RX W HCPCS: Performed by: NURSE PRACTITIONER

## 2022-06-14 PROCEDURE — 2580000003 HC RX 258: Performed by: NURSE PRACTITIONER

## 2022-06-14 PROCEDURE — 2580000003 HC RX 258: Performed by: ORTHOPAEDIC SURGERY

## 2022-06-14 PROCEDURE — 85018 HEMOGLOBIN: CPT

## 2022-06-14 PROCEDURE — 85014 HEMATOCRIT: CPT

## 2022-06-14 PROCEDURE — 6370000000 HC RX 637 (ALT 250 FOR IP): Performed by: NURSE PRACTITIONER

## 2022-06-14 PROCEDURE — 85610 PROTHROMBIN TIME: CPT

## 2022-06-14 RX ORDER — CYCLOBENZAPRINE HCL 5 MG
5 TABLET ORAL 3 TIMES DAILY PRN
Qty: 15 TABLET | Refills: 0 | Status: SHIPPED | OUTPATIENT
Start: 2022-06-14 | End: 2022-06-20 | Stop reason: SDUPTHER

## 2022-06-14 RX ORDER — TRAMADOL HYDROCHLORIDE 50 MG/1
50 TABLET ORAL EVERY 6 HOURS PRN
Status: ON HOLD | COMMUNITY
End: 2022-06-14 | Stop reason: HOSPADM

## 2022-06-14 RX ORDER — CYCLOBENZAPRINE HCL 10 MG
5 TABLET ORAL 3 TIMES DAILY PRN
Status: DISCONTINUED | OUTPATIENT
Start: 2022-06-14 | End: 2022-06-14 | Stop reason: HOSPADM

## 2022-06-14 RX ORDER — RIZATRIPTAN BENZOATE 10 MG/1
10 TABLET ORAL
COMMUNITY
End: 2022-10-05

## 2022-06-14 RX ORDER — ENOXAPARIN SODIUM 100 MG/ML
30 INJECTION SUBCUTANEOUS 2 TIMES DAILY
Qty: 10 ML | Refills: 0 | Status: SHIPPED | OUTPATIENT
Start: 2022-06-14 | End: 2022-06-19

## 2022-06-14 RX ORDER — MONTELUKAST SODIUM 10 MG/1
10 TABLET ORAL NIGHTLY
COMMUNITY

## 2022-06-14 RX ADMIN — OXYCODONE HYDROCHLORIDE 10 MG: 10 TABLET ORAL at 04:45

## 2022-06-14 RX ADMIN — ONDANSETRON 4 MG: 2 INJECTION INTRAMUSCULAR; INTRAVENOUS at 04:59

## 2022-06-14 RX ADMIN — ACETAMINOPHEN 650 MG: 325 TABLET ORAL at 02:04

## 2022-06-14 RX ADMIN — Medication 10 ML: at 08:57

## 2022-06-14 RX ADMIN — MEROPENEM 500 MG: 500 INJECTION, POWDER, FOR SOLUTION INTRAVENOUS at 05:02

## 2022-06-14 RX ADMIN — PANTOPRAZOLE SODIUM 40 MG: 40 TABLET, DELAYED RELEASE ORAL at 05:48

## 2022-06-14 RX ADMIN — GEMFIBROZIL 600 MG: 600 TABLET ORAL at 05:48

## 2022-06-14 RX ADMIN — HYDROMORPHONE HYDROCHLORIDE 0.5 MG: 1 INJECTION, SOLUTION INTRAMUSCULAR; INTRAVENOUS; SUBCUTANEOUS at 02:00

## 2022-06-14 RX ADMIN — ACETAMINOPHEN 650 MG: 325 TABLET ORAL at 08:57

## 2022-06-14 RX ADMIN — BUPROPION HYDROCHLORIDE 150 MG: 150 TABLET, EXTENDED RELEASE ORAL at 08:56

## 2022-06-14 RX ADMIN — MEROPENEM 500 MG: 500 INJECTION, POWDER, FOR SOLUTION INTRAVENOUS at 09:44

## 2022-06-14 RX ADMIN — INSULIN LISPRO 7 UNITS: 100 INJECTION, SOLUTION INTRAVENOUS; SUBCUTANEOUS at 08:58

## 2022-06-14 RX ADMIN — CYCLOBENZAPRINE 5 MG: 10 TABLET, FILM COATED ORAL at 09:41

## 2022-06-14 RX ADMIN — TOPIRAMATE 25 MG: 25 TABLET, FILM COATED ORAL at 09:02

## 2022-06-14 RX ADMIN — HYDROMORPHONE HYDROCHLORIDE 0.5 MG: 1 INJECTION, SOLUTION INTRAMUSCULAR; INTRAVENOUS; SUBCUTANEOUS at 06:25

## 2022-06-14 RX ADMIN — SERTRALINE 100 MG: 100 TABLET, FILM COATED ORAL at 08:56

## 2022-06-14 RX ADMIN — OXYCODONE HYDROCHLORIDE 10 MG: 10 TABLET ORAL at 08:57

## 2022-06-14 RX ADMIN — ASPIRIN 81 MG: 81 TABLET, COATED ORAL at 08:56

## 2022-06-14 RX ADMIN — SENNOSIDES AND DOCUSATE SODIUM 1 TABLET: 50; 8.6 TABLET ORAL at 08:56

## 2022-06-14 RX ADMIN — MELOXICAM 7.5 MG: 7.5 TABLET ORAL at 08:56

## 2022-06-14 ASSESSMENT — PAIN DESCRIPTION - ONSET
ONSET: ON-GOING
ONSET: GRADUAL
ONSET: ON-GOING

## 2022-06-14 ASSESSMENT — PAIN DESCRIPTION - FREQUENCY
FREQUENCY: CONTINUOUS

## 2022-06-14 ASSESSMENT — PAIN DESCRIPTION - ORIENTATION
ORIENTATION: RIGHT

## 2022-06-14 ASSESSMENT — PAIN DESCRIPTION - PAIN TYPE
TYPE: SURGICAL PAIN

## 2022-06-14 ASSESSMENT — PAIN DESCRIPTION - DESCRIPTORS
DESCRIPTORS: ACHING

## 2022-06-14 ASSESSMENT — PAIN DESCRIPTION - LOCATION
LOCATION: SHOULDER

## 2022-06-14 ASSESSMENT — PAIN SCALES - GENERAL
PAINLEVEL_OUTOF10: 10
PAINLEVEL_OUTOF10: 8
PAINLEVEL_OUTOF10: 10
PAINLEVEL_OUTOF10: 10
PAINLEVEL_OUTOF10: 8
PAINLEVEL_OUTOF10: 8
PAINLEVEL_OUTOF10: 10

## 2022-06-14 ASSESSMENT — PAIN - FUNCTIONAL ASSESSMENT
PAIN_FUNCTIONAL_ASSESSMENT: PREVENTS OR INTERFERES SOME ACTIVE ACTIVITIES AND ADLS

## 2022-06-14 ASSESSMENT — PAIN SCALES - WONG BAKER
WONGBAKER_NUMERICALRESPONSE: 0
WONGBAKER_NUMERICALRESPONSE: 0

## 2022-06-14 NOTE — PROGRESS NOTES
Tristan performed this morning including Nurse navigator Tang Quick, Physical therapist terence, and Occupational therapist delmy. Discussed plan of care, discharge plan, and dme needs if applicable for orthopedic total joint patient.

## 2022-06-14 NOTE — PROGRESS NOTES
Patient A&O. Tolerating PO. Call light within reach. Will continue to monitor and reassess.  Electronically signed by Nica Nava RN on 6/14/2022

## 2022-06-14 NOTE — PROGRESS NOTES
Occupational Therapy  Facility/Department: [address] ORTHOPEDICS  Occupational Daily Treatment Note      Name: Arian Renner  : 1950  MRN: [id number]  Date of Service: 2022    Discharge Recommendations:  2-3 sessions per week,Patient would benefit from continued therapy after discharge,Home with Home health OT,24 hour supervision or assist          Patient Diagnosis(es): The primary encounter diagnosis was Rotator cuff arthropathy of right shoulder. A diagnosis of Rotator cuff arthropathy, right was also pertinent to this visit. Past Medical History:  has a past medical history of Asthma, Colon polyp, Depression, DVT (deep venous thrombosis) (HCC), Esophageal reflux, GERD with stricture, Hx of blood clots, Hyperlipidemia, Hypertension, Insomnia, Migraine, Osteoarthritis, Protein deficiency ([address] Pulmonary embolism ([address] Thyroid adenoma, and Type II or unspecified type diabetes mellitus without mention of complication, not stated as uncontrolled. Past Surgical History:  has a past surgical history that includes Thyroidectomy, partial; Appendectomy; Ovarian cyst surgery; hiatal hernia repair; Endoscopy, colon, diagnostic; Colonoscopy; Shoulder arthroscopy (Right, 2016); cervical fusion (); Hysterectomy; cervical fusion (N/A, 2019); Colonoscopy (N/A, 2020); back surgery; and shoulder surgery (Right, 2022). Treatment Diagnosis: impaired ADL/fxl mobility    Arian Renner scored a 17/24 on the AM-PAC ADL Inpatient form. Current research shows that an AM-PAC score of 18 or greater is typically associated with a discharge to the patient's home setting. Based on the patient's AM-PAC score, and their current ADL deficits, it is recommended that the patient have 2-3 sessions per week of Occupational Therapy at d/c to increase the patient's independence.   At this time, this patient demonstrates the endurance and safety to discharge home with home (home vs OP services) and a follow up treatment frequency of 2-3x/wk. Please see assessment section for further patient specific details. If patient discharges prior to next session this note will serve as a discharge summary. Please see below for the latest assessment towards goals. HOME HEALTH CARE: LEVEL 1 STANDARD     -Initial home health evaluation to occur within 24-48 hours, in patient home    -Home health agency to establish plan of care for patient over 60 day period    -Medication Reconciliation    -PCP Visit scheduled within seven days of discharge    -PT/OT to evaluate with goal of regaining prior level of functioning    -OT to evaluate if patient has [address] needs for personal care         Assessment   Performance deficits / Impairments: Decreased functional mobility ; Decreased ADL status; Decreased balance;Decreased ROM; Decreased sensation;Decreased fine motor control;Decreased coordination  Assessment: Discussed with OTR am pac score is 17. Despite am pac score plan is to return home with family to provide 24/7 assist and home OT. Patient completed bed mobiltiy with Min A. SBA for sit<>stand from EOB to commode to recliner chair. Functional mobility without device with SBA, slow steady gait with no overt LOB noted. Dressed lower body with CGA/Min A. Reviewed HEP with patient and spouse. Plan is for discharge to home today. REQUIRES OT FOLLOW-UP: Yes  Activity Tolerance  Activity Tolerance: Patient limited by pain        Plan   Plan  Times per Week: 2-3 sessions  Specific Instructions for Next Treatment: Pendulums.  UB dressing  Current Treatment Recommendations: Strengthening,Balance training,ROM,Functional mobility training,Endurance training,Pain management,Safety education & training,Patient/Caregiver education & training,Modalities,Positioning,Self-Care / ADL,Home management training     Restrictions  Restrictions/Precautions  Restrictions/Precautions: Fall Risk,Weight Bearing,ROM Restrictions  Required Braces or Orthoses?: Yes  Upper Extremity Weight Bearing Restrictions  Right Upper Extremity Weight Bearing: Non Weight Bearing  Required Braces or Orthoses  Right Upper Extremity Brace/Splint: Sling (all times except for ADL and excercise)    Subjective   General  Chart Reviewed: Yes  Patient assessed for rehabilitation services?: Yes  Additional Pertinent Hx: 75 yo female admitted for elective R TSA. PMH: HTN, Depression, OA, DM, DVT, cervical stenosis  Response to previous treatment: Patient with no complaints from previous session  Family / Caregiver Present: Yes (spouse)  Referring Practitioner: Braydon Gunter MD  Diagnosis: R TSA  Subjective  Subjective: Patient supine in bed upon arrival to room. Patient agreeable to therapy. Reports pain 10/10. RN and ortho NP in room and aware. General Comment  Comments: RN ok to see     Social/Functional History  Social/Functional History  Lives With: Spouse,Daughter (spouse works part time, daughter works full time)  Type of Home: House  Home Layout: Two level,Laundry in basement,1/2 bath on main level,Bed/Bath upstairs  Home Access: Level entry  Bathroom Shower/Tub: Tub/Shower unit,Shower chair without back  [address]: Standard  Bathroom Accessibility: Accessible  Has the patient had two or more falls in the past year or any fall with injury in the past year?: Yes (2 falls, dog)  ADL Assistance: Independent  Homemaking Assistance: Independent  Ambulation Assistance: Independent  Transfer Assistance: Independent  Active : Yes  Additional Comments: Here for elective Right TSR: Reports left shoulder \"ok\"       Objective        Safety Devices  Type of Devices: Call light within reach; Chair alarm in place;Gait belt;Patient at risk for falls; Left in chair;Nurse notified  Balance  Sitting: With support (SBA seated on edge of bed)  Standing: With support (SBA for static standing)  Toilet Transfers  Toilet - Technique: Ambulating  Equipment Used: Grab bars  Toilet Transfer: Stand by assistance     ADL  Grooming: Setup;Stand by assistance  Grooming Skilled Clinical Factors: seated in recliner chair to wash face and hands  LE Dressing: Contact guard assistance;Minimal assistance  LE Dressing Skilled Clinical Factors: CGA/Min A to don shorts over feet and hips  Toileting: Contact guard assistance;Minimal assistance  Toileting Skilled Clinical Factors: for clothing management        Bed mobility  Supine to Sit: Minimal assistance  Sit to Supine: Unable to assess (up in chair at end of session)  Transfers  Sit to stand: Stand by assistance  Stand to sit: Stand by assistance  Transfer Comments: SBA for sit<>stand. Functional mobility without device with SBA, slow steady gait with no overt LOB noted. Cognition  Overall Cognitive Status: WFL               Exercise Treatment: HEP issussed and reviewed with patient and spouse, not completed this am due to increased pain  Education Given To: Patient; Family  Education Provided: Role of Therapy;Plan of Care;Transfer Training;Precautions; ADL Adaptive Strategies  Education Provided Comments: Importance of mobility, ice therapy and panda hose          AM-PAC Score        AM-[facility] Inpatient Daily Activity Raw Score: 17 (06/14/22 0925)  AM-PAC Inpatient ADL T-Scale Score : 37.26 (06/14/22 0925)  ADL Inpatient CMS 0-100% Score: 50.11 (06/14/22 0925)  ADL Inpatient CMS G-Code Modifier : CK (06/14/22 0925)    Goals  Short Term Goals  Time Frame for Short term goals: prior to d/c: all goals ongoing except where noted  Short Term Goal 1: toileting CGA: goal met  Short Term Goal 2: UB dressing Min A (family assist)  Short Term Goal 3: LB dressing Mod A (family assist): goal met  Short Term Goal 4: Tolerates 3 min fxl standing task with SBA  Short Term Goal 5: RUE exercises (AROM digits to elbow) (pendulums shoulder) to improve circulation and prevent stiffness for ADLs  Additional Goals?: Yes  Patient Goals   Patient goals : return home with family and get home therapy       Therapy Time   Individual Concurrent Group Co-treatment   Time In 0825         Time Out 2183         Minutes 60              Electronically signed by Jennifer Zaragoza, [id number] on 6/14/2022 at 9:39 AM

## 2022-06-14 NOTE — PROGRESS NOTES
Clinical Pharmacy Note  Medication Counseling    Reviewed new medications started during hospital admission: oxycodone, , . Indications and side effects were emphasized during counseling. All medication-related questions addressed. Patient verbalized understanding of education. Should the patient express any additional questions or concerns regarding their medications, please do not hesitate to contact the pharmacy department. Patient/caregiver aware they may refuse medications during hospital stay. 10 minutes spent educating patient regarding medications.   2

## 2022-06-14 NOTE — PLAN OF CARE
Problem: Chronic Conditions and Co-morbidities  Goal: Patient's chronic conditions and co-morbidity symptoms are monitored and maintained or improved  6/14/2022 0248 by Efrain Daniel RN  Outcome: Progressing  Flowsheets (Taken 6/14/2022 0248)  Care Plan - Patient's Chronic Conditions and Co-Morbidity Symptoms are Monitored and Maintained or Improved:   Monitor and assess patient's chronic conditions and comorbid symptoms for stability, deterioration, or improvement   Collaborate with multidisciplinary team to address chronic and comorbid conditions and prevent exacerbation or deterioration   Update acute care plan with appropriate goals if chronic or comorbid symptoms are exacerbated and prevent overall improvement and discharge  6/13/2022 1250 by Irene Mendez RN  Outcome: Progressing  Flowsheets (Taken 6/13/2022 1145)  Care Plan - Patient's Chronic Conditions and Co-Morbidity Symptoms are Monitored and Maintained or Improved: Monitor and assess patient's chronic conditions and comorbid symptoms for stability, deterioration, or improvement     Problem: Discharge Planning  Goal: Discharge to home or other facility with appropriate resources  6/14/2022 0248 by Efrain Daniel RN  Outcome: Progressing  Flowsheets (Taken 6/14/2022 0248)  Discharge to home or other facility with appropriate resources:   Identify barriers to discharge with patient and caregiver   Refer to discharge planning if patient needs post-hospital services based on physician order or complex needs related to functional status, cognitive ability or social support system  6/13/2022 1250 by Irene Mendez RN  Outcome: Progressing  Flowsheets (Taken 6/13/2022 1145)  Discharge to home or other facility with appropriate resources:   Identify barriers to discharge with patient and caregiver   Identify discharge learning needs (meds, wound care, etc)     Problem: Cardiovascular - Adult  Goal: Maintains optimal cardiac output and hemodynamic stability  6/14/2022 0248 by Arina Peñaloza RN  Outcome: Progressing  Flowsheets (Taken 6/14/2022 0248)  Maintains optimal cardiac output and hemodynamic stability:   Monitor blood pressure and heart rate   Assess for signs of decreased cardiac output   Administer fluid and/or volume expanders as ordered  6/13/2022 1250 by Azeb Kam RN  Outcome: Progressing  Flowsheets (Taken 6/13/2022 1145)  Maintains optimal cardiac output and hemodynamic stability: Monitor blood pressure and heart rate     Problem: Skin/Tissue Integrity - Adult  Goal: Incisions, wounds, or drain sites healing without S/S of infection  6/14/2022 0248 by Arina Peñaloza RN  Outcome: Progressing  Flowsheets (Taken 6/14/2022 0248)  Incisions, Wounds, or Drain Sites Healing Without Sign and Symptoms of Infection: TWICE DAILY: Assess and document skin integrity  6/13/2022 1250 by Azeb Kam RN  Outcome: Progressing  Flowsheets (Taken 6/13/2022 1157)  Incisions, Wounds, or Drain Sites Healing Without Sign and Symptoms of Infection: ADMISSION and DAILY: Assess and document risk factors for pressure ulcer development     Problem: Musculoskeletal - Adult  Goal: Return mobility to safest level of function  6/14/2022 0248 by Arina Peñaloza RN  Outcome: Progressing  Flowsheets (Taken 6/14/2022 0248)  Return Mobility to Safest Level of Function:   Assess patient stability and activity tolerance for standing, transferring and ambulating with or without assistive devices   Assist with transfers and ambulation using safe patient handling equipment as needed   Ensure adequate protection for wounds/incisions during mobilization   Obtain physical therapy/occupational therapy consults as needed  6/13/2022 1250 by Azeb Kam RN  Outcome: Progressing  Flowsheets (Taken 6/13/2022 1145)  Return Mobility to Safest Level of Function: Assess patient stability and activity tolerance for standing, transferring and ambulating with or without assistive devices  Goal: Maintain proper alignment of affected body part  6/14/2022 0248 by Pam Spatz, RN  Outcome: Progressing  Flowsheets (Taken 6/14/2022 0248)  Maintain proper alignment of affected body part:   Support and protect limb and body alignment per provider's orders   Instruct and reinforce with patient and family use of appropriate assistive device and precautions (e.g. spinal or hip dislocation precautions)  6/13/2022 1250 by Dc Cassidy RN  Outcome: Progressing  Flowsheets (Taken 6/13/2022 1145)  Maintain proper alignment of affected body part: Support and protect limb and body alignment per provider's orders  Goal: Return ADL status to a safe level of function  6/14/2022 0248 by Pam Spatz, RN  Outcome: Progressing  Flowsheets (Taken 6/14/2022 0248)  Return ADL Status to a Safe Level of Function:   Assess activities of daily living deficits and provide assistive devices as needed   Obtain physical therapy/occupational therapy consults as needed   Administer medication as ordered  6/13/2022 1250 by Dc Cassidy RN  Outcome: Progressing  Flowsheets (Taken 6/13/2022 1145)  Return ADL Status to a Safe Level of Function: Assess activities of daily living deficits and provide assistive devices as needed     Problem: Infection - Adult  Goal: Absence of infection at discharge  6/14/2022 0248 by Pam Spatz, RN  Outcome: Progressing  Flowsheets (Taken 6/14/2022 0248)  Absence of infection at discharge:   Assess and monitor for signs and symptoms of infection   Monitor lab/diagnostic results   Monitor all insertion sites i.e., indwelling lines, tubes and drains   Administer medications as ordered  6/13/2022 1250 by Dc Cassidy RN  Outcome: Progressing  Flowsheets (Taken 6/13/2022 1145)  Absence of infection at discharge: Assess and monitor for signs and symptoms of infection  Goal: Absence of infection during hospitalization  6/14/2022 0248 by Pam Spatz, RN  Outcome: Progressing  Flowsheets (Taken 6/14/2022 0248)  Absence of infection during hospitalization:   Assess and monitor for signs and symptoms of infection   Monitor lab/diagnostic results   Administer medications as ordered  6/13/2022 1250 by Carrie Ayers RN  Outcome: Progressing  Flowsheets (Taken 6/13/2022 1145)  Absence of infection during hospitalization: Assess and monitor for signs and symptoms of infection     Problem: Metabolic/Fluid and Electrolytes - Adult  Goal: Glucose maintained within prescribed range  6/14/2022 0248 by Loren Blount RN  Outcome: Progressing  Flowsheets (Taken 6/14/2022 0248)  Glucose maintained within prescribed range:   Assess for signs and symptoms of hyperglycemia and hypoglycemia   Monitor blood glucose as ordered   Administer ordered medications to maintain glucose within target range  6/13/2022 1250 by Carrie Ayers RN  Outcome: Progressing  Flowsheets (Taken 6/13/2022 1145)  Glucose maintained within prescribed range: Monitor blood glucose as ordered     Problem: Hematologic - Adult  Goal: Maintains hematologic stability  6/14/2022 0248 by Loren Blount RN  Outcome: Progressing  Flowsheets (Taken 6/14/2022 0248)  Maintains hematologic stability: Assess for signs and symptoms of bleeding or hemorrhage  6/13/2022 1250 by Carrie Ayers RN  Outcome: Progressing  Flowsheets (Taken 6/13/2022 1145)  Maintains hematologic stability: Assess for signs and symptoms of bleeding or hemorrhage     Problem: ABCDS Injury Assessment  Goal: Absence of physical injury  6/14/2022 0248 by Loren Blount RN  Outcome: Progressing  Flowsheets (Taken 6/14/2022 0248)  Absence of Physical Injury: Implement safety measures based on patient assessment  6/13/2022 1250 by Carrie Ayers RN  Outcome: Progressing  Flowsheets (Taken 6/13/2022 1157)  Absence of Physical Injury: Implement safety measures based on patient assessment     Problem: Pain  Goal: Verbalizes/displays adequate comfort level or baseline comfort level  6/14/2022 0248 by Ash Cabello, RN  Outcome: Progressing  Flowsheets (Taken 6/14/2022 0248)  Verbalizes/displays adequate comfort level or baseline comfort level:   Encourage patient to monitor pain and request assistance   Administer analgesics based on type and severity of pain and evaluate response   Assess pain using appropriate pain scale   Implement non-pharmacological measures as appropriate and evaluate response  6/13/2022 1250 by Ortega Sousa RN  Outcome: Progressing     Problem: Safety - Adult  Goal: Free from fall injury  6/14/2022 0248 by Ash Cabello, RN  Outcome: Progressing  Flowsheets (Taken 6/14/2022 0248)  Free From Fall Injury:   Based on caregiver fall risk screen, instruct family/caregiver to ask for assistance with transferring infant if caregiver noted to have fall risk factors   Instruct family/caregiver on patient safety  6/13/2022 1250 by Ortega Sousa RN  Outcome: Progressing

## 2022-06-14 NOTE — PLAN OF CARE
Problem: Chronic Conditions and Co-morbidities  Goal: Patient's chronic conditions and co-morbidity symptoms are monitored and maintained or improved  6/14/2022 1417 by Cara Ty RN  Outcome: Completed     Problem: Discharge Planning  Goal: Discharge to home or other facility with appropriate resources  6/14/2022 1417 by Cara Ty RN  Outcome: Completed     Problem: Cardiovascular - Adult  Goal: Maintains optimal cardiac output and hemodynamic stability  6/14/2022 1417 by Cara Ty RN  Outcome: Completed     Problem: Skin/Tissue Integrity - Adult  Goal: Incisions, wounds, or drain sites healing without S/S of infection  6/14/2022 1417 by Cara Ty RN  Outcome: Completed     Problem: Musculoskeletal - Adult  Goal: Return mobility to safest level of function  6/14/2022 1417 by Cara Ty RN  Outcome: Completed     Problem: Musculoskeletal - Adult  Goal: Maintain proper alignment of affected body part  6/14/2022 1417 by Cara Ty RN  Outcome: Completed     Problem: Musculoskeletal - Adult  Goal: Return ADL status to a safe level of function  6/14/2022 1417 by Cara Ty RN  Outcome: Completed     Problem: Infection - Adult  Goal: Absence of infection at discharge  6/14/2022 1417 by Cara Ty RN  Outcome: Completed     Problem: Infection - Adult  Goal: Absence of infection during hospitalization  6/14/2022 1417 by Cara Ty RN  Outcome: Completed     Problem: Metabolic/Fluid and Electrolytes - Adult  Goal: Glucose maintained within prescribed range  6/14/2022 1417 by Cara Ty RN  Outcome: Completed     Problem: Hematologic - Adult  Goal: Maintains hematologic stability  6/14/2022 1417 by Cara Ty RN  Outcome: Completed     Problem: ABCDS Injury Assessment  Goal: Absence of physical injury  6/14/2022 1417 by Cara Ty RN  Outcome: Completed     Problem: Pain  Goal: Verbalizes/displays adequate comfort level or baseline comfort level  6/14/2022 1417

## 2022-06-14 NOTE — PROGRESS NOTES
Pt discharged to home. Transported via wheelchair. Accompanied by spouse. Transported in personal vehicle. Discharge instructions, Rx, and personal belongings given to pt. Explanation of discharge medications and instructions understood by verbal statement. No questions, comments or concerns at this time.  Electronically signed by Ashley Villanueva RN on 6/14/2022

## 2022-06-14 NOTE — PROGRESS NOTES
Data- discharge order received, patient verbalized agreement to discharge, disposition to previous residence, needs noted for Oliver Gary and informed Hetal Vega NP. Action- discharge instructions prepared and given to patient and , patient and  verbalized understanding. Medication information packet given r/t NEW and/or CHANGED prescriptions emphasizing name/purpose/side effects, pt verbalized understanding. Discharge instruction summary: Diet- general, Activity- NWB right arm, Primary Care Physician as follows: JACK Brown - [phone]. f/u appointment with orthopedic office noted below, immunizations reviewed and discussed with patient, prescription medications to be filled by retail pharmacy and then delivered. Inpatient surgical procedure precautions reviewed: codman exercises. Neurovascular check performed and patient is WNLs, denies numbness/tingling in extremties. Incision site  prineo glue dressing assessed and is  clean,dry, and intact, no signs of redness, drainage, or odor noted. patient's bedside RN Phuc Webster notified of patient completing discharge instructions and iv removal. Nurse Navigator and Orthopedic Office contact information on discharge instructions and provided to patient. Patient and  both familiar and confident in performing lovenox injections. Response- IV removed. Medications to be delivered to patient via meds to bed program. Disposition is home with Oliver Gary , to be transported by .      Future Appointments   Date Time Provider Maria C Mehta   6/28/2022  1:00 PM MD Maribell Gilmore [facility]         Electronically signed by Sergio Raman RN on 6/14/2022 at 11:32 AM

## 2022-06-14 NOTE — CARE COORDINATION
DISCHARGE SUMMARY     DATE OF DISCHARGE: 6/14/22    DISCHARGE DESTINATION: home    HOME CARE: Yes    Agency Name:   Discharging to Facility/ Agency   · Name: [address]  · Address:  [address] [facility] [address]  · Phone:  [phone]  · Fax:  [phone]    TRANSPORTATION: Private Car    NEW DME ORDERED: no    Electronically signed by Nam Esparza on 6/14/2022 at 9:35 AM  #[phone]

## 2022-06-14 NOTE — PROGRESS NOTES
The patient is postoperative day #1 status post right reverse total shoulder arthroplasty. She is having moderate pain. On examination, she is alert and oriented. She is neurovascularly intact. The dressing is intact. She may be discharged later this morning. She will follow-up with me in 2 weeks.

## 2022-06-14 NOTE — PROGRESS NOTES
[facility] Orthopedic Surgery   Progress Note      S/P :  SUBJECTIVE  Working with OTBradley Bain with complaint of right shoulder pain. Pain is   described in right shoulder and with the intensity of severe. Pain is described as aching. States feels like has to help hold arm for relief. REports she takes [place] at home and stopped for 7 days prior to surgery. Plans to resume it tonight. OBJECTIVE              Physical                      VITALS:  BP (!) 134/59   Pulse 81   Temp 98 °F (36.7 °C) (Oral)   Resp 18   Ht 5' 3\" (1.6 m)   Wt 178 lb 5.6 oz (80.9 kg)   SpO2 93%   BMI 31.59 kg/m²                     MUSCULOSKELETAL:  wrist with intact flex/ext and hand grasp and extension and thumbs up                   NEUROLOGIC:                                  Sensory:  Touch:  Right Upper Extremity:  normal                                                 Surgical wound appears clean and dry with Prineo dressing. Right shoulder is bruised and moderately swollen but soft. Arm in sling.      Data       CBC:   Lab Results   Component Value Date    WBC 5.7 06/06/2022    RBC 4.73 06/06/2022    HGB 11.0 06/14/2022    HCT 33.4 06/14/2022    MCV 80.9 06/06/2022    MCH 26.5 06/06/2022    MCHC 32.8 06/06/2022    RDW 16.1 06/06/2022     06/06/2022    MPV 7.0 06/06/2022        WBC:    Lab Results   Component Value Date    WBC 5.7 06/06/2022        Hemoglobin/Hematocrit:    Lab Results   Component Value Date    HGB 11.0 06/14/2022    HCT 33.4 06/14/2022        PT/INR:    Lab Results   Component Value Date    PROTIME 17.4 06/06/2022    PROTIME 1.5 12/16/2016    INR 1.43 06/06/2022              Current Inpatient Medications             Current Facility-Administered Medications: cyclobenzaprine (FLEXERIL) tablet 5 mg, 5 mg, Oral, TID PRN  albuterol sulfate HFA (PROVENTIL;VENTOLIN;PROAIR) 108 (90 Base) MCG/ACT inhaler 2 puff, 2 puff, Inhalation, Q4H PRN  amitriptyline (ELAVIL) tablet 50 mg, 50 mg, Oral, Nightly  atorvastatin (LIPITOR) tablet 40 mg, 40 mg, Oral, Dinner  buPROPion (WELLBUTRIN XL) extended release tablet 150 mg, 150 mg, Oral, QAM  fluticasone (FLONASE) 50 MCG/ACT nasal spray 1 spray, 1 spray, Nasal, Daily PRN  gemfibrozil (LOPID) tablet 600 mg, 600 mg, Oral, BID AC  losartan (COZAAR) tablet 25 mg, 25 mg, Oral, Nightly  pantoprazole (PROTONIX) tablet 40 mg, 40 mg, Oral, QAM AC  sertraline (ZOLOFT) tablet 100 mg, 100 mg, Oral, Daily  topiramate (TOPAMAX) tablet 25 mg, 25 mg, Oral, BID  insulin glargine (LANTUS) injection vial 20 Units, 0.25 Units/kg, SubCUTAneous, Nightly  insulin lispro (HUMALOG) injection vial 7 Units, 0.08 Units/kg, SubCUTAneous, TID WC  glucose chewable tablet 16 g, 4 tablet, Oral, PRN  dextrose bolus 10% 125 mL, 125 mL, IntraVENous, PRN **OR** dextrose bolus 10% 250 mL, 250 mL, IntraVENous, PRN  glucagon (rDNA) injection 1 mg, 1 mg, IntraMUSCular, PRN  dextrose 5 % solution, 100 mL/hr, IntraVENous, PRN  sodium chloride flush 0.9 % injection 5-40 mL, 5-40 mL, IntraVENous, 2 times per day  sodium chloride flush 0.9 % injection 5-40 mL, 5-40 mL, IntraVENous, PRN  0.9 % sodium chloride infusion, , IntraVENous, PRN  acetaminophen (TYLENOL) tablet 650 mg, 650 mg, Oral, Q6H  ondansetron (ZOFRAN-ODT) disintegrating tablet 4 mg, 4 mg, Oral, Q8H PRN **OR** ondansetron (ZOFRAN) injection 4 mg, 4 mg, IntraVENous, Q6H PRN  oxyCODONE (ROXICODONE) immediate release tablet 5 mg, 5 mg, Oral, Q4H PRN **OR** oxyCODONE HCl (OXY-IR) immediate release tablet 10 mg, 10 mg, Oral, Q4H PRN  HYDROmorphone (DILAUDID) injection 0.25 mg, 0.25 mg, IntraVENous, Q3H PRN **OR** HYDROmorphone (DILAUDID) injection 0.5 mg, 0.5 mg, IntraVENous, Q3H PRN  hydrOXYzine HCl (ATARAX) tablet 10 mg, 10 mg, Oral, Q8H PRN  sennosides-docusate sodium (SENOKOT-S) 8.6-50 MG tablet 1 tablet, 1 tablet, Oral, BID  aspirin EC tablet 81 mg, 81 mg, Oral, BID  insulin lispro (HUMALOG) injection vial 0-18 Units, 0-18 Units, SubCUTAneous, TID WC  insulin lispro (HUMALOG) injection vial 0-9 Units, 0-9 Units, SubCUTAneous, Nightly  meloxicam (MOBIC) tablet 7.5 mg, 7.5 mg, Oral, Daily  meropenem (MERREM) 500 mg in sodium chloride 0.9 % 100 mL IVPB, 500 mg, IntraVENous, Q6H    ASSESSMENT AND PLAN    Post right reverse TSA, stable exam  DVT prophylaxis ordered, Resume [place] at home  PT OT for ADL's and ambulation as tolerated  SS for DC planning, home with home care today if pain improves  Add Flexeril prn shoulder pain  IV or PO pain med as ordered,  Mobic added for postop pain control in an effort to reduce narcotic use per Dr Savanna Santana  Pt noted with ESBL UTI preop and was not treated. Will order IV antibx here and give one time dose fosfomycin christos at DC. Discussed with Cassandra Ortega in Pharmacy.      JACK Díaz - CNP  6/14/2022  9:06 AM

## 2022-06-14 NOTE — PROGRESS NOTES
PT AAO x4 per this shift. VSS. Pt able to ambulate to bathroom independently. Dry dressing removed per this shift. Scant serous drainage. Prineo CDI. Pulse remains palpable. Right arm remains in sling with ice. NWB status remains. Pt tearful this shift d/t pain when full feeling returns. Pt refusing PO pain medication at times d/t severity of pain. Managed with Prn medication per MD orders, rest, elevation, ice. No further needs voiced at this time. Fall precautions in place. Bed alarm on. Call light within reach. Will continue to round.  Electronically signed by Suha Saldana RN on 6/14/2022 at 8:02 AM

## 2022-06-15 ENCOUNTER — CARE COORDINATION (OUTPATIENT)
Dept: CASE MANAGEMENT | Age: 72
End: 2022-06-15

## 2022-06-15 DIAGNOSIS — M12.811 ROTATOR CUFF ARTHROPATHY, RIGHT: Primary | ICD-10-CM

## 2022-06-15 NOTE — CARE COORDINATION
[facility] Transitions Initial Follow Up Call    Call within 2 business days of discharge: Yes    Patient: Arian Renner Patient : 1950   MRN: [id number]  Reason for Admission: Right reverse total shoulder  Discharge Date: 22 RARS: Readmission Risk Score: 6.9 ( )      Last Discharge [facility]       Complaint Diagnosis Description Type Department Provider    22  Rotator cuff arthropathy of right shoulder . .. Admission (Discharged) [place] [address] Agustin Abdullahi MD           Spoke with: Shelton Dennis 90: [address]    Non-face-to-face services provided:  Obtained and reviewed discharge summary and/or continuity of care documents  Education of patient/family/caregiver/guardian to support self-management-DM management, s/s infection   Assessment and support for treatment adherence and medication management-med rec and 1111F, what to hold and what to stop     Transitions of Care Initial Call    Was this an external facility discharge? No    Challenges to be reviewed by the provider   Additional needs identified to be addressed with provider: No  none             Method of communication with provider : none      Advance Care Planning:   Does patient have an Advance Directive: reviewed and current and decision maker updated. Advance Care Planning   Healthcare Decision Maker:    Primary Decision Maker: [facility] - [phone]    Secondary Decision Maker: Jenni Mom - Child - [phone]    Click here to complete Healthcare Decision Makers including selection of the Healthcare Decision Maker Relationship (ie \"Primary\"). Today we documented Decision Maker(s) consistent with Legal Next of Kin hierarchy. If the relationship to the patient does NOT follow our state's Next of Kin hierarchy, the patient MUST complete an ACP Document to allow him/her to act on the patient's behalf.:      Was this a readmission?  No  Patient stated reason for admission: shoulder replacement  Patients top risk factors for readmission: medical condition-infection    Care Transition Nurse (CTN) contacted the patient by telephone to perform post hospital discharge assessment. Verified name and  with patient as identifiers. Provided introduction to self, and explanation of the CTN role. CTN reviewed discharge instructions, medical action plan and red flags with patient who verbalized understanding. Patient given an opportunity to ask questions and does not have any further questions or concerns at this time. Were discharge instructions available to patient? Yes. Reviewed appropriate site of care based on symptoms and resources available to patient including: PCP  Specialist  Home health. The patient agrees to contact the PCP office for questions related to their healthcare. Medication reconciliation was performed with patient, who verbalizes understanding of administration of home medications. Reviewed and educated patient on any new and changed medications related to discharge diagnosis. CTN provided contact information. Care Transitions 24 Hour Call    Do you have a copy of your discharge instructions?: Yes  Do you have all of your prescriptions and are they filled?: Yes  Have you been contacted by a [employer]?: No  Have you scheduled your follow up appointment?: Yes  How are you going to get to your appointment?: Car - family or friend to transport  Do you have support at home?: Partner/Spouse/SO  Do you feel like you have everything you need to keep you well at home?: Yes  Are you an active caregiver in your home?: No  Care Transitions Interventions         Follow Up  Future Appointments   Date Time Provider Maria C Mehta   2022  1:00 PM Jozef Davidson MD [facility]       Pt states she is doing well today. Denies any nausea, vomiting, chills, cp or sob. States she had a fever last evening of 100.9.  Pt took tylenol for pain and fever went away. Advised to notify surgeon if fever continues. Pt has not checked temp yet today. States her arm is very sore. Pt using Ice, elevation for support of shoulder, oxycodone, flexeril, tylenol and mobic. Encouraged pt that pain and soreness is normal but to notify surgeon if pain becomes worse or unmanageable. Pt denies any feelings of UTI and states she took her antibiotic medication. States LBM was Saturday 6/11. Pt taking miralax daily and colace at night. Encouraged hydration, mobility, warm prune juice, senna S or milk of mag. Pt verbalized understanding. No questions pertaining to lovenox. Encouraged IS use. Encouraged PCP HFU. ACP discussed and on file. Med rec reviewed but 1111F could not be placed as [facility]. [facility] has reached out to pt but has not scheduled first visit yet. Pt has [facility] contact info. Will send a message to surgeon office about constipation and fever. Will make a call in a few days to follow up with pt before resolving episode.      MATTHIEU Pillai, RN   Care Transition Nurse  Mobile: [phone]

## 2022-06-16 NOTE — DISCHARGE SUMMARY
Notified Dr. Jamesetta Simmonds that pt has absent right pedal pulse before and after the procedure. Notified him also that post tib pulse can be heard with doppler. Awaiting response. [address] Jamesetta Simmonds aware of above findings with no new order. Physician Discharge Summary     Patient ID:  Chastity Marie  [id number]  53 y.o.  1950    Admit date: 6/13/2022    Discharge date and time: 6/14/2022 11:36 AM     Admitting Physician: Steffi Greenwood MD     Discharge Physician: Marylene Blocker    Admission Diagnoses: Rotator cuff arthropathy, right [M12.811]  Rotator cuff arthropathy of right shoulder [M12.811]    Discharge Diagnoses: right rotator cuff arthropathy    Admission Condition: good    Discharged Condition: good    Indication for Admission: Failed conservative treatment as outpatient for joint pain including PT and pain meds. This patient was then electively scheduled for total joint replacement surgery    Surgical procedure: right reverse total shoulder arthroplasty    Consults: PT OT SS    This patient had no postoperative complications. She was noted with ESBL in urine preop but not treated and repeat confirmed UTI. She was treated with IV antibiotics as inpt then PO at home. They has PT and OT for ADL's . IV and PO pain med for pain control and was eventually DC in stable condition    Treatments: analgesia,  therapies: PT OT,  and surgery      Disposition: home    Patient Instructions:   [unfilled]  Activity: activity as tolerated  Diet: regular diet  Wound Care: keep wound clean and dry    Follow-up with Marylene Blocker in 2 weeks.     Signed:  JACK Tiwari CNP  6/16/2022  1:11 PM

## 2022-06-17 ENCOUNTER — CARE COORDINATION (OUTPATIENT)
Dept: CASE MANAGEMENT | Age: 72
End: 2022-06-17

## 2022-06-17 NOTE — CARE COORDINATION
[facility] Transitions Follow Up Call    2022    Patient: Emmanuelle Brown  Patient : 1950   MRN: [id number]  Reason for Admission: reverse total shoulder  Discharge Date: 22 RARS: Readmission Risk Score: 6.9 ( )         Spoke with: Emmanuelle Brown who states she is doing ok. States each day is better. Denies any more fever. Denies any s/s of infection to shoulder. States she is still having constipation with no relief since . Pt states she is taking her regular regime of metamucil daily and 1 colace daily. States one evening she took 3 colace but nothing happened. Advised to use miralax, colace, senna or milk of mag. Pt states she is eating and drinking plenty. No issues with mobility. Active with home care. Advised pt of possible outcomes of prolonged constipation. Pt v/u. Will continue to follow. Care Transitions Follow Up Call    Needs to be reviewed by the provider   Additional needs identified to be addressed with provider: No  none             Method of communication with provider : none      Care Transition Nurse contacted the patient by telephone to follow up after admission. Verified name and  with patient as identifiers. Addressed changes since last contact: constipation  Discussed follow-up appointments. If no appointment was previously scheduled, appointment scheduling offered: No.   Is follow up appointment scheduled within 7 days of discharge? No.    CTN reviewed discharge instructions, medical action plan and red flags with patient and discussed any barriers to care and/or understanding of plan of care after discharge. Discussed appropriate site of care based on symptoms and resources available to patient including: PCP  Specialist. The patient agrees to contact the PCP office for questions related to their healthcare.      Patients top risk factors for readmission: constipation  Interventions to address risk factors: Education of patient/family/caregiver/guardian to support self-management-pain management and Assessment and support for treatment adherence and medication management-colace, miralax, senna or milk of mag      Non-[facility] follow up appointment(s): n/a    CTN provided contact information for future needs. Plan for follow-up call in 3-5 days based on severity of symptoms and risk factors. Plan for next call: symptom management-.  self management-.  follow up appointment-.            Care Transitions Subsequent and Final Call    Subsequent and Final Calls  Do you have any ongoing symptoms?: Yes  Patient-reported symptoms: Constipation  Have your medications changed?: No  Do you have any questions related to your medications?: No  Do you currently have any active services?: Yes  Are you currently active with any services?: Home Health  Do you have any needs or concerns that I can assist you with?: No  Identified Barriers: None  Care Transitions Interventions  Other Interventions:            Follow Up  Future Appointments   Date Time Provider Maria C Mehta   6/28/2022  1:00 PM Tavia Gagnon MD  [facility]       MATTHIEU Pillai, RN   Care Transition Nurse  Mobile: [phone]

## 2022-06-20 ENCOUNTER — CARE COORDINATION (OUTPATIENT)
Dept: CASE MANAGEMENT | Age: 72
End: 2022-06-20

## 2022-06-20 DIAGNOSIS — Z96.611 STATUS POST REVERSE TOTAL REPLACEMENT OF RIGHT SHOULDER: Primary | ICD-10-CM

## 2022-06-20 NOTE — TELEPHONE ENCOUNTER
She had a right reverse TSA on 6/13/22. The patient was informed this would be sent to Dr. Purnima Herman to refill. She was informed he is in surgery and will check with her pharmacy by the end of the day.

## 2022-06-20 NOTE — TELEPHONE ENCOUNTER
Prescription Refill     Medication Name:  OXYCODONE & [address] Eifgayathri: PHARMACY ON FILE  Patient Contact Number: [phone]    PT IS RUNNING LOW ON RX. REQ REFILL OF BOTH OXYCODONE AND MUSCLE RELAXERS. REQ CALLBACK AT NUMBER LISTED TO CONFIRM REFILL ORDER IS SENT.

## 2022-06-20 NOTE — CARE COORDINATION
[facility] Trans[facility] Follow Up Call    2022    Patient: Debbie Hemphill  Patient : 1950   MRN: [id number]  Reason for Admission: Right reverse total shoulder  Discharge Date: 22 RARS: Readmission Risk Score: 6.9 ( )      Spoke with: [facility] Transitions Follow Up Call  States doing better each day but still has some pain, thought it would be better than it is. PT/OT is coming into the home. Pt states incision looks good per , still has \"plastic\" over the incision, no redness, edema, drainage or fever. Pt states she had a bowel movement Saturday and today. Needs to be reviewed by the provider   Additional needs identified to be addressed with provider: No  none             Method of communication with provider : none      Care Transition Nurse contacted the patient by telephone to follow up after admission on 22. Verified name and  with patient as identifiers. Addressed changes since last contact: symptoms related to shoulder surgery. Discussed follow-up appointments. If no appointment was previously scheduled, appointment scheduling offered: No.   Is follow up appointment scheduled within 7 days of discharge? Yes. CTN reviewed discharge instructions, medical action plan and red flags with patient and discussed any barriers to care and/or understanding of plan of care after discharge. Discussed appropriate site of care based on symptoms and resources available to patient including: PCP  Specialist  Home health  When to call 911. The patient agrees to contact the PCP office for questions related to their healthcare. Patients top risk factors for readmission: falls  Interventions to address risk factors: Obtained and reviewed discharge summary and/or continuity of care documents      Non-[facility] follow up appointment(s): na      CTN provided contact information for future needs.  No further follow-up call indicated based on severity of symptoms and risk factors. Care Transitions Subsequent and Final Call    Schedule Follow Up Appointment with PCP: Completed  Subsequent and Final Calls  Do you have any ongoing symptoms?: No  Have your medications changed?: No  Do you have any questions related to your medications?: No  Do you currently have any active services?: Yes  Are you currently active with any services?: Home Health  Do you have any needs or concerns that I can assist you with?: No  Identified Barriers: None  Care Transitions Interventions  No Identified Needs  Other Interventions:            Follow Up  Future Appointments   Date Time Provider Maria C Mehta   6/28/2022  1:00 PM MD Alda Bolden RN

## 2022-06-21 ENCOUNTER — TELEPHONE (OUTPATIENT)
Dept: ORTHOPEDIC SURGERY | Age: 72
End: 2022-06-21

## 2022-06-21 RX ORDER — CYCLOBENZAPRINE HCL 5 MG
5 TABLET ORAL 2 TIMES DAILY PRN
Qty: 14 TABLET | Refills: 0 | Status: SHIPPED | OUTPATIENT
Start: 2022-06-21 | End: 2022-06-28 | Stop reason: SDUPTHER

## 2022-06-21 RX ORDER — OXYCODONE HYDROCHLORIDE 5 MG/1
5-10 TABLET ORAL
Qty: 40 TABLET | Refills: 0 | Status: SHIPPED | OUTPATIENT
Start: 2022-06-21 | End: 2022-06-30 | Stop reason: SDUPTHER

## 2022-06-28 ENCOUNTER — OFFICE VISIT (OUTPATIENT)
Dept: ORTHOPEDIC SURGERY | Age: 72
End: 2022-06-28

## 2022-06-28 VITALS — BODY MASS INDEX: 31.54 KG/M2 | WEIGHT: 178 LBS | HEIGHT: 63 IN

## 2022-06-28 DIAGNOSIS — Z96.611 STATUS POST REVERSE TOTAL REPLACEMENT OF RIGHT SHOULDER: Primary | ICD-10-CM

## 2022-06-28 DIAGNOSIS — Z96.611 STATUS POST REVERSE TOTAL REPLACEMENT OF RIGHT SHOULDER: ICD-10-CM

## 2022-06-28 PROCEDURE — 99024 POSTOP FOLLOW-UP VISIT: CPT | Performed by: ORTHOPAEDIC SURGERY

## 2022-06-28 RX ORDER — CYCLOBENZAPRINE HCL 5 MG
5 TABLET ORAL 2 TIMES DAILY PRN
Qty: 14 TABLET | Refills: 0 | Status: SHIPPED | OUTPATIENT
Start: 2022-06-28 | End: 2022-07-05

## 2022-06-28 RX ORDER — METHYLPREDNISOLONE 4 MG/1
TABLET ORAL
Qty: 1 KIT | Refills: 0 | Status: SHIPPED | OUTPATIENT
Start: 2022-06-28 | End: 2022-07-04

## 2022-06-28 RX ORDER — METHYLPREDNISOLONE 4 MG/1
TABLET ORAL
Qty: 1 KIT | Refills: 0 | Status: SHIPPED
Start: 2022-06-28 | End: 2022-06-28 | Stop reason: CLARIF

## 2022-06-28 NOTE — TELEPHONE ENCOUNTER
Prescription Refill     Medication Name:  CYCLOBENZAPRINE 5MG  [facility]   Patient Contact Number:  [phone]

## 2022-06-28 NOTE — PROGRESS NOTES
Emmanuelle Brown  [id number]  June 28, 2022    Chief Complaint   Patient presents with    Post-Op Check     R reverse TSA DOS 6/13/22       History: The patient is here for evaluation of her right shoulder. She is now approximately 2 weeks status post right reverse total shoulder arthroplasty. She reports minimal to no pain. The patient reports radicular pain down her right leg. She does report a remote history of lumbar spine surgery. The patient's  past medical history, medications, allergies,  family history, social history, and have been reviewed, and dated and are recorded in the chart. Pertinent items are noted in HPI. Review of systems reviewed from Pertinent History Form dated on 6/28/22 and available in the patient's chart under the Media tab. Vitals:  Ht 5' 3\" (1.6 m)   Wt 178 lb (80.7 kg)   BMI 31.53 kg/m²     Physical: On examination today, the patient is alert and oriented x3. Her incision is clean and dry. There is no evidence of drainage. There is no sign of infection. We abducted the right shoulder to 60 degrees. We forward flexed the right shoulder to 65 degrees. The patient is neurovascularly intact in the right upper extremity. Examination of the skin reveals no lesions or ulcerations. She is able to flex and extend the right elbow without difficulty. X-rays: 3 views of the right shoulder obtained in the office today were extensively reviewed. The reverse total shoulder arthroplasty is in good position. There is no evidence of fracture, loosening or subsidence. Impression: Status post right reverse total shoulder arthroplasty    Plan: At this time, we will continue our current treatment. The patient may work on light range of motion. She was given a Medrol Dosepak for symptomatic treatment of the radiculopathy. If the patient continues to significant radicular pain down her right leg, we will refer her to Dr. Mauri Gallegos for evaluation and treatment.     Orders Placed This Encounter   Procedures    XR SHOULDER RIGHT (MIN 2 VIEWS)     Standing Status:   Future     Number of Occurrences:   1     Standing Expiration Date:   6/28/2023     Scheduling Instructions:      rm 21      3 views     Order Specific Question:   Reason for exam:     Answer:   pain   [address] [facility]     Referral Priority:   Routine     Referral Type:   Eval and Treat     Referral Reason:   Specialty Services Required     Requested Specialty:   Physical Therapist     Number of Visits Requested:   1

## 2022-06-29 DIAGNOSIS — Z96.611 STATUS POST REVERSE TOTAL REPLACEMENT OF RIGHT SHOULDER: ICD-10-CM

## 2022-06-29 NOTE — TELEPHONE ENCOUNTER
Prescription Refill     Medication Name:  OXYCODONE  Pharmacy:[facility]  [address]  Patient Contact Number:  [phone]    PATIENT HAD SX ON 6/13/22 ON RT SHOULDER.

## 2022-06-29 NOTE — TELEPHONE ENCOUNTER
Patient was seen in the office yesterday. Was given Rx for Flexeril and a Medrol Harsh. Will ask Dr Patricia Shaw to advise when he returns to the office tomorrow. I spoke to pt and let her know. She gave verbal understanding. She also states she had a therapy session today and this has increased her pain.

## 2022-06-30 RX ORDER — OXYCODONE HYDROCHLORIDE 5 MG/1
5-10 TABLET ORAL
Qty: 40 TABLET | Refills: 0 | Status: SHIPPED | OUTPATIENT
Start: 2022-06-30 | End: 2022-08-02 | Stop reason: SDUPTHER

## 2022-07-05 ENCOUNTER — HOSPITAL ENCOUNTER (OUTPATIENT)
Dept: PHYSICAL THERAPY | Age: 72
Setting detail: THERAPIES SERIES
Discharge: HOME | End: 2022-07-05
Payer: MEDICARE

## 2022-07-05 PROCEDURE — 97140 MANUAL THERAPY 1/> REGIONS: CPT

## 2022-07-05 PROCEDURE — G0283 ELEC STIM OTHER THAN WOUND: HCPCS

## 2022-07-05 PROCEDURE — 97530 THERAPEUTIC ACTIVITIES: CPT

## 2022-07-05 PROCEDURE — 97161 PT EVAL LOW COMPLEX 20 MIN: CPT

## 2022-07-05 NOTE — FLOWSHEET NOTE
[facility], [facility]  [address] [facility] [place], [facility]  Phone: [phone]   Fax:     [phone]        Physical Therapy Treatment Note/ Progress Report:       Date:  2022    Patient Name:  Alexandr Lisa    :  1950  MRN: [id number]    Pertinent Medical History:Additional Pertinent Hx: OA, asthma, DM, HTN, migraine, depression, DVT, pulmonary embolism, cervical fusion    Medical/Treatment Diagnosis Information:  · Diagnosis: Z96.611 s/p reverse TSR R shoulder  · Treatment Diagnosis: recent shoulder surgery R UE; pain, limited ROM and strength    Insurance/Certification information:  PT Insurance Information: [place]  - $40 copay, no auth, visits BMN  Physician Information:  Referring Provider (secondary): Dr. Mike Tolliver of care signed (Y/N): inbox    Date of Patient follow up with Physician:      Progress Report: []  Yes  [x]  No     Date Range for reporting period:  Beginnin2022  Ending:      Progress report due (10 Rx/or 30 days whichever is less): 98     Recertification due (POC duration/ or 90 days whichever is less):      Visit # POC/Insurance Allowable Auth Needed   - BMN []Yes    [x]No     Functional Outcomes Measure:    Date Assessed: at eval  Test: FOTO  Score:42    Pain level:  1-5/10     History of Injury: Patient stated complaint: R shoulder reverse TSR on 22.     MD precautions/restrictions:   active and passive fwd flexion and abduction  Limit shoulder extension  Limit ER to 15 degrees  No active IR, adduction (behind back hygiene)       SUBJECTIVE:  See eval    OBJECTIVE:    Observation:    Test measurements:      RESTRICTIONS/PRECAUTIONS:   active and passive fwd flexion and abduction  Limit shoulder extension  Limit ER to 15 degrees  No active IR, adduction (behind back hygiene)    DOS 22  7/11: 4 wks p/o  81: 7 wks p/o  15: 9 wks p/o Exercises/Interventions:   Therapeutic Ex (55430)  Min:2 Resistance/Reps Notes/Cues   UE ranger 4 way on floor at elbow ht     Table slides   flexion   scaption     Pendulum      Biceps curl X 10          Seated     UT str for R side 3 x 10 sec     AROM:   Pron/sup  Wrist flex/ext     X 10   X 10   X 10          Therapeutic Activity (32368) Min: 8 See below                    NMR re-education (90595)  Min:2     Shoulder roll  Shoulder shrug  Scapular retraction X 10   X 10   X 10     SL scap mobs AAROM AAROM elevate/depress  Protract/retract x 10 each          Manual Intervention (31910) Min: 8     PROM:  Flex <120  Abd/scap < 90  Ext to neutral  ER to 15  Gentle within precautions               Modalities:  Min: 15      premod with CP 2 pads R shoulder avoiding steri strips x 15 min  Pt supine HL with pillow under R arm With relief           Other Therapeutic Activities:  Pt was educated on PT POC, Diagnosis, Prognosis, pathomechanics as well as frequency and duration of scheduling future physical therapy appointments. Time was also taken on this day to answer all patient questions and participation in PT. Reviewed appointment policy in detail with patient and patient verbalized understanding. X 8 min     Home Exercise Program: Patient instructed in the following for HEP:          .   Patient verbalized/demonstrated understanding and was issued written handout.   7/5: pendulum, scap retraction, shoulder shrug/roll, UT str, AROM elbow, wrist, hand,     Therapeutic Exercise and NMR EXR  [x] (15584) Provided verbal/tactile cueing for activities related to strengthening, flexibility, endurance, ROM  for improvements in scapular, scapulothoracic and UE control with self care, reaching, carrying, lifting, house/yardwork, driving/computer work.    [] (20122) Provided verbal/tactile cueing for activities related to improving balance, coordination, kinesthetic sense, posture, motor skill, proprioception  to assist with  scapular, scapulothoracic and UE control with self care, reaching, carrying, lifting, house/yardwork, driving/computer work. Therapeutic Activities:    [x] (08099 or 11414) Provided verbal/tactile cueing for activities related to improving balance, coordination, kinesthetic sense, posture, motor skill, proprioception and motor activation to allow for proper function of scapular, scapulothoracic and UE control with self care, carrying, lifting, driving/computer work.      Home Exercise Program:    [x] (19658) Reviewed/Progressed HEP activities related to strengthening, flexibility, endurance, ROM of scapular, scapulothoracic and UE control with self care, reaching, carrying, lifting, house/yardwork, driving/computer work  [] (74106) Reviewed/Progressed HEP activities related to improving balance, coordination, kinesthetic sense, posture, motor skill, proprioception of scapular, scapulothoracic and UE control with self care, reaching, carrying, lifting, house/yardwork, driving/computer work      Manual Treatments:  PROM / STM / Oscillations-Mobs:  G-I, II, III, IV (PA's, Inf., Post.)  [x] (49647) Provided manual therapy to mobilize soft tissue/joints of cervical/CT, scapular GHJ and UE for the purpose of modulating pain, promoting relaxation,  increasing ROM, reducing/eliminating soft tissue swelling/inflammation/restriction, improving soft tissue extensibility and allowing for proper ROM for normal function with self care, reaching, carrying, lifting, house/yardwork, driving/computer work    Charges:  Timed Code Treatment Minutes: 30   Total Treatment Minutes: 50       [x] EVAL (LOW) 26712 (typically 20 minutes face-to-face)  [] EVAL (MOD) 35505 (typically 30 minutes face-to-face)  [] EVAL (HIGH) 58019 (typically 45 minutes face-to-face)  [] RE-EVAL     [] WC(73647) x     [] Dry needle 1 or 2 Muscles (49738)  [] NMR (53961) x     [] Dry needle 3+ Muscles (56588)  [x] Manual (27100) x     [] Ultrasound (17250) x  [x] TA (34706) x     [] [facility] Traction (26318)  [] ES(attended) (13444)     [x] ES (un) (51652):   [] Vasopump (80977) [] Ionto (21115)   [] Other:     GOALS:  Patient stated goal: \"dec pain\"  [x]? Progressing: []? Met: []? Not Met: []? Adjusted     Therapist goals for Patient:   Short Term Goals: To be achieved in: 2 weeks  1. Independent in HEP and progression per patient tolerance, in order to prevent re-injury. [x]? Progressing: []? Met: []? Not Met: []? Adjusted  2. Patient will have a decrease in pain by 20-30% to facilitate improvement in movement, function, and ADLs as indicated by Functional Deficits. [x]? Progressing: []? Met: []? Not Met: []? Adjusted     Long Term Goals: To be achieved in: at d/c   1. Increase FOTO functional outcome score from 42 to 60 to assist with reaching prior level of function. [x]? Progressing: []? Met: []? Not Met: []? Adjusted  2. Patient will have a decrease in pain by 50-60% to facilitate improvement in movement, function, and ADLs as indicated by Functional Deficits. [x]? Progressing: []? Met: []? Not Met: []? Adjusted  3. Patient will demonstrate increased AROM to flex/scap 140, IR/ER to [facility] for ease with self care to allow for proper joint functioning as indicated by Functional Deficits. [x]? Progressing: []? Met: []? Not Met: []? Adjusted  4. Patient will demonstrate an increase in NM recruitment/activation and overall GH and scapular strength to 4+/5 - 5/5 for proper functional mobility as indicated by patients Functional Deficits. [x]? Progressing: []? Met: []? Not Met: []? Adjusted  5. Patient will return to self care/dressing activities without increased symptoms or restriction. [x]? Progressing: []? Met: []? Not Met: []? Adjusted  6. \"full use of arm\"         [x]? Progressing: []? Met: []? Not Met: []?  Adjusted    ASSESSMENT:  Tolerated eval well; proceed per protocol/MD precautions     Treatment/Activity Tolerance:  [x] Patient tolerated treatment well [] Patient limited by fatique  [] Patient limited by pain  [] Patient limited by other medical complications  [] Other:     Overall Progression Towards Functional goals/ Treatment Progress Update:  [] Patient is progressing as expected towards functional goals listed. [] Progression is slowed due to complexities/Impairments listed. [] Progression has been slowed due to co-morbidities. [x] Plan just implemented, too soon to assess goals progression <30days   [] Goals require adjustment due to lack of progress  [] Patient is not progressing as expected and requires additional follow up with physician  [] Other    Prognosis for POC: [x] Good [] Fair  [] Poor    Patient requires continued skilled intervention: [x] Yes  [] No      PLAN: See eval  [] Continue per plan of care [] Alter current plan (see comments)  [x] Plan of care initiated [] Hold pending MD visit [] Discharge    Electronically signed by: Mandie Beaver, PT MPT [id number]    Note: If patient does not return for scheduled/recommended follow up visits, this note will serve as a discharge from care along with the most recent update on progress.

## 2022-07-05 NOTE — PLAN OF CARE
[facility], [facility]  [address] [facility] [place], [facility]  Phone: [phone]   Fax:     [phone]                                                       Physical Therapy Certification    Dear Referring Provider (secondary): Dr. Ibrahim First,    We had the pleasure of evaluating the following patient for physical therapy services at [facility]. A summary of our findings can be found in the initial assessment below. This includes our plan of care. If you have any questions or concerns regarding these findings, please do not hesitate to contact me at the office phone number checked above. Thank you for the referral.       Physician Signature:_______________________________Date:__________________  By signing above (or electronic signature), therapists plan is approved by physician        Patient: Chavez Newsome   : 1950   MRN: [id number]  Referring Physician: Referring Provider (secondary): Dr. Ibrahim First      Evaluation Date: 2022      Medical Diagnosis Information:  Diagnosis: W38.899 s/p reverse TSR R shoulder   Treatment Diagnosis: recent shoulder surgery R UE; pain, limited ROM and strength                                         Insurance information: PT Insurance Information: [employer]     Precautions/ Contra-indications: see MD orders below   Latex Allergy:   [x]  NO      []YES  Preferred Language for Healthcare:   [x]English       []other:    C-SSRS Triggered by Intake questionnaire (Past 2 wk assessment ):   [x] No, Questionnaire did not trigger screening.   [] Yes, Patient intake triggered C-SSRS Screening      [] C-SSRS Screening completed  [] PCP notified via Epic     SUBJECTIVE: Patient stated complaint: R shoulder reverse TSR on 22.     MD precautions/restrictions:   active and passive fwd flexion and abduction  Limit shoulder extension  Limit ER to 15 degrees  No active IR, adduction (behind back hygiene)            Relevant Medical History:Additional Pertinent Hx: OA, asthma, DM, HTN, migraine, depression, DVT, pulmonary embolism, cervical fusion  Functional Scale/Score: FOTO = 42    Pain Scale: 1-5/10 low pain at rest but will inc after exercises  Easing factors: rest; ice    Provocative factors: use of arm     Type: [x]Constant   []Intermittent  []Radiating []Localized []other:     Numbness/Tingling: none    Occupation/School:NA      Living Status/Prior Level of Function: Independent with ADLs and IADLs,     OBJECTIVE:   Palpation: general TTP around shoulder/incision     Functional Mobility/Transfers: independent     Posture: slightly rounded shoulders     Bandages/Dressings/Incisions: steri strips in place; incision healing well; small bump distal incision that looks like a suture might be coming out slightly, but no signs of infection noted     Gait: (include devices/WB status): WNL     CERV ROM     Cervical Flexion     Cervical Extension     Cervical SB Min dec L with tightness     Cervical rotation          ROM Left Right   Shoulder Flex  95   Shoulder Abd/scap  70   Shoulder ER  0  At 45 abd   Shoulder IR  To belly at 45 abd     Pain with ROM         Strength  Left Right   Shoulder Flex  NT - due to recent sx and precautions   Shoulder Scap     Shoulder ER     Shoulder IR             Reflexes: NT  Normal Abnormal Comments   []ALL NORMAL            S1-2 Seated achilles [] []    S1-2 Prone knee bend [] []    L3-4 Patellar tendon [] []    C5-6 Biceps [] []    C6 Brachioradialis [] []    C7-8 Triceps [] []    Clonus [] []    Babinski [] []    Nair's [] []          Joint mobility: NT    []Normal    []Hypo   []Hyper    Orthopedic Special Tests: NT                        [x] Patient history, allergies, meds reviewed. Medical chart reviewed. See intake form.      Review Of Systems (ROS):  [x]Performed Review of systems (Integumentary, CardioPulmonary, Neurological) by intake and observation. Intake form has been scanned into medical record. Patient has been instructed to contact their primary care physician regarding ROS issues if not already being addressed at this time. Co-morbidities/Complexities (which will affect course of rehabilitation):   []None           Arthritic conditions   []Rheumatoid arthritis (M05.9)  [x]Osteoarthritis (M19.91)   Cardiovascular conditions   []Hypertension (I10)  []Hyperlipidemia (E78.5)  []Angina pectoris (I20)  []Atherosclerosis (I70)  []CVA Musculoskeletal conditions   []Disc pathology   []Congenital spine pathologies   [x]Prior surgical intervention  []Osteoporosis (M81.8)  []Osteopenia (M85.8)   Endocrine conditions   []Hypothyroid (E03.9)  []Hyperthyroid Gastrointestinal conditions   []Constipation (L68.39)   Metabolic conditions   []Morbid obesity (E66.01)  [x]Diabetes type 1(E10.65) or 2 (E11.65)   []Neuropathy (G60.9)     Pulmonary conditions   [x]Asthma (J45)  []Coughing   []COPD (J44.9)   Psychological Disorders  []Anxiety (F41.9)  [x]Depression (F32.9)   []Other:   [x]Other:     Migraine   DVT  Pulmonary embolism,cervical fusion       Barriers to/and or personal factors that will affect rehab potential:              []Age  []Sex   []Smoker              []Motivation/Lack of Motivation                        [x]Co-Morbidities              []Cognitive Function, education/learning barriers              []Environmental, home barriers              []profession/work barriers  []past PT/medical experience  []other:  Justification:     Falls Risk Assessment (30 days):   [x] Falls Risk assessed and no intervention required.   [] Falls Risk assessed and Patient requires intervention due to being higher risk   TUG score (>12s at risk):     [] Falls education provided, including         ASSESSMENT:    Functional Impairments:     [x]Noted spinal or UE joint hypomobility   []Noted spinal or UE joint hypermobility   [x]Decreased spinal/UE functional ROM   []Abnormal reflexes/sensation/myotomal/dermatomal deficits   [x]Decreased RC/scapular/core strength and neuromuscular control    [x]Decreased UE functional strength   []other:      Functional Activity Limitations (from functional questionnaire and intake)   [x]Reduced ability to tolerate prolonged functional positions   [x]Reduced ability or difficulty with changes of positions or transfers between positions   [x]Reduced ability to maintain good posture and demonstrate good body mechanics with sitting, bending, and lifting   [x] Reduced ability or tolerance with driving and/or computer work   [x]Reduced ability to perform lifting, reaching, carrying tasks   [x]Reduced ability to reach behind back   [x]Reduced ability to sleep    [x]Reduced ability to tolerate any impact through UE or spine   [x]Reduced ability to  or hold objects   [x]Reduced ability to throw or toss an object   []other:    Participation Restrictions   [x]Reduced participation in self care activities   [x]Reduced participation in home management activities   []Reduced participation in work activities   [x]Reduced participation in social activities. []Reduced participation in sport/recreational activities. Classification/Subgrouping:   [x]signs/symptoms consistent with post-surgical status including decreased ROM, strength and function.     []signs/symptoms consistent with joint sprain/strain    []signs/symptoms consistent with shoulder impingement (internal, external, primary or secondary)   []signs/symptoms consistent with shoulder/elbow/wrist tendinopathy   []Signs/symptoms consistent with Rotator cuff tear   []sign/symptoms consistent with labral tear   []signs/symptoms consistent with rib dysfunction   []signs/symptoms consistent with postural dysfunction   []signs/symptoms consistent with Glenohumeral IR Deficit - <45 degrees   []signs/symptoms consistent with facet dysfunction of cervical/thoracic spine   []signs/symptoms consistent with pathology which may benefit from Dry Needling   []signs/symptoms which may limit the use of advanced manual therapy techniques: (Elevated CV risk profile, recent trauma, intolerance to end range positions, prior TIA, visual issues, UE neurological compromise )     Prognosis/Rehab Potential:      []Excellent   [x]Good    []Fair   []Poor    Tolerance of evaluation/treatment:    []Excellent   [x]Good    []Fair   []Poor    Physical Therapy Evaluation Complexity Justification  [x] A history of present problem with:  [] no personal factors and/or comorbidities that impact the plan of care;  [x]1-2 personal factors and/or comorbidities that impact the plan of care  []3 personal factors and/or comorbidities that impact the plan of care  [x] An examination of body systems using standardized tests and measures addressing any of the following: body structures and functions (impairments), activity limitations, and/or participation restrictions;:  [] a total of 1-2 or more elements   [x] a total of 3 or more elements   [] a total of 4 or more elements   [x] A clinical presentation with:  [x] stable and/or uncomplicated characteristics   [] evolving clinical presentation with changing characteristics  [] unstable and unpredictable characteristics;   [x] Clinical decision making of [x] low, [] moderate, [] high complexity using standardized patient assessment instrument and/or measurable assessment of functional outcome. [x] EVAL (LOW) 32950 (typically 20 minutes face-to-face)  [] EVAL (MOD) 73656 (typically 30 minutes face-to-face)  [] EVAL (HIGH) 79473 (typically 45 minutes face-to-face)  [] RE-EVAL     PLAN:   Frequency/Duration:  2 days per week for 4-8 Weeks:  Interventions:  [x]  Therapeutic exercise including: strength training, ROM, for scapula, core and Upper extremity, including postural re-education.    [x]  NMR activation and proprioception for UE, periscapular and RC muscles and Core, including postural re-education. [x]  Manual therapy as indicated for shoulder, scapula, spine and associated soft tissue including: Dry Needling/IASTM, STM, PROM, Gr I-IV mobilizations, manipulation. [x] Modalities as needed that may include: thermal agents, E-stim, Biofeedback, US, iontophoresis as indicated  [x] Patient education on joint protection, postural re-education, activity modification, progression of HEP. [x] Aquatic exercise including: strength training, ROM, for scapula, core and Upper extremity, including postural re-education. HEP instruction: see flowsheet     GOALS:  Patient stated goal: \"dec pain\"  [x] Progressing: [] Met: [] Not Met: [] Adjusted    Therapist goals for Patient:   Short Term Goals: To be achieved in: 2 weeks  1. Independent in HEP and progression per patient tolerance, in order to prevent re-injury. [x] Progressing: [] Met: [] Not Met: [] Adjusted  2. Patient will have a decrease in pain by 20-30% to facilitate improvement in movement, function, and ADLs as indicated by Functional Deficits. [x] Progressing: [] Met: [] Not Met: [] Adjusted    Long Term Goals: To be achieved in: at d/c   1. Increase FOTO functional outcome score from 42 to 60 to assist with reaching prior level of function. [x] Progressing: [] Met: [] Not Met: [] Adjusted  2. Patient will have a decrease in pain by 50-60% to facilitate improvement in movement, function, and ADLs as indicated by Functional Deficits. [x] Progressing: [] Met: [] Not Met: [] Adjusted  3. Patient will demonstrate increased AROM to flex/scap 140, IR/ER to [facility] for ease with self care to allow for proper joint functioning as indicated by Functional Deficits. [x] Progressing: [] Met: [] Not Met: [] Adjusted  4. Patient will demonstrate an increase in NM recruitment/activation and overall GH and scapular strength to 4+/5 - 5/5 for proper functional mobility as indicated by patients Functional Deficits.    [x] Progressing: [] Met: [] Not Met: [] Adjusted  5. Patient will return to self care/dressing activities without increased symptoms or restriction. [x] Progressing: [] Met: [] Not Met: [] Adjusted  6. \"full use of arm\"    [x] Progressing: [] Met: [] Not Met: [] Adjusted    Electronically signed by:  Isabel Leyva, PTMMI [id number]      Note: If patient does not return for scheduled/recommended follow up visits, this note will serve as a discharge from care along with the most recent update on progress.

## 2022-07-11 ENCOUNTER — HOSPITAL ENCOUNTER (OUTPATIENT)
Dept: PHYSICAL THERAPY | Age: 72
Setting detail: THERAPIES SERIES
Discharge: HOME | End: 2022-07-11
Payer: MEDICARE

## 2022-07-11 PROCEDURE — G0283 ELEC STIM OTHER THAN WOUND: HCPCS

## 2022-07-11 PROCEDURE — 97140 MANUAL THERAPY 1/> REGIONS: CPT

## 2022-07-11 PROCEDURE — 97110 THERAPEUTIC EXERCISES: CPT

## 2022-07-11 NOTE — FLOWSHEET NOTE
wks p/o  8/15: 9 wks p/o           Exercises/Interventions:   Therapeutic Ex (49529)  Min: 25 Resistance/Reps Notes/Cues   UE ranger 4 way on floor at elbow ht     Table slides   flexion   scaption    x 10 R  X 10 R    Pendulum      Biceps curl      standing X 10 R         Seated     UT str for R side 3 x 10 sec  R    AROM:   Pron/sup  Wrist flex/ext     X 10   X 10   X 10          Therapeutic Activity (04339) Min:                     NMR re-education (23280)  Min:2     Shoulder roll  Shoulder shrug  Scapular retraction X 10   X 10   X 10     SL scap mobs AAROM AAROM elevate/depress  Protract/retract x 10 each          Manual Intervention (35484) Min: 8     PROM:  Flex <120  Abd/scap < 90  Ext to neutral  ER to 15  Gentle within precautions               Modalities:  Min: 15      premod with CP 4 pads R shoulder  x 15 min  Recliner with pillow under R arm With relief   Steri strips off  recliner          Other Therapeutic Activities:  Pt was educated on PT POC, Diagnosis, Prognosis, pathomechanics as well as frequency and duration of scheduling future physical therapy appointments. Time was also taken on this day to answer all patient questions and participation in PT. Reviewed appointment policy in detail with patient and patient verbalized understanding. X 8 min     Home Exercise Program: Patient instructed in the following for HEP:          .   Patient verbalized/demonstrated understanding and was issued written handout.   7/5: pendulum, scap retraction, shoulder shrug/roll, UT str, AROM elbow, wrist, hand,     Therapeutic Exercise and NMR EXR  [x] (88775) Provided verbal/tactile cueing for activities related to strengthening, flexibility, endurance, ROM  for improvements in scapular, scapulothoracic and UE control with self care, reaching, carrying, lifting, house/yardwork, driving/computer work.    [] (41602) Provided verbal/tactile cueing for activities related to improving balance, coordination, kinesthetic sense, posture, motor skill, proprioception  to assist with  scapular, scapulothoracic and UE control with self care, reaching, carrying, lifting, house/yardwork, driving/computer work. Therapeutic Activities:    [x] (66511 or 55952) Provided verbal/tactile cueing for activities related to improving balance, coordination, kinesthetic sense, posture, motor skill, proprioception and motor activation to allow for proper function of scapular, scapulothoracic and UE control with self care, carrying, lifting, driving/computer work.      Home Exercise Program:    [x] (97649) Reviewed/Progressed HEP activities related to strengthening, flexibility, endurance, ROM of scapular, scapulothoracic and UE control with self care, reaching, carrying, lifting, house/yardwork, driving/computer work  [] (65372) Reviewed/Progressed HEP activities related to improving balance, coordination, kinesthetic sense, posture, motor skill, proprioception of scapular, scapulothoracic and UE control with self care, reaching, carrying, lifting, house/yardwork, driving/computer work      Manual Treatments:  PROM / STM / Oscillations-Mobs:  G-I, II, III, IV (PA's, Inf., Post.)  [x] (52036) Provided manual therapy to mobilize soft tissue/joints of cervical/CT, scapular GHJ and UE for the purpose of modulating pain, promoting relaxation,  increasing ROM, reducing/eliminating soft tissue swelling/inflammation/restriction, improving soft tissue extensibility and allowing for proper ROM for normal function with self care, reaching, carrying, lifting, house/yardwork, driving/computer work    Charges:  Timed Code Treatment Minutes: 30   Total Treatment Minutes: 45       [] EVAL (LOW) 71864 (typically 20 minutes face-to-face)  [] EVAL (MOD) 48794 (typically 30 minutes face-to-face)  [] EVAL (HIGH) 68847 (typically 45 minutes face-to-face)  [] RE-EVAL     [x] KB(32988) x     [] Dry needle 1 or 2 Muscles (31311)  [] NMR (66065) x     [] Dry needle 3+ Muscles (88357)  [x] Manual (80578) x     [] Ultrasound (50782) x  [] TA (16216) x     [] Mech Traction (44828)  [] ES(attended) (76292)     [x] ES (un) (87950):   [] Vasopump (48000) [] Ionto (77914)   [] Other:     GOALS:  Patient stated goal: \"dec pain\"  [x]? Progressing: []? Met: []? Not Met: []? Adjusted     Therapist goals for Patient:   Short Term Goals: To be achieved in: 2 weeks  1. Independent in HEP and progression per patient tolerance, in order to prevent re-injury. [x]? Progressing: []? Met: []? Not Met: []? Adjusted  2. Patient will have a decrease in pain by 20-30% to facilitate improvement in movement, function, and ADLs as indicated by Functional Deficits. [x]? Progressing: []? Met: []? Not Met: []? Adjusted     Long Term Goals: To be achieved in: at d/c   1. Increase FOTO functional outcome score from 42 to 60 to assist with reaching prior level of function. [x]? Progressing: []? Met: []? Not Met: []? Adjusted  2. Patient will have a decrease in pain by 50-60% to facilitate improvement in movement, function, and ADLs as indicated by Functional Deficits. [x]? Progressing: []? Met: []? Not Met: []? Adjusted  3. Patient will demonstrate increased AROM to flex/scap 140, IR/ER to [facility] for ease with self care to allow for proper joint functioning as indicated by Functional Deficits. [x]? Progressing: []? Met: []? Not Met: []? Adjusted  4. Patient will demonstrate an increase in NM recruitment/activation and overall GH and scapular strength to 4+/5 - 5/5 for proper functional mobility as indicated by patients Functional Deficits. [x]? Progressing: []? Met: []? Not Met: []? Adjusted  5. Patient will return to self care/dressing activities without increased symptoms or restriction. [x]? Progressing: []? Met: []? Not Met: []? Adjusted  6. \"full use of arm\"         [x]? Progressing: []? Met: []? Not Met: []?  Adjusted    ASSESSMENT:  Tolerated eval well; proceed per protocol/MD precautions 7-11-22 tolerated above ex/ PROM well. Steri strips gone , placed 4 pads with E-stim for more coverage area. Treatment/Activity Tolerance:  [x] Patient tolerated treatment well [] Patient limited by fatique  [] Patient limited by pain  [] Patient limited by other medical complications  [] Other:     Overall Progression Towards Functional goals/ Treatment Progress Update:  [] Patient is progressing as expected towards functional goals listed. [] Progression is slowed due to complexities/Impairments listed. [] Progression has been slowed due to co-morbidities. [x] Plan just implemented, too soon to assess goals progression <30days   [] Goals require adjustment due to lack of progress  [] Patient is not progressing as expected and requires additional follow up with physician  [] Other    Prognosis for POC: [x] Good [] Fair  [] Poor    Patient requires continued skilled intervention: [x] Yes  [] No      PLAN: See eval  [] Continue per plan of care [] Alter current plan (see comments)  [x] Plan of care initiated [] Hold pending MD visit [] Discharge    Electronically signed by: Julianne Matthews, PTA  [id number]    Note: If patient does not return for scheduled/recommended follow up visits, this note will serve as a discharge from care along with the most recent update on progress.

## 2022-07-13 ENCOUNTER — HOSPITAL ENCOUNTER (OUTPATIENT)
Dept: PHYSICAL THERAPY | Age: 72
Setting detail: THERAPIES SERIES
Discharge: HOME | End: 2022-07-13
Payer: MEDICARE

## 2022-07-13 PROCEDURE — 97110 THERAPEUTIC EXERCISES: CPT

## 2022-07-13 PROCEDURE — 97140 MANUAL THERAPY 1/> REGIONS: CPT

## 2022-07-13 PROCEDURE — G0283 ELEC STIM OTHER THAN WOUND: HCPCS

## 2022-07-13 NOTE — FLOWSHEET NOTE
[facility] [place] [facility], [facility]  [address] [facility] [facility] [place], [facility]  Phone: [phone]   Fax:     [phone]        Physical Therapy Treatment Note/ Progress Report:       Date:  2022    Patient Name:  Chavez Newsome    :  1950  MRN: [id number]    Pertinent Medical History:Additional Pertinent Hx: OA, asthma, DM, HTN, migraine, depression, DVT, pulmonary embolism, cervical fusion    Medical/Treatment Diagnosis Information:  · Diagnosis: Z96.611 s/p reverse TSR R shoulder  · Treatment Diagnosis: recent shoulder surgery R UE; pain, limited ROM and strength    Insurance/Certification information:  PT Insurance Information: [employer]  - $40 copay, no auth, visits BMN  Physician Information:  Referring Provider (secondary): Dr. Tang Mail of care signed (Y/N): inbox    Date of Patient follow up with Physician:  22     Progress Report: []  Yes  [x]  No     Date Range for reporting period:  Beginnin2022  Ending:      Progress report due (10 Rx/or 30 days whichever is less): 90     Recertification due (POC duration/ or 90 days whichever is less):      Visit # POC/Insurance Allowable Auth Needed   3/8- BMN []Yes    [x]No     Functional Outcomes Measure:    Date Assessed: at eval  Test: FOTO  Score:42    Pain level:  2/10     History of Injury: Patient stated complaint: R shoulder reverse TSR on 22.     MD precautions/restrictions:   active and passive fwd flexion and abduction  Limit shoulder extension  Limit ER to 15 degrees  No active IR, adduction (behind back hygiene)       SUBJECTIVE:    22  Tolerated first visit ok.  11  Sore after last Rx until next day. Able to get arm away from side better.            OBJECTIVE:    Observation:    Test measurements:      RESTRICTIONS/PRECAUTIONS:   active and passive fwd flexion and abduction  Limit shoulder extension  Limit ER to 15 degrees  No active IR, adduction (behind back hygiene)    DOS 6/13/22  7/11: 4 wks p/o  8/1: 7 wks p/o  8/15: 9 wks p/o           Exercises/Interventions:   Therapeutic Ex (80837)  Min: 25 Resistance/Reps Notes/Cues     REVERSE TSR on 6/13/22. UE ranger 4 way on floor at elbow ht X 10 ea R With L arm assist   Table slides   flexion   scaption    x 10 R  X 10 R    Pendulum  4 way X 10 R    Biceps curl      standing X 10 R         Seated     UT str for R side 3 x 10 sec  R         AROM:   Pron/sup  Wrist flex/ext     X 10   X 10   X 10          Therapeutic Activity (11488) Min:                     NMR re-education (51546)  Min:2     Shoulder roll  Shoulder shrug  Scapular retraction X 10   X 10   X 10     SL scap mobs AAROM AAROM elevate/depress  Protract/retract x 10 each          Manual Intervention (19867) Min: 8     PROM:  Flex <120  Abd/scap < 90  Ext to neutral  ER to 15  Gentle within precautions               Modalities:  Min: 15      IFC  with CP 4 pads R shoulder  x 15 min  Recliner with pillow under R arm   recliner          Other Therapeutic Activities:  Pt was educated on PT POC, Diagnosis, Prognosis, pathomechanics as well as frequency and duration of scheduling future physical therapy appointments. Time was also taken on this day to answer all patient questions and participation in PT. Reviewed appointment policy in detail with patient and patient verbalized understanding. X 8 min     Home Exercise Program: Patient instructed in the following for HEP:          .   Patient verbalized/demonstrated understanding and was issued written handout.   7/5: pendulum, scap retraction, shoulder shrug/roll, UT str, AROM elbow, wrist, hand,     Therapeutic Exercise and NMR EXR  [x] (38352) Provided verbal/tactile cueing for activities related to strengthening, flexibility, endurance, ROM  for improvements in scapular, scapulothoracic and UE control with self care, reaching, carrying, lifting, house/yardwork, driving/computer work.    [] (64477) Provided verbal/tactile cueing for activities related to improving balance, coordination, kinesthetic sense, posture, motor skill, proprioception  to assist with  scapular, scapulothoracic and UE control with self care, reaching, carrying, lifting, house/yardwork, driving/computer work. Therapeutic Activities:    [x] (41346 or 02856) Provided verbal/tactile cueing for activities related to improving balance, coordination, kinesthetic sense, posture, motor skill, proprioception and motor activation to allow for proper function of scapular, scapulothoracic and UE control with self care, carrying, lifting, driving/computer work.      Home Exercise Program:    [x] (45480) Reviewed/Progressed HEP activities related to strengthening, flexibility, endurance, ROM of scapular, scapulothoracic and UE control with self care, reaching, carrying, lifting, house/yardwork, driving/computer work  [] (58442) Reviewed/Progressed HEP activities related to improving balance, coordination, kinesthetic sense, posture, motor skill, proprioception of scapular, scapulothoracic and UE control with self care, reaching, carrying, lifting, house/yardwork, driving/computer work      Manual Treatments:  PROM / STM / Oscillations-Mobs:  G-I, II, III, IV (PA's, Inf., Post.)  [x] (13962) Provided manual therapy to mobilize soft tissue/joints of cervical/CT, scapular GHJ and UE for the purpose of modulating pain, promoting relaxation,  increasing ROM, reducing/eliminating soft tissue swelling/inflammation/restriction, improving soft tissue extensibility and allowing for proper ROM for normal function with self care, reaching, carrying, lifting, house/yardwork, driving/computer work    Charges:  Timed Code Treatment Minutes: 30   Total Treatment Minutes: 45       [] EVAL (LOW) 50140 (typically 20 minutes face-to-face)  [] EVAL (MOD) 24688 (typically 30 minutes face-to-face)  [] EVAL (HIGH) 59451 arm\"         [x]? Progressing: []? Met: []? Not Met: []? Adjusted    ASSESSMENT:  Tolerated eval well; proceed per protocol/MD precautions   7-11-22 tolerated above ex/ PROM well. Steri strips gone , placed 4 pads with E-stim for more coverage area. 7-13-22 progressing with gentle ex         Treatment/Activity Tolerance:  [x] Patient tolerated treatment well [] Patient limited by fatique  [] Patient limited by pain  [] Patient limited by other medical complications  [] Other:     Overall Progression Towards Functional goals/ Treatment Progress Update:  [] Patient is progressing as expected towards functional goals listed. [] Progression is slowed due to complexities/Impairments listed. [] Progression has been slowed due to co-morbidities. [x] Plan just implemented, too soon to assess goals progression <30days   [] Goals require adjustment due to lack of progress  [] Patient is not progressing as expected and requires additional follow up with physician  [] Other    Prognosis for POC: [x] Good [] Fair  [] Poor    Patient requires continued skilled intervention: [x] Yes  [] No      PLAN: See eval  [] Continue per plan of care [] Alter current plan (see comments)  [x] Plan of care initiated [] Hold pending MD visit [] Discharge    Electronically signed by: Cindy Price, PTA  [id number]    Note: If patient does not return for scheduled/recommended follow up visits, this note will serve as a discharge from care along with the most recent update on progress.

## 2022-07-19 ENCOUNTER — HOSPITAL ENCOUNTER (OUTPATIENT)
Dept: PHYSICAL THERAPY | Age: 72
Setting detail: THERAPIES SERIES
Discharge: HOME | End: 2022-07-19
Payer: MEDICARE

## 2022-07-19 PROCEDURE — G0283 ELEC STIM OTHER THAN WOUND: HCPCS

## 2022-07-19 PROCEDURE — 97112 NEUROMUSCULAR REEDUCATION: CPT

## 2022-07-19 PROCEDURE — 97140 MANUAL THERAPY 1/> REGIONS: CPT

## 2022-07-19 PROCEDURE — 97110 THERAPEUTIC EXERCISES: CPT

## 2022-07-19 NOTE — FLOWSHEET NOTE
[facility]  [address] [place] [address]  Phone: [phone]   Fax:     [phone]        Physical Therapy Treatment Note/ Progress Report:       Date:  2022    Patient Name:  Elisabeth Bueno    :  1950  MRN: [id number]    Pertinent Medical History:Additional Pertinent Hx: OA, asthma, DM, HTN, migraine, depression, DVT, pulmonary embolism, cervical fusion    Medical/Treatment Diagnosis Information:  Diagnosis: Z96.611 s/p reverse TSR R shoulder  Treatment Diagnosis: recent shoulder surgery R UE; pain, limited ROM and strength    Insurance/Certification information:  PT Insurance Information: [employer]  - $40 copay, no auth, visits BMN  Physician Information:  Referring Provider (secondary): Dr. Carmencita Mike of care signed (Y/N): inbox    Date of Patient follow up with Physician:  22     Progress Report: []  Yes  [x]  No     Date Range for reporting period:  Beginnin2022  Ending:      Progress report due (10 Rx/or 30 days whichever is less): 57     Recertification due (POC duration/ or 90 days whichever is less):      Visit # POC/Insurance Allowable Auth Needed    BMN []Yes    [x]No     Functional Outcomes Measure:    Date Assessed: at eval  Test: FOTO  Score:42    Pain level:  0-1/10     History of Injury: Patient stated complaint: R shoulder reverse TSR on 22. MD precautions/restrictions:   active and passive fwd flexion and abduction  Limit shoulder extension  Limit ER to 15 degrees  No active IR, adduction (behind back hygiene)       SUBJECTIVE:    22  Tolerated first visit ok.  11  Sore after last Rx until next day. Able to get arm away from side better.    : was able to finally get a decent night of sleep last night laying on her R side; pain levels in shoulder are very low, no longer taking prescription pain meds; some soreness scapular the following for HEP:          .   Patient verbalized/demonstrated understanding and was issued written handout. 7/5: pendulum, scap retraction, shoulder shrug/roll, UT str, AROM elbow, wrist, hand,   7/19: supine cane press, supine UE flex/abd     Therapeutic Exercise and NMR EXR  [x] (60635) Provided verbal/tactile cueing for activities related to strengthening, flexibility, endurance, ROM  for improvements in scapular, scapulothoracic and UE control with self care, reaching, carrying, lifting, house/yardwork, driving/computer work.    [] (77530) Provided verbal/tactile cueing for activities related to improving balance, coordination, kinesthetic sense, posture, motor skill, proprioception  to assist with  scapular, scapulothoracic and UE control with self care, reaching, carrying, lifting, house/yardwork, driving/computer work. Therapeutic Activities:    [x] (23827 or 91875) Provided verbal/tactile cueing for activities related to improving balance, coordination, kinesthetic sense, posture, motor skill, proprioception and motor activation to allow for proper function of scapular, scapulothoracic and UE control with self care, carrying, lifting, driving/computer work.      Home Exercise Program:    [x] (99434) Reviewed/Progressed HEP activities related to strengthening, flexibility, endurance, ROM of scapular, scapulothoracic and UE control with self care, reaching, carrying, lifting, house/yardwork, driving/computer work  [] (78645) Reviewed/Progressed HEP activities related to improving balance, coordination, kinesthetic sense, posture, motor skill, proprioception of scapular, scapulothoracic and UE control with self care, reaching, carrying, lifting, house/yardwork, driving/computer work      Manual Treatments:  PROM / STM / Oscillations-Mobs:  G-I, II, III, IV (PA's, Inf., Post.)  [x] (67832) Provided manual therapy to mobilize soft tissue/joints of cervical/CT, scapular GHJ and UE for the purpose of modulating pain, promoting relaxation,  increasing ROM, reducing/eliminating soft tissue swelling/inflammation/restriction, improving soft tissue extensibility and allowing for proper ROM for normal function with self care, reaching, carrying, lifting, house/yardwork, driving/computer work    Charges:  Timed Code Treatment Minutes: 45   Total Treatment Minutes: 60       [] EVAL (LOW) 69457 (typically 20 minutes face-to-face)  [] EVAL (MOD) 31823 (typically 30 minutes face-to-face)  [] EVAL (HIGH) 54556 (typically 45 minutes face-to-face)  [] RE-EVAL     [x] EA(81143) x     [] Dry needle 1 or 2 Muscles (85042)  [x] NMR (56395) x     [] Dry needle 3+ Muscles (74425)  [x] Manual (89836) x     [] Ultrasound (36433) x  [] TA (52684) x     [] Mech Traction (32944)  [] ES(attended) (33084)     [x] ES (un) (82329):   [] Vasopump (64439) [] Ionto (13500)   [] Other:     GOALS:  Patient stated goal: \"dec pain\"  [x] Progressing: [] Met: [] Not Met: [] Adjusted     Therapist goals for Patient:   Short Term Goals: To be achieved in: 2 weeks  1. Independent in HEP and progression per patient tolerance, in order to prevent re-injury. [x] Progressing: [] Met: [] Not Met: [] Adjusted  2. Patient will have a decrease in pain by 20-30% to facilitate improvement in movement, function, and ADLs as indicated by Functional Deficits. [x] Progressing: [] Met: [] Not Met: [] Adjusted     Long Term Goals: To be achieved in: at d/c   1. Increase FOTO functional outcome score from 42 to 60 to assist with reaching prior level of function. [x] Progressing: [] Met: [] Not Met: [] Adjusted  2. Patient will have a decrease in pain by 50-60% to facilitate improvement in movement, function, and ADLs as indicated by Functional Deficits. [x] Progressing: [] Met: [] Not Met: [] Adjusted  3.  Patient will demonstrate increased AROM to flex/scap 140, IR/ER to [facility] for ease with self care to allow for proper joint functioning as indicated by Functional Deficits. [x] Progressing: [] Met: [] Not Met: [] Adjusted  4. Patient will demonstrate an increase in NM recruitment/activation and overall GH and scapular strength to 4+/5 - 5/5 for proper functional mobility as indicated by patients Functional Deficits. [x] Progressing: [] Met: [] Not Met: [] Adjusted  5. Patient will return to self care/dressing activities without increased symptoms or restriction. [x] Progressing: [] Met: [] Not Met: [] Adjusted  6. \"full use of arm\"         [x] Progressing: [] Met: [] Not Met: [] Adjusted    ASSESSMENT:  Tolerated eval well; proceed per protocol/MD precautions   7-11-22 tolerated above ex/ PROM well. Steri strips gone , placed 4 pads with E-stim for more coverage area. 7-13-22 progressing with gentle ex  7/19: cont skilled PT for ROM and initiating strength         Treatment/Activity Tolerance:  [x] Patient tolerated treatment well [] Patient limited by fatique  [] Patient limited by pain  [] Patient limited by other medical complications  [] Other:     Overall Progression Towards Functional goals/ Treatment Progress Update:  [] Patient is progressing as expected towards functional goals listed. [] Progression is slowed due to complexities/Impairments listed. [] Progression has been slowed due to co-morbidities.   [x] Plan just implemented, too soon to assess goals progression <30days   [] Goals require adjustment due to lack of progress  [] Patient is not progressing as expected and requires additional follow up with physician  [] Other    Prognosis for POC: [x] Good [] Fair  [] Poor    Patient requires continued skilled intervention: [x] Yes  [] No      PLAN: isometrics  [x] Continue per plan of care [] Alter current plan (see comments)  [] Plan of care initiated [] Hold pending MD visit [] Discharge    Electronically signed by: Govind Pearce, PT  MPT [id number]    Note: If patient does not return for scheduled/recommended follow up visits, this note will serve as a discharge from care along with the most recent update on progress.

## 2022-07-21 ENCOUNTER — HOSPITAL ENCOUNTER (OUTPATIENT)
Dept: PHYSICAL THERAPY | Age: 72
Setting detail: THERAPIES SERIES
Discharge: HOME | End: 2022-07-21
Payer: MEDICARE

## 2022-07-21 PROCEDURE — 97112 NEUROMUSCULAR REEDUCATION: CPT

## 2022-07-21 PROCEDURE — G0283 ELEC STIM OTHER THAN WOUND: HCPCS

## 2022-07-21 PROCEDURE — 97110 THERAPEUTIC EXERCISES: CPT

## 2022-07-21 NOTE — FLOWSHEET NOTE
[facility], [facility]  [address] [place], [facility]  Phone: [phone]   Fax:     [phone]        Physical Therapy Treatment Note/ Progress Report:       Date:  2022    Patient Name:  Barbara Sharma    :  1950  MRN: [id number]    Pertinent Medical History:Additional Pertinent Hx: OA, asthma, DM, HTN, migraine, depression, DVT, pulmonary embolism, cervical fusion    Medical/Treatment Diagnosis Information:  Diagnosis: Z96.611 s/p reverse TSR R shoulder  Treatment Diagnosis: recent shoulder surgery R UE; pain, limited ROM and strength    Insurance/Certification information:  PT Insurance Information: [employer]  - $40 copay, no auth, visits BMN  Physician Information:  Referring Provider (secondary): Dr. Tay Paulson of care signed (Y/N): inbox    Date of Patient follow up with Physician:  22     Progress Report: []  Yes  [x]  No     Date Range for reporting period:  Beginnin2022  Ending:      Progress report due (10 Rx/or 30 days whichever is less): 7/5/15     Recertification due (POC duration/ or 90 days whichever is less):      Visit # POC/Insurance Allowable Auth Needed    BMN []Yes    [x]No     Functional Outcomes Measure:    Date Assessed: at eval  Test: FOTO  Score:42    Pain level:  2-3/10     History of Injury: Patient stated complaint: R shoulder reverse TSR on 22. MD precautions/restrictions:   active and passive fwd flexion and abduction  Limit shoulder extension  Limit ER to 15 degrees  No active IR, adduction (behind back hygiene)       SUBJECTIVE:    22  Tolerated first visit ok.  11  Sore after last Rx until next day. Able to get arm away from side better.    : was able to finally get a decent night of sleep last night laying on her R side; pain levels in shoulder are very low, no longer taking prescription pain meds; some soreness scapular mms from sleeping on R side    7/21: a little sore from the new exercises and just trying to be more active; feels she tenses on R side when using L UE       * send MD BROWN on 8/2          OBJECTIVE:   Observation:   Test measurements:      RESTRICTIONS/PRECAUTIONS:   active and passive fwd flexion and abduction  Limit shoulder extension  Limit ER to 15 degrees  No active IR, adduction (behind back hygiene)    DOS 6/13/22 - generic protocol on [employer]'s desk   7/11: 4 wks p/o  8/1: 7 wks p/o  8/15: 9 wks p/o           Exercises/Interventions:   Therapeutic Ex (56551)  Min: 20 Resistance/Reps Notes/Cues     R REVERSE TSR on 6/13/22     NT ( no ext past hip)  0# x 2 min  Pulley flex  X 2 min  UE ranger 4 way on floor at elbow ht X 10 ea R Table slides   flexion   scaption    x 10 R  X 10 R    Pendulum  4 way X 10 R Cues for inc trunk flexion for improved shoulder mobility   Biceps curl standing X 10 R         Supine:  Cane chest press  Cane ER     1 x 10   X 10 - monitored so keeping within precautions limited to 15    Supine  Shoulder flex  Shoulder abd   SA punch  Hold arm at 90 flex    AAROM 1 x 10   AAROM 1 x 10    Add  add         Seated     UT str for R side 3 x 20 sec  R    Shoulder flex AAROM AAROM with elbow bent 2 x 5     AROM:   Pron/sup  Wrist flex/ext     1# X 10   1# X 10    Grn digiflex all X 10   W/ each finger x 5 each         Therapeutic Activity (71866) Min:                     NMR re-education (10416)  Min:10     Shoulder roll  Shoulder shrug  Scapular retraction X 10   X 10   X 10      Cues to inc ROM   SL scap mobs AAROM AAROM elevate/depress  Protract/retract x 10 each     Isometrics flex, abd, ext in neutral  5 x 5 sec manually   Ext in neutral     Manual Intervention (83779) Min: 5     PROM:  Flex <120  Abd/scap < 90  Ext to neutral  ER to 15  Gentle within precautions               Modalities:  Min: 15      IFC  with CP 4 pads R shoulder  x 15 min  Recliner with pillow under R arm   recliner balance, coordination, kinesthetic sense, posture, motor skill, proprioception of scapular, scapulothoracic and UE control with self care, reaching, carrying, lifting, house/yardwork, driving/computer work      Manual Treatments:  PROM / STM / Oscillations-Mobs:  G-I, II, III, IV (PA's, Inf., Post.)  [x] (05453) Provided manual therapy to mobilize soft tissue/joints of cervical/CT, scapular GHJ and UE for the purpose of modulating pain, promoting relaxation,  increasing ROM, reducing/eliminating soft tissue swelling/inflammation/restriction, improving soft tissue extensibility and allowing for proper ROM for normal function with self care, reaching, carrying, lifting, house/yardwork, driving/computer work    Charges:  Timed Code Treatment Minutes: 35   Total Treatment Minutes: 50       [] EVAL (LOW) 99118 (typically 20 minutes face-to-face)  [] EVAL (MOD) 58250 (typically 30 minutes face-to-face)  [] EVAL (HIGH) 04105 (typically 45 minutes face-to-face)  [] RE-EVAL     [x] OL(25253) x     [] Dry needle 1 or 2 Muscles (76159)  [x] NMR (98020) x     [] Dry needle 3+ Muscles (68897)  [] Manual (27473) x     [] Ultrasound (84888) x  [] TA (18827) x     [] Mech Traction (12369)  [] ES(attended) (51996)     [x] ES (un) (98344):   [] Vasopump (51889) [] Ionto (28937)   [] Other:     GOALS:  Patient stated goal: \"dec pain\"  [x] Progressing: [] Met: [] Not Met: [] Adjusted     Therapist goals for Patient:   Short Term Goals: To be achieved in: 2 weeks  1. Independent in HEP and progression per patient tolerance, in order to prevent re-injury. [x] Progressing: [] Met: [] Not Met: [] Adjusted  2. Patient will have a decrease in pain by 20-30% to facilitate improvement in movement, function, and ADLs as indicated by Functional Deficits. [x] Progressing: [] Met: [] Not Met: [] Adjusted     Long Term Goals: To be achieved in: at d/c   1.  Increase FOTO functional outcome score from 42 to 60 to assist with reaching prior level of function. [x] Progressing: [] Met: [] Not Met: [] Adjusted  2. Patient will have a decrease in pain by 50-60% to facilitate improvement in movement, function, and ADLs as indicated by Functional Deficits. [x] Progressing: [] Met: [] Not Met: [] Adjusted  3. Patient will demonstrate increased AROM to flex/scap 140, IR/ER to [place] for ease with self care to allow for proper joint functioning as indicated by Functional Deficits. [x] Progressing: [] Met: [] Not Met: [] Adjusted  4. Patient will demonstrate an increase in NM recruitment/activation and overall GH and scapular strength to 4+/5 - 5/5 for proper functional mobility as indicated by patients Functional Deficits. [x] Progressing: [] Met: [] Not Met: [] Adjusted  5. Patient will return to self care/dressing activities without increased symptoms or restriction. [x] Progressing: [] Met: [] Not Met: [] Adjusted  6. \"full use of arm\"         [x] Progressing: [] Met: [] Not Met: [] Adjusted    ASSESSMENT:  Tolerated eval well; proceed per protocol/MD precautions   7-11-22 tolerated above ex/ PROM well. Steri strips gone , placed 4 pads with E-stim for more coverage area. 7-13-22 progressing with gentle ex  7/19: cont skilled PT for ROM and initiating strength         Treatment/Activity Tolerance:  [x] Patient tolerated treatment well [] Patient limited by fatique  [] Patient limited by pain  [] Patient limited by other medical complications  [] Other:     Overall Progression Towards Functional goals/ Treatment Progress Update:  [] Patient is progressing as expected towards functional goals listed. [] Progression is slowed due to complexities/Impairments listed. [] Progression has been slowed due to co-morbidities.   [x] Plan just implemented, too soon to assess goals progression <30days   [] Goals require adjustment due to lack of progress  [] Patient is not progressing as expected and requires additional follow up with physician  [] Other    Prognosis for POC: [x] Good [] Fair  [] Poor    Patient requires continued skilled intervention: [x] Yes  [] No      PLAN: Progress per protocol   [x] Continue per plan of care [] Alter current plan (see comments)  [] Plan of care initiated [] Hold pending MD visit [] Discharge    Electronically signed by: Jose Juan Sewell, PT  [id number] [id number]    Note: If patient does not return for scheduled/recommended follow up visits, this note will serve as a discharge from care along with the most recent update on progress.

## 2022-07-25 ENCOUNTER — HOSPITAL ENCOUNTER (OUTPATIENT)
Dept: PHYSICAL THERAPY | Age: 72
Setting detail: THERAPIES SERIES
Discharge: HOME | End: 2022-07-25
Payer: MEDICARE

## 2022-07-25 PROCEDURE — G0283 ELEC STIM OTHER THAN WOUND: HCPCS

## 2022-07-25 PROCEDURE — 97110 THERAPEUTIC EXERCISES: CPT

## 2022-07-25 PROCEDURE — 97112 NEUROMUSCULAR REEDUCATION: CPT

## 2022-07-25 NOTE — FLOWSHEET NOTE
[facility] [place] [facility], [facility]  [address] [place], [facility]  Phone: [phone]   Fax:     [phone]        Physical Therapy Treatment Note/ Progress Report:       Date:  2022    Patient Name:  Josey Camarillo    :  1950  MRN: [id number]    Pertinent Medical History:Additional Pertinent Hx: OA, asthma, DM, HTN, migraine, depression, DVT, pulmonary embolism, cervical fusion    Medical/Treatment Diagnosis Information:  Diagnosis: Z96.611 s/p reverse TSR R shoulder  Treatment Diagnosis: recent shoulder surgery R UE; pain, limited ROM and strength    Insurance/Certification information:  PT Insurance Information: [employer]  - $40 copay, no auth, visits BMN  Physician Information:  Referring Provider (secondary): Dr. Patrick Freire of care signed (Y/N): inbox    Date of Patient follow up with Physician:  22     Progress Report: []  Yes  [x]  No     Date Range for reporting period:  Beginnin2022  Ending:      Progress report due (10 Rx/or 30 days whichever is less): 45     Recertification due (POC duration/ or 90 days whichever is less):      Visit # POC/Insurance Allowable Auth Needed    BMN []Yes    [x]No     Functional Outcomes Measure:    Date Assessed: at eval  Test: FOTO  Score:42    Pain level: 0/10     History of Injury: Patient stated complaint: R shoulder reverse TSR on 22. MD precautions/restrictions:   active and passive fwd flexion and abduction  Limit shoulder extension  Limit ER to 15 degrees  No active IR, adduction (behind back hygiene)       SUBJECTIVE:    22  Tolerated first visit ok.  11  Sore after last Rx until next day. Able to get arm away from side better.    : was able to finally get a decent night of sleep last night laying on her R side; pain levels in shoulder are very low, no longer taking prescription pain meds; some soreness scapular mms from sleeping on R side    7/21: a little sore from the new exercises and just trying to be more active; feels she tenses on R side when using L UE   7-25-22  sore after PT the remainder of the day. Sore this am,  was in hospital , so did a little more around the house.        * send MD BROWN on 8/2          OBJECTIVE:   Observation:   Test measurements:      RESTRICTIONS/PRECAUTIONS:   active and passive fwd flexion and abduction  Limit shoulder extension  Limit ER to 15 degrees  No active IR, adduction (behind back hygiene)    DOS 6/13/22 - generic protocol on [employer]'s desk   7/11: 4 wks p/o  8/1: 7 wks p/o  8/15: 9 wks p/o           Exercises/Interventions:   Therapeutic Ex (56047)  Min: 20 Resistance/Reps Notes/Cues     R REVERSE TSR on 6/13/22     NT ( not ext past hip)  0# x 2 min  Pulley flex  X 2 min  UE ranger 4 way on floor at elbow ht X 10 ea R Table slides   flexion   scaption    x 10 R  X 10 R    Pendulum  4 way X 10 R Cues for inc trunk flexion for improved shoulder mobility   Biceps curl standing X 10 R         Supine:  Cane chest press  Cane ER     1 x 10   X 10 - monitored so keeping within precautions limited to 15    Supine  Shoulder flex  Shoulder abd   SA punch  Hold arm at 90 flex    AAROM 1 x 10   AAROM 1 x 10    X 10 CGA  10 sec x 3 CGA         Seated     UT str for R side 3 x 20 sec  R    Shoulder flex AAROM AAROM with elbow bent 2 x 5     AROM:   Pron/sup  Wrist flex/ext     1# X 10 R  1# X 10 R   Grn digiflex all X 10   W/ each finger x 5 each         Therapeutic Activity (97486) Min:                     NMR re-education (39426)  Min:10     Shoulder roll  Shoulder shrug  Scapular retraction X 10   X 10   X 10      Cues to inc ROM   SL scap mobs AAROM AAROM elevate/depress  Protract/retract x 10 each     Isometrics flex, abd, ext in neutral  5 x 5 sec manually   Ext in neutral     Manual Intervention (38578) Min: 5     PROM:  Flex <120  Abd/scap < 90  Ext to neutral  ER to 15  Gentle within precautions               Modalities:  Min: 15      IFC  with CP 4 pads R shoulder  x 15 min  Recliner with pillow under R arm   recliner          Other Therapeutic Activities:  Pt was educated on PT POC, Diagnosis, Prognosis, pathomechanics as well as frequency and duration of scheduling future physical therapy appointments. Time was also taken on this day to answer all patient questions and participation in PT. Reviewed appointment policy in detail with patient and patient verbalized understanding. X 8 min     Home Exercise Program: Patient instructed in the following for HEP:          .   Patient verbalized/demonstrated understanding and was issued written handout. 7/5: pendulum, scap retraction, shoulder shrug/roll, UT str, AROM elbow, wrist, hand,   7/19: supine cane press, supine UE flex/abd     Therapeutic Exercise and NMR EXR  [x] (03122) Provided verbal/tactile cueing for activities related to strengthening, flexibility, endurance, ROM  for improvements in scapular, scapulothoracic and UE control with self care, reaching, carrying, lifting, house/yardwork, driving/computer work.    [] (70502) Provided verbal/tactile cueing for activities related to improving balance, coordination, kinesthetic sense, posture, motor skill, proprioception  to assist with  scapular, scapulothoracic and UE control with self care, reaching, carrying, lifting, house/yardwork, driving/computer work. Therapeutic Activities:    [x] (13053 or 47977) Provided verbal/tactile cueing for activities related to improving balance, coordination, kinesthetic sense, posture, motor skill, proprioception and motor activation to allow for proper function of scapular, scapulothoracic and UE control with self care, carrying, lifting, driving/computer work.      Home Exercise Program:    [x] (19906) Reviewed/Progressed HEP activities related to strengthening, flexibility, endurance, ROM of scapular, scapulothoracic and UE control with self care, reaching, carrying, lifting, house/yardwork, driving/computer work  [] (33275) Reviewed/Progressed HEP activities related to improving balance, coordination, kinesthetic sense, posture, motor skill, proprioception of scapular, scapulothoracic and UE control with self care, reaching, carrying, lifting, house/yardwork, driving/computer work      Manual Treatments:  PROM / STM / Oscillations-Mobs:  G-I, II, III, IV (PA's, Inf., Post.)  [x] (72686) Provided manual therapy to mobilize soft tissue/joints of cervical/CT, scapular GHJ and UE for the purpose of modulating pain, promoting relaxation,  increasing ROM, reducing/eliminating soft tissue swelling/inflammation/restriction, improving soft tissue extensibility and allowing for proper ROM for normal function with self care, reaching, carrying, lifting, house/yardwork, driving/computer work    Charges:  Timed Code Treatment Minutes: 40   Total Treatment Minutes: 55       [] EVAL (LOW) 24538 (typically 20 minutes face-to-face)  [] EVAL (MOD) 92287 (typically 30 minutes face-to-face)  [] EVAL (HIGH) 30002 (typically 45 minutes face-to-face)  [] RE-EVAL     [x] HO(02626) x  2   [] Dry needle 1 or 2 Muscles (40953)  [x] NMR (04333) x     [] Dry needle 3+ Muscles (68368)  [] Manual (55673) x     [] Ultrasound (11933) x  [] TA (83993) x     [] Mech Traction (55813)  [] ES(attended) (31396)     [x] ES (un) (51761):   [] Vasopump (88225) [] Ionto (91275)   [] Other:     GOALS:  Patient stated goal: \"dec pain\"  [x] Progressing: [] Met: [] Not Met: [] Adjusted     Therapist goals for Patient:   Short Term Goals: To be achieved in: 2 weeks  1. Independent in HEP and progression per patient tolerance, in order to prevent re-injury. [x] Progressing: [] Met: [] Not Met: [] Adjusted  2. Patient will have a decrease in pain by 20-30% to facilitate improvement in movement, function, and ADLs as indicated by Functional Deficits.   [x] Progressing: [] Met: [] Not Met: [] Adjusted     Long Term Goals: To be achieved in: at d/c   1. Increase FOTO functional outcome score from 42 to 60 to assist with reaching prior level of function. [x] Progressing: [] Met: [] Not Met: [] Adjusted  2. Patient will have a decrease in pain by 50-60% to facilitate improvement in movement, function, and ADLs as indicated by Functional Deficits. [x] Progressing: [] Met: [] Not Met: [] Adjusted  3. Patient will demonstrate increased AROM to flex/scap 140, IR/ER to [facility] for ease with self care to allow for proper joint functioning as indicated by Functional Deficits. [x] Progressing: [] Met: [] Not Met: [] Adjusted  4. Patient will demonstrate an increase in NM recruitment/activation and overall GH and scapular strength to 4+/5 - 5/5 for proper functional mobility as indicated by patients Functional Deficits. [x] Progressing: [] Met: [] Not Met: [] Adjusted  5. Patient will return to self care/dressing activities without increased symptoms or restriction. [x] Progressing: [] Met: [] Not Met: [] Adjusted  6. \"full use of arm\"         [x] Progressing: [] Met: [] Not Met: [] Adjusted    ASSESSMENT:  Tolerated eval well; proceed per protocol/MD precautions   7-11-22 tolerated above ex/ PROM well. Steri strips gone , placed 4 pads with E-stim for more coverage area. 7-13-22 progressing with gentle ex  7/19: cont skilled PT for ROM and initiating strength         Treatment/Activity Tolerance:  [x] Patient tolerated treatment well [] Patient limited by fatique  [] Patient limited by pain  [] Patient limited by other medical complications  [] Other:     Overall Progression Towards Functional goals/ Treatment Progress Update:  [] Patient is progressing as expected towards functional goals listed. [] Progression is slowed due to complexities/Impairments listed. [] Progression has been slowed due to co-morbidities.   [x] Plan just implemented, too soon to assess goals progression <30days   [] Goals require adjustment due to lack of progress  [] Patient is not progressing as expected and requires additional follow up with physician  [] Other    Prognosis for POC: [x] Good [] Fair  [] Poor    Patient requires continued skilled intervention: [x] Yes  [] No      PLAN: Progress per protocol   [x] Continue per plan of care [] Alter current plan (see comments)  [] Plan of care initiated [] Hold pending MD visit [] Discharge    Electronically signed by: Bora Ramos,     Note: If patient does not return for scheduled/recommended follow up visits, this note will serve as a discharge from care along with the most recent update on progress.

## 2022-07-27 ENCOUNTER — HOSPITAL ENCOUNTER (OUTPATIENT)
Dept: PHYSICAL THERAPY | Age: 72
Setting detail: THERAPIES SERIES
Discharge: HOME | End: 2022-07-27
Payer: MEDICARE

## 2022-07-27 PROCEDURE — 97110 THERAPEUTIC EXERCISES: CPT

## 2022-07-27 PROCEDURE — G0283 ELEC STIM OTHER THAN WOUND: HCPCS

## 2022-07-27 PROCEDURE — 97112 NEUROMUSCULAR REEDUCATION: CPT

## 2022-07-27 NOTE — FLOWSHEET NOTE
soreness scapular mms from sleeping on R side    7/21: a little sore from the new exercises and just trying to be more active; feels she tenses on R side when using L UE   7-25-22  sore after PT the remainder of the day. Sore this am,  was in hospital , so did a little more around the house. 7-17-22 felt good after last Rx.        * send MD BROWN on 8/2          OBJECTIVE:   Observation:   Test measurements:      RESTRICTIONS/PRECAUTIONS:   active and passive fwd flexion and abduction  Limit shoulder extension  Limit ER to 15 degrees  No active IR, adduction (behind back hygiene)    DOS 6/13/22 - generic protocol on [employer]'s desk   7/11: 4 wks p/o  8/1: 7 wks p/o  8/15: 9 wks p/o           Exercises/Interventions:   Therapeutic Ex (99519)  Min: 20 Resistance/Reps Notes/Cues     R REVERSE TSR on 6/13/22     NT ( not ext past hip)  0# x 2 min  Pulley flex  X 2 min  UE ranger 4 way on floor at elbow ht X 10 ea R Table slides   flexion   scaption    x 10 R  X 10 R    Pendulum  4 way X 10 R Cues for inc trunk flexion for improved shoulder mobility   Biceps curl standing X 10 R         Supine:  Cane chest press  Cane ER     1 x 10 B  X 10 - monitored so keeping within precautions limited to 15    Supine  Shoulder flex  Shoulder abd   SA punch  Hold arm at 90 flex    AAROM 1 x 10   AAROM 1 x 10    X 10 CGA  10 sec x 3 CGA         Seated     UT str for R side 3 x 20 sec  R    Shoulder flex AAROM AAROM with elbow bent  x 10     AROM:   Pron/sup  Wrist flex/ext     1# X 10 R  1# X 10 R   Grn digiflex all X 10   W/ each finger x 5 each         Therapeutic Activity (55881) Min:                     NMR re-education (26853)  Min:10     Shoulder roll  Shoulder shrug  Scapular retraction X 10   X 10   X 10      Cues to inc ROM   SL scap mobs AAROM AAROM elevate/depress  Protract/retract x 10 each     Isometrics flex, abd, ext in neutral  5 x 5 sec manually   Ext in neutral          Manual Intervention (60851) Min: 5 PROM:  Flex <120  Abd/scap < 90  Ext to neutral  ER to 15  Gentle within precautions               Modalities:  Min: 15      IFC  with CP 4 pads R shoulder  x 15 min  Recliner with pillow under R arm   recliner          Other Therapeutic Activities:  Pt was educated on PT POC, Diagnosis, Prognosis, pathomechanics as well as frequency and duration of scheduling future physical therapy appointments. Time was also taken on this day to answer all patient questions and participation in PT. Reviewed appointment policy in detail with patient and patient verbalized understanding. X 8 min     Home Exercise Program: Patient instructed in the following for HEP:          .   Patient verbalized/demonstrated understanding and was issued written handout. 7/5: pendulum, scap retraction, shoulder shrug/roll, UT str, AROM elbow, wrist, hand,   7/19: supine cane press, supine UE flex/abd     Therapeutic Exercise and NMR EXR  [x] (77406) Provided verbal/tactile cueing for activities related to strengthening, flexibility, endurance, ROM  for improvements in scapular, scapulothoracic and UE control with self care, reaching, carrying, lifting, house/yardwork, driving/computer work.    [] (67580) Provided verbal/tactile cueing for activities related to improving balance, coordination, kinesthetic sense, posture, motor skill, proprioception  to assist with  scapular, scapulothoracic and UE control with self care, reaching, carrying, lifting, house/yardwork, driving/computer work. Therapeutic Activities:    [x] (31818 or 01628) Provided verbal/tactile cueing for activities related to improving balance, coordination, kinesthetic sense, posture, motor skill, proprioception and motor activation to allow for proper function of scapular, scapulothoracic and UE control with self care, carrying, lifting, driving/computer work.      Home Exercise Program:    [x] (14655) Reviewed/Progressed HEP activities related to strengthening, flexibility, endurance, ROM of scapular, scapulothoracic and UE control with self care, reaching, carrying, lifting, house/yardwork, driving/computer work  [] (88628) Reviewed/Progressed HEP activities related to improving balance, coordination, kinesthetic sense, posture, motor skill, proprioception of scapular, scapulothoracic and UE control with self care, reaching, carrying, lifting, house/yardwork, driving/computer work      Manual Treatments:  PROM / STM / Oscillations-Mobs:  G-I, II, III, IV (PA's, Inf., Post.)  [x] (00728) Provided manual therapy to mobilize soft tissue/joints of cervical/CT, scapular GHJ and UE for the purpose of modulating pain, promoting relaxation,  increasing ROM, reducing/eliminating soft tissue swelling/inflammation/restriction, improving soft tissue extensibility and allowing for proper ROM for normal function with self care, reaching, carrying, lifting, house/yardwork, driving/computer work    Charges:  Timed Code Treatment Minutes: 40   Total Treatment Minutes: 55       [] EVAL (LOW) 84221 (typically 20 minutes face-to-face)  [] EVAL (MOD) 20072 (typically 30 minutes face-to-face)  [] EVAL (HIGH) 83615 (typically 45 minutes face-to-face)  [] RE-EVAL     [x] HP(48960) x  2   [] Dry needle 1 or 2 Muscles (50433)  [x] NMR (36767) x     [] Dry needle 3+ Muscles (69685)  [] Manual (26154) x     [] Ultrasound (19735) x  [] TA (36031) x     [] Mech Traction (87560)  [] ES(attended) (03511)     [x] ES (un) (70457):   [] Vasopump (26446) [] Ionto (37780)   [] Other:     GOALS:  Patient stated goal: \"dec pain\"  [x] Progressing: [] Met: [] Not Met: [] Adjusted     Therapist goals for Patient:   Short Term Goals: To be achieved in: 2 weeks  1. Independent in HEP and progression per patient tolerance, in order to prevent re-injury. [x] Progressing: [] Met: [] Not Met: [] Adjusted  2.  Patient will have a decrease in pain by 20-30% to facilitate improvement in movement, function, and ADLs as indicated by Functional Deficits. [x] Progressing: [] Met: [] Not Met: [] Adjusted     Long Term Goals: To be achieved in: at d/c   1. Increase FOTO functional outcome score from 42 to 60 to assist with reaching prior level of function. [x] Progressing: [] Met: [] Not Met: [] Adjusted  2. Patient will have a decrease in pain by 50-60% to facilitate improvement in movement, function, and ADLs as indicated by Functional Deficits. [x] Progressing: [] Met: [] Not Met: [] Adjusted  3. Patient will demonstrate increased AROM to flex/scap 140, IR/ER to [facility] for ease with self care to allow for proper joint functioning as indicated by Functional Deficits. [x] Progressing: [] Met: [] Not Met: [] Adjusted  4. Patient will demonstrate an increase in NM recruitment/activation and overall GH and scapular strength to 4+/5 - 5/5 for proper functional mobility as indicated by patients Functional Deficits. [x] Progressing: [] Met: [] Not Met: [] Adjusted  5. Patient will return to self care/dressing activities without increased symptoms or restriction. [x] Progressing: [] Met: [] Not Met: [] Adjusted  6. \"full use of arm\"         [x] Progressing: [] Met: [] Not Met: [] Adjusted    ASSESSMENT:  Tolerated eval well; proceed per protocol/MD precautions   7-11-22 tolerated above ex/ PROM well. Steri strips gone , placed 4 pads with E-stim for more coverage area. 7-13-22 progressing with gentle ex  7/19: cont skilled PT for ROM and initiating strength  7-27-22 tolerating above ex well. Gradually progressing with AAROM. Treatment/Activity Tolerance:  [x] Patient tolerated treatment well [] Patient limited by fatique  [] Patient limited by pain  [] Patient limited by other medical complications  [] Other:     Overall Progression Towards Functional goals/ Treatment Progress Update:  [] Patient is progressing as expected towards functional goals listed.     [] Progression is slowed due to complexities/Impairments listed. [] Progression has been slowed due to co-morbidities. [x] Plan just implemented, too soon to assess goals progression <30days   [] Goals require adjustment due to lack of progress  [] Patient is not progressing as expected and requires additional follow up with physician  [] Other    Prognosis for POC: [x] Good [] Fair  [] Poor    Patient requires continued skilled intervention: [x] Yes  [] No      PLAN: Progress per protocol   [x] Continue per plan of care [] Alter current plan (see comments)  [] Plan of care initiated [] Hold pending MD visit [] Discharge    Electronically signed by: Shazia Ramirez,     Note: If patient does not return for scheduled/recommended follow up visits, this note will serve as a discharge from care along with the most recent update on progress.

## 2022-08-02 ENCOUNTER — OFFICE VISIT (OUTPATIENT)
Dept: ORTHOPEDIC SURGERY | Age: 72
End: 2022-08-02

## 2022-08-02 ENCOUNTER — HOSPITAL ENCOUNTER (OUTPATIENT)
Dept: PHYSICAL THERAPY | Age: 72
Setting detail: THERAPIES SERIES
Discharge: HOME | End: 2022-08-02
Payer: MEDICARE

## 2022-08-02 VITALS — WEIGHT: 178 LBS | HEIGHT: 63 IN | BODY MASS INDEX: 31.54 KG/M2

## 2022-08-02 DIAGNOSIS — Z96.611 STATUS POST REVERSE TOTAL REPLACEMENT OF RIGHT SHOULDER: Primary | ICD-10-CM

## 2022-08-02 PROCEDURE — 99024 POSTOP FOLLOW-UP VISIT: CPT | Performed by: ORTHOPAEDIC SURGERY

## 2022-08-02 PROCEDURE — 97112 NEUROMUSCULAR REEDUCATION: CPT

## 2022-08-02 PROCEDURE — 97530 THERAPEUTIC ACTIVITIES: CPT

## 2022-08-02 PROCEDURE — 97110 THERAPEUTIC EXERCISES: CPT

## 2022-08-02 PROCEDURE — G0283 ELEC STIM OTHER THAN WOUND: HCPCS

## 2022-08-02 RX ORDER — OXYCODONE HYDROCHLORIDE 5 MG/1
5-10 TABLET ORAL EVERY 8 HOURS PRN
Qty: 30 TABLET | Refills: 0 | Status: SHIPPED | OUTPATIENT
Start: 2022-08-02 | End: 2022-08-09

## 2022-08-02 NOTE — PROGRESS NOTES
Albert Greer  [id number]  August 2, 2022    Chief Complaint   Patient presents with    Post-Op Check     6/13/22 right reverse TSA       History: The patient is here in follow up regarding the right reverse TSA performed approximately ago. She was progressing rather well. She did have a fall getting out of her vehicle in the parking lot today. She landed onto her right elbow. She noted increased right shoulder pain after the fall. She has been intermittently taking oxycodone. The patient's  past medical history, medications, allergies,  family history, social history, and review of systems have been reviewed, and dated and are recorded in the chart. Vitals:  Ht 5' 3\" (1.6 m)   Wt 178 lb (80.7 kg)   BMI 31.53 kg/m²       Physical:   Ms. Albert Greer appears well, and is in no apparent distress, She demonstrates appropriate mood & affect. She is alert and oriented to person, place and time. The patient has moderate swelling. The patient is neurovascularly intact distally. Sensation is intact in the axillary nerve distribution. There is no evidence of DVT. right shoulder passive range of motion: 90 degrees abduction, 100 degrees forward flexion, 30 degrees external rotation. The patient has minimal pain with light range of motion of the shoulder. The incisions are clean, dry and intact and without erythema. The patient does have a small superficial abrasion over the posterior aspect of the right elbow. She is able to flex and extend the right elbow without difficulty. Imaging: 3 views of the right shoulder were obtained and extensively reviewed in the office today. They show good alignment of the prosthesis. No loosening of components, fractures, or dislocation. Impression: right Reverse Total Shoulder Arthroplasty    Plan: At this time we will get the patient into an outpatient therapy program.  The patient rodrick work aggressively on ROM and strengthening.   She was given a prescription for oxycodone. She may gradually begin to perform some hygiene activities behind her back.   Orders Placed This Encounter   Procedures    XR SHOULDER RIGHT (MIN 2 VIEWS)     Standing Status:   Future     Number of Occurrences:   1     Standing Expiration Date:   8/2/2023     Scheduling Instructions:      Rm 21      Yanique, Y, axillary     Order Specific Question:   Reason for exam:     Answer:   pain    R [address]     Referral Priority:   Routine     Referral Type:   Eval and Treat     Referral Reason:   Specialty Services Required     Requested Specialty:   Physical Therapist     Number of Visits Requested:   1

## 2022-08-02 NOTE — FLOWSHEET NOTE
[facility], [facility]  [address] [facility], [facility]  Phone: [phone]   Fax:     [phone]          Physical Therapy Re-Certification Plan of Care/MD UPDATE      Dear Dr. Gamaliel Lovell ,    We had the pleasure of treating the following patient for physical therapy services at [address]. A summary of our findings can be found in the updated assessment below. This includes our plan of care. If you have any questions or concerns regarding these findings, please do not hesitate to contact me at the office phone number checked above. Thank you for the referral.     Physician Signature:________________________________Date:__________________  By signing above (or electronic signature), therapists plan is approved by physician    Date Range Of Visits: 22-22  Total Visits to Date: 8  Overall Response to Treatment:   [x]Patient is responding well to treatment and improvement is noted with regards  to goals   []Patient should continue to improve in reasonable time if they continue HEP   []Patient has plateaued and is no longer responding to skilled PT intervention    []Patient is getting worse and would benefit from return to referring MD   []Patient unable to adhere to initial POC   [x]Other:   Updated :  Pain ranges from 2-4/10 with sleeping and PT/hep   Self care improving (showering, dressing) and able to clover light housework (unload )   Hep 1 x per day   Overall 30% improved   MMT: flex and abd 3-/5   ROM per protocol/precautions: ER 15, flex 120, abd 90     Recommendation:    [x]Continue PT 2x / wk for 4-6 weeks.     []Hold PT, pending MD visit      Physical Therapy Treatment Note/ Progress Report:       Date:  2022    Patient Name:  Chloe Sorensen    :  1950  MRN: [id number]    Pertinent Medical History:Additional Pertinent Hx: OA, asthma, DM, HTN, migraine, depression, DVT, pulmonary embolism, cervical fusion    Medical/Treatment Diagnosis Information:  Diagnosis: Z96.611 s/p reverse TSR R shoulder  Treatment Diagnosis: recent shoulder surgery R UE; pain, limited ROM and strength    Insurance/Certification information:  PT Insurance Information: [employer]  - $40 copay, no auth, visits BMN  Physician Information:  Referring Provider (secondary): Dr. Silvia Nguyen of care signed (Y/N): yes    Date of Patient follow up with Physician:  22     Progress Report: [x]  Yes -  []  No     Date Range for reporting period:  Beginnin2022  Ending:      Progress report due (10 Rx/or 30 days whichever is less):      Recertification due (POC duration/ or 90 days whichever is less):      Visit # POC/Insurance Allowable Auth Needed    BMN []Yes    [x]No     Functional Outcomes Measure:    Date Assessed: at eval  Test: FOTO  Score:42    Pain level: 2/10 With movement 3-410    History of Injury: Patient stated complaint: R shoulder reverse TSR on 22. MD precautions/restrictions:   active and passive fwd flexion and abduction  Limit shoulder extension  Limit ER to 15 degrees  No active IR, adduction (behind back hygiene)       SUBJECTIVE:    22  Tolerated first visit ok.  11  Sore after last Rx until next day. Able to get arm away from side better. : was able to finally get a decent night of sleep last night laying on her R side; pain levels in shoulder are very low, no longer taking prescription pain meds; some soreness scapular mms from sleeping on R side    : a little sore from the new exercises and just trying to be more active; feels she tenses on R side when using L UE   22  sore after PT the remainder of the day. Sore this am,  was in hospital , so did a little more around the house.    22 felt good after last Rx.   : a little sore from sleeping; see goal reassessment             OBJECTIVE: Observation:   Test measurements:      RESTRICTIONS/PRECAUTIONS:   active and passive fwd flexion and abduction  Limit shoulder extension  Limit ER to 15 degrees  No active IR, adduction (behind back hygiene)    DOS 6/13/22 - generic protocol on [employer]'s desk   7/11: 4 wks p/o  8/1: 7 wks p/o  8/15: 9 wks p/o           Exercises/Interventions:   Therapeutic Ex (27473)  Min: 20 Resistance/Reps Notes/Cues     R REVERSE TSR on 6/13/22     NT ( not ext past hip)  0# x 2 min  Pulley flex  X 2 min  UE ranger 4 way on floor at elbow ht X 10 ea R Table slides   flexion   scaption    x 10 R  X 10 R    Pendulum  4 way X 10 R Cues for inc trunk flexion for improved shoulder mobility   Biceps curl standing X 10 R         Supine:  Cane chest press  Cane ER     1 x 10 B  X 10 - monitored so keeping within precautions limited to 15    Supine  Shoulder flex  Shoulder abd   SA punch   AROM 1 x 10   AAROM 1 x 10    X 10 CGA         Seated     UT str for R side Hep    Shoulder flex AAROM  Shoulder ER AAROM AAROM with elbow bent  x 10  AAROM x 10      AROM:   Pron/sup  Wrist flex/ext     1# X 10 R  1# X 10 R   Grn digiflex all X 10   W/ each finger x 5 each         Therapeutic Activity (35299) Min: 10 See below                    NMR re-education (35988)  Min:10     Shoulder roll  Shoulder shrug  Scapular retraction X 10   X 10   X 10      Cues to inc ROM   SL scap mobs AAROM AAROM elevate/depress  Protract/retract x 10 each     Supine RS with arm 90  3 x 10 sec     Isometrics flex, abd, ext in neutral  5 x 5 sec manually   Ext in neutral          Manual Intervention (21905) Min: 5     PROM:  Flex <120  Abd/scap < 90  Ext to neutral  ER to 15  Gentle within precautions               Modalities:  Min: 15      IFC  with CP 4 pads R shoulder  x 15 min  Recliner with pillow under R arm   recliner          Other Therapeutic Activities:  Pt was educated on PT POC, Diagnosis, Prognosis, pathomechanics as well as frequency and duration of scheduling future physical therapy appointments. Time was also taken on this day to answer all patient questions and participation in PT. Reviewed appointment policy in detail with patient and patient verbalized understanding. X 8 min     Home Exercise Program: Patient instructed in the following for HEP:          .   Patient verbalized/demonstrated understanding and was issued written handout. 7/5: pendulum, scap retraction, shoulder shrug/roll, UT str, AROM elbow, wrist, hand,   7/19: supine cane press, supine UE flex/abd     Therapeutic Exercise and NMR EXR  [x] (92442) Provided verbal/tactile cueing for activities related to strengthening, flexibility, endurance, ROM  for improvements in scapular, scapulothoracic and UE control with self care, reaching, carrying, lifting, house/yardwork, driving/computer work.    [] (77837) Provided verbal/tactile cueing for activities related to improving balance, coordination, kinesthetic sense, posture, motor skill, proprioception  to assist with  scapular, scapulothoracic and UE control with self care, reaching, carrying, lifting, house/yardwork, driving/computer work. Therapeutic Activities:    [x] (87714 or 88581) Provided verbal/tactile cueing for activities related to improving balance, coordination, kinesthetic sense, posture, motor skill, proprioception and motor activation to allow for proper function of scapular, scapulothoracic and UE control with self care, carrying, lifting, driving/computer work.      Home Exercise Program:    [x] (28263) Reviewed/Progressed HEP activities related to strengthening, flexibility, endurance, ROM of scapular, scapulothoracic and UE control with self care, reaching, carrying, lifting, house/yardwork, driving/computer work  [] (25458) Reviewed/Progressed HEP activities related to improving balance, coordination, kinesthetic sense, posture, motor skill, proprioception of scapular, scapulothoracic and UE control with self care, reaching, carrying, lifting, house/yardwork, driving/computer work      Manual Treatments:  PROM / STM / Oscillations-Mobs:  G-I, II, III, IV (PA's, Inf., Post.)  [x] (81578) Provided manual therapy to mobilize soft tissue/joints of cervical/CT, scapular GHJ and UE for the purpose of modulating pain, promoting relaxation,  increasing ROM, reducing/eliminating soft tissue swelling/inflammation/restriction, improving soft tissue extensibility and allowing for proper ROM for normal function with self care, reaching, carrying, lifting, house/yardwork, driving/computer work    Charges:  Timed Code Treatment Minutes: 45   Total Treatment Minutes: 60       [] EVAL (LOW) 56828 (typically 20 minutes face-to-face)  [] EVAL (MOD) 12910 (typically 30 minutes face-to-face)  [] EVAL (HIGH) 59579 (typically 45 minutes face-to-face)  [] RE-EVAL     [x] UC(16549) x   [] Dry needle 1 or 2 Muscles (72377)  [x] NMR (47566) x     [] Dry needle 3+ Muscles (54443)  [] Manual (47269) x     [] Ultrasound (30235) x  [x] TA (56559) x     [] Mech Traction (16333)  [] ES(attended) (35512)     [x] ES (un) (75894):   [] Vasopump (36110) [] Ionto (98523)   [] Other:     GOALS:  Updated 8/2:  Pain ranges from 2-4/10 with sleeping and PT/hep   Self care improving (showering, dressing) and able to clover light housework (unload )   Hep 1 x per day   Overall 30% improved   MMT: flex and abd 3-/5   ROM per protocol: ER 15, flex 120, abd 90   Patient stated goal: \"dec pain\"  [x] Progressing: [] Met: [] Not Met: [] Adjusted     Therapist goals for Patient:   Short Term Goals: To be achieved in: 2 weeks  1. Independent in HEP and progression per patient tolerance, in order to prevent re-injury. [x] Progressing: [] Met: [] Not Met: [] Adjusted  2. Patient will have a decrease in pain by 20-30% to facilitate improvement in movement, function, and ADLs as indicated by Functional Deficits.   [] Progressing: [x] Met: [] Not Met: [] Adjusted     Long Term Goals: To be achieved in: at d/c   1. Increase FOTO functional outcome score from 42 to 60 to assist with reaching prior level of function. [x] Progressing: [] Met: [] Not Met: [] Adjusted  2. Patient will have a decrease in pain by 50-60% to facilitate improvement in movement, function, and ADLs as indicated by Functional Deficits. [x] Progressing: [] Met: [] Not Met: [] Adjusted  3. Patient will demonstrate increased AROM to flex/scap 140, IR/ER to [facility] for ease with self care to allow for proper joint functioning as indicated by Functional Deficits. [x] Progressing: [] Met: [] Not Met: [] Adjusted  4. Patient will demonstrate an increase in NM recruitment/activation and overall GH and scapular strength to 4+/5 - 5/5 for proper functional mobility as indicated by patients Functional Deficits. [x] Progressing: [] Met: [] Not Met: [] Adjusted  5. Patient will return to self care/dressing activities without increased symptoms or restriction. [] Progressing: [x] Met: [] Not Met: [] Adjusted  6. \"full use of arm\"         [x] Progressing: [] Met: [] Not Met: [] Adjusted    ASSESSMENT:  Tolerated eval well; proceed per protocol/MD precautions   7-11-22 tolerated above ex/ PROM well. Steri strips gone , placed 4 pads with E-stim for more coverage area. 7-13-22 progressing with gentle ex  7/19: cont skilled PT for ROM and initiating strength  7-27-22 tolerating above ex well. Gradually progressing with AAROM.   8/2: MD f/u on 8/2; will cont for strength and ROM per MD guidelines          Treatment/Activity Tolerance:  [x] Patient tolerated treatment well [] Patient limited by fatique  [] Patient limited by pain  [] Patient limited by other medical complications  [] Other:     Overall Progression Towards Functional goals/ Treatment Progress Update:  [] Patient is progressing as expected towards functional goals listed. [x] Progression is improving slowed due to complexities/Impairments listed.   [] Progression has been slowed due to co-morbidities. [] Plan just implemented, too soon to assess goals progression <30days   [] Goals require adjustment due to lack of progress  [] Patient is not progressing as expected and requires additional follow up with physician  [] Other    Prognosis for POC: [x] Good [] Fair  [] Poor    Patient requires continued skilled intervention: [x] Yes  [] No      PLAN: Progress per protocol/MD assessment; isometrics hep   [x] Continue per plan of care [] Alter current plan (see comments)  [] Plan of care initiated [] Hold pending MD visit [] Discharge    Electronically signed by: Yair Bar, PT MPT [id number]    Note: If patient does not return for scheduled/recommended follow up visits, this note will serve as a discharge from care along with the most recent update on progress.

## 2022-08-04 ENCOUNTER — HOSPITAL ENCOUNTER (OUTPATIENT)
Dept: PHYSICAL THERAPY | Age: 72
Setting detail: THERAPIES SERIES
Discharge: HOME | End: 2022-08-04
Payer: MEDICARE

## 2022-08-04 PROCEDURE — 97112 NEUROMUSCULAR REEDUCATION: CPT

## 2022-08-04 PROCEDURE — 97110 THERAPEUTIC EXERCISES: CPT

## 2022-08-04 PROCEDURE — G0283 ELEC STIM OTHER THAN WOUND: HCPCS

## 2022-08-04 NOTE — FLOWSHEET NOTE
[facility]  [address] [facility]  Phone: [phone]   Fax:     [phone]              Physical Therapy Treatment Note/ Progress Report:       Date:  2022    Patient Name:  Bridget Ortega    :  1950  MRN: [id number]    Pertinent Medical History:Additional Pertinent Hx: OA, asthma, DM, HTN, migraine, depression, DVT, pulmonary embolism, cervical fusion    Medical/Treatment Diagnosis Information:  Diagnosis: R97.056 s/p reverse TSR R shoulder  Treatment Diagnosis: recent shoulder surgery R UE; pain, limited ROM and strength    Insurance/Certification information:  PT Insurance Information: [employer]  - $40 copay, no auth, visits BMN  Physician Information:  Referring Provider (secondary): Dr. Karissa Beatty of care signed (Y/N): yes    Date of Patient follow up with Physician:  22     Progress Report: []  Yes -  [x]  No     Date Range for reporting period:  Beginnin2022  Ending:      Progress report due (10 Rx/or 30 days whichever is less): 69     Recertification due (POC duration/ or 90 days whichever is less):      Visit # POC/Insurance Allowable Auth Needed   - BMN []Yes    [x]No     Functional Outcomes Measure:    Date Assessed: at eval  Test: FOTO  Score:42    Pain level: 4/10 With movement 3-4/10    History of Injury: Patient stated complaint: R shoulder reverse TSR on 22. MD precautions/restrictions:   active and passive fwd flexion and abduction  Limit shoulder extension  Limit ER to 15 degrees  No active IR, adduction (behind back hygiene)       SUBJECTIVE:    22  Tolerated first visit ok.  11  Sore after last Rx until next day. Able to get arm away from side better.    : was able to finally get a decent night of sleep last night laying on her R side; pain levels in shoulder are very low, no longer taking prescription pain meds; some soreness scapular mms from sleeping on R side    7/21: a little sore from the new exercises and just trying to be more active; feels she tenses on R side when using L UE   7-25-22  sore after PT the remainder of the day. Sore this am,  was in hospital , so did a little more around the house.    7-17-22 felt good after last Rx.   8/2: a little sore from sleeping; see goal reassessment   8/4: saw Dr. Ambrocio Aranda and he is pleased with progress - ok to push ROM and strength as clover and ok to begin behind the back hygiene gradually; pt notes she did have a fall when getting out of the car to go the appt yesterday landing on R elbow (pt c/o moderate muscle soreness in shoulder and has a few abrasions on R elbow); to PT without sling            OBJECTIVE:   Observation:   Test measurements:      RESTRICTIONS/PRECAUTIONS:   Per MD notes from visit 8/2:  Lum Romance to push aggressive ROM and strength   Ok to begin gradual behind the back hygiene   Ok to d/c sling    DOS 6/13/22 - generic protocol on [employer]'s desk       8/1: 7 wks p/o  8/15: 9 wks p/o  8/22: 10 wks  9/5: 12 wks           Exercises/Interventions:   Therapeutic Ex (65586)  Min: 25 Resistance/Reps Notes/Cues     R REVERSE TSR on 6/13/22     NT   0# x 2 min  Pulley flex  X 2 min  UE ranger 4 way on floor at elbow ht X 10 ea R Progress to ranger on wall    Table slides   flexion   scaption    x 10 R  X 10 R   Progress to incline   Standing cane   ext add    Pendulum  4 way Cues for inc trunk flexion for improved shoulder mobility   Biceps curl standing X 10 R    Bent row     Ball roll on wall      TB row        Ext        tricep Orange     Supine:  Cane chest press  Cane flex  Cane ER   1 x 10 B  X 5  X 10     Supine  Shoulder flex  Shoulder abd   SA punch   AROM 1 x 10   AAROM 1 x 10    X 10 CGA    SL  ER with AAROM  Abd with AAROM  Horiz add           Seated     UT str for R side Hep    Shoulder flex AAROM  Shoulder ER AAROM AAROM with elbow bent  x 10  AAROM x 10 lifting, house/yardwork, driving/computer work. Therapeutic Activities:    [x] (73101 or 41868) Provided verbal/tactile cueing for activities related to improving balance, coordination, kinesthetic sense, posture, motor skill, proprioception and motor activation to allow for proper function of scapular, scapulothoracic and UE control with self care, carrying, lifting, driving/computer work.      Home Exercise Program:    [x] (11207) Reviewed/Progressed HEP activities related to strengthening, flexibility, endurance, ROM of scapular, scapulothoracic and UE control with self care, reaching, carrying, lifting, house/yardwork, driving/computer work  [] (66809) Reviewed/Progressed HEP activities related to improving balance, coordination, kinesthetic sense, posture, motor skill, proprioception of scapular, scapulothoracic and UE control with self care, reaching, carrying, lifting, house/yardwork, driving/computer work      Manual Treatments:  PROM / STM / Oscillations-Mobs:  G-I, II, III, IV (PA's, Inf., Post.)  [x] (63175) Provided manual therapy to mobilize soft tissue/joints of cervical/CT, scapular GHJ and UE for the purpose of modulating pain, promoting relaxation,  increasing ROM, reducing/eliminating soft tissue swelling/inflammation/restriction, improving soft tissue extensibility and allowing for proper ROM for normal function with self care, reaching, carrying, lifting, house/yardwork, driving/computer work    Charges:  Timed Code Treatment Minutes: 45   Total Treatment Minutes: 60       [] EVAL (LOW) 47657 (typically 20 minutes face-to-face)  [] EVAL (MOD) 18745 (typically 30 minutes face-to-face)  [] EVAL (HIGH) 38134 (typically 45 minutes face-to-face)  [] RE-EVAL     [x] DK(99900) x 2  [] Dry needle 1 or 2 Muscles (09473)  [x] NMR (34791) x     [] Dry needle 3+ Muscles (84163)  [] Manual (59287) x     [] Ultrasound (86563) x  [] TA (85466) x     [] Mech Traction (45651)  [] ES(attended) (08997)     [x] ES (un) (33156):   [] Vasopump (07374) [] Ionto (86477)   [] Other:     GOALS:  Updated 8/2:  Pain ranges from 2-4/10 with sleeping and PT/hep   Self care improving (showering, dressing) and able to clover light housework (unload )   Hep 1 x per day   Overall 30% improved   MMT: flex and abd 3-/5   ROM per protocol: ER 15, flex 120, abd 90   Patient stated goal: \"dec pain\"  [x] Progressing: [] Met: [] Not Met: [] Adjusted     Therapist goals for Patient:   Short Term Goals: To be achieved in: 2 weeks  1. Independent in HEP and progression per patient tolerance, in order to prevent re-injury. [x] Progressing: [] Met: [] Not Met: [] Adjusted  2. Patient will have a decrease in pain by 20-30% to facilitate improvement in movement, function, and ADLs as indicated by Functional Deficits. [] Progressing: [x] Met: [] Not Met: [] Adjusted     Long Term Goals: To be achieved in: at d/c   1. Increase FOTO functional outcome score from 42 to 60 to assist with reaching prior level of function. [x] Progressing: [] Met: [] Not Met: [] Adjusted  2. Patient will have a decrease in pain by 50-60% to facilitate improvement in movement, function, and ADLs as indicated by Functional Deficits. [x] Progressing: [] Met: [] Not Met: [] Adjusted  3. Patient will demonstrate increased AROM to flex/scap 140, IR/ER to [facility] for ease with self care to allow for proper joint functioning as indicated by Functional Deficits. [x] Progressing: [] Met: [] Not Met: [] Adjusted  4. Patient will demonstrate an increase in NM recruitment/activation and overall GH and scapular strength to 4+/5 - 5/5 for proper functional mobility as indicated by patients Functional Deficits. [x] Progressing: [] Met: [] Not Met: [] Adjusted  5. Patient will return to self care/dressing activities without increased symptoms or restriction. [] Progressing: [x] Met: [] Not Met: [] Adjusted  6.  \"full use of arm\"         [x] Progressing: [] Met: [] Not Met: [] Adjusted    ASSESSMENT:  Tolerated eval well; proceed per protocol/MD precautions   7-11-22 tolerated above ex/ PROM well. Steri strips gone , placed 4 pads with E-stim for more coverage area. 7-13-22 progressing with gentle ex  7/19: cont skilled PT for ROM and initiating strength  7-27-22 tolerating above ex well. Gradually progressing with AAROM.   8/2: MD f/u on 8/2; will cont for strength and ROM per MD guidelines   8/4: ex progression held today due to inc soreness; progress when able          Treatment/Activity Tolerance:  [x] Patient tolerated treatment well [] Patient limited by fatique  [] Patient limited by pain  [] Patient limited by other medical complications  [] Other:     Overall Progression Towards Functional goals/ Treatment Progress Update:  [] Patient is progressing as expected towards functional goals listed. [x] Progression is improving slowed due to complexities/Impairments listed. [] Progression has been slowed due to co-morbidities. [] Plan just implemented, too soon to assess goals progression <30days   [] Goals require adjustment due to lack of progress  [] Patient is not progressing as expected and requires additional follow up with physician  [] Other    Prognosis for POC: [x] Good [] Fair  [] Poor    Patient requires continued skilled intervention: [x] Yes  [] No      PLAN: Progress per protocol/MD assessment   [x] Continue per plan of care [] Alter current plan (see comments)  [] Plan of care initiated [] Hold pending MD visit [] Discharge    Electronically signed by: Tarun James, PT MPT [id number]    Note: If patient does not return for scheduled/recommended follow up visits, this note will serve as a discharge from care along with the most recent update on progress.

## 2022-08-09 ENCOUNTER — HOSPITAL ENCOUNTER (OUTPATIENT)
Dept: PHYSICAL THERAPY | Age: 72
Setting detail: THERAPIES SERIES
Discharge: HOME | End: 2022-08-09
Payer: MEDICARE

## 2022-08-09 PROCEDURE — 97112 NEUROMUSCULAR REEDUCATION: CPT

## 2022-08-09 PROCEDURE — G0283 ELEC STIM OTHER THAN WOUND: HCPCS

## 2022-08-09 PROCEDURE — 97110 THERAPEUTIC EXERCISES: CPT

## 2022-08-09 NOTE — FLOWSHEET NOTE
[place] [facility], [facility]  [address]  Phone: [phone]   Fax:     [phone]              Physical Therapy Treatment Note/ Progress Report:       Date:  2022    Patient Name:  True Lee    :  1950  MRN: [id number]    Pertinent Medical History:Additional Pertinent Hx: OA, asthma, DM, HTN, migraine, depression, DVT, pulmonary embolism, cervical fusion    Medical/Treatment Diagnosis Information:  Diagnosis: B35.732 s/p reverse TSR R shoulder  Treatment Diagnosis: recent shoulder surgery R UE; pain, limited ROM and strength    Insurance/Certification information:  PT Insurance Information: [employer]  - $40 copay, no auth, visits BMN  Physician Information:  Referring Provider (secondary): Dr. India Bryan of care signed (Y/N): yes    Date of Patient follow up with Physician:  22     Progress Report: []  Yes -  [x]  No     Date Range for reporting period:  Beginnin2022  Ending:      Progress report due (10 Rx/or 30 days whichever is less): 3/8/02     Recertification due (POC duration/ or 90 days whichever is less):      Visit # POC/Insurance Allowable Auth Needed   10/8-16 BMN []Yes    [x]No     Functional Outcomes Measure:    Date Assessed: at eval  Test: FOTO  Score:42    Pain level: 4/10 With movement 3-4/10    History of Injury: Patient stated complaint: R shoulder reverse TSR on 22. MD precautions/restrictions:   active and passive fwd flexion and abduction  Limit shoulder extension  Limit ER to 15 degrees  No active IR, adduction (behind back hygiene)       SUBJECTIVE:    22  Tolerated first visit ok.  11  Sore after last Rx until next day. Able to get arm away from side better.    : was able to finally get a decent night of sleep last night laying on her R side; pain levels in shoulder are very low, no longer taking prescription pain meds; some soreness scapular mms from sleeping on R side    7/21: a little sore from the new exercises and just trying to be more active; feels she tenses on R side when using L UE   7-25-22  sore after PT the remainder of the day. Sore this am,  was in hospital , so did a little more around the house. 7-17-22 felt good after last Rx.   8/2: a little sore from sleeping; see goal reassessment   8/4: saw Dr. Osmin Solis and he is pleased with progress - ok to push ROM and strength as clover and ok to begin behind the back hygiene gradually; pt notes she did have a fall when getting out of the car to go the appt yesterday landing on R elbow (pt c/o moderate muscle soreness in shoulder and has a few abrasions on R elbow); to PT without sling  8/9: Can sleep okay on her R side, but if she sleeps on her L side she wakes up and her shoulder hurts. Woke up that way today.  Had to take pain medication because she just didn't want to move it it hurt so bad      OBJECTIVE:   Observation:   Test measurements:      RESTRICTIONS/PRECAUTIONS:   Per MD notes from visit 8/2:  [address] Digna Joseph to push aggressive ROM and strength   Ok to begin gradual behind the back hygiene   Ok to d/c sling    DOS 6/13/22 - generic protocol on [employer]'s desk       8/1: 7 wks p/o  8/15: 9 wks p/o  8/22: 10 wks  9/5: 12 wks    Exercises/Interventions:   Therapeutic Ex (41942)  Min: 25 Resistance/Reps Notes/Cues     R REVERSE TSR on 6/13/22     NT   0# x 2 min  Pulley flex  X 2 min  UE ranger 4 way on wall X 10 ea R    Table slides on incline   flexion   scaption    x 10 R  X 10 R      Standing cane   ext X 10    Pendulum  4 way Cues for inc trunk flexion for improved shoulder mobility   Biceps curl standing 2 X 10 R    Bent row     Ball roll on wall      TB row        Ext        tricep Orange  x10  Orange  x10  Orange x10    Supine:  Cane chest press  Cane flex  Cane ER   1 x 10 B  X 10  X 10  Cues to slow down   Supine  Shoulder flex  Shoulder abd   SA punch   AROM 1 x 10   AAROM 1 x 10    X 10 CGA    SL  ER with AAROM  Abd with AAROM  Horiz add    X10  x10         Seated     UT str for R side Hep    Shoulder flex AAROM  Shoulder ER AAROM AAROM with elbow bent  x 10  AAROM x 10      AROM:   Pron/sup  Wrist flex/ext            Therapeutic Activity (53020) Min:                    NMR re-education (78293)  Min:15     Shoulder roll  Shoulder shrug  Scapular retraction     Cues to inc ROM   SL scap mobs AAROM AAROM elevate/depress  Protract/retract x 10 each     Supine    RS arm 90 flex  RS IR/ER  Cw/ccw   3 x 10 sec   2 x 10 sec  X10 ea         Isometrics flex, abd, ext, IR/ER in neutral  5 x 5 sec manually  Progress to independent against wall w/ hep         Manual Intervention (51957) Min: 5     PROM:  As tolerated  Gentle                Modalities:  Min: 15      IFC  with CP 4 pads R shoulder  x 15 min  Recliner with pillow under R arm   recliner          Other Therapeutic Activities:  Pt was educated on PT POC, Diagnosis, Prognosis, pathomechanics as well as frequency and duration of scheduling future physical therapy appointments. Time was also taken on this day to answer all patient questions and participation in PT. Reviewed appointment policy in detail with patient and patient verbalized understanding. X 8 min     Home Exercise Program: Patient instructed in the following for HEP:          .   Patient verbalized/demonstrated understanding and was issued written handout.   7/5: pendulum, scap retraction, shoulder shrug/roll, UT str, AROM elbow, wrist, hand,   7/19: supine cane press, supine UE flex/abd   8/4: cane ER and flex   8/9: Tband row and extension    Therapeutic Exercise and NMR EXR  [x] (37529) Provided verbal/tactile cueing for activities related to strengthening, flexibility, endurance, ROM  for improvements in scapular, scapulothoracic and UE control with self care, reaching, carrying, lifting, house/yardwork, driving/computer work.    [] (76699) Provided verbal/tactile cueing for activities related to improving balance, coordination, kinesthetic sense, posture, motor skill, proprioception  to assist with  scapular, scapulothoracic and UE control with self care, reaching, carrying, lifting, house/yardwork, driving/computer work. Therapeutic Activities:    [x] (41313 or 69513) Provided verbal/tactile cueing for activities related to improving balance, coordination, kinesthetic sense, posture, motor skill, proprioception and motor activation to allow for proper function of scapular, scapulothoracic and UE control with self care, carrying, lifting, driving/computer work.      Home Exercise Program:    [x] (50954) Reviewed/Progressed HEP activities related to strengthening, flexibility, endurance, ROM of scapular, scapulothoracic and UE control with self care, reaching, carrying, lifting, house/yardwork, driving/computer work  [] (54827) Reviewed/Progressed HEP activities related to improving balance, coordination, kinesthetic sense, posture, motor skill, proprioception of scapular, scapulothoracic and UE control with self care, reaching, carrying, lifting, house/yardwork, driving/computer work      Manual Treatments:  PROM / STM / Oscillations-Mobs:  G-I, II, III, IV (PA's, Inf., Post.)  [x] (22684) Provided manual therapy to mobilize soft tissue/joints of cervical/CT, scapular GHJ and UE for the purpose of modulating pain, promoting relaxation,  increasing ROM, reducing/eliminating soft tissue swelling/inflammation/restriction, improving soft tissue extensibility and allowing for proper ROM for normal function with self care, reaching, carrying, lifting, house/yardwork, driving/computer work    Charges:  Timed Code Treatment Minutes: 45   Total Treatment Minutes: 60       [] EVAL (LOW) 16124 (typically 20 minutes face-to-face)  [] EVAL (MOD) 98249 (typically 30 minutes face-to-face)  [] EVAL (HIGH) 32851 (typically 45 minutes face-to-face)  [] RE-EVAL     [x] VW(96073) x 2  [] Dry needle 1 or 2 Muscles (22191)  [x] NMR (54797) x     [] Dry needle 3+ Muscles (17943)  [] Manual (36241) x     [] Ultrasound (15377) x  [] TA (31136) x     [] Mech Traction (27947)  [] ES(attended) (89264)     [x] ES (un) (63420):   [] Vasopump (51442) [] Ionto (08379)   [] Other:     GOALS:  Updated 8/2:  Pain ranges from 2-4/10 with sleeping and PT/hep   Self care improving (showering, dressing) and able to clover light housework (unload )   Hep 1 x per day   Overall 30% improved   MMT: flex and abd 3-/5   ROM per protocol: ER 15, flex 120, abd 90   Patient stated goal: \"dec pain\"  [x] Progressing: [] Met: [] Not Met: [] Adjusted     Therapist goals for Patient:   Short Term Goals: To be achieved in: 2 weeks  1. Independent in HEP and progression per patient tolerance, in order to prevent re-injury. [x] Progressing: [] Met: [] Not Met: [] Adjusted  2. Patient will have a decrease in pain by 20-30% to facilitate improvement in movement, function, and ADLs as indicated by Functional Deficits. [] Progressing: [x] Met: [] Not Met: [] Adjusted     Long Term Goals: To be achieved in: at d/c   1. Increase FOTO functional outcome score from 42 to 60 to assist with reaching prior level of function. [x] Progressing: [] Met: [] Not Met: [] Adjusted  2. Patient will have a decrease in pain by 50-60% to facilitate improvement in movement, function, and ADLs as indicated by Functional Deficits. [x] Progressing: [] Met: [] Not Met: [] Adjusted  3. Patient will demonstrate increased AROM to flex/scap 140, IR/ER to [facility] for ease with self care to allow for proper joint functioning as indicated by Functional Deficits. [x] Progressing: [] Met: [] Not Met: [] Adjusted  4. Patient will demonstrate an increase in NM recruitment/activation and overall GH and scapular strength to 4+/5 - 5/5 for proper functional mobility as indicated by patients Functional Deficits.    [x] Progressing: [] Met: [] Not Met: [] Adjusted  5. Patient will return to self care/dressing activities without increased symptoms or restriction. [] Progressing: [x] Met: [] Not Met: [] Adjusted  6. \"full use of arm\"         [x] Progressing: [] Met: [] Not Met: [] Adjusted    ASSESSMENT:  Tolerated eval well; proceed per protocol/MD precautions   7-11-22 tolerated above ex/ PROM well. Steri strips gone , placed 4 pads with E-stim for more coverage area. 7-13-22 progressing with gentle ex  7/19: cont skilled PT for ROM and initiating strength  7-27-22 tolerating above ex well. Gradually progressing with AAROM.   8/2: MD f/u on 8/2; will cont for strength and ROM per MD guidelines   8/4: ex progression held today due to inc soreness; progress when able   8/9: tolerated progressions well         Treatment/Activity Tolerance:  [x] Patient tolerated treatment well [] Patient limited by fatique  [] Patient limited by pain  [] Patient limited by other medical complications  [] Other:     Overall Progression Towards Functional goals/ Treatment Progress Update:  [] Patient is progressing as expected towards functional goals listed. [x] Progression is improving slowed due to complexities/Impairments listed. [] Progression has been slowed due to co-morbidities.   [] Plan just implemented, too soon to assess goals progression <30days   [] Goals require adjustment due to lack of progress  [] Patient is not progressing as expected and requires additional follow up with physician  [] Other    Prognosis for POC: [x] Good [] Fair  [] Poor    Patient requires continued skilled intervention: [x] Yes  [] No      PLAN: Progress per protocol/MD assessment   [x] Continue per plan of care [] Alter current plan (see comments)  [] Plan of care initiated [] Hold pending MD visit [] Discharge    Electronically signed by: Larissa Livingston, PTA [id number]    Note: If patient does not return for scheduled/recommended follow up visits, this note will serve as a discharge from care along with the most recent update on progress.

## 2022-08-11 ENCOUNTER — HOSPITAL ENCOUNTER (OUTPATIENT)
Dept: PHYSICAL THERAPY | Age: 72
Setting detail: THERAPIES SERIES
Discharge: HOME | End: 2022-08-11
Payer: MEDICARE

## 2022-08-11 PROCEDURE — 97110 THERAPEUTIC EXERCISES: CPT

## 2022-08-11 PROCEDURE — G0283 ELEC STIM OTHER THAN WOUND: HCPCS

## 2022-08-11 PROCEDURE — 97112 NEUROMUSCULAR REEDUCATION: CPT

## 2022-08-11 NOTE — FLOWSHEET NOTE
[facility], [facility]  [address] [facility] [address] [place], [facility]  Phone: [phone]   Fax:     [phone]              Physical Therapy Treatment Note/ Progress Report:       Date:  2022    Patient Name:  Barbara Mccoy    :  1950  MRN: [id number]    Pertinent Medical History:Additional Pertinent Hx: OA, asthma, DM, HTN, migraine, depression, DVT, pulmonary embolism, cervical fusion    Medical/Treatment Diagnosis Information:  Diagnosis: H63.877 s/p reverse TSR R shoulder  Treatment Diagnosis: recent shoulder surgery R UE; pain, limited ROM and strength    Insurance/Certification information:  PT Insurance Information: [employer]  - $40 copay, no auth, visits BMN  Physician Information:  Referring Provider (secondary): Dr. Eber Perez of care signed (Y/N): yes    Date of Patient follow up with Physician:  22     Progress Report: []  Yes -  [x]  No     Date Range for reporting period:  Beginnin2022  Ending:      Progress report due (10 Rx/or 30 days whichever is less): 8/3/87     Recertification due (POC duration/ or 90 days whichever is less):      Visit # POC/Insurance Allowable Auth Needed   - BMN []Yes    [x]No     Functional Outcomes Measure:    Date Assessed: at eval  Test: FOTO  Score:42    Pain level: 4/10 With movement 3-4/10    History of Injury: Patient stated complaint: R shoulder reverse TSR on 22. MD precautions/restrictions:   active and passive fwd flexion and abduction  Limit shoulder extension  Limit ER to 15 degrees  No active IR, adduction (behind back hygiene)       SUBJECTIVE:    22  Tolerated first visit ok.  11  Sore after last Rx until next day. Able to get arm away from side better.    : was able to finally get a decent night of sleep last night laying on her R side; pain levels in shoulder are very low, no longer taking prescription pain meds; some soreness scapular mms from sleeping on R side    7/21: a little sore from the new exercises and just trying to be more active; feels she tenses on R side when using L UE   7-25-22  sore after PT the remainder of the day. Sore this am,  was in hospital , so did a little more around the house. 7-17-22 felt good after last Rx.   8/2: a little sore from sleeping; see goal reassessment   8/4: saw Dr. Amanda Khoury and he is pleased with progress - ok to push ROM and strength as clover and ok to begin behind the back hygiene gradually; pt notes she did have a fall when getting out of the car to go the appt yesterday landing on R elbow (pt c/o moderate muscle soreness in shoulder and has a few abrasions on R elbow); to PT without sling  8/9: Can sleep okay on her R side, but if she sleeps on her L side she wakes up and her shoulder hurts. Woke up that way today. Had to take pain medication because she just didn't want to move it it hurt so bad  8/11: Shoulder doing pretty good. Just depends on what she's doing.  It's just annoying      OBJECTIVE:   Observation:   Test measurements:      RESTRICTIONS/PRECAUTIONS:   Per MD notes from visit 8/2:  [id number] [address] Joseph to push aggressive ROM and strength   Ok to begin gradual behind the back hygiene   Ok to d/c sling    DOS 6/13/22 - generic protocol on BK's desk       8/1: 7 wks p/o  8/15: 9 wks p/o  8/22: 10 wks  9/5: 12 wks    Exercises/Interventions:   Therapeutic Ex (24717)  Min: 25 Resistance/Reps Notes/Cues     R REVERSE TSR on 6/13/22     NT   0# x 2 min  Pulley flex  X 2 min  UE ranger 4 way on wall X 10 ea R Add cw/ccw on wall as able   Table slides on incline   flexion   scaption    x 10 R  X 10 R      Standing cane   ext X 10    Pendulum  4 way Cues for inc trunk flexion for improved shoulder mobility   Biceps curl standing 2 X 10 R    Bent row 1 x 10 R cues   Ball roll on wall  X10 ea    TB row        Ext        tricep Orange  x10  Orange  x10  Orange x10    Supine:  [place] Are[place] chest press  Cane flex  Cane ER   1 x 10 B  X 10  X 10  Cues to slow down   Supine  Shoulder flex  Shoulder abd   SA punch   AROM 1 x 10   AAROM 1 x 10    X 10 CGA    SL  ER with AAROM  Abd with AAROM  Horiz add    X10  X10  x10         Seated     UT str for R side Hep    Shoulder flex AAROM  Shoulder ER AAROM AAROM with elbow bent  x 10  AAROM x 10      AROM:   Pron/sup  Wrist flex/ext            Therapeutic Activity (47307) Min:                    NMR re-education (79487)  Min:15     Shoulder roll  Shoulder shrug  Scapular retraction     HEP   SL scap mobs AAROM AAROM elevate/depress  Protract/retract x 10 each     Supine    RS arm 90 flex  RS IR/ER  Cw/ccw  Flex 90*-125*   3 x 10 sec   2 x 10 sec  X10 ea         Isometrics flex, abd, ext, IR/ER in neutral  5 x 5 sec manually  Progress to independent against wall w/ hep         Manual Intervention (72657) Min: 5     PROM:  As tolerated  Gentle   IR in scap plane <=50*             Modalities:  Min: 15      IFC  with CP 4 pads R shoulder  x 15 min  Recliner with pillow under R arm   recliner          Other Therapeutic Activities:  Pt was educated on PT POC, Diagnosis, Prognosis, pathomechanics as well as frequency and duration of scheduling future physical therapy appointments. Time was also taken on this day to answer all patient questions and participation in PT. Reviewed appointment policy in detail with patient and patient verbalized understanding. X 8 min     Home Exercise Program: Patient instructed in the following for HEP:          .   Patient verbalized/demonstrated understanding and was issued written handout.   7/5: pendulum, scap retraction, shoulder shrug/roll, UT str, AROM elbow, wrist, hand,   7/19: supine cane press, supine UE flex/abd   8/4: cane ER and flex   8/9: Tband row and extension (per pt)  8/11: Isometrics: Flex, ext, abd, IR, ER    Therapeutic Exercise and NMR EXR  [x] (68038) Provided verbal/tactile cueing for activities related to strengthening, flexibility, endurance, ROM  for improvements in scapular, scapulothoracic and UE control with self care, reaching, carrying, lifting, house/yardwork, driving/computer work.    [] (82398) Provided verbal/tactile cueing for activities related to improving balance, coordination, kinesthetic sense, posture, motor skill, proprioception  to assist with  scapular, scapulothoracic and UE control with self care, reaching, carrying, lifting, house/yardwork, driving/computer work. Therapeutic Activities:    [x] (02795 or 55077) Provided verbal/tactile cueing for activities related to improving balance, coordination, kinesthetic sense, posture, motor skill, proprioception and motor activation to allow for proper function of scapular, scapulothoracic and UE control with self care, carrying, lifting, driving/computer work.      Home Exercise Program:    [x] (03589) Reviewed/Progressed HEP activities related to strengthening, flexibility, endurance, ROM of scapular, scapulothoracic and UE control with self care, reaching, carrying, lifting, house/yardwork, driving/computer work  [] (40228) Reviewed/Progressed HEP activities related to improving balance, coordination, kinesthetic sense, posture, motor skill, proprioception of scapular, scapulothoracic and UE control with self care, reaching, carrying, lifting, house/yardwork, driving/computer work      Manual Treatments:  PROM / STM / Oscillations-Mobs:  G-I, II, III, IV (PA's, Inf., Post.)  [x] (39767) Provided manual therapy to mobilize soft tissue/joints of cervical/CT, scapular GHJ and UE for the purpose of modulating pain, promoting relaxation,  increasing ROM, reducing/eliminating soft tissue swelling/inflammation/restriction, improving soft tissue extensibility and allowing for proper ROM for normal function with self care, reaching, carrying, lifting, house/yardwork, driving/computer work    Charges:  Timed Code Treatment Minutes: 45   Total Treatment Minutes: 60       [] EVAL (LOW) 01679 (typically 20 minutes face-to-face)  [] EVAL (MOD) 93334 (typically 30 minutes face-to-face)  [] EVAL (HIGH) 52626 (typically 45 minutes face-to-face)  [] RE-EVAL     [x] BK(83206) x 2  [] Dry needle 1 or 2 Muscles (13890)  [x] NMR (55661) x     [] Dry needle 3+ Muscles (84563)  [] Manual (26602) x     [] Ultrasound (18588) x  [] TA (33652) x     [] Mech Traction (30709)  [] ES(attended) (35174)     [x] ES (un) (18246):   [] Vasopump (19966) [] Ionto (69232)   [] Other:     GOALS:  Updated 8/2:  Pain ranges from 2-4/10 with sleeping and PT/hep   Self care improving (showering, dressing) and able to clover light housework (unload )   Hep 1 x per day   Overall 30% improved   MMT: flex and abd 3-/5   ROM per protocol: ER 15, flex 120, abd 90   Patient stated goal: \"dec pain\"  [x] Progressing: [] Met: [] Not Met: [] Adjusted     Therapist goals for Patient:   Short Term Goals: To be achieved in: 2 weeks  1. Independent in HEP and progression per patient tolerance, in order to prevent re-injury. [x] Progressing: [] Met: [] Not Met: [] Adjusted  2. Patient will have a decrease in pain by 20-30% to facilitate improvement in movement, function, and ADLs as indicated by Functional Deficits. [] Progressing: [x] Met: [] Not Met: [] Adjusted     Long Term Goals: To be achieved in: at d/c   1. Increase FOTO functional outcome score from 42 to 60 to assist with reaching prior level of function. [x] Progressing: [] Met: [] Not Met: [] Adjusted  2. Patient will have a decrease in pain by 50-60% to facilitate improvement in movement, function, and ADLs as indicated by Functional Deficits. [x] Progressing: [] Met: [] Not Met: [] Adjusted  3. Patient will demonstrate increased AROM to flex/scap 140, IR/ER to [facility] for ease with self care to allow for proper joint functioning as indicated by Functional Deficits. [x] Progressing: [] Met: [] Not Met: [] Adjusted  4.  Patient will demonstrate an increase in NM recruitment/activation and overall GH and scapular strength to 4+/5 - 5/5 for proper functional mobility as indicated by patients Functional Deficits. [x] Progressing: [] Met: [] Not Met: [] Adjusted  5. Patient will return to self care/dressing activities without increased symptoms or restriction. [] Progressing: [x] Met: [] Not Met: [] Adjusted  6. \"full use of arm\"         [x] Progressing: [] Met: [] Not Met: [] Adjusted    ASSESSMENT:  Tolerated eval well; proceed per protocol/MD precautions   7-11-22 tolerated above ex/ PROM well. Steri strips gone , placed 4 pads with E-stim for more coverage area. 7-13-22 progressing with gentle ex  7/19: cont skilled PT for ROM and initiating strength  7-27-22 tolerating above ex well. Gradually progressing with AAROM.   8/2: MD f/u on 8/2; will cont for strength and ROM per MD guidelines   8/4: ex progression held today due to inc soreness; progress when able   8/9: tolerated progressions well         Treatment/Activity Tolerance:  [x] Patient tolerated treatment well [] Patient limited by fatique  [] Patient limited by pain  [] Patient limited by other medical complications  [] Other:     Overall Progression Towards Functional goals/ Treatment Progress Update:  [] Patient is progressing as expected towards functional goals listed. [x] Progression is improving slowed due to complexities/Impairments listed. [] Progression has been slowed due to co-morbidities.   [] Plan just implemented, too soon to assess goals progression <30days   [] Goals require adjustment due to lack of progress  [] Patient is not progressing as expected and requires additional follow up with physician  [] Other    Prognosis for POC: [x] Good [] Fair  [] Poor    Patient requires continued skilled intervention: [x] Yes  [] No      PLAN: Progress per protocol/MD assessment   [x] Continue per plan of care [] Alter current plan (see comments)  [] Plan of care initiated [] Hold pending MD visit [] Discharge    Electronically signed by: Emili Escobar, PTA [id number]    Note: If patient does not return for scheduled/recommended follow up visits, this note will serve as a discharge from care along with the most recent update on progress.

## 2022-08-16 ENCOUNTER — HOSPITAL ENCOUNTER (OUTPATIENT)
Dept: PHYSICAL THERAPY | Age: 72
Setting detail: THERAPIES SERIES
Discharge: HOME | End: 2022-08-16
Payer: MEDICARE

## 2022-08-16 PROCEDURE — 97110 THERAPEUTIC EXERCISES: CPT

## 2022-08-16 PROCEDURE — G0283 ELEC STIM OTHER THAN WOUND: HCPCS

## 2022-08-16 PROCEDURE — 97112 NEUROMUSCULAR REEDUCATION: CPT

## 2022-08-16 NOTE — FLOWSHEET NOTE
[facility], [facility]  [address] [place], [facility]  Phone: [phone]   Fax:     [phone]              Physical Therapy Treatment Note/ Progress Report:       Date:  2022    Patient Name:  Barbara Mccoy    :  1950  MRN: [id number]    Pertinent Medical History:Additional Pertinent Hx: OA, asthma, DM, HTN, migraine, depression, DVT, pulmonary embolism, cervical fusion    Medical/Treatment Diagnosis Information:  Diagnosis: A62.685 s/p reverse TSR R shoulder  Treatment Diagnosis: recent shoulder surgery R UE; pain, limited ROM and strength    Insurance/Certification information:  PT Insurance Information: [place]  - $40 copay, no auth, visits BMN  Physician Information:  Referring Provider (secondary): Dr. Eber Perez of care signed (Y/N): yes    Date of Patient follow up with Physician:  22     Progress Report: []  Yes -  [x]  No     Date Range for reporting period:  Beginnin2022  Ending:      Progress report due (10 Rx/or 30 days whichever is less): 04     Recertification due (POC duration/ or 90 days whichever is less):      Visit # POC/Insurance Allowable Auth Needed    BMN []Yes    [x]No     Functional Outcomes Measure:    Date Assessed: at eval  Test: FOTO  Score:42    Pain level: 2/10 With movement 3-4/10    History of Injury: Patient stated complaint: R shoulder reverse TSR on 22. MD precautions/restrictions:   active and passive fwd flexion and abduction  Limit shoulder extension  Limit ER to 15 degrees  No active IR, adduction (behind back hygiene)       SUBJECTIVE:    22  Tolerated first visit ok.  11  Sore after last Rx until next day. Able to get arm away from side better.    : was able to finally get a decent night of sleep last night laying on her R side; pain levels in shoulder are very low, no longer taking prescription pain meds; some soreness scapular mms from sleeping on R side    7/21: a little sore from the new exercises and just trying to be more active; feels she tenses on R side when using L UE   7-25-22  sore after PT the remainder of the day. Sore this am,  was in hospital , so did a little more around the house. 7-17-22 felt good after last Rx.   8/2: a little sore from sleeping; see goal reassessment   8/4: saw Dr. John James and he is pleased with progress - ok to push ROM and strength as clover and ok to begin behind the back hygiene gradually; pt notes she did have a fall when getting out of the car to go the appt yesterday landing on R elbow (pt c/o moderate muscle soreness in shoulder and has a few abrasions on R elbow); to PT without sling  8/9: Can sleep okay on her R side, but if she sleeps on her L side she wakes up and her shoulder hurts. Woke up that way today. Had to take pain medication because she just didn't want to move it it hurt so bad  8/11: Shoulder doing pretty good. Just depends on what she's doing.  It's just annoying  8/16: doing well; opening fridge door easier and able to reach to touch forehead easier now       OBJECTIVE:   Observation:   Test measurements:      RESTRICTIONS/PRECAUTIONS:   Per MD notes from visit 8/2:  [address] Digna Joseph to push aggressive ROM and strength   Ok to begin gradual behind the back hygiene   Ok to d/c sling    DOS 6/13/22 - generic protocol on BK's desk       8/1: 7 wks p/o  8/15: 9 wks p/o  8/22: 10 wks  9/5: 12 wks    Exercises/Interventions:   Therapeutic Ex (78295)  Min: 25 Resistance/Reps Notes/Cues     R REVERSE TSR on 6/13/22     NT   0# x 2 min  Pulley flex  X 20  UE ranger 4 way on wall X 10 ea R with occasional L help     Table slides on incline   flexion   scaption    x 10 R  X 10 R      Standing cane   ext X 10    Finger ladder  Peg board Add  add    Biceps curl standing 1# 1 X 10 R    Bent row 1 x 10 R cues   Ball roll on wall  X10 ea Assistance from L    TB row Ext        tricep yellow  x10  yellow  x10  Yellow x10    Supine:  Cane chest press/flex  Cane ER   1 x 10   X 10  Cues to slow down   Supine  Shoulder flex  SA punch    1 x 10   X 10     SL  ER with AAROM  Abd with AAROM  Horiz add    X10  X10  x10         Seated         Shoulder flex AAROM  Shoulder ER AAROM  Overhead press AAROM AAROM x 10  AAROM x 10    AAROM x 10              Therapeutic Activity (52646) Min:                    NMR re-education (98379)  Min:15         SL scap mobs AAROM      Supine    RS arm 90 flex  RS IR/ER  Cw/ccw  Flex 90*-125*   3 x 10 sec   2 x 10 sec  X10 ea  X 10          Isometrics flex, abd, ext, IR/ER in neutral  5 x 5 sec independently  against wall  Cues for tech and answered questions         Manual Intervention (96919) Min: 5     PROM:  As tolerated  Gentle   IR in scap plane <=50*             Modalities:  Min: 15      IFC  with CP 4 pads R shoulder  x 15 min  Recliner with pillow under R arm   recliner          Other Therapeutic Activities:  Pt was educated on PT POC, Diagnosis, Prognosis, pathomechanics as well as frequency and duration of scheduling future physical therapy appointments. Time was also taken on this day to answer all patient questions and participation in PT. Reviewed appointment policy in detail with patient and patient verbalized understanding. X 8 min     Home Exercise Program: Patient instructed in the following for HEP:          .   Patient verbalized/demonstrated understanding and was issued written handout.   7/5: pendulum, scap retraction, shoulder shrug/roll, UT str, AROM elbow, wrist, hand,   7/19: supine cane press, supine UE flex/abd   8/4: cane ER and flex   8/9: Tband row and extension (per pt)  8/11: Isometrics: Flex, ext, abd, IR, ER    Therapeutic Exercise and NMR EXR  [x] (67899) Provided verbal/tactile cueing for activities related to strengthening, flexibility, endurance, ROM  for improvements in scapular, scapulothoracic and UE control with self care, reaching, carrying, lifting, house/yardwork, driving/computer work.    [] (20154) Provided verbal/tactile cueing for activities related to improving balance, coordination, kinesthetic sense, posture, motor skill, proprioception  to assist with  scapular, scapulothoracic and UE control with self care, reaching, carrying, lifting, house/yardwork, driving/computer work. Therapeutic Activities:    [x] (01552 or 08139) Provided verbal/tactile cueing for activities related to improving balance, coordination, kinesthetic sense, posture, motor skill, proprioception and motor activation to allow for proper function of scapular, scapulothoracic and UE control with self care, carrying, lifting, driving/computer work.      Home Exercise Program:    [x] (48320) Reviewed/Progressed HEP activities related to strengthening, flexibility, endurance, ROM of scapular, scapulothoracic and UE control with self care, reaching, carrying, lifting, house/yardwork, driving/computer work  [] (06928) Reviewed/Progressed HEP activities related to improving balance, coordination, kinesthetic sense, posture, motor skill, proprioception of scapular, scapulothoracic and UE control with self care, reaching, carrying, lifting, house/yardwork, driving/computer work      Manual Treatments:  PROM / STM / Oscillations-Mobs:  G-I, II, III, IV (PA's, Inf., Post.)  [x] (67515) Provided manual therapy to mobilize soft tissue/joints of cervical/CT, scapular GHJ and UE for the purpose of modulating pain, promoting relaxation,  increasing ROM, reducing/eliminating soft tissue swelling/inflammation/restriction, improving soft tissue extensibility and allowing for proper ROM for normal function with self care, reaching, carrying, lifting, house/yardwork, driving/computer work    Charges:  Timed Code Treatment Minutes: 45   Total Treatment Minutes: 60       [] EVAL (LOW) 82708 (typically 20 minutes face-to-face)  [] EVAL (MOD) 50598 (typically 30 minutes face-to-face)  [] EVAL (HIGH) 88743 (typically 45 minutes face-to-face)  [] RE-EVAL     [x] PW(51847) x 2  [] Dry needle 1 or 2 Muscles (63181)  [x] NMR (67710) x     [] Dry needle 3+ Muscles (61182)  [] Manual (35046) x     [] Ultrasound (31521) x  [] TA (95247) x     [] Mech Traction (04153)  [] ES(attended) (08569)     [x] ES (un) (30492):   [] Vasopump (51094) [] Ionto (99950)   [] Other:     GOALS:  Updated 8/2:  Pain ranges from 2-4/10 with sleeping and PT/hep   Self care improving (showering, dressing) and able to clover light housework (unload )   Hep 1 x per day   Overall 30% improved   MMT: flex and abd 3-/5   ROM per protocol: ER 15, flex 120, abd 90   Patient stated goal: \"dec pain\"  [x] Progressing: [] Met: [] Not Met: [] Adjusted     Therapist goals for Patient:   Short Term Goals: To be achieved in: 2 weeks  1. Independent in HEP and progression per patient tolerance, in order to prevent re-injury. [x] Progressing: [] Met: [] Not Met: [] Adjusted  2. Patient will have a decrease in pain by 20-30% to facilitate improvement in movement, function, and ADLs as indicated by Functional Deficits. [] Progressing: [x] Met: [] Not Met: [] Adjusted     Long Term Goals: To be achieved in: at d/c   1. Increase FOTO functional outcome score from 42 to 60 to assist with reaching prior level of function. [x] Progressing: [] Met: [] Not Met: [] Adjusted  2. Patient will have a decrease in pain by 50-60% to facilitate improvement in movement, function, and ADLs as indicated by Functional Deficits. [x] Progressing: [] Met: [] Not Met: [] Adjusted  3. Patient will demonstrate increased AROM to flex/scap 140, IR/ER to [facility] for ease with self care to allow for proper joint functioning as indicated by Functional Deficits. [x] Progressing: [] Met: [] Not Met: [] Adjusted  4.  Patient will demonstrate an increase in NM recruitment/activation and overall GH and scapular strength to 4+/5 - 5/5 for proper functional mobility as indicated by patients Functional Deficits. [x] Progressing: [] Met: [] Not Met: [] Adjusted  5. Patient will return to self care/dressing activities without increased symptoms or restriction. [] Progressing: [x] Met: [] Not Met: [] Adjusted  6. \"full use of arm\"         [x] Progressing: [] Met: [] Not Met: [] Adjusted    ASSESSMENT:  Tolerated eval well; proceed per protocol/MD precautions   7-11-22 tolerated above ex/ PROM well. Steri strips gone , placed 4 pads with E-stim for more coverage area. 7-13-22 progressing with gentle ex  7/19: cont skilled PT for ROM and initiating strength  7-27-22 tolerating above ex well. Gradually progressing with AAROM.   8/2: MD f/u on 8/2; will cont for strength and ROM per MD guidelines   8/4: ex progression held today due to inc soreness; progress when able   8/9: tolerated progressions well  8/16: functional progression made with reaching face and opening fridge door, however, still can't reach to fix hair yet and driving is still on hold; cont with skilled PT for functional gains and strength with overhead reaching          Treatment/Activity Tolerance:  [x] Patient tolerated treatment well [] Patient limited by fatique  [] Patient limited by pain  [] Patient limited by other medical complications  [] Other:     Overall Progression Towards Functional goals/ Treatment Progress Update:  [] Patient is progressing as expected towards functional goals listed. [x] Progression is improving slowed due to complexities/Impairments listed. [] Progression has been slowed due to co-morbidities.   [] Plan just implemented, too soon to assess goals progression <30days   [] Goals require adjustment due to lack of progress  [] Patient is not progressing as expected and requires additional follow up with physician  [] Other    Prognosis for POC: [x] Good [] Fair  [] Poor    Patient requires continued skilled intervention: [x] Yes  [] No      PLAN: Progress per protocol/MD assessment   [x] Continue per plan of care [] Alter current plan (see comments)  [] Plan of care initiated [] Hold pending MD visit [] Discharge    Electronically signed by: Mandie Beaver, PT MPT [id number]    Note: If patient does not return for scheduled/recommended follow up visits, this note will serve as a discharge from care along with the most recent update on progress.

## 2022-08-18 ENCOUNTER — HOSPITAL ENCOUNTER (OUTPATIENT)
Dept: PHYSICAL THERAPY | Age: 72
Setting detail: THERAPIES SERIES
Discharge: HOME | End: 2022-08-18
Payer: MEDICARE

## 2022-08-18 PROCEDURE — 97530 THERAPEUTIC ACTIVITIES: CPT

## 2022-08-18 PROCEDURE — 97110 THERAPEUTIC EXERCISES: CPT

## 2022-08-18 PROCEDURE — G0283 ELEC STIM OTHER THAN WOUND: HCPCS

## 2022-08-18 PROCEDURE — 97112 NEUROMUSCULAR REEDUCATION: CPT

## 2022-08-18 NOTE — FLOWSHEET NOTE
[place] [facility], [facility]  [address] [facility], [facility]  Phone: [phone]   Fax:     [phone]          Physical Therapy Re-Certification Plan of Care/MD UPDATE      Dear Dr. Anna Sales ,    We had the pleasure of treating the following patient for physical therapy services at [address]. A summary of our findings can be found in the updated assessment below. This includes our plan of care. If you have any questions or concerns regarding these findings, please do not hesitate to contact me at the office phone number checked above.   Thank you for the referral.     Physician Signature:________________________________Date:__________________  By signing above (or electronic signature), therapists plan is approved by physician    Date Range Of Visits: 22-22  Total Visits to Date: 15  Overall Response to Treatment:   [x]Patient is responding well to treatment and improvement is noted with regards  to goals   []Patient should continue to improve in reasonable time if they continue HEP   []Patient has plateaued and is no longer responding to skilled PT intervention    []Patient is getting worse and would benefit from return to referring MD   []Patient unable to adhere to initial POC   [x]Other:   As updated on :  Pain ranges from 2-4/10 with sleeping and PT/hep   Self care improving (showering, dressing) and able to clover light housework (unload )   Hep 1 x per day   Overall 30% improved   MMT: flex and abd 3/5   Progressing ROM as clover    Recommendation:    [x]Continue PT 2x / wk for 4 weeks for cont strength/ROM/function   []Hold PT, pending MD visit          Physical Therapy Treatment Note/ Progress Report:       Date:  2022    Patient Name:  Albert Greer    :  1950  MRN: [id number]    Pertinent Medical History:Additional Pertinent Hx: OA, asthma, DM, 8/4: saw Dr. Jenelle Vergara and he is pleased with progress - ok to push ROM and strength as clover and ok to begin behind the back hygiene gradually; pt notes she did have a fall when getting out of the car to go the appt yesterday landing on R elbow (pt c/o moderate muscle soreness in shoulder and has a few abrasions on R elbow); to PT without sling  8/9: Can sleep okay on her R side, but if she sleeps on her L side she wakes up and her shoulder hurts. Woke up that way today. Had to take pain medication because she just didn't want to move it it hurt so bad  8/11: Shoulder doing pretty good. Just depends on what she's doing.  It's just annoying  8/16: doing well; opening fridge door easier and able to reach to touch forehead easier now   8/18: inc soreness from new ex and trying to use her arm more, also from sleeping on R side       OBJECTIVE:   Observation:   Test measurements:      RESTRICTIONS/PRECAUTIONS:   Per MD notes from visit 8/2:  [address] Digna Joseph to push aggressive ROM and strength   Ok to begin gradual behind the back hygiene   Ok to d/c sling    DOS 6/13/22 - generic protocol on BK's desk       8/1: 7 wks p/o  8/15: 9 wks p/o  8/22: 10 wks  9/5: 12 wks    Exercises/Interventions:   Therapeutic Ex (63628)  Min: 27 Resistance/Reps Notes/Cues     R REVERSE TSR on 6/13/22     NT    UBE 0# x 2 min   Fwd x 2 min  Pulley flex  X 20  UE ranger 4 way on wall X 10 ea R with occasional L help     Table slides on incline   flexion   scaption    x 10 R  X 10 R      Standing cane   ext   X 10    Finger ladder  Peg board To top x 3  X 2  (reach to #9-10)    Baps reach and turn add    Biceps curl standing 1# 1 X 10 R    Bent row  Bent ext  1 x 10 R  add cues   Ball roll on wall  X10 ea Assistance from L    TB row        Ext        Tricep        Add         IR yellow  x10  yellow  x10  Yellow x10    Supine:  Cane chest press/flex  Cane ER   1/2# 1 x 10   X 10  Cues to slow down   Supine  Shoulder flex  SA punch   W/ orange TB 1 x 10   X 10 SL  ER with AAROM  Abd   Horiz add    X10  X10  x10         Seated         Shoulder flex 0-90  Shoulder flex   Overhead press AAROM  x 10  AAROM x 5    AAROM x 10              Therapeutic Activity (59853) Min: 8 See MD note and Foto update                    NMR re-education (67632)  Min:15         SL scap mobs AAROM      Supine    RS arm 90 flex  RS IR/ER  Cw/ccw  Flex 90*-125*  AROM ER   3 x 10 sec   3 x 10 sec  X10 ea  X 10   X 10 tactile cue to guide         Isometrics flex, abd, ext, IR/ER in neutral  hep        Manual Intervention (57637) Min: 5     PROM:  As tolerated  Gentle   IR in scap plane <=50*             Modalities:  Min: 15      IFC  with CP 4 pads R shoulder  x 15 min  Recliner with pillow under R arm   recliner          Other Therapeutic Activities:  Pt was educated on PT POC, Diagnosis, Prognosis, pathomechanics as well as frequency and duration of scheduling future physical therapy appointments. Time was also taken on this day to answer all patient questions and participation in PT. Reviewed appointment policy in detail with patient and patient verbalized understanding. X 8 min     Home Exercise Program: Patient instructed in the following for HEP:          .   Patient verbalized/demonstrated understanding and was issued written handout.   7/5: pendulum, scap retraction, shoulder shrug/roll, UT str, AROM elbow, wrist, hand,   7/19: supine cane press, supine UE flex/abd   8/4: cane ER and flex   8/9: Tband row and extension (per pt)  8/11: Isometrics: Flex, ext, abd, IR, ER  8/18: bent row, cane ext, wall climb, SL horiz abd/abd, supine ER     Therapeutic Exercise and NMR EXR  [x] (78281) Provided verbal/tactile cueing for activities related to strengthening, flexibility, endurance, ROM  for improvements in scapular, scapulothoracic and UE control with self care, reaching, carrying, lifting, house/yardwork, driving/computer work.    [] (75071) Provided verbal/tactile cueing for activities related to improving balance, coordination, kinesthetic sense, posture, motor skill, proprioception  to assist with  scapular, scapulothoracic and UE control with self care, reaching, carrying, lifting, house/yardwork, driving/computer work. Therapeutic Activities:    [x] (81792 or 45378) Provided verbal/tactile cueing for activities related to improving balance, coordination, kinesthetic sense, posture, motor skill, proprioception and motor activation to allow for proper function of scapular, scapulothoracic and UE control with self care, carrying, lifting, driving/computer work.      Home Exercise Program:    [x] (21572) Reviewed/Progressed HEP activities related to strengthening, flexibility, endurance, ROM of scapular, scapulothoracic and UE control with self care, reaching, carrying, lifting, house/yardwork, driving/computer work  [] (37170) Reviewed/Progressed HEP activities related to improving balance, coordination, kinesthetic sense, posture, motor skill, proprioception of scapular, scapulothoracic and UE control with self care, reaching, carrying, lifting, house/yardwork, driving/computer work      Manual Treatments:  PROM / STM / Oscillations-Mobs:  G-I, II, III, IV (PA's, Inf., Post.)  [x] (66113) Provided manual therapy to mobilize soft tissue/joints of cervical/CT, scapular GHJ and UE for the purpose of modulating pain, promoting relaxation,  increasing ROM, reducing/eliminating soft tissue swelling/inflammation/restriction, improving soft tissue extensibility and allowing for proper ROM for normal function with self care, reaching, carrying, lifting, house/yardwork, driving/computer work    Charges:  Timed Code Treatment Minutes: 50   Total Treatment Minutes: 65       [] EVAL (LOW) 66229 (typically 20 minutes face-to-face)  [] EVAL (MOD) 63939 (typically 30 minutes face-to-face)  [] EVAL (HIGH) 59578 (typically 45 minutes face-to-face)  [] RE-EVAL     [x] YG(88840) x 2  [] Dry needle 1 or 2 Muscles (96252)  [x] NMR (37967) x     [] Dry needle 3+ Muscles (92026)  [] Manual (22978) x     [] Ultrasound (52522) x  [x] TA (05431) x     [] Mech Traction (39942)  [] ES(attended) (99708)     [x] ES (un) (32047):   [] Vasopump (17336) [] Ionto (30587)   [] Other:     GOALS:  Updated 8/2:  Pain ranges from 2-4/10 with sleeping and PT/hep   Self care improving (showering, dressing) and able to clover light housework (unload )   Hep 1 x per day   Overall 30% improved   MMT: flex and abd 3-/5   ROM per protocol: ER 15, flex 120, abd 90     Patient stated goal: \"dec pain\"  [x] Progressing: [] Met: [] Not Met: [] Adjusted     Therapist goals for Patient:   Short Term Goals: To be achieved in: 2 weeks  1. Independent in HEP and progression per patient tolerance, in order to prevent re-injury. [x] Progressing: [] Met: [] Not Met: [] Adjusted  2. Patient will have a decrease in pain by 20-30% to facilitate improvement in movement, function, and ADLs as indicated by Functional Deficits. [] Progressing: [x] Met: [] Not Met: [] Adjusted     Long Term Goals: To be achieved in: at d/c   1. Increase FOTO functional outcome score from 42 to 60 to assist with reaching prior level of function. [x] Progressing: [] Met: [] Not Met: [] Adjusted  2. Patient will have a decrease in pain by 50-60% to facilitate improvement in movement, function, and ADLs as indicated by Functional Deficits. [x] Progressing: [] Met: [] Not Met: [] Adjusted  3. Patient will demonstrate increased AROM to flex/scap 140, IR/ER to [facility] for ease with self care to allow for proper joint functioning as indicated by Functional Deficits. [x] Progressing: [] Met: [] Not Met: [] Adjusted  4. Patient will demonstrate an increase in NM recruitment/activation and overall GH and scapular strength to 4+/5 - 5/5 for proper functional mobility as indicated by patients Functional Deficits. [x] Progressing: [] Met: [] Not Met: [] Adjusted  5.  Patient will return to self care/dressing activities without increased symptoms or restriction. [] Progressing: [x] Met: [] Not Met: [] Adjusted  6. \"full use of arm\"         [x] Progressing: [] Met: [] Not Met: [] Adjusted    ASSESSMENT:  Tolerated eval well; proceed per protocol/MD precautions   7-11-22 tolerated above ex/ PROM well. Steri strips gone , placed 4 pads with E-stim for more coverage area. 7-13-22 progressing with gentle ex  7/19: cont skilled PT for ROM and initiating strength  7-27-22 tolerating above ex well. Gradually progressing with AAROM.   8/2: MD f/u on 8/2; will cont for strength and ROM per MD guidelines   8/4: ex progression held today due to inc soreness; progress when able   8/9: tolerated progressions well  8/16: functional progression made with reaching face and opening fridge door, however, still can't reach to fix hair yet and driving is still on hold; cont with skilled PT for functional gains and strength with overhead reaching          Treatment/Activity Tolerance:  [x] Patient tolerated treatment well [] Patient limited by fatique  [] Patient limited by pain  [] Patient limited by other medical complications  [] Other:     Overall Progression Towards Functional goals/ Treatment Progress Update:  [] Patient is progressing as expected towards functional goals listed. [x] Progression is improving slowed due to complexities/Impairments listed. [] Progression has been slowed due to co-morbidities.   [] Plan just implemented, too soon to assess goals progression <30days   [] Goals require adjustment due to lack of progress  [] Patient is not progressing as expected and requires additional follow up with physician  [] Other    Prognosis for POC: [x] Good [] Fair  [] Poor    Patient requires continued skilled intervention: [x] Yes  [] No      PLAN: Continue for functional strength and ROM   [x] Continue per plan of care [] Alter current plan (see comments)  [] Plan of care initiated [] Hold pending MD visit [] Discharge    Electronically signed by: Mandie Beaver, PT MPT [id number]    Note: If patient does not return for scheduled/recommended follow up visits, this note will serve as a discharge from care along with the most recent update on progress.

## 2022-08-23 ENCOUNTER — HOSPITAL ENCOUNTER (OUTPATIENT)
Dept: PHYSICAL THERAPY | Age: 72
Setting detail: THERAPIES SERIES
Discharge: HOME | End: 2022-08-23
Payer: MEDICARE

## 2022-08-23 PROCEDURE — 97110 THERAPEUTIC EXERCISES: CPT

## 2022-08-23 PROCEDURE — 97112 NEUROMUSCULAR REEDUCATION: CPT

## 2022-08-23 PROCEDURE — G0283 ELEC STIM OTHER THAN WOUND: HCPCS

## 2022-08-23 NOTE — FLOWSHEET NOTE
10  X 10     Finger ladder  Peg board To top x 3  X 2  (reach to #9-10)    Baps reach and turn add    Biceps curl standing 1# 1 X 10 R    Bent row  Bent ext  1 x 10 R  1 x 10 R cues   Ball roll on wall  X10 ea Assistance from L    TB row        Ext        Tricep        Add         IR yellow  x10  yellow  x10  Yellow x10  Yellow x 10   Yellow x 10  Pt has orange TB at home for hep    Supine:  Cane chest press/flex  Cane ER   1/2# 1 x 10   X 10  Cues to slow down   Supine  Shoulder flex  SA punch   W/ orange TB 1 x 10   X 10     SL  ER with AAROM  Abd   Horiz add    X10  X10  x10         Seated         Shoulder flex 0-90  Shoulder flex/scaption   Overhead press AAROM  x 10  AAROM x 10     AAROM x 10              Therapeutic Activity (47967) Min:                    NMR re-education (62612)  Min:15         SL scap mobs AAROM      Supine    RS arm 90 flex  RS IR/ER  Cw/ccw  Flex 90*-125*  AROM ER   3 x 10 sec   3 x 10 sec  X10 ea  X 10   X 10 tactile cue to guide         Isometrics flex, abd, ext, IR/ER in neutral  hep        Manual Intervention (44164) Min: 5     PROM:  As tolerated  Gentle   IR in scap plane <=50*             Modalities:  Min: 15      IFC  with CP 4 pads R shoulder  x 15 min  Recliner with pillow under R arm   recliner          Other Therapeutic Activities:  Pt was educated on PT POC, Diagnosis, Prognosis, pathomechanics as well as frequency and duration of scheduling future physical therapy appointments. Time was also taken on this day to answer all patient questions and participation in PT. Reviewed appointment policy in detail with patient and patient verbalized understanding. X 8 min     Home Exercise Program: Patient instructed in the following for HEP:          .   Patient verbalized/demonstrated understanding and was issued written handout.   7/5: pendulum, scap retraction, shoulder shrug/roll, UT str, AROM elbow, wrist, hand,   7/19: supine cane press, supine UE flex/abd   8/4: cane ER and flex   8/9: Tband row and extension (per pt)  8/11: Isometrics: Flex, ext, abd, IR, ER  8/18: bent row, cane ext, wall climb, SL horiz abd/abd, supine ER  8/23: bent ext, TB add/IR, cane IR, wall ER str     Therapeutic Exercise and NMR EXR  [x] (79906) Provided verbal/tactile cueing for activities related to strengthening, flexibility, endurance, ROM  for improvements in scapular, scapulothoracic and UE control with self care, reaching, carrying, lifting, house/yardwork, driving/computer work.    [] (30421) Provided verbal/tactile cueing for activities related to improving balance, coordination, kinesthetic sense, posture, motor skill, proprioception  to assist with  scapular, scapulothoracic and UE control with self care, reaching, carrying, lifting, house/yardwork, driving/computer work. Therapeutic Activities:    [x] (26835 or 26624) Provided verbal/tactile cueing for activities related to improving balance, coordination, kinesthetic sense, posture, motor skill, proprioception and motor activation to allow for proper function of scapular, scapulothoracic and UE control with self care, carrying, lifting, driving/computer work.      Home Exercise Program:    [x] (71955) Reviewed/Progressed HEP activities related to strengthening, flexibility, endurance, ROM of scapular, scapulothoracic and UE control with self care, reaching, carrying, lifting, house/yardwork, driving/computer work  [] (37261) Reviewed/Progressed HEP activities related to improving balance, coordination, kinesthetic sense, posture, motor skill, proprioception of scapular, scapulothoracic and UE control with self care, reaching, carrying, lifting, house/yardwork, driving/computer work      Manual Treatments:  PROM / STM / Oscillations-Mobs:  G-I, II, III, IV (PA's, Inf., Post.)  [x] (13618) Provided manual therapy to mobilize soft tissue/joints of cervical/CT, scapular GHJ and UE for the purpose of modulating pain, promoting relaxation, increasing ROM, reducing/eliminating soft tissue swelling/inflammation/restriction, improving soft tissue extensibility and allowing for proper ROM for normal function with self care, reaching, carrying, lifting, house/yardwork, driving/computer work    Charges:  Timed Code Treatment Minutes: 50   Total Treatment Minutes: 65       [] EVAL (LOW) 25663 (typically 20 minutes face-to-face)  [] EVAL (MOD) 54393 (typically 30 minutes face-to-face)  [] EVAL (HIGH) 62824 (typically 45 minutes face-to-face)  [] RE-EVAL     [x] JU(86568) x 2  [] Dry needle 1 or 2 Muscles (88277)  [x] NMR (45952) x     [] Dry needle 3+ Muscles (40846)  [] Manual (09408) x     [] Ultrasound (10093) x  [] TA (61121) x     [] Mech Traction (80364)  [] ES(attended) (59882)     [x] ES (un) (58902):   [] Vasopump (14379) [] Ionto (81310)   [] Other:     GOALS:  Updated 8/2:  Pain ranges from 2-4/10 with sleeping and PT/hep   Self care improving (showering, dressing) and able to clover light housework (unload )   Hep 1 x per day   Overall 30% improved   MMT: flex and abd 3-/5   ROM 8/23 update: IR 55, ER 33, flex 148, abd/scaption 130    Patient stated goal: \"dec pain\"  [x] Progressing: [] Met: [] Not Met: [] Adjusted     Therapist goals for Patient:   Short Term Goals: To be achieved in: 2 weeks  1. Independent in HEP and progression per patient tolerance, in order to prevent re-injury. [x] Progressing: [] Met: [] Not Met: [] Adjusted  2. Patient will have a decrease in pain by 20-30% to facilitate improvement in movement, function, and ADLs as indicated by Functional Deficits. [] Progressing: [x] Met: [] Not Met: [] Adjusted     Long Term Goals: To be achieved in: at d/c   1. Increase FOTO functional outcome score from 42 to 60 to assist with reaching prior level of function. [x] Progressing: [] Met: [] Not Met: [] Adjusted  2.  Patient will have a decrease in pain by 50-60% to facilitate improvement in movement, function, and ADLs as indicated by Functional Deficits. [x] Progressing: [] Met: [] Not Met: [] Adjusted  3. Patient will demonstrate increased AROM to flex/scap 140, IR/ER to [place] for ease with self care to allow for proper joint functioning as indicated by Functional Deficits. [x] Progressing: [] Met: [] Not Met: [] Adjusted  4. Patient will demonstrate an increase in NM recruitment/activation and overall GH and scapular strength to 4+/5 - 5/5 for proper functional mobility as indicated by patients Functional Deficits. [x] Progressing: [] Met: [] Not Met: [] Adjusted  5. Patient will return to self care/dressing activities without increased symptoms or restriction. [] Progressing: [x] Met: [] Not Met: [] Adjusted  6. \"full use of arm\"         [x] Progressing: [] Met: [] Not Met: [] Adjusted    ASSESSMENT:  Tolerated eval well; proceed per protocol/MD precautions   7-11-22 tolerated above ex/ PROM well. Steri strips gone , placed 4 pads with E-stim for more coverage area. 7-13-22 progressing with gentle ex  7/19: cont skilled PT for ROM and initiating strength  7-27-22 tolerating above ex well. Gradually progressing with AAROM.   8/2: MD f/u on 8/2; will cont for strength and ROM per MD guidelines   8/4: ex progression held today due to inc soreness; progress when able   8/9: tolerated progressions well  8/16: functional progression made with reaching face and opening fridge door, however, still can't reach to fix hair yet and driving is still on hold; cont with skilled PT for functional gains and strength with overhead reaching          Treatment/Activity Tolerance:  [x] Patient tolerated treatment well [] Patient limited by fatique  [] Patient limited by pain  [] Patient limited by other medical complications  [] Other:     Overall Progression Towards Functional goals/ Treatment Progress Update:  [] Patient is progressing as expected towards functional goals listed.     [x] Progression is improving slowed due to

## 2022-08-25 ENCOUNTER — HOSPITAL ENCOUNTER (OUTPATIENT)
Dept: PHYSICAL THERAPY | Age: 72
Setting detail: THERAPIES SERIES
Discharge: HOME | End: 2022-08-25
Payer: MEDICARE

## 2022-08-25 PROCEDURE — 97110 THERAPEUTIC EXERCISES: CPT

## 2022-08-25 PROCEDURE — 97530 THERAPEUTIC ACTIVITIES: CPT

## 2022-08-25 PROCEDURE — G0283 ELEC STIM OTHER THAN WOUND: HCPCS

## 2022-08-25 NOTE — FLOWSHEET NOTE
[facility] [place] [facility], [facility]  [address] [place], [facility]  Phone: [phone]   Fax:     [phone]                Physical Therapy Treatment Note/ Progress Report:       Date:  2022    Patient Name:  Wendy Hallman    :  1950  MRN: [id number]    Pertinent Medical History:Additional Pertinent Hx: OA, asthma, DM, HTN, migraine, depression, DVT, pulmonary embolism, cervical fusion    Medical/Treatment Diagnosis Information:  Diagnosis: E96.936 s/p reverse TSR R shoulder  Treatment Diagnosis: recent shoulder surgery R UE; pain, limited ROM and strength    Insurance/Certification information:  PT Insurance Information: [employer]  - $40 copay, no auth, visits BMN  Physician Information:  Referring Provider (secondary): Dr. Freida Archibald of care signed (Y/N): yes    Date of Patient follow up with Physician:  22     Progress Report: []  Yes -  [x]  No     Date Range for reporting period:  Beginnin2022  Ending:      Progress report due (10 Rx/or 30 days whichever is less):      Recertification due (POC duration/ or 90 days whichever is less):      Visit # POC/Insurance Allowable Auth Needed   15/24 BMN []Yes    [x]No     Functional Outcomes Measure:    Date Assessed: at eval  Test: FOTO  Score:42  Foto update : 54    Pain level: 1-2/10     History of Injury: Patient stated complaint: R shoulder reverse TSR on 22. MD precautions/restrictions:   active and passive fwd flexion and abduction  Limit shoulder extension  Limit ER to 15 degrees  No active IR, adduction (behind back hygiene)       SUBJECTIVE:    22  Tolerated first visit ok.  11  Sore after last Rx until next day. Able to get arm away from side better.    : was able to finally get a decent night of sleep last night laying on her R side; pain levels in shoulder are very low, no longer taking prescription pain meds; some soreness scapular mms from sleeping on R side    7/21: a little sore from the new exercises and just trying to be more active; feels she tenses on R side when using L UE   7-25-22  sore after PT the remainder of the day. Sore this am,  was in hospital , so did a little more around the house. 7-17-22 felt good after last Rx.   8/2: a little sore from sleeping; see goal reassessment   8/4: saw Dr. Milka Sellers and he is pleased with progress - ok to push ROM and strength as clover and ok to begin behind the back hygiene gradually; pt notes she did have a fall when getting out of the car to go the appt yesterday landing on R elbow (pt c/o moderate muscle soreness in shoulder and has a few abrasions on R elbow); to PT without sling  8/9: Can sleep okay on her R side, but if she sleeps on her L side she wakes up and her shoulder hurts. Woke up that way today. Had to take pain medication because she just didn't want to move it it hurt so bad  8/11: Shoulder doing pretty good. Just depends on what she's doing.  It's just annoying  8/16: doing well; opening fridge door easier and able to reach to touch forehead easier now   8/18: inc soreness from new ex and trying to use her arm more, also from sleeping on R side   8/23: closing car door is getting easier   8/25: has another appt after PT and is on a little time constraint; mild soreness from sleeping on the other side of the bed and trying to use curling iron on hair this morning (some ex held to day to cont with hep due to time constraint)      OBJECTIVE:   Observation:   Test measurements:      RESTRICTIONS/PRECAUTIONS:   Per MD notes from visit 8/2:  [facility] to push aggressive ROM and strength   Ok to begin gradual behind the back hygiene   Ok to d/c sling    DOS 6/13/22 - generic protocol on BK's desk       8/1: 7 wks p/o  8/15: 9 wks p/o  8/22: 10 wks  9/5: 12 wks    Exercises/Interventions:   Therapeutic Ex (46077)  Min: 17 Resistance/Reps Notes/Cues     R REVERSE TSR on 6/13/22     NT    UBE    Fwd x 3 min  Pulley flex  X 20  UE ranger 4 way on wall X 10 ea R with occasional L help     Table slides on incline   flexion   scaption        Standing cane   Ext   IR             Biceps curl standing    Bent row  Bent ext  cues   Ball roll on wall  X10 ea Assistance from L    TB row        Ext        Tricep        Add         IR yellow  x10  yellow  x10  Yellow x10  Yellow x 10   Yellow x 10  Pt has orange TB at home for hep    Supine:  Cane chest press/flex  Cane ER   1/2# 1 x 10   Cues to slow down   Supine  Shoulder flex  SA punch   W/ orange TB 1 x 10      SL  ER with AAROM  Abd   Horiz add    X10  X10  x10         Seated         Shoulder flex 0-90  Shoulder flex/scaption   Overhead press AAROM  AAROM x 10     AAROM x 10              Therapeutic Activity (45990) Min: 8    Finger ladder To top R shoulder only x 5     Peg board X 1 up/dwn to #14    Baps reach and turn add              NMR re-education (90006)  Min:5         SL scap mobs AAROM      Supine    RS arm 90 flex  RS IR/ER  Cw/ccw  Flex 90*-125*  AROM ER   3 x 10 sec   3 x 10 sec  1/2# X10 ea  X 10 tactile cue to guide         Isometrics flex, abd, ext, IR/ER in neutral  hep        Manual Intervention (05248) Min: 5     PROM:  As tolerated  Gentle   IR in scap plane <=50*             Modalities:  Min: 15      IFC  with CP 4 pads R shoulder  x 15 min  Recliner with pillow under R arm   recliner          Other Therapeutic Activities:  Pt was educated on PT POC, Diagnosis, Prognosis, pathomechanics as well as frequency and duration of scheduling future physical therapy appointments. Time was also taken on this day to answer all patient questions and participation in PT. Reviewed appointment policy in detail with patient and patient verbalized understanding.  X 8 min     Home Exercise Program: Patient instructed in the following for HEP:          .   Patient verbalized/demonstrated understanding and was issued written handout. 7/5: pendulum, scap retraction, shoulder shrug/roll, UT str, AROM elbow, wrist, hand,   7/19: supine cane press, supine UE flex/abd   8/4: cane ER and flex   8/9: Tband row and extension (per pt)  8/11: Isometrics: Flex, ext, abd, IR, ER  8/18: bent row, cane ext, wall climb, SL horiz abd/abd, supine ER  8/23: bent ext, TB add/IR, cane IR, wall ER str     Therapeutic Exercise and NMR EXR  [x] (05579) Provided verbal/tactile cueing for activities related to strengthening, flexibility, endurance, ROM  for improvements in scapular, scapulothoracic and UE control with self care, reaching, carrying, lifting, house/yardwork, driving/computer work.    [] (62516) Provided verbal/tactile cueing for activities related to improving balance, coordination, kinesthetic sense, posture, motor skill, proprioception  to assist with  scapular, scapulothoracic and UE control with self care, reaching, carrying, lifting, house/yardwork, driving/computer work. Therapeutic Activities:    [x] (41234 or 79157) Provided verbal/tactile cueing for activities related to improving balance, coordination, kinesthetic sense, posture, motor skill, proprioception and motor activation to allow for proper function of scapular, scapulothoracic and UE control with self care, carrying, lifting, driving/computer work.      Home Exercise Program:    [x] (03954) Reviewed/Progressed HEP activities related to strengthening, flexibility, endurance, ROM of scapular, scapulothoracic and UE control with self care, reaching, carrying, lifting, house/yardwork, driving/computer work  [] (50929) Reviewed/Progressed HEP activities related to improving balance, coordination, kinesthetic sense, posture, motor skill, proprioception of scapular, scapulothoracic and UE control with self care, reaching, carrying, lifting, house/yardwork, driving/computer work      Manual Treatments:  PROM / STM / Oscillations-Mobs:  G-I, II, III, IV (PA's, Inf., Post.)  [x] (46669) Provided manual therapy to mobilize soft tissue/joints of cervical/CT, scapular GHJ and UE for the purpose of modulating pain, promoting relaxation,  increasing ROM, reducing/eliminating soft tissue swelling/inflammation/restriction, improving soft tissue extensibility and allowing for proper ROM for normal function with self care, reaching, carrying, lifting, house/yardwork, driving/computer work    Charges:  Timed Code Treatment Minutes: 35   Total Treatment Minutes: 50       [] EVAL (LOW) 26991 (typically 20 minutes face-to-face)  [] EVAL (MOD) 57289 (typically 30 minutes face-to-face)  [] EVAL (HIGH) 06004 (typically 45 minutes face-to-face)  [] RE-EVAL     [x] LX(09886) x   [] Dry needle 1 or 2 Muscles (63204)  [] NMR (48021) x     [] Dry needle 3+ Muscles (35598)  [] Manual (07028) x     [] Ultrasound (32001) x  [x] TA (02632) x     [] Mech Traction (36287)  [] ES(attended) (04689)     [x] ES (un) (35858):   [] Vasopump (42116) [] Ionto (41730)   [] Other:     GOALS:  Updated 8/2:  Pain ranges from 2-4/10 with sleeping and PT/hep   Self care improving (showering, dressing) and able to clover light housework (unload )   Hep 1 x per day   Overall 30% improved   MMT: flex and abd 3-/5   ROM 8/23 update: IR 55, ER 33, flex 148, abd/scaption 130    Patient stated goal: \"dec pain\"  [x] Progressing: [] Met: [] Not Met: [] Adjusted     Therapist goals for Patient:   Short Term Goals: To be achieved in: 2 weeks  1. Independent in HEP and progression per patient tolerance, in order to prevent re-injury. [x] Progressing: [] Met: [] Not Met: [] Adjusted  2. Patient will have a decrease in pain by 20-30% to facilitate improvement in movement, function, and ADLs as indicated by Functional Deficits. [] Progressing: [x] Met: [] Not Met: [] Adjusted     Long Term Goals: To be achieved in: at d/c   1.  Increase FOTO functional outcome score from 42 to 60 to assist with reaching prior level of function. [x] Progressing: [] Met: [] Not Met: [] Adjusted  2. Patient will have a decrease in pain by 50-60% to facilitate improvement in movement, function, and ADLs as indicated by Functional Deficits. [x] Progressing: [] Met: [] Not Met: [] Adjusted  3. Patient will demonstrate increased AROM to flex/scap 140, IR/ER to [place] for ease with self care to allow for proper joint functioning as indicated by Functional Deficits. [x] Progressing: [] Met: [] Not Met: [] Adjusted  4. Patient will demonstrate an increase in NM recruitment/activation and overall GH and scapular strength to 4+/5 - 5/5 for proper functional mobility as indicated by patients Functional Deficits. [x] Progressing: [] Met: [] Not Met: [] Adjusted  5. Patient will return to self care/dressing activities without increased symptoms or restriction. [] Progressing: [x] Met: [] Not Met: [] Adjusted  6. \"full use of arm\"         [x] Progressing: [] Met: [] Not Met: [] Adjusted    ASSESSMENT:  Tolerated eval well; proceed per protocol/MD precautions   7-11-22 tolerated above ex/ PROM well. Steri strips gone , placed 4 pads with E-stim for more coverage area. 7-13-22 progressing with gentle ex  7/19: cont skilled PT for ROM and initiating strength  7-27-22 tolerating above ex well.  Gradually progressing with AAROM.   8/2: MD f/u on 8/2; will cont for strength and ROM per MD guidelines   8/4: ex progression held today due to inc soreness; progress when able   8/9: tolerated progressions well  8/16: functional progression made with reaching face and opening fridge door, however, still can't reach to fix hair yet and driving is still on hold; cont with skilled PT for functional gains and strength with overhead reaching   8/25: increased independence and strength noted with some overhead ex         Treatment/Activity Tolerance:  [x] Patient tolerated treatment well [] Patient limited by fatique  [] Patient limited by pain  [] Patient limited by other medical complications  [] Other:     Overall Progression Towards Functional goals/ Treatment Progress Update:  [] Patient is progressing as expected towards functional goals listed. [x] Progression is improving slowed due to complexities/Impairments listed. [] Progression has been slowed due to co-morbidities. [] Plan just implemented, too soon to assess goals progression <30days   [] Goals require adjustment due to lack of progress  [] Patient is not progressing as expected and requires additional follow up with physician  [] Other    Prognosis for POC: [x] Good [] Fair  [] Poor    Patient requires continued skilled intervention: [x] Yes  [] No      PLAN: Continue for functional strength and ROM; resume ex   [x] Continue per plan of care [] Alter current plan (see comments)  [] Plan of care initiated [] Hold pending MD visit [] Discharge    Electronically signed by: Odilia Vargas, PT MPT [id number]    Note: If patient does not return for scheduled/recommended follow up visits, this note will serve as a discharge from care along with the most recent update on progress.

## 2022-08-30 ENCOUNTER — HOSPITAL ENCOUNTER (OUTPATIENT)
Dept: PHYSICAL THERAPY | Age: 72
Setting detail: THERAPIES SERIES
Discharge: HOME | End: 2022-08-30
Payer: MEDICARE

## 2022-08-30 PROCEDURE — 97112 NEUROMUSCULAR REEDUCATION: CPT

## 2022-08-30 PROCEDURE — 97530 THERAPEUTIC ACTIVITIES: CPT

## 2022-08-30 PROCEDURE — G0283 ELEC STIM OTHER THAN WOUND: HCPCS

## 2022-08-30 PROCEDURE — 97110 THERAPEUTIC EXERCISES: CPT

## 2022-08-30 NOTE — FLOWSHEET NOTE
meds; some soreness scapular mms from sleeping on R side    7/21: a little sore from the new exercises and just trying to be more active; feels she tenses on R side when using L UE   7-25-22  sore after PT the remainder of the day. Sore this am,  was in hospital , so did a little more around the house. 7-17-22 felt good after last Rx.   8/2: a little sore from sleeping; see goal reassessment   8/4: saw Dr. Anna Sales and he is pleased with progress - ok to push ROM and strength as clover and ok to begin behind the back hygiene gradually; pt notes she did have a fall when getting out of the car to go the appt yesterday landing on R elbow (pt c/o moderate muscle soreness in shoulder and has a few abrasions on R elbow); to PT without sling  8/9: Can sleep okay on her R side, but if she sleeps on her L side she wakes up and her shoulder hurts. Woke up that way today. Had to take pain medication because she just didn't want to move it it hurt so bad  8/11: Shoulder doing pretty good. Just depends on what she's doing.  It's just annoying  8/16: doing well; opening fridge door easier and able to reach to touch forehead easier now   8/18: inc soreness from new ex and trying to use her arm more, also from sleeping on R side   8/23: closing car door is getting easier   8/25: has another appt after PT and is on a little time constraint; mild soreness from sleeping on the other side of the bed and trying to use curling iron on hair this morning (some ex held to day to cont with hep due to time constraint)  8/30: without new c/o; no pain       OBJECTIVE:   Observation:   Test measurements:      RESTRICTIONS/PRECAUTIONS:   Per MD notes from visit 8/2:  [id number] [facility] to push aggressive ROM and strength   Ok to begin gradual behind the back hygiene   Ok to d/c sling    DOS 6/13/22 - generic protocol on BK's desk       8/1: 7 wks p/o  8/15: 9 wks p/o  8/22: 10 wks  9/5: 12 wks    Exercises/Interventions:   Therapeutic Ex (14702)  Min: 25 Resistance/Reps Notes/Cues     R REVERSE TSR on 6/13/22     NT    UBE    Fwd x 3 min  Pulley flex  X 20  UE ranger 4 way on wall Up/dwn and s/s w/ 1/2# on wrist X 10 ea R with occasional L help  Only clover half of ex w/ wt due to fatigue    Table slides on incline   flexion   scaption        Standing cane   Ext   IR       hep        Biceps curl standing 1# 2 X 10 R    Bent row  Bent ext  1 x 10 R  1 x 10 R cues   Ball roll on wall  X10 ea Assistance from L as needed   TB row        Ext        Tricep        Add         IR yellow  x10  yellow  x10  Yellow x10  Yellow x 10   Yellow x 10  Pt has orange TB at home for hep    WB with wt shifting on table add    Supine:  Cane chest press/flex  Cane ER   1# 1 x 10   Cues to slow down   Supine  Shoulder flex  SA punch   W/ orange TB 1 x 10   1# X 10     SL  ER with AAROM  Abd   Horiz add    X10  1# X 10  1# x 10         Seated         Shoulder flex 0-90  Shoulder flex/scaption   Overhead press AAROM  Reach to touch back of head w/ hold   1/2# x 10   AAROM x 10     AAROM x 10   add             Therapeutic Activity (85895) Min: 10    Finger ladder To top R shoulder only x 3    Peg board   X 1 up/dwn to #12 placing in each hole  Over head reach to place in #14 x 3 reps     Baps reach and turn L5-1 turn ccw/cw x 3 each at each level   X 2 reps               NMR re-education (75263)  Min:10         SL scap mobs AAROM      Supine    RS arm 90 flex  RS IR/ER  Cw/ccw  Flex 90*-125*  AROM ER   3 x 15 sec   3 x 15 sec  1# X10 ea  1# X 10           Isometrics flex, abd, ext, IR/ER in neutral  hep        Manual Intervention ([id number]) Min: 5     PROM:  As tolerated  Gentle   IR in scap plane <=50*             Modalities:  Min: 15      IFC  with CP 4 pads R shoulder  x 15 min  Recliner with pillow under R arm   recliner          Other Therapeutic Activities:  Pt was educated on PT POC, Diagnosis, Prognosis, pathomechanics as well as frequency and duration of scheduling future physical therapy appointments. Time was also taken on this day to answer all patient questions and participation in PT. Reviewed appointment policy in detail with patient and patient verbalized understanding. X 8 min     Home Exercise Program: Patient instructed in the following for HEP:          .   Patient verbalized/demonstrated understanding and was issued written handout. 7/5: pendulum, scap retraction, shoulder shrug/roll, UT str, AROM elbow, wrist, hand,   7/19: supine cane press, supine UE flex/abd   8/4: cane ER and flex   8/9: Tband row and extension (per pt)  8/11: Isometrics: Flex, ext, abd, IR, ER  8/18: bent row, cane ext, wall climb, SL horiz abd/abd, supine ER  8/23: bent ext, TB add/IR, cane IR, wall ER str     Therapeutic Exercise and NMR EXR  [x] (05223) Provided verbal/tactile cueing for activities related to strengthening, flexibility, endurance, ROM  for improvements in scapular, scapulothoracic and UE control with self care, reaching, carrying, lifting, house/yardwork, driving/computer work.    [] (86180) Provided verbal/tactile cueing for activities related to improving balance, coordination, kinesthetic sense, posture, motor skill, proprioception  to assist with  scapular, scapulothoracic and UE control with self care, reaching, carrying, lifting, house/yardwork, driving/computer work. Therapeutic Activities:    [x] (13052 or 64000) Provided verbal/tactile cueing for activities related to improving balance, coordination, kinesthetic sense, posture, motor skill, proprioception and motor activation to allow for proper function of scapular, scapulothoracic and UE control with self care, carrying, lifting, driving/computer work.      Home Exercise Program:    [x] (17142) Reviewed/Progressed HEP activities related to strengthening, flexibility, endurance, ROM of scapular, scapulothoracic and UE control with self care, reaching, carrying, lifting, house/yardwork, driving/computer work  [] (74499) Reviewed/Progressed HEP activities related to improving balance, coordination, kinesthetic sense, posture, motor skill, proprioception of scapular, scapulothoracic and UE control with self care, reaching, carrying, lifting, house/yardwork, driving/computer work      Manual Treatments:  PROM / STM / Oscillations-Mobs:  G-I, II, III, IV (PA's, Inf., Post.)  [x] (63179) Provided manual therapy to mobilize soft tissue/joints of cervical/CT, scapular GHJ and UE for the purpose of modulating pain, promoting relaxation,  increasing ROM, reducing/eliminating soft tissue swelling/inflammation/restriction, improving soft tissue extensibility and allowing for proper ROM for normal function with self care, reaching, carrying, lifting, house/yardwork, driving/computer work    Charges:  Timed Code Treatment Minutes: 50   Total Treatment Minutes: 65       [] EVAL (LOW) 27073 (typically 20 minutes face-to-face)  [] EVAL (MOD) 10766 (typically 30 minutes face-to-face)  [] EVAL (HIGH) 02393 (typically 45 minutes face-to-face)  [] RE-EVAL     [x] CH(08172) x   [] Dry needle 1 or 2 Muscles (80975)  [x] NMR (66246) x     [] Dry needle 3+ Muscles (40832)  [] Manual (44584) x     [] Ultrasound (02651) x  [x] TA (17024) x     [] Mech Traction (48045)  [] ES(attended) (30233)     [x] ES (un) (76170):   [] Vasopump (43470) [] Ionto (84849)   [] Other:     GOALS:  Updated 8/2:  Pain ranges from 2-4/10 with sleeping and PT/hep   Self care improving (showering, dressing) and able to clover light housework (unload )   Hep 1 x per day   Overall 30% improved   MMT: flex and abd 3-/5   ROM 8/23 update: IR 55, ER 33, flex 148, abd/scaption 130    Patient stated goal: \"dec pain\"  [x] Progressing: [] Met: [] Not Met: [] Adjusted     Therapist goals for Patient:   Short Term Goals: To be achieved in: 2 weeks  1. Independent in HEP and progression per patient tolerance, in order to prevent re-injury.    [x] Progressing: [] Met: [] Not Met: [] Adjusted  2. Patient will have a decrease in pain by 20-30% to facilitate improvement in movement, function, and ADLs as indicated by Functional Deficits. [] Progressing: [x] Met: [] Not Met: [] Adjusted     Long Term Goals: To be achieved in: at d/c   1. Increase FOTO functional outcome score from 42 to 60 to assist with reaching prior level of function. [x] Progressing: [] Met: [] Not Met: [] Adjusted  2. Patient will have a decrease in pain by 50-60% to facilitate improvement in movement, function, and ADLs as indicated by Functional Deficits. [x] Progressing: [] Met: [] Not Met: [] Adjusted  3. Patient will demonstrate increased AROM to flex/scap 140, IR/ER to [place] for ease with self care to allow for proper joint functioning as indicated by Functional Deficits. [x] Progressing: [] Met: [] Not Met: [] Adjusted  4. Patient will demonstrate an increase in NM recruitment/activation and overall GH and scapular strength to 4+/5 - 5/5 for proper functional mobility as indicated by patients Functional Deficits. [x] Progressing: [] Met: [] Not Met: [] Adjusted  5. Patient will return to self care/dressing activities without increased symptoms or restriction. [] Progressing: [x] Met: [] Not Met: [] Adjusted  6. \"full use of arm\"         [x] Progressing: [] Met: [] Not Met: [] Adjusted    ASSESSMENT:  Tolerated eval well; proceed per protocol/MD precautions   7-11-22 tolerated above ex/ PROM well. Steri strips gone , placed 4 pads with E-stim for more coverage area. 7-13-22 progressing with gentle ex  7/19: cont skilled PT for ROM and initiating strength  7-27-22 tolerating above ex well.  Gradually progressing with AAROM.   8/2: MD f/u on 8/2; will cont for strength and ROM per MD guidelines   8/4: ex progression held today due to inc soreness; progress when able   8/9: tolerated progressions well  8/16: functional progression made with reaching face and opening fridge door, however, still can't reach to fix hair yet and driving is still on hold; cont with skilled PT for functional gains and strength with overhead reaching   8/25: increased independence and strength noted with some overhead ex         Treatment/Activity Tolerance:  [x] Patient tolerated treatment well [] Patient limited by fatique  [] Patient limited by pain  [] Patient limited by other medical complications  [] Other:     Overall Progression Towards Functional goals/ Treatment Progress Update:  [] Patient is progressing as expected towards functional goals listed. [x] Progression is improving slowed due to complexities/Impairments listed. [] Progression has been slowed due to co-morbidities. [] Plan just implemented, too soon to assess goals progression <30days   [] Goals require adjustment due to lack of progress  [] Patient is not progressing as expected and requires additional follow up with physician  [] Other    Prognosis for POC: [x] Good [] Fair  [] Poor    Patient requires continued skilled intervention: [x] Yes  [] No      PLAN: Continue for functional strength and ROM  [x] Continue per plan of care [] Alter current plan (see comments)  [] Plan of care initiated [] Hold pending MD visit [] Discharge    Electronically signed by: Karena Gifford, PT MPT [id number]    Note: If patient does not return for scheduled/recommended follow up visits, this note will serve as a discharge from care along with the most recent update on progress.

## 2022-09-01 ENCOUNTER — HOSPITAL ENCOUNTER (OUTPATIENT)
Dept: PHYSICAL THERAPY | Age: 72
Setting detail: THERAPIES SERIES
Discharge: HOME | End: 2022-09-01
Payer: MEDICARE

## 2022-09-01 PROCEDURE — 97530 THERAPEUTIC ACTIVITIES: CPT

## 2022-09-01 PROCEDURE — 97112 NEUROMUSCULAR REEDUCATION: CPT

## 2022-09-01 PROCEDURE — 97110 THERAPEUTIC EXERCISES: CPT

## 2022-09-01 PROCEDURE — G0283 ELEC STIM OTHER THAN WOUND: HCPCS

## 2022-09-01 NOTE — FLOWSHEET NOTE
[facility], [facility]  [address] [facility] [place], [facility]  Phone: [phone]   Fax:     [phone]                Physical Therapy Treatment Note/ Progress Report:       Date:  2022    Patient Name:  Teja Cantrell    :  1950  MRN: [id number]    Pertinent Medical History:Additional Pertinent Hx: OA, asthma, DM, HTN, migraine, depression, DVT, pulmonary embolism, cervical fusion    Medical/Treatment Diagnosis Information:  Diagnosis: X74.450 s/p reverse TSR R shoulder  Treatment Diagnosis: recent shoulder surgery R UE; pain, limited ROM and strength    Insurance/Certification information:  PT Insurance Information: [employer]  - $40 copay, no auth, visits BMN  Physician Information:  Referring Provider (secondary): Dr. Mohamud Knox of care signed (Y/N): yes    Date of Patient follow up with Physician:  22     Progress Report: [x]  Yes -  []  No     Date Range for reporting period:  Beginnin2022  Ending:      Progress report due (10 Rx/or 30 days whichever is less):     Recertification due (POC duration/ or 90 days whichever is less):      Visit # POC/Insurance Allowable Auth Needed    BMN []Yes    [x]No     Functional Outcomes Measure:    Date Assessed: at eval  Test: FOTO  Score:42  Foto update : 54    Pain level: 0/10     History of Injury: Patient stated complaint: R shoulder reverse TSR on 22. MD precautions/restrictions:   active and passive fwd flexion and abduction  Limit shoulder extension  Limit ER to 15 degrees  No active IR, adduction (behind back hygiene)       SUBJECTIVE:    22  Tolerated first visit ok.  11  Sore after last Rx until next day. Able to get arm away from side better.    : was able to finally get a decent night of sleep last night laying on her R side; pain levels in shoulder are very low, no longer taking prescription pain meds; some soreness scapular mms from sleeping on R side    7/21: a little sore from the new exercises and just trying to be more active; feels she tenses on R side when using L UE   7-25-22  sore after PT the remainder of the day. Sore this am,  was in hospital , so did a little more around the house. 7-17-22 felt good after last Rx.   8/2: a little sore from sleeping; see goal reassessment   8/4: saw Dr. Ambrocio Aranda and he is pleased with progress - ok to push ROM and strength as clover and ok to begin behind the back hygiene gradually; pt notes she did have a fall when getting out of the car to go the appt yesterday landing on R elbow (pt c/o moderate muscle soreness in shoulder and has a few abrasions on R elbow); to PT without sling  8/9: Can sleep okay on her R side, but if she sleeps on her L side she wakes up and her shoulder hurts. Woke up that way today. Had to take pain medication because she just didn't want to move it it hurt so bad  8/11: Shoulder doing pretty good. Just depends on what she's doing.  It's just annoying  8/16: doing well; opening fridge door easier and able to reach to touch forehead easier now   8/18: inc soreness from new ex and trying to use her arm more, also from sleeping on R side   8/23: closing car door is getting easier   8/25: has another appt after PT and is on a little time constraint; mild soreness from sleeping on the other side of the bed and trying to use curling iron on hair this morning (some ex held to day to cont with hep due to time constraint)  8/30: without new c/o; no pain   9/1: slowly feels like she is reaching more and slowly gaining strength with over head reaching       OBJECTIVE:   Observation:   Test measurements:      RESTRICTIONS/PRECAUTIONS:   Per MD notes from visit 8/2:  [id number] Digna Joseph to push aggressive ROM and strength   Ok to begin gradual behind the back hygiene   Ok to d/c sling    DOS 6/13/22 - generic protocol on BK's desk       8/1: 7 wks p/o  8/15: 9 wks p/o  8/22: 10 wks  9/5: 12 wks    Exercises/Interventions:   Therapeutic Ex (61525)  Min: 25 Resistance/Reps Notes/Cues     R REVERSE TSR on 6/13/22     NT    UBE    Fwd x 4 min  Pulley flex  X 20  UE ranger 4 way on wall X 10 ea R with occasional L help  Poor tech with wt on - hold on adding 1/2#  - substitutions noted    Table slides on incline   flexion   scaption        Standing cane   Ext   IR       hep        Biceps curl standing 1# 2 X 10 R    Bent row  Bent ext  1# 1 x 10 R  1# 1 x 10 R cues   Ball roll on wall  X10 ea Assistance from L as needed   TB row        Ext        Tricep        Add         IR yellow  x10  yellow  x10  Yellow x10  Yellow x 10   Yellow x 10  Pt has orange TB at home for hep    WB with wt shifting on table S/s  x 10     Supine:  Cane chest press/flex  Cane ER   Cues to slow down   Supine  Shoulder flex  SA punch  Chest press    W/ orange TB 1 x 10   1# X 10   1# x 10     SL  ER with AAROM      Abd   Horiz add    X 10 A to full range w/ independent hold x 3 sec at top of ROM   1# X 10  1# x 10         Seated         Shoulder flex 0-90  Shoulder flex/scaption   Overhead press   Reach to touch back of head w/ hold   1/2# x 10   Min A to guide 1/2# 2 x 5      Min A to guide 1/2# 2 x 5  3 x 10 sec              Therapeutic Activity (42111) Min: 15 See below for PN    Finger ladder To top R shoulder only x 5    Peg board   X 1 up/dwn to #12 placing in each hole  Over head reach to place in #14 x 3 reps     Baps reach and turn L5-1 turn ccw/cw x 3 each at each level   X 1 reps up and down              NMR re-education (44624)  Min:10         SL scap mobs AAROM      Supine    RS arm 90 flex  RS IR/ER  Cw/ccw  Flex 90*-125*  AROM ER   3 x 15 sec   3 x 15 sec  1# X10 ea  1# X 10           Isometrics flex, abd, ext, IR/ER in neutral     ER isometrics supine 10 x 5 sec  hep        Manual Intervention (45640) Min: 5     PROM:  As tolerated  Gentle   IR in scap plane <=50* Modalities:  Min: 15      IFC  with CP 4 pads R shoulder  x 15 min  Recliner with pillow under R arm   recliner          Other Therapeutic Activities:  Pt was educated on PT POC, Diagnosis, Prognosis, pathomechanics as well as frequency and duration of scheduling future physical therapy appointments. Time was also taken on this day to answer all patient questions and participation in PT. Reviewed appointment policy in detail with patient and patient verbalized understanding. X 8 min     Home Exercise Program: Patient instructed in the following for HEP:          .   Patient verbalized/demonstrated understanding and was issued written handout. 7/5: pendulum, scap retraction, shoulder shrug/roll, UT str, AROM elbow, wrist, hand,   7/19: supine cane press, supine UE flex/abd   8/4: cane ER and flex   8/9: Tband row and extension (per pt)  8/11: Isometrics: Flex, ext, abd, IR, ER  8/18: bent row, cane ext, wall climb, SL horiz abd/abd, supine ER  8/23: bent ext, TB add/IR, cane IR, wall ER str     Therapeutic Exercise and NMR EXR  [x] (35325) Provided verbal/tactile cueing for activities related to strengthening, flexibility, endurance, ROM  for improvements in scapular, scapulothoracic and UE control with self care, reaching, carrying, lifting, house/yardwork, driving/computer work.    [] (44305) Provided verbal/tactile cueing for activities related to improving balance, coordination, kinesthetic sense, posture, motor skill, proprioception  to assist with  scapular, scapulothoracic and UE control with self care, reaching, carrying, lifting, house/yardwork, driving/computer work.     Therapeutic Activities:    [x] (05368 or 32666) Provided verbal/tactile cueing for activities related to improving balance, coordination, kinesthetic sense, posture, motor skill, proprioception and motor activation to allow for proper function of scapular, scapulothoracic and UE control with self care, carrying, lifting, driving/computer work.      Home Exercise Program:    [x] (08277) Reviewed/Progressed HEP activities related to strengthening, flexibility, endurance, ROM of scapular, scapulothoracic and UE control with self care, reaching, carrying, lifting, house/yardwork, driving/computer work  [] (76871) Reviewed/Progressed HEP activities related to improving balance, coordination, kinesthetic sense, posture, motor skill, proprioception of scapular, scapulothoracic and UE control with self care, reaching, carrying, lifting, house/yardwork, driving/computer work      Manual Treatments:  PROM / STM / Oscillations-Mobs:  G-I, II, III, IV (PA's, Inf., Post.)  [x] (50921) Provided manual therapy to mobilize soft tissue/joints of cervical/CT, scapular GHJ and UE for the purpose of modulating pain, promoting relaxation,  increasing ROM, reducing/eliminating soft tissue swelling/inflammation/restriction, improving soft tissue extensibility and allowing for proper ROM for normal function with self care, reaching, carrying, lifting, house/yardwork, driving/computer work    Charges:  Timed Code Treatment Minutes: 55   Total Treatment Minutes: 70       [] EVAL (LOW) 94177 (typically 20 minutes face-to-face)  [] EVAL (MOD) 15123 (typically 30 minutes face-to-face)  [] EVAL (HIGH) 84339 (typically 45 minutes face-to-face)  [] RE-EVAL     [x] QI(04105) x 2  [] Dry needle 1 or 2 Muscles (37084)  [x] NMR (09806) x     [] Dry needle 3+ Muscles (56891)  [] Manual (29839) x     [] Ultrasound (52289) x  [x] TA (22115) x     [] Mech Traction (33005)  [] ES(attended) (68799)     [x] ES (un) (73194):   [] Vasopump (45741) [] Ionto (56083)   [] Other:     GOALS:  Updated 9/1:  Pain ranges from 0-4/10 with sleeping and PT/hep and activity level    Self care improving (showering, dressing) and able to clover light housework (unload )  *able to reach head but difficulty holding it there to style hair; able to reach 1st shelf of cabinet  Able to hold cup but difficulty bringing it to mouth to drink due to ER weakness    Hep 1 x per day   Overall 30% improved   MMT: flex 4+/5, abd 4+/5, IR 4+/5 and ER 3-/5   ROM update: IR 55, ER 40, flex 150, abd/scaption 150    Patient stated goal: \"dec pain\"  [x] Progressing: [] Met: [] Not Met: [] Adjusted     Therapist goals for Patient:   Short Term Goals: To be achieved in: 2 weeks  1. Independent in HEP and progression per patient tolerance, in order to prevent re-injury. [x] Progressing: [] Met: [] Not Met: [] Adjusted  2. Patient will have a decrease in pain by 20-30% to facilitate improvement in movement, function, and ADLs as indicated by Functional Deficits. [] Progressing: [x] Met: [] Not Met: [] Adjusted     Long Term Goals: To be achieved in: at d/c   1. Increase FOTO functional outcome score from 42 to 60 to assist with reaching prior level of function. [x] Progressing: [] Met: [] Not Met: [] Adjusted  2. Patient will have a decrease in pain by 50-60% to facilitate improvement in movement, function, and ADLs as indicated by Functional Deficits. [x] Progressing: [] Met: [] Not Met: [] Adjusted  3. Patient will demonstrate increased AROM to flex/scap 140, IR/ER to [facility] for ease with self care to allow for proper joint functioning as indicated by Functional Deficits. [x] Progressing: [] Met: [] Not Met: [] Adjusted  4. Patient will demonstrate an increase in NM recruitment/activation and overall GH and scapular strength to 4+/5 - 5/5 for proper functional mobility as indicated by patients Functional Deficits. [x] Progressing: [] Met: [] Not Met: [] Adjusted  5. Patient will return to self care/dressing activities without increased symptoms or restriction. [] Progressing: [x] Met: [] Not Met: [] Adjusted  6. \"full use of arm\"         [x] Progressing: [] Met: [] Not Met: [] Adjusted    ASSESSMENT:  Tolerated eval well; proceed per protocol/MD precautions   7-11-22 tolerated above ex/ PROM well.   Rosibel strips gone , placed 4 pads with E-stim for more coverage area. 7-13-22 progressing with gentle ex  7/19: cont skilled PT for ROM and initiating strength  7-27-22 tolerating above ex well. Gradually progressing with AAROM.   8/2: MD f/u on 8/2; will cont for strength and ROM per MD guidelines   8/4: ex progression held today due to inc soreness; progress when able   8/9: tolerated progressions well  8/16: functional progression made with reaching face and opening fridge door, however, still can't reach to fix hair yet and driving is still on hold; cont with skilled PT for functional gains and strength with overhead reaching   8/25: increased independence and strength noted with some overhead ex  9/1: despite functional gains with strength and ROM with overhead reaching, ER strength remains weak limiting pt's functional progression; IFC cont to help with pain from ex and stretching          Treatment/Activity Tolerance:  [x] Patient tolerated treatment well [] Patient limited by fatique  [] Patient limited by pain  [] Patient limited by other medical complications  [] Other:     Overall Progression Towards Functional goals/ Treatment Progress Update:  [] Patient is progressing as expected towards functional goals listed. [x] Progression is improving slowed due to complexities/Impairments listed. [] Progression has been slowed due to co-morbidities.   [] Plan just implemented, too soon to assess goals progression <30days   [] Goals require adjustment due to lack of progress  [] Patient is not progressing as expected and requires additional follow up with physician  [] Other    Prognosis for POC: [x] Good [] Fair  [] Poor    Patient requires continued skilled intervention: [x] Yes  [] No      PLAN: Continue for functional strength and ROM, work ER strength as able   [x] Continue per plan of care [] Alter current plan (see comments)  [] Plan of care initiated [] Hold pending MD visit [] Discharge    Electronically signed by: Ligia Neri, PT MPT [id number]    Note: If patient does not return for scheduled/recommended follow up visits, this note will serve as a discharge from care along with the most recent update on progress.

## 2022-09-06 ENCOUNTER — HOSPITAL ENCOUNTER (OUTPATIENT)
Dept: PHYSICAL THERAPY | Age: 72
Setting detail: THERAPIES SERIES
Discharge: HOME | End: 2022-09-06
Payer: MEDICARE

## 2022-09-06 PROCEDURE — 97110 THERAPEUTIC EXERCISES: CPT

## 2022-09-06 PROCEDURE — G0283 ELEC STIM OTHER THAN WOUND: HCPCS

## 2022-09-06 PROCEDURE — 97530 THERAPEUTIC ACTIVITIES: CPT

## 2022-09-06 PROCEDURE — 97112 NEUROMUSCULAR REEDUCATION: CPT

## 2022-09-06 NOTE — FLOWSHEET NOTE
[facility], [facility]  [address] [facility] [place], [facility]  Phone: [phone]   Fax:     [phone]                Physical Therapy Treatment Note/ Progress Report:       Date:  2022    Patient Name:  Ynes Florian    :  1950  MRN: [id number]    Pertinent Medical History:Additional Pertinent Hx: OA, asthma, DM, HTN, migraine, depression, DVT, pulmonary embolism, cervical fusion    Medical/Treatment Diagnosis Information:  Diagnosis: A42.049 s/p reverse TSR R shoulder  Treatment Diagnosis: recent shoulder surgery R UE; pain, limited ROM and strength    Insurance/Certification information:  PT Insurance Information: [employer]  - $40 copay, no auth, visits BMN  Physician Information:  Referring Provider (secondary): Dr. Gaby Borges of care signed (Y/N): yes    Date of Patient follow up with Physician:  22     Progress Report: [x]  Yes -  []  No     Date Range for reporting period:  Beginnin2022  Ending:      Progress report due (10 Rx/or 30 days whichever is less): /    Recertification due (POC duration/ or 90 days whichever is less):      Visit # POC/Insurance Allowable Auth Needed    BMN []Yes    [x]No     Functional Outcomes Measure:    Date Assessed: at eval  Test: FOTO  Score:42  Foto update : 54    Pain level: 1-2/10 at rest and up to 3-4/10 with movement and reaching     History of Injury: Patient stated complaint: R shoulder reverse TSR on 22. MD precautions/restrictions:   active and passive fwd flexion and abduction  Limit shoulder extension  Limit ER to 15 degrees  No active IR, adduction (behind back hygiene)       SUBJECTIVE:    22  Tolerated first visit ok.  11  Sore after last Rx until next day. Able to get arm away from side better.    : was able to finally get a decent night of sleep last night laying on her R side; pain levels in shoulder are very low, no longer taking prescription pain meds; some soreness scapular mms from sleeping on R side    7/21: a little sore from the new exercises and just trying to be more active; feels she tenses on R side when using L UE   7-25-22  sore after PT the remainder of the day. Sore this am,  was in hospital , so did a little more around the house. 7-17-22 felt good after last Rx.   8/2: a little sore from sleeping; see goal reassessment   8/4: saw Dr. Lois Jewell and he is pleased with progress - ok to push ROM and strength as clover and ok to begin behind the back hygiene gradually; pt notes she did have a fall when getting out of the car to go the appt yesterday landing on R elbow (pt c/o moderate muscle soreness in shoulder and has a few abrasions on R elbow); to PT without sling  8/9: Can sleep okay on her R side, but if she sleeps on her L side she wakes up and her shoulder hurts. Woke up that way today. Had to take pain medication because she just didn't want to move it it hurt so bad  8/11: Shoulder doing pretty good. Just depends on what she's doing.  It's just annoying  8/16: doing well; opening fridge door easier and able to reach to touch forehead easier now   8/18: inc soreness from new ex and trying to use her arm more, also from sleeping on R side   8/23: closing car door is getting easier   8/25: has another appt after PT and is on a little time constraint; mild soreness from sleeping on the other side of the bed and trying to use curling iron on hair this morning (some ex held to day to cont with hep due to time constraint)  8/30: without new c/o; no pain   9/1: slowly feels like she is reaching more and slowly gaining strength with over head reaching   9/6: thinks she is overdoing with hep b/c she doesn't have a 1# and is doing all hep 2-3x per day with a 2# weight and is c/o deltoid muscle soreness      OBJECTIVE:   Observation:   Test measurements:      RESTRICTIONS/PRECAUTIONS:   Per MD notes from visit 8/2:  [id number] [address] Joseph to push aggressive ROM and strength   Ok to begin gradual behind the back hygiene   Ok to d/c sling    DOS 6/13/22 - generic protocol on BK's desk       8/1: 7 wks p/o  8/15: 9 wks p/o  8/22: 10 wks  9/5: 12 wks    Exercises/Interventions:  9/6/22 - all ex done with 0# today to rest shoulder   Therapeutic Ex (68520)  Min: 20 Resistance/Reps Notes/Cues     R REVERSE TSR on 6/13/22     NT    UBE    Fwd x 4 min  Pulley flex  X 20  UE ranger 4 way on wall X 10 ea R with occasional L help      Table slides on incline   flexion   scaption        Standing cane   Ext   IR       hep        Biceps curl standing 1# 2 X 10 R    Bent row  Bent ext  1# 1 x 10 R  1# 1 x 10 R    Ball roll on wall  X10 ea Assistance from L as needed   TB row        Ext        Tricep        Add         IR yellow  x10  yellow  x10  Yellow x10  Yellow x 10   Yellow x 10  Pt has orange TB at home for hep    WB with wt shifting on table S/s  x 10     Supine:  Cane chest press/flex  Cane ER   Cues to slow down   Supine  Shoulder flex  SA punch  Chest press    W/ orange TB 1 x 10   1# X 10   1# x 10     SL  ER with AAROM      Abd   Horiz add    X 10 A to full range w/ independent hold x 3 sec at top of ROM and lower slowly  1# X 10  1# x 10         Seated         Shoulder flex 0-90  Shoulder flex/scaption   Overhead press   Reach to touch back of head w/ hold   1/2# x 10   Min A to guide 1/2# 2 x 5      Min A to guide 1/2# 2 x 5  3 x 10 sec            9/6 pt able to use R UE to wash hair today             Therapeutic Activity (00175) Min: 10    Finger ladder To top R shoulder only x 5    Peg board   X 1 up/dwn to #12 placing in each hole  Over head reach to place in #14 x 3 reps     Baps reach and turn L5-1 turn ccw/cw x 3 each at each level   X 1 rep up and down w/ rest b/w              NMR re-education (72254)  Min:10         SL scap mobs AAROM      Supine    RS arm 90 flex  RS IR/ER  Cw/ccw  Flex 90*-125*  AROM ER   3 x 15 sec related to improving balance, coordination, kinesthetic sense, posture, motor skill, proprioception and motor activation to allow for proper function of scapular, scapulothoracic and UE control with self care, carrying, lifting, driving/computer work.      Home Exercise Program:    [x] (17519) Reviewed/Progressed HEP activities related to strengthening, flexibility, endurance, ROM of scapular, scapulothoracic and UE control with self care, reaching, carrying, lifting, house/yardwork, driving/computer work  [] (21312) Reviewed/Progressed HEP activities related to improving balance, coordination, kinesthetic sense, posture, motor skill, proprioception of scapular, scapulothoracic and UE control with self care, reaching, carrying, lifting, house/yardwork, driving/computer work      Manual Treatments:  PROM / STM / Oscillations-Mobs:  G-I, II, III, IV (PA's, Inf., Post.)  [x] (64822) Provided manual therapy to mobilize soft tissue/joints of cervical/CT, scapular GHJ and UE for the purpose of modulating pain, promoting relaxation,  increasing ROM, reducing/eliminating soft tissue swelling/inflammation/restriction, improving soft tissue extensibility and allowing for proper ROM for normal function with self care, reaching, carrying, lifting, house/yardwork, driving/computer work    Charges:  Timed Code Treatment Minutes: 45   Total Treatment Minutes: 60       [] EVAL (LOW) 86823 (typically 20 minutes face-to-face)  [] EVAL (MOD) 93187 (typically 30 minutes face-to-face)  [] EVAL (HIGH) 18755 (typically 45 minutes face-to-face)  [] RE-EVAL     [x] XG(40685) x   [] Dry needle 1 or 2 Muscles (89613)  [x] NMR (80464) x     [] Dry needle 3+ Muscles (50982)  [] Manual (60710) x     [] Ultrasound (65702) x  [x] TA (74841) x     [] Mech Traction (12046)  [] ES(attended) (03632)     [x] ES (un) (65612):   [] Vasopump (42520) [] Ionto (85951)   [] Other:     GOALS:  Updated 9/1:  Pain ranges from 0-4/10 with sleeping and PT/hep and activity level    Self care improving (showering, dressing) and able to clover light housework (unload )  *able to reach head but difficulty holding it there to style hair; able to reach 1st shelf of cabinet  Able to hold cup but difficulty bringing it to mouth to drink due to ER weakness    Hep 1 x per day   Overall 30% improved   MMT: flex 4+/5, abd 4+/5, IR 4+/5 and ER 3-/5   ROM update: IR 55, ER 40, flex 150, abd/scaption 150    Patient stated goal: \"dec pain\"  [x] Progressing: [] Met: [] Not Met: [] Adjusted     Therapist goals for Patient:   Short Term Goals: To be achieved in: 2 weeks  1. Independent in HEP and progression per patient tolerance, in order to prevent re-injury. [x] Progressing: [] Met: [] Not Met: [] Adjusted  2. Patient will have a decrease in pain by 20-30% to facilitate improvement in movement, function, and ADLs as indicated by Functional Deficits. [] Progressing: [x] Met: [] Not Met: [] Adjusted     Long Term Goals: To be achieved in: at d/c   1. Increase FOTO functional outcome score from 42 to 60 to assist with reaching prior level of function. [x] Progressing: [] Met: [] Not Met: [] Adjusted  2. Patient will have a decrease in pain by 50-60% to facilitate improvement in movement, function, and ADLs as indicated by Functional Deficits. [x] Progressing: [] Met: [] Not Met: [] Adjusted  3. Patient will demonstrate increased AROM to flex/scap 140, IR/ER to [facility] for ease with self care to allow for proper joint functioning as indicated by Functional Deficits. [x] Progressing: [] Met: [] Not Met: [] Adjusted  4. Patient will demonstrate an increase in NM recruitment/activation and overall GH and scapular strength to 4+/5 - 5/5 for proper functional mobility as indicated by patients Functional Deficits. [x] Progressing: [] Met: [] Not Met: [] Adjusted  5. Patient will return to self care/dressing activities without increased symptoms or restriction.    [] Progressing: [x] Met: [] Not Met: [] Adjusted  6. \"full use of arm\"         [x] Progressing: [] Met: [] Not Met: [] Adjusted    ASSESSMENT:  Tolerated eval well; proceed per protocol/MD precautions   7-11-22 tolerated above ex/ PROM well. Steri strips gone , placed 4 pads with E-stim for more coverage area. 7-13-22 progressing with gentle ex  7/19: cont skilled PT for ROM and initiating strength  7-27-22 tolerating above ex well. Gradually progressing with AAROM.   8/2: MD f/u on 8/2; will cont for strength and ROM per MD guidelines   8/4: ex progression held today due to inc soreness; progress when able   8/9: tolerated progressions well  8/16: functional progression made with reaching face and opening fridge door, however, still can't reach to fix hair yet and driving is still on hold; cont with skilled PT for functional gains and strength with overhead reaching   8/25: increased independence and strength noted with some overhead ex  9/1: despite functional gains with strength and ROM with overhead reaching, ER strength remains weak limiting pt's functional progression; IFC cont to help with pain from ex and stretching   9/6: pt edu to hold on hep x a few days to let shoulder rest and calm down; offered options for creating 1# for use at home versus purchasing 1# dumb bell; improved strength noted with RS ex today          Treatment/Activity Tolerance:  [x] Patient tolerated treatment well [] Patient limited by fatique  [] Patient limited by pain  [] Patient limited by other medical complications  [] Other:     Overall Progression Towards Functional goals/ Treatment Progress Update:  [] Patient is progressing as expected towards functional goals listed. [x] Progression is improving slowed due to complexities/Impairments listed. [] Progression has been slowed due to co-morbidities.   [] Plan just implemented, too soon to assess goals progression <30days   [] Goals require adjustment due to lack of progress  [] Patient is not progressing as expected and requires additional follow up with physician  [] Other    Prognosis for POC: [x] Good [] Fair  [] Poor    Patient requires continued skilled intervention: [x] Yes  [] No      PLAN: Continue for functional strength and ROM, work ER strength as able; resume with wt  [x] Continue per plan of care [] Alter current plan (see comments)  [] Plan of care initiated [] Hold pending MD visit [] Discharge    Electronically signed by: Dorian Lagos, PT MPT [id number]    Note: If patient does not return for scheduled/recommended follow up visits, this note will serve as a discharge from care along with the most recent update on progress.

## 2022-09-08 ENCOUNTER — HOSPITAL ENCOUNTER (OUTPATIENT)
Dept: PHYSICAL THERAPY | Age: 72
Setting detail: THERAPIES SERIES
Discharge: HOME | End: 2022-09-08
Payer: MEDICARE

## 2022-09-08 PROCEDURE — G0283 ELEC STIM OTHER THAN WOUND: HCPCS

## 2022-09-08 PROCEDURE — 97110 THERAPEUTIC EXERCISES: CPT

## 2022-09-08 PROCEDURE — 97112 NEUROMUSCULAR REEDUCATION: CPT

## 2022-09-08 PROCEDURE — 97530 THERAPEUTIC ACTIVITIES: CPT

## 2022-09-08 NOTE — FLOWSHEET NOTE
[facility], [facility]  [address] [place], [facility]  Phone: [phone]   Fax:     [phone]                Physical Therapy Treatment Note/ Progress Report:       Date:  2022    Patient Name:  Olamide Chau    :  1950  MRN: [id number]    Pertinent Medical History:Additional Pertinent Hx: OA, asthma, DM, HTN, migraine, depression, DVT, pulmonary embolism, cervical fusion    Medical/Treatment Diagnosis Information:  Diagnosis: W66.662 s/p reverse TSR R shoulder  Treatment Diagnosis: recent shoulder surgery R UE; pain, limited ROM and strength    Insurance/Certification information:  PT Insurance Information: [employer]  - $40 copay, no auth, visits BMN  Physician Information:  Referring Provider (secondary): Dr. Krystal Dean of care signed (Y/N): yes    Date of Patient follow up with Physician:  22     Progress Report: []  Yes -  [x]  No     Date Range for reporting period:  Beginnin2022  Ending:      Progress report due (10 Rx/or 30 days whichever is less):     Recertification due (POC duration/ or 90 days whichever is less):      Visit # POC/Insurance Allowable Auth Needed    BMN []Yes    [x]No     Functional Outcomes Measure:    Date Assessed: at eval  Test: FOTO  Score:42  Foto update : 54    Pain level:0/10   History of Injury: Patient stated complaint: R shoulder reverse TSR on 22. MD precautions/restrictions:   active and passive fwd flexion and abduction  Limit shoulder extension  Limit ER to 15 degrees  No active IR, adduction (behind back hygiene)       SUBJECTIVE:    22  Tolerated first visit ok.  11  Sore after last Rx until next day. Able to get arm away from side better.    : was able to finally get a decent night of sleep last night laying on her R side; pain levels in shoulder are very low, no longer taking prescription pain meds; some soreness scapular mms from sleeping on R side    7/21: a little sore from the new exercises and just trying to be more active; feels she tenses on R side when using L UE   7-25-22  sore after PT the remainder of the day. Sore this am,  was in hospital , so did a little more around the house. 7-17-22 felt good after last Rx.   8/2: a little sore from sleeping; see goal reassessment   8/4: saw Dr. Milka Sellers and he is pleased with progress - ok to push ROM and strength as clover and ok to begin behind the back hygiene gradually; pt notes she did have a fall when getting out of the car to go the appt yesterday landing on R elbow (pt c/o moderate muscle soreness in shoulder and has a few abrasions on R elbow); to PT without sling  8/9: Can sleep okay on her R side, but if she sleeps on her L side she wakes up and her shoulder hurts. Woke up that way today. Had to take pain medication because she just didn't want to move it it hurt so bad  8/11: Shoulder doing pretty good. Just depends on what she's doing.  It's just annoying  8/16: doing well; opening fridge door easier and able to reach to touch forehead easier now   8/18: inc soreness from new ex and trying to use her arm more, also from sleeping on R side   8/23: closing car door is getting easier   8/25: has another appt after PT and is on a little time constraint; mild soreness from sleeping on the other side of the bed and trying to use curling iron on hair this morning (some ex held to day to cont with hep due to time constraint)  8/30: without new c/o; no pain   9/1: slowly feels like she is reaching more and slowly gaining strength with over head reaching   9/6: thinks she is overdoing with hep b/c she doesn't have a 1# and is doing all hep 2-3x per day with a 2# weight and is c/o deltoid muscle soreness  9/8: shoulder is feeling much better with the rest; FOTO next time for visit 20      OBJECTIVE:   Observation:   Test measurements: RESTRICTIONS/PRECAUTIONS:   Per MD notes from visit 8/2:  TED [facility] to push aggressive ROM and strength   Ok to begin gradual behind the back hygiene   Ok to d/c sling    DOS 6/13/22 - generic protocol on BK's desk       8/1: 7 wks p/o  8/15: 9 wks p/o  8/22: 10 wks  9/5: 12 wks    Exercises/Interventions:   Therapeutic Ex (88218)  Min: 15 Resistance/Reps Notes/Cues     R REVERSE TSR on 6/13/22     NT    UBE    Fwd x 4 min  Pulley flex  X 20  UE ranger 4 way on wall X 10 ea R with occasional L help  Rest breaks needed    Table slides on incline   flexion   scaption        Standing cane   Ext   IR       hep        Biceps curl standing 1# 2 X 10 R    Bent row  Bent ext  1# 1 x 10 R  1# 1 x 10 R    Ball roll on wall  X10 ea Assistance from L as needed- rest breaks needed   TB row        Ext        Tricep        Add         IR yellow  x10  yellow  x10  Yellow x10  Yellow x 10   Yellow x 10  Pt has orange TB at home for hep    WB with wt shifting on table S/s  x 10     Supine:  Cane chest press/flex  Cane ER   Cues to slow down   Supine  Shoulder flex  SA punch  Chest press    W/ orange TB 1 x 10   1# X 10   1# x 10     SL  ER with AAROM      Abd   Horiz add    X 10 A to full range w/ independent hold x 3 sec at top of ROM and lower slowly  1# X 10  1# x 10   Improved hold noted         Seated         Shoulder flex 0-90  Shoulder flex/scaption   Overhead press   Reach to touch back of head w/ hold   1/2# x 10   Min A to guide 1/2# 2 x 5      Min A to guide 1/2# 2 x 5  3 x 10 sec            9/6 pt able to use R UE to wash hair today             Therapeutic Activity (69074) Min: 10    Finger ladder To top R shoulder only x 5    Peg board   X 1 up/dwn to #12 placing in each hole  Over head reach to place in #14 x 3 reps     Baps reach and turn L5-1 turn ccw/cw x 3 each at each level   X 1 rep up and down w/ rest b/w              NMR re-education (02783)  Min:10         SL scap mobs AAROM      Supine    RS arm 90 flex  RS IR/ER  Cw/ccw  Flex 90*-125*  AROM ER   3 x 15 sec   3 x 15 sec  1# X10 ea  1# X 10           Isometrics flex, abd, ext, IR/ER in neutral     ER isometrics supine 10 x 5 sec  hep        Manual Intervention (54807) Min: 5     PROM:  As tolerated  Gentle   IR in scap plane <=50*             Modalities:  Min: 15      IFC  with CP 4 pads R shoulder  x 15 min  Recliner with pillow under R arm   recliner          Other Therapeutic Activities:  Pt was educated on PT POC, Diagnosis, Prognosis, pathomechanics as well as frequency and duration of scheduling future physical therapy appointments. Time was also taken on this day to answer all patient questions and participation in PT. Reviewed appointment policy in detail with patient and patient verbalized understanding. X 8 min     Home Exercise Program: Patient instructed in the following for HEP:          .   Patient verbalized/demonstrated understanding and was issued written handout. 7/5: pendulum, scap retraction, shoulder shrug/roll, UT str, AROM elbow, wrist, hand,   7/19: supine cane press, supine UE flex/abd   8/4: cane ER and flex   8/9: Tband row and extension (per pt)  8/11: Isometrics: Flex, ext, abd, IR, ER  8/18: bent row, cane ext, wall climb, SL horiz abd/abd, supine ER  8/23: bent ext, TB add/IR, cane IR, wall ER str     Therapeutic Exercise and NMR EXR  [x] (19384) Provided verbal/tactile cueing for activities related to strengthening, flexibility, endurance, ROM  for improvements in scapular, scapulothoracic and UE control with self care, reaching, carrying, lifting, house/yardwork, driving/computer work.    [] (22107) Provided verbal/tactile cueing for activities related to improving balance, coordination, kinesthetic sense, posture, motor skill, proprioception  to assist with  scapular, scapulothoracic and UE control with self care, reaching, carrying, lifting, house/yardwork, driving/computer work.     Therapeutic Activities:    [x] (07695 or 24341) Provided verbal/tactile cueing for activities related to improving balance, coordination, kinesthetic sense, posture, motor skill, proprioception and motor activation to allow for proper function of scapular, scapulothoracic and UE control with self care, carrying, lifting, driving/computer work.      Home Exercise Program:    [x] (44413) Reviewed/Progressed HEP activities related to strengthening, flexibility, endurance, ROM of scapular, scapulothoracic and UE control with self care, reaching, carrying, lifting, house/yardwork, driving/computer work  [] (42914) Reviewed/Progressed HEP activities related to improving balance, coordination, kinesthetic sense, posture, motor skill, proprioception of scapular, scapulothoracic and UE control with self care, reaching, carrying, lifting, house/yardwork, driving/computer work      Manual Treatments:  PROM / STM / Oscillations-Mobs:  G-I, II, III, IV (PA's, Inf., Post.)  [x] (22351) Provided manual therapy to mobilize soft tissue/joints of cervical/CT, scapular GHJ and UE for the purpose of modulating pain, promoting relaxation,  increasing ROM, reducing/eliminating soft tissue swelling/inflammation/restriction, improving soft tissue extensibility and allowing for proper ROM for normal function with self care, reaching, carrying, lifting, house/yardwork, driving/computer work    Charges:  Timed Code Treatment Minutes: 40   Total Treatment Minutes: 55       [] EVAL (LOW) 98185 (typically 20 minutes face-to-face)  [] EVAL (MOD) 46204 (typically 30 minutes face-to-face)  [] EVAL (HIGH) 31062 (typically 45 minutes face-to-face)  [] RE-EVAL     [x] FR(81675) x   [] Dry needle 1 or 2 Muscles (95513)  [x] NMR (20015) x     [] Dry needle 3+ Muscles (15034)  [] Manual (77278) x     [] Ultrasound (73111) x  [x] TA (93978) x     [] Mech Traction (23814)  [] ES(attended) (12742)     [x] ES (un) (76751):   [] Vasopump (11646) [] Ionto (73638)   [] Other:     GOALS:  Updated 9/1:  Pain ranges from 0-4/10 with sleeping and PT/hep and activity level    Self care improving (showering, dressing) and able to clover light housework (unload )  *able to reach head but difficulty holding it there to style hair; able to reach 1st shelf of cabinet  Able to hold cup but difficulty bringing it to mouth to drink due to ER weakness    Hep 1 x per day   Overall 30% improved   MMT: flex 4+/5, abd 4+/5, IR 4+/5 and ER 3-/5   ROM update: IR 55, ER 40, flex 150, abd/scaption 150    Patient stated goal: \"dec pain\"  [x] Progressing: [] Met: [] Not Met: [] Adjusted     Therapist goals for Patient:   Short Term Goals: To be achieved in: 2 weeks  1. Independent in HEP and progression per patient tolerance, in order to prevent re-injury. [x] Progressing: [] Met: [] Not Met: [] Adjusted  2. Patient will have a decrease in pain by 20-30% to facilitate improvement in movement, function, and ADLs as indicated by Functional Deficits. [] Progressing: [x] Met: [] Not Met: [] Adjusted     Long Term Goals: To be achieved in: at d/c   1. Increase FOTO functional outcome score from 42 to 60 to assist with reaching prior level of function. [x] Progressing: [] Met: [] Not Met: [] Adjusted  2. Patient will have a decrease in pain by 50-60% to facilitate improvement in movement, function, and ADLs as indicated by Functional Deficits. [x] Progressing: [] Met: [] Not Met: [] Adjusted  3. Patient will demonstrate increased AROM to flex/scap 140, IR/ER to [facility] for ease with self care to allow for proper joint functioning as indicated by Functional Deficits. [x] Progressing: [] Met: [] Not Met: [] Adjusted  4. Patient will demonstrate an increase in NM recruitment/activation and overall GH and scapular strength to 4+/5 - 5/5 for proper functional mobility as indicated by patients Functional Deficits. [x] Progressing: [] Met: [] Not Met: [] Adjusted  5.  Patient will return to self care/dressing activities without increased symptoms or restriction. [] Progressing: [x] Met: [] Not Met: [] Adjusted  6. \"full use of arm\"         [x] Progressing: [] Met: [] Not Met: [] Adjusted    ASSESSMENT:  Tolerated eval well; proceed per protocol/MD precautions   7-11-22 tolerated above ex/ PROM well. Steri strips gone , placed 4 pads with E-stim for more coverage area. 7-13-22 progressing with gentle ex  7/19: cont skilled PT for ROM and initiating strength  7-27-22 tolerating above ex well. Gradually progressing with AAROM.   8/2: MD f/u on 8/2; will cont for strength and ROM per MD guidelines   8/4: ex progression held today due to inc soreness; progress when able   8/9: tolerated progressions well  8/16: functional progression made with reaching face and opening fridge door, however, still can't reach to fix hair yet and driving is still on hold; cont with skilled PT for functional gains and strength with overhead reaching   8/25: increased independence and strength noted with some overhead ex  9/1: despite functional gains with strength and ROM with overhead reaching, ER strength remains weak limiting pt's functional progression; IFC cont to help with pain from ex and stretching   9/6: pt edu to hold on hep x a few days to let shoulder rest and calm down; offered options for creating 1# for use at home versus purchasing 1# dumb bell; improved strength noted with RS ex today   9/8: mild soreness during session, but overall tolerated well with resuming wt with ex; improved holding strength with ER in SL         Treatment/Activity Tolerance:  [x] Patient tolerated treatment well [] Patient limited by fatique  [] Patient limited by pain  [] Patient limited by other medical complications  [] Other:     Overall Progression Towards Functional goals/ Treatment Progress Update:  [x] Patient is progressing as expected towards functional goals listed. [] Progression is improving slowed due to complexities/Impairments listed.   [] Progression has been slowed due to co-morbidities. [] Plan just implemented, too soon to assess goals progression <30days   [] Goals require adjustment due to lack of progress  [] Patient is not progressing as expected and requires additional follow up with physician  [] Other    Prognosis for POC: [x] Good [] Fair  [] Poor    Patient requires continued skilled intervention: [x] Yes  [] No      PLAN: Continue for functional strength and ROM, work ER strength as able   [] Plan of care initiated [] Hold pending MD visit [] Discharge    Electronically signed by: Haley Leung, PT MPT [id number]    Note: If patient does not return for scheduled/recommended follow up visits, this note will serve as a discharge from care along with the most recent update on progress. CONSTITUTIONAL: Respiratory Distress  SKIN: warm, dry  HEAD: Normocephalic; atraumatic.  EYES: PERRL, EOMI, no conjunctival erythema  ENT: No nasal discharge; airway clear.  NECK: Supple; non tender.  CARD: Tachy to 113  RESP: No wheezes, rales or rhonchi.  ABD: soft ntnd  EXT: Normal ROM.  No clubbing, cyanosis or edema.   LYMPH: No acute cervical adenopathy.  NEURO: Alert, oriented, grossly unremarkable  PSYCH: Cooperative, appropriate. PHYSICAL EXAM: I have reviewed current vital signs.  GENERAL: Mild respiratory distress, otherwise well appearing.  HEAD:  Normocephalic, atraumatic.  EYES: Conjunctiva and sclera clear.  ENT: MMM, no erythema/exudates.  NECK: Supple, no JVD.  CHEST/LUNG: Clear to auscultation bilaterally; no wheezes. Mild resp distress, +tachypnea, dyspnea with minimal exertion. Symmetric expansion, good respiratory effort.  HEART: Regular rate and rhythm, normal S1 and S2; no murmurs, rubs, or gallops.  ABDOMEN: Soft, nontender, nondistended.  EXTREMITIES:  2+ peripheral pulses; +BLE edema.  NEUROLOGY: A&O x 3. Motor 5/5. No focal neurological deficits.   SKIN: Warm and dry.

## 2022-09-13 ENCOUNTER — OFFICE VISIT (OUTPATIENT)
Dept: ORTHOPEDIC SURGERY | Age: 72
End: 2022-09-13
Payer: MEDICARE

## 2022-09-13 ENCOUNTER — HOSPITAL ENCOUNTER (OUTPATIENT)
Dept: PHYSICAL THERAPY | Age: 72
Setting detail: THERAPIES SERIES
Discharge: HOME | End: 2022-09-13
Payer: MEDICARE

## 2022-09-13 VITALS — WEIGHT: 185 LBS | HEIGHT: 63 IN | BODY MASS INDEX: 32.78 KG/M2

## 2022-09-13 DIAGNOSIS — M51.36 DDD (DEGENERATIVE DISC DISEASE), LUMBAR: ICD-10-CM

## 2022-09-13 DIAGNOSIS — Z96.611 STATUS POST REVERSE TOTAL REPLACEMENT OF RIGHT SHOULDER: Primary | ICD-10-CM

## 2022-09-13 PROCEDURE — 97112 NEUROMUSCULAR REEDUCATION: CPT

## 2022-09-13 PROCEDURE — 1123F ACP DISCUSS/DSCN MKR DOCD: CPT | Performed by: ORTHOPAEDIC SURGERY

## 2022-09-13 PROCEDURE — 97530 THERAPEUTIC ACTIVITIES: CPT

## 2022-09-13 PROCEDURE — 99213 OFFICE O/P EST LOW 20 MIN: CPT | Performed by: ORTHOPAEDIC SURGERY

## 2022-09-13 PROCEDURE — G0283 ELEC STIM OTHER THAN WOUND: HCPCS

## 2022-09-13 PROCEDURE — 97110 THERAPEUTIC EXERCISES: CPT

## 2022-09-13 NOTE — PROGRESS NOTES
Lazaro Bautista  [id number]  September 13, 2022    Chief Complaint   Patient presents with    Follow-up     Right shoulder. S/P Reverse TSA; DOS 6/13/22. History: The patient is here in follow up regarding the right reverse TSA performed approximately 3 months ago. She was progressing rather well. She is making slow progress with regards to her range of motion and strength. The patient's  past medical history, medications, allergies,  family history, social history, and review of systems have been reviewed, and dated and are recorded in the chart. Vitals:  Ht 5' 3\" (1.6 m)   Wt 185 lb (83.9 kg)   BMI 32.77 kg/m²       Physical:   Ms. Lazaro Bautista appears well, and is in no apparent distress, She demonstrates appropriate mood & affect. She is alert and oriented to person, place and time. The patient has moderate swelling. The patient is neurovascularly intact distally. Sensation is intact in the axillary nerve distribution. There is no evidence of DVT. right shoulder passive range of motion: 140 degrees abduction, 140 degrees forward flexion, 30 degrees external rotation. The patient has minimal pain with light range of motion of the shoulder. The incisions are clean, dry and intact and without erythema. Right shoulder strength is 4/5. Imaging: 3 views of the right shoulder obtained at last visit were reviewed in the office today. They show good alignment of the prosthesis. No loosening of components, fractures, or dislocation. Impression: right Reverse Total Shoulder Arthroplasty    Plan: At this time, we did reassure the patient. The patient will continue to work aggressively on range of motion and strengthening. She will gradually resume regular activities. The patient will follow up with me in 2 months and we will reassess her then. At follow-up, final x-rays of the right shoulder will be obtained. No orders of the defined types were placed in this encounter.

## 2022-09-13 NOTE — FLOWSHEET NOTE
[facility], [facility]  [address] [facility], [facility]  Phone: [phone]   Fax:     [phone]              Physical Therapy Re-Certification Plan of Care/MD UPDATE      Dear Alex Lane MD,    We had the pleasure of treating the following patient for physical therapy services at [address]. A summary of our findings can be found in the updated assessment below. This includes our plan of care. If you have any questions or concerns regarding these findings, please do not hesitate to contact me at the office phone number checked above. Thank you for the referral.     Physician Signature:________________________________Date:__________________  By signing above (or electronic signature), therapists plan is approved by physician    Date Range Of Visits: 22-22  Total Visits to Date: 20    Recommendation:    [x]Update script to Continue PT 2x / wk for 4 weeks for functional strength and ROM.     []Hold PT, pending MD visit            Physical Therapy Treatment Note/ Progress Report:       Date:  2022    Patient Name:  Ellis Garcia    :  1950  MRN: [id number]    Pertinent Medical History:Additional Pertinent Hx: OA, asthma, DM, HTN, migraine, depression, DVT, pulmonary embolism, cervical fusion    Medical/Treatment Diagnosis Information:  Diagnosis: Z96.611 s/p reverse TSR R shoulder  Treatment Diagnosis: recent shoulder surgery R UE; pain, limited ROM and strength    Insurance/Certification information:  PT Insurance Information: [employer]  - $40 copay, no auth, visits BMN  Physician Information:  Referring Provider (secondary): Dr. Mark Herron of care signed (Y/N): yes    Date of Patient follow up with Physician:  22     Progress Report: []  Yes -  [x]  No     Date Range for reporting period:  Beginnin2022  Ending:      Progress report due (10 Rx/or 30 days whichever is less): 68/1    Recertification due (POC duration/ or 90 days whichever is less):      Visit # POC/Insurance Allowable Auth Needed   20/24 BMN []Yes    [x]No     Functional Outcomes Measure:    Date Assessed: at eval  Test: FOTO  Score:42  Foto update 8/18: 54  On 9/13 at visit 20: 50     Pain level:4/10 with activity  History of Injury: Patient stated complaint: R shoulder reverse TSR on 6/13/22. MD precautions/restrictions:   active and passive fwd flexion and abduction  Limit shoulder extension  Limit ER to 15 degrees  No active IR, adduction (behind back hygiene)       SUBJECTIVE:    7-11-22  Tolerated first visit ok.  7-13-11  Sore after last Rx until next day. Able to get arm away from side better. 7/19: was able to finally get a decent night of sleep last night laying on her R side; pain levels in shoulder are very low, no longer taking prescription pain meds; some soreness scapular mms from sleeping on R side    7/21: a little sore from the new exercises and just trying to be more active; feels she tenses on R side when using L UE   7-25-22  sore after PT the remainder of the day. Sore this am,  was in hospital , so did a little more around the house. 7-17-22 felt good after last Rx.   8/2: a little sore from sleeping; see goal reassessment   8/4: saw Dr. Ric Rocha and he is pleased with progress - ok to push ROM and strength as clover and ok to begin behind the back hygiene gradually; pt notes she did have a fall when getting out of the car to go the appt yesterday landing on R elbow (pt c/o moderate muscle soreness in shoulder and has a few abrasions on R elbow); to PT without sling  8/9: Can sleep okay on her R side, but if she sleeps on her L side she wakes up and her shoulder hurts. Woke up that way today. Had to take pain medication because she just didn't want to move it it hurt so bad  8/11: Shoulder doing pretty good. Just depends on what she's doing.  It's just annoying  8/16: doing well; opening fridge door easier and able to reach to touch forehead easier now   8/18: inc soreness from new ex and trying to use her arm more, also from sleeping on R side   8/23: closing car door is getting easier   8/25: has another appt after PT and is on a little time constraint; mild soreness from sleeping on the other side of the bed and trying to use curling iron on hair this morning (some ex held to day to cont with hep due to time constraint)  8/30: without new c/o; no pain   9/1: slowly feels like she is reaching more and slowly gaining strength with over head reaching   9/6: thinks she is overdoing with hep b/c she doesn't have a 1# and is doing all hep 2-3x per day with a 2# weight and is c/o deltoid muscle soreness  9/8: shoulder is feeling much better with the rest; FOTO next time for visit 20  9/13: not sure what happened but was really sore driving home last session, rubbed some BenGay and that helped, but still feeling it some today with activity and having a harder time getting arm up to head to wash hair; saw MD - X rays good, cont PT to push ROM and strength per MD       OBJECTIVE:   Observation:   Test measurements:      RESTRICTIONS/PRECAUTIONS:   Per MD notes from visit 8/2:  [id number] Digna Joseph to push aggressive ROM and strength   Ok to begin gradual behind the back hygiene   Ok to d/c sling    DOS 6/13/22 - generic protocol on BK's desk       8/1: 7 wks p/o  8/15: 9 wks p/o  8/22: 10 wks  9/5: 12 wks    Exercises/Interventions:   Therapeutic Ex (38459)  Min: 20 Resistance/Reps Notes/Cues     R REVERSE TSR on 6/13/22     NT    UBE    Fwd x 4 min  P) inc to 5 min    Pulley flex  X 20  UE ranger 4 way on wall X 10 ea R with occasional L help  Rest breaks needed    Table slides on incline   flexion   scaption        Standing cane   Ext   IR       hep        Biceps curl standing 1# 2 X 10 R    Bent row  Bent ext  1# 1 x 10 R  1# 1 x 10 R    Ball roll on wall     Bounce ball against wall  X10 ea    add Assistance from L as needed- rest breaks needed   TB row        Ext        Tricep        Add         IR yellow  x10  yellow  x10  Yellow x10  Yellow x 10   Yellow x 10  Pt has orange TB at home for hep    WB with wt shifting on table S/s  x 10     Supine:  Cane chest press/flex  Cane ER   Cues to slow down   Supine  Shoulder flex  SA punch  Chest press       1# X 10   1# x 10    Held - resume   SL  ER with AAROM      Abd   Horiz add    X 10 A to full range w/ independent hold x 3 sec at top of ROM and lower slowly  0# X 10  0# x 10   Improved hold noted       Dec wt to 0# - resume 1# when able         Seated         Shoulder flex 0-90  Shoulder flex/scaption   Overhead press   Reach to touch back of head w/ hold   1/2# x 10   Min A to guide 1/2# 2 x 5       Min A to guide 1/2# 2 x 5  3 x 10 sec            9/6 pt able to use R UE to wash hair today             Therapeutic Activity (11400) Min: 10    Finger ladder To top R shoulder only x 5    Peg board   X 1 up/dwn to #12 placing in each hole  Over head reach to place in #14 x 3 reps     Baps reach and turn L5-1 turn ccw/cw x 3 each at each level   X 1 rep up and down w/ rest b/w              NMR re-education (61908)  Min:10         SL scap mobs AAROM      Supine    RS arm 90 flex  RS IR/ER  Cw/ccw  Flex 90*-125*  AROM ER   3 x 15 sec   3 x 15 sec  1# X10 ea  1# X 10           Isometrics flex, abd, ext, IR/ER in neutral     ER isometrics supine 10 x 5 sec  hep        Manual Intervention (64271) Min: 5     PROM:  As tolerated  Gentle   IR in scap plane <=50*             Modalities:  Min: 15      IFC  with CP 4 pads R shoulder  x 15 min  Recliner with pillow under R arm   recliner          Other Therapeutic Activities:  Pt was educated on PT POC, Diagnosis, Prognosis, pathomechanics as well as frequency and duration of scheduling future physical therapy appointments.  Time was also taken on this day to answer all patient questions and participation in PT. Reviewed appointment policy in detail with patient and patient verbalized understanding. X 8 min     Home Exercise Program: Patient instructed in the following for HEP:          .   Patient verbalized/demonstrated understanding and was issued written handout. 7/5: pendulum, scap retraction, shoulder shrug/roll, UT str, AROM elbow, wrist, hand,   7/19: supine cane press, supine UE flex/abd   8/4: cane ER and flex   8/9: Tband row and extension (per pt)  8/11: Isometrics: Flex, ext, abd, IR, ER  8/18: bent row, cane ext, wall climb, SL horiz abd/abd, supine ER  8/23: bent ext, TB add/IR, cane IR, wall ER str     Therapeutic Exercise and NMR EXR  [x] (03928) Provided verbal/tactile cueing for activities related to strengthening, flexibility, endurance, ROM  for improvements in scapular, scapulothoracic and UE control with self care, reaching, carrying, lifting, house/yardwork, driving/computer work.    [] (56224) Provided verbal/tactile cueing for activities related to improving balance, coordination, kinesthetic sense, posture, motor skill, proprioception  to assist with  scapular, scapulothoracic and UE control with self care, reaching, carrying, lifting, house/yardwork, driving/computer work. Therapeutic Activities:    [x] (57691 or 40647) Provided verbal/tactile cueing for activities related to improving balance, coordination, kinesthetic sense, posture, motor skill, proprioception and motor activation to allow for proper function of scapular, scapulothoracic and UE control with self care, carrying, lifting, driving/computer work.      Home Exercise Program:    [x] (99954) Reviewed/Progressed HEP activities related to strengthening, flexibility, endurance, ROM of scapular, scapulothoracic and UE control with self care, reaching, carrying, lifting, house/yardwork, driving/computer work  [] (29792) Reviewed/Progressed HEP activities related to improving balance, coordination, kinesthetic Independent in HEP and progression per patient tolerance, in order to prevent re-injury. [x] Progressing: [] Met: [] Not Met: [] Adjusted  2. Patient will have a decrease in pain by 20-30% to facilitate improvement in movement, function, and ADLs as indicated by Functional Deficits. [] Progressing: [x] Met: [] Not Met: [] Adjusted     Long Term Goals: To be achieved in: at d/c   1. Increase FOTO functional outcome score from 42 to 60 to assist with reaching prior level of function. [x] Progressing: [] Met: [] Not Met: [] Adjusted  2. Patient will have a decrease in pain by 50-60% to facilitate improvement in movement, function, and ADLs as indicated by Functional Deficits. [x] Progressing: [] Met: [] Not Met: [] Adjusted  3. Patient will demonstrate increased AROM to flex/scap 140, IR/ER to [place] for ease with self care to allow for proper joint functioning as indicated by Functional Deficits. [x] Progressing: [] Met: [] Not Met: [] Adjusted  4. Patient will demonstrate an increase in NM recruitment/activation and overall GH and scapular strength to 4+/5 - 5/5 for proper functional mobility as indicated by patients Functional Deficits. [x] Progressing: [] Met: [] Not Met: [] Adjusted  5. Patient will return to self care/dressing activities without increased symptoms or restriction. [] Progressing: [x] Met: [] Not Met: [] Adjusted  6. \"full use of arm\"         [x] Progressing: [] Met: [] Not Met: [] Adjusted    ASSESSMENT:  Tolerated eval well; proceed per protocol/MD precautions   7-11-22 tolerated above ex/ PROM well. Steri strips gone , placed 4 pads with E-stim for more coverage area. 7-13-22 progressing with gentle ex  7/19: cont skilled PT for ROM and initiating strength  7-27-22 tolerating above ex well.  Gradually progressing with AAROM.   8/2: MD f/u on 8/2; will cont for strength and ROM per MD guidelines   8/4: ex progression held today due to inc soreness; progress when able   8/9: tolerated progressions well  8/16: functional progression made with reaching face and opening fridge door, however, still can't reach to fix hair yet and driving is still on hold; cont with skilled PT for functional gains and strength with overhead reaching   8/25: increased independence and strength noted with some overhead ex  9/1: despite functional gains with strength and ROM with overhead reaching, ER strength remains weak limiting pt's functional progression; IFC cont to help with pain from ex and stretching   9/6: pt edu to hold on hep x a few days to let shoulder rest and calm down; offered options for creating 1# for use at home versus purchasing 1# dumb bell; improved strength noted with RS ex today   9/8: mild soreness during session, but overall tolerated well with resuming wt with ex; improved holding strength with ER in SL  9/13; manage through recent flare up with inc icing frequency and backing of of hep somewhat until pain levels dec and then resume          Treatment/Activity Tolerance:  [x] Patient tolerated treatment well [] Patient limited by fatique  [] Patient limited by pain  [] Patient limited by other medical complications  [] Other:     Overall Progression Towards Functional goals/ Treatment Progress Update:  [x] Patient is progressing as expected towards functional goals listed. [] Progression is improving slowed due to complexities/Impairments listed. [] Progression has been slowed due to co-morbidities.   [] Plan just implemented, too soon to assess goals progression <30days   [] Goals require adjustment due to lack of progress  [] Patient is not progressing as expected and requires additional follow up with physician  [] Other    Prognosis for POC: [x] Good [] Fair  [] Poor    Patient requires continued skilled intervention: [x] Yes  [] No      PLAN: Continue per MD de la paz another month for functional strength and ROM, work ER strength as able   [] Plan of care initiated [] Hold pending MD visit [] Discharge    Electronically signed by: Haley Leung, PT MPT [id number]    Note: If patient does not return for scheduled/recommended follow up visits, this note will serve as a discharge from care along with the most recent update on progress.

## 2022-09-15 ENCOUNTER — HOSPITAL ENCOUNTER (OUTPATIENT)
Dept: PHYSICAL THERAPY | Age: 72
Setting detail: THERAPIES SERIES
Discharge: HOME | End: 2022-09-15
Payer: MEDICARE

## 2022-09-15 PROCEDURE — 97140 MANUAL THERAPY 1/> REGIONS: CPT

## 2022-09-15 PROCEDURE — 97110 THERAPEUTIC EXERCISES: CPT

## 2022-09-15 PROCEDURE — 97035 APP MDLTY 1+ULTRASOUND EA 15: CPT

## 2022-09-15 NOTE — FLOWSHEET NOTE
[facility], [facility]  [address] [facility] [place], [facility]  Phone: [phone]   Fax:     [phone]                              Physical Therapy Treatment Note/ Progress Report:       Date:  9/15/2022    Patient Name:  Rober Denise    :  1950  MRN: [id number]    Pertinent Medical History:Additional Pertinent Hx: OA, asthma, DM, HTN, migraine, depression, DVT, pulmonary embolism, cervical fusion    Medical/Treatment Diagnosis Information:  Diagnosis: Y23.358 s/p reverse TSR R shoulder  Treatment Diagnosis: recent shoulder surgery R UE; pain, limited ROM and strength    Insurance/Certification information:  PT Insurance Information: [employer]  - $40 copay, no auth, visits BMN  Physician Information:  Referring Provider (secondary): Dr. Jose Brian of care signed (Y/N): yes    Date of Patient follow up with Physician:  22     Progress Report: []  Yes -  [x]  No     Date Range for reporting period:  Beginnin2022  Ending:      Progress report due (10 Rx/or 30 days whichever is less): 24/3    Recertification due (POC duration/ or 90 days whichever is less):      Visit # POC/Insurance Allowable Auth Needed    BMN []Yes    [x]No     Functional Outcomes Measure:    Date Assessed: at eval  Test: FOTO  Score:42  Foto update : 54  On  at visit 20: 50     Pain level:2/10 today \"annoying\"  History of Injury: Patient stated complaint: R shoulder reverse TSR on 22. MD precautions/restrictions:   active and passive fwd flexion and abduction  Limit shoulder extension  Limit ER to 15 degrees  No active IR, adduction (behind back hygiene)       SUBJECTIVE:    22  Tolerated first visit ok.  11  Sore after last Rx until next day. Able to get arm away from side better.    : was able to finally get a decent night of sleep last night laying on her R side; pain levels in shoulder are very low, no longer taking prescription pain meds; some soreness scapular mms from sleeping on R side    7/21: a little sore from the new exercises and just trying to be more active; feels she tenses on R side when using L UE   7-25-22  sore after PT the remainder of the day. Sore this am,  was in hospital , so did a little more around the house. 7-17-22 felt good after last Rx.   8/2: a little sore from sleeping; see goal reassessment   8/4: saw Dr. Solorio Shown and he is pleased with progress - ok to push ROM and strength as clover and ok to begin behind the back hygiene gradually; pt notes she did have a fall when getting out of the car to go the appt yesterday landing on R elbow (pt c/o moderate muscle soreness in shoulder and has a few abrasions on R elbow); to PT without sling  8/9: Can sleep okay on her R side, but if she sleeps on her L side she wakes up and her shoulder hurts. Woke up that way today. Had to take pain medication because she just didn't want to move it it hurt so bad  8/11: Shoulder doing pretty good. Just depends on what she's doing.  It's just annoying  8/16: doing well; opening fridge door easier and able to reach to touch forehead easier now   8/18: inc soreness from new ex and trying to use her arm more, also from sleeping on R side   8/23: closing car door is getting easier   8/25: has another appt after PT and is on a little time constraint; mild soreness from sleeping on the other side of the bed and trying to use curling iron on hair this morning (some ex held to day to cont with hep due to time constraint)  8/30: without new c/o; no pain   9/1: slowly feels like she is reaching more and slowly gaining strength with over head reaching   9/6: thinks she is overdoing with hep b/c she doesn't have a 1# and is doing all hep 2-3x per day with a 2# weight and is c/o deltoid muscle soreness  9/8: shoulder is feeling much better with the rest; FOTO next time for visit 20  9/13: not sure what happened but was really sore driving home last session, rubbed some [place] and that helped, but still feeling it some today with activity and having a harder time getting arm up to head to wash hair; saw MD - X rays good, cont PT to push ROM and strength per MD   9/15: sore after session and lasted for a few hours and then eased up; just annoying today       OBJECTIVE:   Observation:   Test measurements:      RESTRICTIONS/PRECAUTIONS:   Per MD notes from visit 8/2:  [facility] to push aggressive ROM and strength   Ok to begin gradual behind the back hygiene   Ok to d/c sling    DOS 6/13/22 - generic protocol on [employer]'s desk       8/1: 7 wks p/o  8/15: 9 wks p/o  8/22: 10 wks  9/5: 12 wks    Exercises/Interventions:   Therapeutic Ex (22034)  Min: 25 Resistance/Reps Notes/Cues     R REVERSE TSR on 6/13/22     NT    UBE    Fwd x 5 min      Pulley flex  X 20  UE ranger 4 way on wall X 10 ea R with occasional L help  Rest breaks needed    Table slides on incline   flexion   scaption        Standing cane   Ext   IR       hep        Biceps curl standing 1# 2 X 10 R    Bent row  Bent ext  1# 1 x 10 R  1# 1 x 10 R    Ball roll on wall     Bounce ball against wall  X10 ea    add Assistance from L as needed- rest breaks needed    9/15 - after ball ex, pain in to 7-8/10, TTP lat and ant shoulder   TB row        Ext        Tricep        Add         IR yellow  x10  yellow  x10  Yellow x10  Yellow x 10   Yellow x 10  Pt has orange TB at home for hep    WB with wt shifting on table   Held - resume    Supine:  Cane chest press/flex  Cane ER   Cues to slow down   Supine  Shoulder flex  SA punch  Chest press           Held - resume   SL  ER with AAROM      Abd   Horiz add    X  Improved hold noted       Dec wt to 0# - resume 1# when able - held -resume         Seated         Shoulder flex 0-90  Shoulder flex/scaption   Overhead press   Reach to touch back of head w/ hold   1/2# x 10   Min A to guide 1/2# 2 x 5       Min A to guide 1/2# 2 x 5  3 x 10 sec                     Therapeutic Activity (19830) Min: 2    Finger ladder Held - resume    Peg board      Baps reach and turn L5-1 turn ccw/cw x 3 each at each level   X 1 rep up and down w/ rest b/w              NMR re-education (51019)  Min:         SL scap mobs AAROM      Supine    RS arm 90 flex  RS IR/ER  Cw/ccw  Flex 90*-125*  AROM ER   Held - resume        Isometrics flex, abd, ext, IR/ER in neutral     ER isometrics supine hep       Manual Intervention (54126) Min: 10    PROM:  As tolerated  Held - resume   IR in scap plane <=50*   STM  To lat shoulder and ant at biceps tendon x 10 min          Modalities:  Min: 23      IFC  with CP    CP only x 15 min  Recliner with pillow under R arm   recliner   US 50% 1.5 w/cm2 to lant/at shoulder  X 8 min  Assess      Other Therapeutic Activities:  Pt was educated on PT POC, Diagnosis, Prognosis, pathomechanics as well as frequency and duration of scheduling future physical therapy appointments. Time was also taken on this day to answer all patient questions and participation in PT. Reviewed appointment policy in detail with patient and patient verbalized understanding. X 8 min     Home Exercise Program: Patient instructed in the following for HEP:          .   Patient verbalized/demonstrated understanding and was issued written handout.   7/5: pendulum, scap retraction, shoulder shrug/roll, UT str, AROM elbow, wrist, hand,   7/19: supine cane press, supine UE flex/abd   8/4: cane ER and flex   8/9: Tband row and extension (per pt)  8/11: Isometrics: Flex, ext, abd, IR, ER  8/18: bent row, cane ext, wall climb, SL horiz abd/abd, supine ER  8/23: bent ext, TB add/IR, cane IR, wall ER str     Therapeutic Exercise and NMR EXR  [x] (13778) Provided verbal/tactile cueing for activities related to strengthening, flexibility, endurance, ROM  for improvements in scapular, scapulothoracic and UE control with self care, reaching, carrying, lifting, house/yardwork, driving/computer work.    [] (01063) Provided verbal/tactile cueing for activities related to improving balance, coordination, kinesthetic sense, posture, motor skill, proprioception  to assist with  scapular, scapulothoracic and UE control with self care, reaching, carrying, lifting, house/yardwork, driving/computer work. Therapeutic Activities:    [x] (29084 or 16114) Provided verbal/tactile cueing for activities related to improving balance, coordination, kinesthetic sense, posture, motor skill, proprioception and motor activation to allow for proper function of scapular, scapulothoracic and UE control with self care, carrying, lifting, driving/computer work.      Home Exercise Program:    [x] (40894) Reviewed/Progressed HEP activities related to strengthening, flexibility, endurance, ROM of scapular, scapulothoracic and UE control with self care, reaching, carrying, lifting, house/yardwork, driving/computer work  [] (35957) Reviewed/Progressed HEP activities related to improving balance, coordination, kinesthetic sense, posture, motor skill, proprioception of scapular, scapulothoracic and UE control with self care, reaching, carrying, lifting, house/yardwork, driving/computer work      Manual Treatments:  PROM / STM / Oscillations-Mobs:  G-I, II, III, IV (PA's, Inf., Post.)  [x] (70221) Provided manual therapy to mobilize soft tissue/joints of cervical/CT, scapular GHJ and UE for the purpose of modulating pain, promoting relaxation,  increasing ROM, reducing/eliminating soft tissue swelling/inflammation/restriction, improving soft tissue extensibility and allowing for proper ROM for normal function with self care, reaching, carrying, lifting, house/yardwork, driving/computer work    Charges:  Timed Code Treatment Minutes: 45   Total Treatment Minutes: 60       [] EVAL (LOW) 71729 (typically 20 minutes face-to-face)  [] EVAL (MOD) 80396 (typically 30 minutes face-to-face)  [] EVAL (HIGH) 70019 (typically 45 minutes face-to-face)  [] RE-EVAL     [x] NP(07209) x   [] Dry needle 1 or 2 Muscles (57704)  [] NMR (67569) x     [] Dry needle 3+ Muscles (41593)  [x] Manual (44067) x     [x] Ultrasound (20094) x  [] TA (01740) x     [] Mech Traction (72635)  [] ES(attended) (97527)     [] ES (un) (53646):   [] Vasopump (21541) [] Ionto (63314)   [] Other:     GOALS:  Updated 9/1:  Pain ranges from 0-4/10 with sleeping and PT/hep and activity level    Self care improving (showering, dressing) and able to clover light housework (unload )  *able to reach head but difficulty holding it there to style hair; able to reach 1st shelf of cabinet  Able to hold cup but difficulty bringing it to mouth to drink due to ER weakness    Hep 1 x per day   Overall 30% improved   MMT: flex 4+/5, abd 4+/5, IR 4+/5 and ER 3-/5   ROM update: IR 55, ER 40, flex 150, abd/scaption 150    Patient stated goal: \"dec pain\"  [x] Progressing: [] Met: [] Not Met: [] Adjusted     Therapist goals for Patient:   Short Term Goals: To be achieved in: 2 weeks  1. Independent in HEP and progression per patient tolerance, in order to prevent re-injury. [x] Progressing: [] Met: [] Not Met: [] Adjusted  2. Patient will have a decrease in pain by 20-30% to facilitate improvement in movement, function, and ADLs as indicated by Functional Deficits. [] Progressing: [x] Met: [] Not Met: [] Adjusted     Long Term Goals: To be achieved in: at d/c   1. Increase FOTO functional outcome score from 42 to 60 to assist with reaching prior level of function. [x] Progressing: [] Met: [] Not Met: [] Adjusted  2. Patient will have a decrease in pain by 50-60% to facilitate improvement in movement, function, and ADLs as indicated by Functional Deficits. [x] Progressing: [] Met: [] Not Met: [] Adjusted  3.  Patient will demonstrate increased AROM to flex/scap 140, IR/ER to [facility] for ease with self care to allow for proper joint functioning as indicated by Functional Deficits. [x] Progressing: [] Met: [] Not Met: [] Adjusted  4. Patient will demonstrate an increase in NM recruitment/activation and overall GH and scapular strength to 4+/5 - 5/5 for proper functional mobility as indicated by patients Functional Deficits. [x] Progressing: [] Met: [] Not Met: [] Adjusted  5. Patient will return to self care/dressing activities without increased symptoms or restriction. [] Progressing: [x] Met: [] Not Met: [] Adjusted  6. \"full use of arm\"         [x] Progressing: [] Met: [] Not Met: [] Adjusted    ASSESSMENT:  Tolerated eval well; proceed per protocol/MD precautions   7-11-22 tolerated above ex/ PROM well. Steri strips gone , placed 4 pads with E-stim for more coverage area. 7-13-22 progressing with gentle ex  7/19: cont skilled PT for ROM and initiating strength  7-27-22 tolerating above ex well.  Gradually progressing with AAROM.   8/2: MD f/u on 8/2; will cont for strength and ROM per MD guidelines   8/4: ex progression held today due to inc soreness; progress when able   8/9: tolerated progressions well  8/16: functional progression made with reaching face and opening fridge door, however, still can't reach to fix hair yet and driving is still on hold; cont with skilled PT for functional gains and strength with overhead reaching   8/25: increased independence and strength noted with some overhead ex  9/1: despite functional gains with strength and ROM with overhead reaching, ER strength remains weak limiting pt's functional progression; IFC cont to help with pain from ex and stretching   9/6: pt edu to hold on hep x a few days to let shoulder rest and calm down; offered options for creating 1# for use at home versus purchasing 1# dumb bell; improved strength noted with RS ex today   9/8: mild soreness during session, but overall tolerated well with resuming wt with ex; improved holding strength with ER in SL  9/13; manage through recent flare up with inc icing frequency and backing of of hep somewhat until pain levels dec and then resume   9/15: ex stopped today due to flare up of pain; possible onset of tendonitis with on/off intense pain over the past week- pt TTP over ant/lat shoulder and biceps tendon and rating pain at 7-8/10; pt instructed to hold on hep and focus on rest/ice; realistic functional long term goals addressed again with pt after reverse TSR with significant loss of RTC mm           Treatment/Activity Tolerance:  [x] Patient tolerated treatment well [] Patient limited by fatique  [] Patient limited by pain  [] Patient limited by other medical complications  [] Other:     Overall Progression Towards Functional goals/ Treatment Progress Update:  [x] Patient is progressing as expected towards functional goals listed. [] Progression is improving slowed due to complexities/Impairments listed. [] Progression has been slowed due to co-morbidities. [] Plan just implemented, too soon to assess goals progression <30days   [] Goals require adjustment due to lack of progress  [] Patient is not progressing as expected and requires additional follow up with physician  [] Other    Prognosis for POC: [x] Good [] Fair  [] Poor    Patient requires continued skilled intervention: [x] Yes  [] No      PLAN: Assess US/STM and pain levels; period of rest to calm down pain and resume ex as able and when indicated   [] Plan of care initiated [] Hold pending MD visit [] Discharge    Electronically signed by: Yair Bar, PT MPT [id number]    Note: If patient does not return for scheduled/recommended follow up visits, this note will serve as a discharge from care along with the most recent update on progress.

## 2022-09-20 ENCOUNTER — HOSPITAL ENCOUNTER (OUTPATIENT)
Dept: PHYSICAL THERAPY | Age: 72
Setting detail: THERAPIES SERIES
Discharge: HOME | End: 2022-09-20
Payer: MEDICARE

## 2022-09-20 PROCEDURE — 97112 NEUROMUSCULAR REEDUCATION: CPT

## 2022-09-20 PROCEDURE — 97110 THERAPEUTIC EXERCISES: CPT

## 2022-09-20 PROCEDURE — 97140 MANUAL THERAPY 1/> REGIONS: CPT

## 2022-09-20 NOTE — FLOWSHEET NOTE
[place] [facility], [facility]  [address] [facility] [place], [facility]  Phone: [phone]   Fax:     [phone]                              Physical Therapy Treatment Note/ Progress Report:       Date:  2022    Patient Name:  Alexandr Lisa    :  1950  MRN: [id number]    Pertinent Medical History:Additional Pertinent Hx: OA, asthma, DM, HTN, migraine, depression, DVT, pulmonary embolism, cervical fusion    Medical/Treatment Diagnosis Information:  Diagnosis: J83.687 s/p reverse TSR R shoulder  Treatment Diagnosis: recent shoulder surgery R UE; pain, limited ROM and strength    Insurance/Certification information:  PT Insurance Information: [facility] - $40 copay, no auth, visits BMN  Physician Information:  Referring Provider (secondary): Dr. Mike Tolliver of care signed (Y/N): yes    Date of Patient follow up with Physician:  22     Progress Report: []  Yes -  [x]  No     Date Range for reporting period:  Beginnin2022  Ending:      Progress report due (10 Rx/or 30 days whichever is less): /6    Recertification due (POC duration/ or 90 days whichever is less):      Visit # POC/Insurance Allowable Auth Needed    BMN []Yes    [x]No     Functional Outcomes Measure:    Date Assessed: at eval  Test: FOTO  Score:42  Foto update : 54  On  at visit 20: 50     Pain level:3-4/10 today   History of Injury: Patient stated complaint: R shoulder reverse TSR on 22. MD precautions/restrictions:   active and passive fwd flexion and abduction  Limit shoulder extension  Limit ER to 15 degrees  No active IR, adduction (behind back hygiene)       SUBJECTIVE:    22  Tolerated first visit ok.  11  Sore after last Rx until next day. Able to get arm away from side better.    : was able to finally get a decent night of sleep last night laying on her R side; pain levels in shoulder are very low, no longer taking prescription pain meds; some soreness scapular mms from sleeping on R side    7/21: a little sore from the new exercises and just trying to be more active; feels she tenses on R side when using L UE   7-25-22  sore after PT the remainder of the day. Sore this am,  was in hospital , so did a little more around the house. 7-17-22 felt good after last Rx.   8/2: a little sore from sleeping; see goal reassessment   8/4: saw Dr. Cole Jacobs and he is pleased with progress - ok to push ROM and strength as clover and ok to begin behind the back hygiene gradually; pt notes she did have a fall when getting out of the car to go the appt yesterday landing on R elbow (pt c/o moderate muscle soreness in shoulder and has a few abrasions on R elbow); to PT without sling  8/9: Can sleep okay on her R side, but if she sleeps on her L side she wakes up and her shoulder hurts. Woke up that way today. Had to take pain medication because she just didn't want to move it it hurt so bad  8/11: Shoulder doing pretty good. Just depends on what she's doing.  It's just annoying  8/16: doing well; opening fridge door easier and able to reach to touch forehead easier now   8/18: inc soreness from new ex and trying to use her arm more, also from sleeping on R side   8/23: closing car door is getting easier   8/25: has another appt after PT and is on a little time constraint; mild soreness from sleeping on the other side of the bed and trying to use curling iron on hair this morning (some ex held to day to cont with hep due to time constraint)  8/30: without new c/o; no pain   9/1: slowly feels like she is reaching more and slowly gaining strength with over head reaching   9/6: thinks she is overdoing with hep b/c she doesn't have a 1# and is doing all hep 2-3x per day with a 2# weight and is c/o deltoid muscle soreness  9/8: shoulder is feeling much better with the rest; FOTO next time for visit 20  9/13: not sure what happened but was really sore driving home last session, rubbed some BenGay and that helped, but still feeling it some today with activity and having a harder time getting arm up to head to wash hair; saw MD - X rays good, cont PT to push ROM and strength per MD   9/15: sore after session and lasted for a few hours and then eased up; just annoying today   9/20: has been alternating with heat/ice and cont gentle ex only and avoiding the overhead stuff and it is helping; pain was 1-2/10 with waking and getting ready this morning but inc to 3-4/10 after       OBJECTIVE:   Observation:   Test measurements:      RESTRICTIONS/PRECAUTIONS:   Per MD notes from visit 8/2:  Marissa Foster to push aggressive ROM and strength   Ok to begin gradual behind the back hygiene   Ok to d/c sling    DOS 6/13/22 - generic protocol on [employer]'s desk       8/1: 7 wks p/o  8/15: 9 wks p/o  8/22: 10 wks  9/5: 12 wks    Exercises/Interventions: 9/20: held all overhead ex today   Therapeutic Ex (35094)  Min: 19 Resistance/Reps Notes/Cues     R REVERSE TSR on 6/13/22     NT    UBE    Fwd x 5 min      Pulley flex  UE ranger 4 way on wall   Rest breaks needed    Table slides on incline   flexion   scaption        Standing cane   Ext   IR       hep        Biceps curl standing 1# 2 X 10 R    Bent row  Bent ext  1# 1 x 10 R  1# 1 x 10 R Cues for scap retraction    Ball roll on wall     Bounce ball against wall    add Assistance from L as needed- rest breaks needed    9/15 - after ball ex, pain in to 7-8/10, TTP lat and ant shoulder   TB row        Ext        Tricep        Add         IR yellow  x10  yellow  x10  Yellow x10  Yellow x 10   Yellow x 10  Pt has orange TB at home for hep   Cues for scap retraction    WB with wt shifting on table S/s  x 10     Supine:  Cane chest press/flex  Cane ER   Cues to slow down   Supine  Shoulder flex  SA punch  Chest press     0# 1 x 10   0# X 10   0# x 10    Held wts - resume as able   SL  ER with AAROM      Abd   Horiz add    X 10 A to full range w/ independent hold x 3 sec at top of ROM and lower slowly  0# X 10  0# x 10   Improved hold noted       Dec wt to 0# - resume 1# when able         Seated         Shoulder flex 0-90  Shoulder flex/scaption   Overhead press   Reach to touch back of head w/ hold   0# x 10                             Therapeutic Activity (98967) Min:     Finger ladder Held - resume    Peg board      Baps reach and turn              NMR re-education (70226)  Min:10         SL scap mobs AAROM      Supine    RS arm 90 flex  RS IR/ER  Cw/ccw  Flex 90*-125*  AROM ER 3 x 15 sec   3 x 15 sec  1# X10 ea           Isometrics flex, abd, ext, IR/ER in neutral     ER isometrics supine 10 x 5 sec  hep       Manual Intervention (45792) Min: 8    PROM:  As tolerated  Gentle     IR in scap plane <=50*   STM  To lat shoulder and ant at biceps tendon x 8 min          Modalities:  Min: 23      IFC  with CP    CP only x 15 min  Recliner with pillow under R arm   recliner   US 50% 1.5 w/cm2 to lant/at shoulder  X 8 min  Relief       Other Therapeutic Activities:  Pt was educated on PT POC, Diagnosis, Prognosis, pathomechanics as well as frequency and duration of scheduling future physical therapy appointments. Time was also taken on this day to answer all patient questions and participation in PT. Reviewed appointment policy in detail with patient and patient verbalized understanding. X 8 min     Home Exercise Program: Patient instructed in the following for HEP:          .   Patient verbalized/demonstrated understanding and was issued written handout.   7/5: pendulum, scap retraction, shoulder shrug/roll, UT str, AROM elbow, wrist, hand,   7/19: supine cane press, supine UE flex/abd   8/4: cane ER and flex   8/9: Tband row and extension (per pt)  8/11: Isometrics: Flex, ext, abd, IR, ER  8/18: bent row, cane ext, wall climb, SL horiz abd/abd, supine ER  8/23: bent ext, TB add/IR, cane IR, wall ER str     Therapeutic Exercise and NMR EXR  [x] (67058) Provided verbal/tactile cueing for activities related to strengthening, flexibility, endurance, ROM  for improvements in scapular, scapulothoracic and UE control with self care, reaching, carrying, lifting, house/yardwork, driving/computer work.    [] (26297) Provided verbal/tactile cueing for activities related to improving balance, coordination, kinesthetic sense, posture, motor skill, proprioception  to assist with  scapular, scapulothoracic and UE control with self care, reaching, carrying, lifting, house/yardwork, driving/computer work. Therapeutic Activities:    [x] (62810 or 45584) Provided verbal/tactile cueing for activities related to improving balance, coordination, kinesthetic sense, posture, motor skill, proprioception and motor activation to allow for proper function of scapular, scapulothoracic and UE control with self care, carrying, lifting, driving/computer work.      Home Exercise Program:    [x] (21835) Reviewed/Progressed HEP activities related to strengthening, flexibility, endurance, ROM of scapular, scapulothoracic and UE control with self care, reaching, carrying, lifting, house/yardwork, driving/computer work  [] (07969) Reviewed/Progressed HEP activities related to improving balance, coordination, kinesthetic sense, posture, motor skill, proprioception of scapular, scapulothoracic and UE control with self care, reaching, carrying, lifting, house/yardwork, driving/computer work      Manual Treatments:  PROM / STM / Oscillations-Mobs:  G-I, II, III, IV (PA's, Inf., Post.)  [x] (74133) Provided manual therapy to mobilize soft tissue/joints of cervical/CT, scapular GHJ and UE for the purpose of modulating pain, promoting relaxation,  increasing ROM, reducing/eliminating soft tissue swelling/inflammation/restriction, improving soft tissue extensibility and allowing for proper ROM for normal function with self care, reaching, carrying, lifting, house/yardwork, driving/computer work    Charges:  Timed Code Treatment Minutes: 45   Total Treatment Minutes: 60       [] EVAL (LOW) 68861 (typically 20 minutes face-to-face)  [] EVAL (MOD) 22390 (typically 30 minutes face-to-face)  [] EVAL (HIGH) 07083 (typically 45 minutes face-to-face)  [] RE-EVAL     [x] KA(07726) x   [] Dry needle 1 or 2 Muscles (03134)  [x] NMR (28970) x     [] Dry needle 3+ Muscles (55632)  [x] Manual (18258) x     [] Ultrasound (53242) x  [] TA (20257) x     [] Mech Traction (78627)  [] ES(attended) (48436)     [] ES (un) (81627):   [] Vasopump (11264) [] Ionto (56349)   [] Other:     GOALS:  Updated 9/1:  Pain ranges from 0-4/10 with sleeping and PT/hep and activity level    Self care improving (showering, dressing) and able to clover light housework (unload )  *able to reach head but difficulty holding it there to style hair; able to reach 1st shelf of cabinet  Able to hold cup but difficulty bringing it to mouth to drink due to ER weakness    Hep 1 x per day   Overall 30% improved   MMT: flex 4+/5, abd 4+/5, IR 4+/5 and ER 3-/5   ROM update: IR 55, ER 40, flex 150, abd/scaption 150    Patient stated goal: \"dec pain\"  [x] Progressing: [] Met: [] Not Met: [] Adjusted     Therapist goals for Patient:   Short Term Goals: To be achieved in: 2 weeks  1. Independent in HEP and progression per patient tolerance, in order to prevent re-injury. [x] Progressing: [] Met: [] Not Met: [] Adjusted  2. Patient will have a decrease in pain by 20-30% to facilitate improvement in movement, function, and ADLs as indicated by Functional Deficits. [] Progressing: [x] Met: [] Not Met: [] Adjusted     Long Term Goals: To be achieved in: at d/c   1. Increase FOTO functional outcome score from 42 to 60 to assist with reaching prior level of function. [x] Progressing: [] Met: [] Not Met: [] Adjusted  2.  Patient will have a decrease in pain by 50-60% to facilitate improvement in movement, function, and ADLs as 1# dumb bell; improved strength noted with RS ex today   9/8: mild soreness during session, but overall tolerated well with resuming wt with ex; improved holding strength with ER in SL  9/13; manage through recent flare up with inc icing frequency and backing of of hep somewhat until pain levels dec and then resume   9/15: ex stopped today due to flare up of pain; possible onset of tendonitis with on/off intense pain over the past week- pt TTP over ant/lat shoulder and biceps tendon and rating pain at 7-8/10; pt instructed to hold on hep and focus on rest/ice; realistic functional long term goals addressed again with pt after reverse TSR with significant loss of RTC mm           Treatment/Activity Tolerance:  [x] Patient tolerated treatment well [] Patient limited by fatique  [] Patient limited by pain  [] Patient limited by other medical complications  [] Other:     Overall Progression Towards Functional goals/ Treatment Progress Update:  [x] Patient is progressing as expected towards functional goals listed. [] Progression is improving slowed due to complexities/Impairments listed. [] Progression has been slowed due to co-morbidities. [] Plan just implemented, too soon to assess goals progression <30days   [] Goals require adjustment due to lack of progress  [] Patient is not progressing as expected and requires additional follow up with physician  [] Other    Prognosis for POC: [x] Good [] Fair  [] Poor    Patient requires continued skilled intervention: [x] Yes  [] No      PLAN: US/STM helping; ease back into ex; period of rest to calm down pain and resume ex as able and when indicated   [] Plan of care initiated [] Hold pending MD visit [] Discharge    Electronically signed by: Ricardo Cortes, PT MPT [id number]    Note: If patient does not return for scheduled/recommended follow up visits, this note will serve as a discharge from care along with the most recent update on progress.

## 2022-09-22 ENCOUNTER — HOSPITAL ENCOUNTER (OUTPATIENT)
Dept: PHYSICAL THERAPY | Age: 72
Setting detail: THERAPIES SERIES
Discharge: HOME | End: 2022-09-22
Payer: MEDICARE

## 2022-09-22 PROCEDURE — 97140 MANUAL THERAPY 1/> REGIONS: CPT

## 2022-09-22 PROCEDURE — 97112 NEUROMUSCULAR REEDUCATION: CPT

## 2022-09-22 PROCEDURE — 97110 THERAPEUTIC EXERCISES: CPT

## 2022-09-22 NOTE — FLOWSHEET NOTE
[place] [facility], [facility]  [address] [facility] [place], [facility]  Phone: [phone]   Fax:     [phone]                              Physical Therapy Treatment Note/ Progress Report:       Date:  2022    Patient Name:  Garfield Hernandez    :  1950  MRN: [id number]    Pertinent Medical History:Additional Pertinent Hx: OA, asthma, DM, HTN, migraine, depression, DVT, pulmonary embolism, cervical fusion    Medical/Treatment Diagnosis Information:  Diagnosis: M29.952 s/p reverse TSR R shoulder  Treatment Diagnosis: recent shoulder surgery R UE; pain, limited ROM and strength    Insurance/Certification information:  PT Insurance Information: [employer]  - $40 copay, no auth, visits BMN  Physician Information:  Referring Provider (secondary): Dr. Noé Wells of care signed (Y/N): yes    Date of Patient follow up with Physician:  22     Progress Report: []  Yes -  [x]  No     Date Range for reporting period:  Beginnin2022  Ending:      Progress report due (10 Rx/or 30 days whichever is less):     Recertification due (POC duration/ or 90 days whichever is less):      Visit # POC/Insurance Allowable Auth Needed    BMN []Yes    [x]No     Functional Outcomes Measure:    Date Assessed: at eval  Test: FOTO  Score:42  Foto update : 54  On  at visit 20: 50     Pain level:3-4/10 today   History of Injury: Patient stated complaint: R shoulder reverse TSR on 22. MD precautions/restrictions:   active and passive fwd flexion and abduction  Limit shoulder extension  Limit ER to 15 degrees  No active IR, adduction (behind back hygiene)       SUBJECTIVE:    22  Tolerated first visit ok.  11  Sore after last Rx until next day. Able to get arm away from side better.    : was able to finally get a decent night of sleep last night laying on her R side; pain levels in shoulder are very low, no longer taking prescription pain meds; some soreness scapular mms from sleeping on R side    7/21: a little sore from the new exercises and just trying to be more active; feels she tenses on R side when using L UE   7-25-22  sore after PT the remainder of the day. Sore this am,  was in hospital , so did a little more around the house. 7-17-22 felt good after last Rx.   8/2: a little sore from sleeping; see goal reassessment   8/4: saw Dr. John James and he is pleased with progress - ok to push ROM and strength as clover and ok to begin behind the back hygiene gradually; pt notes she did have a fall when getting out of the car to go the appt yesterday landing on R elbow (pt c/o moderate muscle soreness in shoulder and has a few abrasions on R elbow); to PT without sling  8/9: Can sleep okay on her R side, but if she sleeps on her L side she wakes up and her shoulder hurts. Woke up that way today. Had to take pain medication because she just didn't want to move it it hurt so bad  8/11: Shoulder doing pretty good. Just depends on what she's doing.  It's just annoying  8/16: doing well; opening fridge door easier and able to reach to touch forehead easier now   8/18: inc soreness from new ex and trying to use her arm more, also from sleeping on R side   8/23: closing car door is getting easier   8/25: has another appt after PT and is on a little time constraint; mild soreness from sleeping on the other side of the bed and trying to use curling iron on hair this morning (some ex held to day to cont with hep due to time constraint)  8/30: without new c/o; no pain   9/1: slowly feels like she is reaching more and slowly gaining strength with over head reaching   9/6: thinks she is overdoing with hep b/c she doesn't have a 1# and is doing all hep 2-3x per day with a 2# weight and is c/o deltoid muscle soreness  9/8: shoulder is feeling much better with the rest; FOTO next time for visit 20  9/13: not sure what happened but was really sore driving home last session, rubbed some BenGay and that helped, but still feeling it some today with activity and having a harder time getting arm up to head to wash hair; saw MD - X rays good, cont PT to push ROM and strength per MD   9/15: sore after session and lasted for a few hours and then eased up; just annoying today   9/20: has been alternating with heat/ice and cont gentle ex only and avoiding the overhead stuff and it is helping; pain was 1-2/10 with waking and getting ready this morning but inc to 3-4/10 after  9/22: rest and US        OBJECTIVE:   Observation:   Test measurements:      RESTRICTIONS/PRECAUTIONS:   Per MD notes from visit 8/2:  [id number] Digna Joseph to push aggressive ROM and strength   Ok to begin gradual behind the back hygiene   Ok to d/c sling    DOS 6/13/22 - generic protocol on [employer]'s desk       8/1: 7 wks p/o  8/15: 9 wks p/o  8/22: 10 wks  9/5: 12 wks    Exercises/Interventions: 9/22: held all overhead ex today   Therapeutic Ex (31282)  Min: 19 Resistance/Reps Notes/Cues     R REVERSE TSR on 6/13/22     NT    UBE    Fwd x 5 min      Pulley flex  X 20  UE ranger 4 way on wall   Rest breaks needed    Table slides on incline   flexion   scaption        Standing cane   Ext   IR       hep        Biceps curl standing 1# 2 X 10 R    Bent row  Bent ext  1# 1 x 10 R  1# 1 x 10 R Cues for scap retraction    Ball roll on wall     Bounce ball against wall    add Assistance from L as needed- rest breaks needed    9/15 - after ball ex, pain in to 7-8/10, TTP lat and ant shoulder   TB row        Ext        Tricep        Add         IR yellow  x10  yellow  x10  Yellow x10  Yellow x 10   Yellow x 10  Pt has orange TB at home for hep   Cues for scap retraction    WB with wt shifting on table S/s  x 10     Supine:  Cane chest press/flex  Cane ER   Cues to slow down   Supine  Shoulder flex  SA punch  Chest press     0# 1 x 10   0# X 10   0# x 10    Held wts - resume as able   SL  ER with AAROM      Abd   Horiz add    X 10 A to full range w/ independent hold x 3 sec at top of ROM and lower slowly  0# X 10  0# x 10   Improved hold noted       Dec wt to 0# - resume 1# when able         Seated         Shoulder flex 0-90  Shoulder flex/scaption   Overhead press   Reach to touch back of head w/ hold   0# x 10                             Therapeutic Activity (80567) Min:     Finger ladder Held - resume    Peg board      Baps reach and turn              NMR re-education (83098)  Min:10         SL scap mobs AAROM      Supine    RS arm 90 flex  RS IR/ER  Cw/ccw  Flex 90*-125*  AROM ER 3 x 15 sec   3 x 15 sec  1# X10 ea           Isometrics flex, abd, ext, IR/ER in neutral     ER isometrics supine 10 x 5 sec  hep       Manual Intervention (60676) Min: 8    PROM:  As tolerated  Gentle     IR in scap plane <=50*   IASTM     STM To lat shoulder at deltoid insertion x 4 min   To same x 4 min          Modalities:  Min: 23      IFC  with CP    CP only x 15 min  Recliner with pillow under R arm   recliner   US 50% 1.5 w/cm2 to lant/at shoulder  X 8 min  Relief       Other Therapeutic Activities:  Pt was educated on PT POC, Diagnosis, Prognosis, pathomechanics as well as frequency and duration of scheduling future physical therapy appointments. Time was also taken on this day to answer all patient questions and participation in PT. Reviewed appointment policy in detail with patient and patient verbalized understanding. X 8 min     Home Exercise Program: Patient instructed in the following for HEP:          .   Patient verbalized/demonstrated understanding and was issued written handout.   7/5: pendulum, scap retraction, shoulder shrug/roll, UT str, AROM elbow, wrist, hand,   7/19: supine cane press, supine UE flex/abd   8/4: cane ER and flex   8/9: Tband row and extension (per pt)  8/11: Isometrics: Flex, ext, abd, IR, ER  8/18: bent row, cane ext, wall climb, SL horiz abd/abd, supine ER  8/23: bent ext, TB add/IR, cane IR, wall ER str     Therapeutic Exercise and NMR EXR  [x] (00929) Provided verbal/tactile cueing for activities related to strengthening, flexibility, endurance, ROM  for improvements in scapular, scapulothoracic and UE control with self care, reaching, carrying, lifting, house/yardwork, driving/computer work.    [] (54988) Provided verbal/tactile cueing for activities related to improving balance, coordination, kinesthetic sense, posture, motor skill, proprioception  to assist with  scapular, scapulothoracic and UE control with self care, reaching, carrying, lifting, house/yardwork, driving/computer work. Therapeutic Activities:    [x] (16705 or 03111) Provided verbal/tactile cueing for activities related to improving balance, coordination, kinesthetic sense, posture, motor skill, proprioception and motor activation to allow for proper function of scapular, scapulothoracic and UE control with self care, carrying, lifting, driving/computer work.      Home Exercise Program:    [x] (17617) Reviewed/Progressed HEP activities related to strengthening, flexibility, endurance, ROM of scapular, scapulothoracic and UE control with self care, reaching, carrying, lifting, house/yardwork, driving/computer work  [] (78352) Reviewed/Progressed HEP activities related to improving balance, coordination, kinesthetic sense, posture, motor skill, proprioception of scapular, scapulothoracic and UE control with self care, reaching, carrying, lifting, house/yardwork, driving/computer work      Manual Treatments:  PROM / STM / Oscillations-Mobs:  G-I, II, III, IV (PA's, Inf., Post.)  [x] (22395) Provided manual therapy to mobilize soft tissue/joints of cervical/CT, scapular GHJ and UE for the purpose of modulating pain, promoting relaxation,  increasing ROM, reducing/eliminating soft tissue swelling/inflammation/restriction, improving soft tissue extensibility and allowing for proper ROM for normal function with self care, improvement in movement, function, and ADLs as indicated by Functional Deficits. [x] Progressing: [] Met: [] Not Met: [] Adjusted  3. Patient will demonstrate increased AROM to flex/scap 140, IR/ER to [place] for ease with self care to allow for proper joint functioning as indicated by Functional Deficits. [x] Progressing: [] Met: [] Not Met: [] Adjusted  4. Patient will demonstrate an increase in NM recruitment/activation and overall GH and scapular strength to 4+/5 - 5/5 for proper functional mobility as indicated by patients Functional Deficits. [x] Progressing: [] Met: [] Not Met: [] Adjusted  5. Patient will return to self care/dressing activities without increased symptoms or restriction. [] Progressing: [x] Met: [] Not Met: [] Adjusted  6. \"full use of arm\"         [x] Progressing: [] Met: [] Not Met: [] Adjusted    ASSESSMENT:  Tolerated eval well; proceed per protocol/MD precautions   7-11-22 tolerated above ex/ PROM well. Steri strips gone , placed 4 pads with E-stim for more coverage area. 7-13-22 progressing with gentle ex  7/19: cont skilled PT for ROM and initiating strength  7-27-22 tolerating above ex well.  Gradually progressing with AAROM.   8/2: MD f/u on 8/2; will cont for strength and ROM per MD guidelines   8/4: ex progression held today due to inc soreness; progress when able   8/9: tolerated progressions well  8/16: functional progression made with reaching face and opening fridge door, however, still can't reach to fix hair yet and driving is still on hold; cont with skilled PT for functional gains and strength with overhead reaching   8/25: increased independence and strength noted with some overhead ex  9/1: despite functional gains with strength and ROM with overhead reaching, ER strength remains weak limiting pt's functional progression; IFC cont to help with pain from ex and stretching   9/6: pt edu to hold on hep x a few days to let shoulder rest and calm down; offered options for creating 1# for use at home versus purchasing 1# dumb bell; improved strength noted with RS ex today   9/8: mild soreness during session, but overall tolerated well with resuming wt with ex; improved holding strength with ER in SL  9/13; manage through recent flare up with inc icing frequency and backing of of hep somewhat until pain levels dec and then resume   9/15: ex stopped today due to flare up of pain; possible onset of tendonitis with on/off intense pain over the past week- pt TTP over ant/lat shoulder and biceps tendon and rating pain at 7-8/10; pt instructed to hold on hep and focus on rest/ice; realistic functional long term goals addressed again with pt after reverse TSR with significant loss of RTC mm           Treatment/Activity Tolerance:  [x] Patient tolerated treatment well [] Patient limited by fatique  [] Patient limited by pain  [] Patient limited by other medical complications  [] Other:     Overall Progression Towards Functional goals/ Treatment Progress Update:  [x] Patient is progressing as expected towards functional goals listed. [] Progression is improving slowed due to complexities/Impairments listed. [] Progression has been slowed due to co-morbidities.   [] Plan just implemented, too soon to assess goals progression <30days   [] Goals require adjustment due to lack of progress  [] Patient is not progressing as expected and requires additional follow up with physician  [] Other    Prognosis for POC: [x] Good [] Fair  [] Poor    Patient requires continued skilled intervention: [x] Yes  [] No      PLAN: US/STM helping; ease back into ex; period of rest to calm down pain and resume ex as able and when indicated   [] Plan of care initiated [] Hold pending MD visit [] Discharge    Electronically signed by: Rossana Montes, PT MPT [id number]    Note: If patient does not return for scheduled/recommended follow up visits, this note will serve as a discharge from care along with the most recent update on progress.

## 2022-09-27 ENCOUNTER — HOSPITAL ENCOUNTER (OUTPATIENT)
Dept: PHYSICAL THERAPY | Age: 72
Setting detail: THERAPIES SERIES
Discharge: HOME | End: 2022-09-27
Payer: MEDICARE

## 2022-09-27 PROCEDURE — 97112 NEUROMUSCULAR REEDUCATION: CPT

## 2022-09-27 PROCEDURE — 97110 THERAPEUTIC EXERCISES: CPT

## 2022-09-27 PROCEDURE — 97530 THERAPEUTIC ACTIVITIES: CPT

## 2022-09-27 PROCEDURE — 97140 MANUAL THERAPY 1/> REGIONS: CPT

## 2022-09-27 NOTE — FLOWSHEET NOTE
[place] [facility], [facility]  [address] [facility] [place], [facility]  Phone: [phone]   Fax:     [phone]                              Physical Therapy Treatment Note/ Progress Report:       Date:  2022    Patient Name:  Slime Cornelius    :  1950  MRN: [id number]    Pertinent Medical History:Additional Pertinent Hx: OA, asthma, DM, HTN, migraine, depression, DVT, pulmonary embolism, cervical fusion    Medical/Treatment Diagnosis Information:  Diagnosis: C52.092 s/p reverse TSR R shoulder  Treatment Diagnosis: recent shoulder surgery R UE; pain, limited ROM and strength    Insurance/Certification information:  PT Insurance Information: [employer]  - $40 copay, no auth, visits BMN  Physician Information:  Referring Provider (secondary): Dr. Arthur Dhaliwal of care signed (Y/N): yes    Date of Patient follow up with Physician:  22     Progress Report: []  Yes -  [x]  No     Date Range for reporting period:  Beginnin2022  Ending:      Progress report due (10 Rx/or 30 days whichever is less):     Recertification due (POC duration/ or 90 days whichever is less):      Visit # POC/Insurance Allowable Auth Needed    BMN []Yes    [x]No     Functional Outcomes Measure:    Date Assessed: at eval  Test: FOTO  Score:42  Foto update : 54  On  at visit 20: 50     Pain level:2/10 today   History of Injury: Patient stated complaint: R shoulder reverse TSR on 22. MD precautions/restrictions:   active and passive fwd flexion and abduction  Limit shoulder extension  Limit ER to 15 degrees  No active IR, adduction (behind back hygiene)       SUBJECTIVE:    22  Tolerated first visit ok.  11  Sore after last Rx until next day. Able to get arm away from side better.    : was able to finally get a decent night of sleep last night laying on her R side; pain levels in shoulder are very low, no longer taking prescription pain meds; some soreness scapular mms from sleeping on R side    7/21: a little sore from the new exercises and just trying to be more active; feels she tenses on R side when using L UE   7-25-22  sore after PT the remainder of the day. Sore this am,  was in hospital , so did a little more around the house. 7-17-22 felt good after last Rx.   8/2: a little sore from sleeping; see goal reassessment   8/4: saw Dr. Cole Jacobs and he is pleased with progress - ok to push ROM and strength as clover and ok to begin behind the back hygiene gradually; pt notes she did have a fall when getting out of the car to go the appt yesterday landing on R elbow (pt c/o moderate muscle soreness in shoulder and has a few abrasions on R elbow); to PT without sling  8/9: Can sleep okay on her R side, but if she sleeps on her L side she wakes up and her shoulder hurts. Woke up that way today. Had to take pain medication because she just didn't want to move it it hurt so bad  8/11: Shoulder doing pretty good. Just depends on what she's doing.  It's just annoying  8/16: doing well; opening fridge door easier and able to reach to touch forehead easier now   8/18: inc soreness from new ex and trying to use her arm more, also from sleeping on R side   8/23: closing car door is getting easier   8/25: has another appt after PT and is on a little time constraint; mild soreness from sleeping on the other side of the bed and trying to use curling iron on hair this morning (some ex held to day to cont with hep due to time constraint)  8/30: without new c/o; no pain   9/1: slowly feels like she is reaching more and slowly gaining strength with over head reaching   9/6: thinks she is overdoing with hep b/c she doesn't have a 1# and is doing all hep 2-3x per day with a 2# weight and is c/o deltoid muscle soreness  9/8: shoulder is feeling much better with the rest; FOTO next time for visit 20  9/13: not sure what happened but was really sore driving home last session, rubbed some BenGay and that helped, but still feeling it some today with activity and having a harder time getting arm up to head to wash hair; saw MD - X rays good, cont PT to push ROM and strength per MD   9/15: sore after session and lasted for a few hours and then eased up; just annoying today   9/20: has been alternating with heat/ice and cont gentle ex only and avoiding the overhead stuff and it is helping; pain was 1-2/10 with waking and getting ready this morning but inc to 3-4/10 after  9/22: rest and US helping    9/27: doing more reaching forward lately, so a little sore       OBJECTIVE:   Observation:   Test measurements:      RESTRICTIONS/PRECAUTIONS:   Per MD notes from visit 8/2:  [id number] Digna Joseph to push aggressive ROM and strength   Ok to begin gradual behind the back hygiene   Ok to d/c sling    DOS 6/13/22 - generic protocol on BK's desk       8/1: 7 wks p/o  8/15: 9 wks p/o  8/22: 10 wks  9/5: 12 wks    Exercises/Interventions:    Therapeutic Ex (35707)  Min: 24 Resistance/Reps Notes/Cues     R REVERSE TSR on 6/13/22     NT    UBE    Fwd x 5 min      Pulley flex  X 20  UE ranger 4 way on wall X 10 up/dwn and s/s   Progress to cw/ccw   Table slides on incline   flexion   scaption        Standing cane   Ext   IR       hep        Biceps curl standing 1# 2 X 10 R    Bent row  Bent ext  1# 1 x 10 R  1# 1 x 10 R Cues for scap retraction    Ball roll on wall     Bounce ball against wall     Assistance from L as needed- rest breaks needed       TB row        Ext        Tricep        Add         IR yellow  x10  yellow  x10  Yellow x10  Yellow x 10   Yellow x 10  Pt has orange TB at home for hep   Cues for scap retraction    WB with wt shifting on table S/s  x 10     Supine:  Cane chest press/flex  Cane ER   Cues to slow down   Supine  Shoulder flex  SA punch  Chest press    W/ orange TB  1 x 10   1/2# X 10   0# x 10     resume to 1# as able   SL  ER with AAROM      Abd   Horiz add    X 10 A to full range w/ independent hold x 3 sec at top of ROM and lower slowly  1/2# X 10  1/2# x 10   Improved hold noted        resume 1# when able         Seated         Shoulder flex 0-90  Shoulder flex/scaption   Overhead press   Reach to touch back of head w/ hold   1/2# x 10   Min A to guide 0# 1 x 10       3 x 10 sec          9/27 improved reach            Therapeutic Activity (61859) Min: 8    Finger ladder 3/4 way up R shoulder only x 3    Peg board   X 1 up/dwn to #12 placing in each hole     Baps reach and turn              NMR re-education (43735)  Min:10         SL scap mobs AAROM      Supine    RS arm 90 flex  RS IR/ER  Cw/ccw  Flex 90*-125*  AROM ER 3 x 15 sec   3 x 15 sec  1# X10 ea           Isometrics flex, abd, ext, IR/ER in neutral     ER isometrics supine 10 x 5 sec  hep       Manual Intervention (68305) Min:10    PROM:  As tolerated  Gentle     IR in scap plane <=50*   IASTM     To lat shoulder at deltoid insertion x 5 min           Modalities:  Min: 23      IFC  with CP    CP only x 15 min  Recliner with pillow under R arm   recliner   US 50% 1.5 w/cm2 to lant/at shoulder  X 8 min  Relief       Other Therapeutic Activities:  Pt was educated on PT POC, Diagnosis, Prognosis, pathomechanics as well as frequency and duration of scheduling future physical therapy appointments. Time was also taken on this day to answer all patient questions and participation in PT. Reviewed appointment policy in detail with patient and patient verbalized understanding. X 8 min     Home Exercise Program: Patient instructed in the following for HEP:          .   Patient verbalized/demonstrated understanding and was issued written handout.   7/5: pendulum, scap retraction, shoulder shrug/roll, UT str, AROM elbow, wrist, hand,   7/19: supine cane press, supine UE flex/abd   8/4: cane ER and flex   8/9: Tband row and extension (per pt)  8/11: Isometrics: Flex, ext, abd, IR, ER  8/18: bent row, cane ext, wall climb, SL horiz abd/abd, supine ER  8/23: bent ext, TB add/IR, cane IR, wall ER str     Therapeutic Exercise and NMR EXR  [x] (97827) Provided verbal/tactile cueing for activities related to strengthening, flexibility, endurance, ROM  for improvements in scapular, scapulothoracic and UE control with self care, reaching, carrying, lifting, house/yardwork, driving/computer work.    [] (05734) Provided verbal/tactile cueing for activities related to improving balance, coordination, kinesthetic sense, posture, motor skill, proprioception  to assist with  scapular, scapulothoracic and UE control with self care, reaching, carrying, lifting, house/yardwork, driving/computer work. Therapeutic Activities:    [x] (59872 or 81766) Provided verbal/tactile cueing for activities related to improving balance, coordination, kinesthetic sense, posture, motor skill, proprioception and motor activation to allow for proper function of scapular, scapulothoracic and UE control with self care, carrying, lifting, driving/computer work.      Home Exercise Program:    [x] (98967) Reviewed/Progressed HEP activities related to strengthening, flexibility, endurance, ROM of scapular, scapulothoracic and UE control with self care, reaching, carrying, lifting, house/yardwork, driving/computer work  [] (35154) Reviewed/Progressed HEP activities related to improving balance, coordination, kinesthetic sense, posture, motor skill, proprioception of scapular, scapulothoracic and UE control with self care, reaching, carrying, lifting, house/yardwork, driving/computer work      Manual Treatments:  PROM / STM / Oscillations-Mobs:  G-I, II, III, IV (PA's, Inf., Post.)  [x] (74107) Provided manual therapy to mobilize soft tissue/joints of cervical/CT, scapular GHJ and UE for the purpose of modulating pain, promoting relaxation,  increasing ROM, reducing/eliminating soft tissue swelling/inflammation/restriction, improving soft tissue extensibility and allowing for proper ROM for normal function with self care, reaching, carrying, lifting, house/yardwork, driving/computer work    Charges:  Timed Code Treatment Minutes: 60   Total Treatment Minutes: 75       [] EVAL (LOW) 06149 (typically 20 minutes face-to-face)  [] EVAL (MOD) 85384 (typically 30 minutes face-to-face)  [] EVAL (HIGH) 66906 (typically 45 minutes face-to-face)  [] RE-EVAL     [x] FI(39615) x   [] Dry needle 1 or 2 Muscles (52261)  [x] NMR (86399) x     [] Dry needle 3+ Muscles (72870)  [x] Manual (40375) x     [] Ultrasound (31304) x  [x] TA (35864) x     [] Mech Traction (04920)  [] ES(attended) (84735)     [] ES (un) (28774):   [] Vasopump (23614) [] Ionto (85157)   [] Other:     GOALS:  Updated 9/1:  Pain ranges from 0-4/10 with sleeping and PT/hep and activity level    Self care improving (showering, dressing) and able to clover light housework (unload )  *able to reach head but difficulty holding it there to style hair; able to reach 1st shelf of cabinet  Able to hold cup but difficulty bringing it to mouth to drink due to ER weakness    Hep 1 x per day   Overall 30% improved   MMT: flex 4+/5, abd 4+/5, IR 4+/5 and ER 3-/5   ROM update: IR 55, ER 40, flex 150, abd/scaption 150    Patient stated goal: \"dec pain\"  [x] Progressing: [] Met: [] Not Met: [] Adjusted     Therapist goals for Patient:   Short Term Goals: To be achieved in: 2 weeks  1. Independent in HEP and progression per patient tolerance, in order to prevent re-injury. [x] Progressing: [] Met: [] Not Met: [] Adjusted  2. Patient will have a decrease in pain by 20-30% to facilitate improvement in movement, function, and ADLs as indicated by Functional Deficits. [] Progressing: [x] Met: [] Not Met: [] Adjusted     Long Term Goals: To be achieved in: at d/c   1. Increase FOTO functional outcome score from 42 to 60 to assist with reaching prior level of function.    [x] Progressing: [] Met: [] Not Met: [] Adjusted  2. Patient will have a decrease in pain by 50-60% to facilitate improvement in movement, function, and ADLs as indicated by Functional Deficits. [x] Progressing: [] Met: [] Not Met: [] Adjusted  3. Patient will demonstrate increased AROM to flex/scap 140, IR/ER to [place] for ease with self care to allow for proper joint functioning as indicated by Functional Deficits. [x] Progressing: [] Met: [] Not Met: [] Adjusted  4. Patient will demonstrate an increase in NM recruitment/activation and overall GH and scapular strength to 4+/5 - 5/5 for proper functional mobility as indicated by patients Functional Deficits. [x] Progressing: [] Met: [] Not Met: [] Adjusted  5. Patient will return to self care/dressing activities without increased symptoms or restriction. [] Progressing: [x] Met: [] Not Met: [] Adjusted  6. \"full use of arm\"         [x] Progressing: [] Met: [] Not Met: [] Adjusted    ASSESSMENT:  Tolerated eval well; proceed per protocol/MD precautions   7-11-22 tolerated above ex/ PROM well. Steri strips gone , placed 4 pads with E-stim for more coverage area. 7-13-22 progressing with gentle ex  7/19: cont skilled PT for ROM and initiating strength  7-27-22 tolerating above ex well.  Gradually progressing with AAROM.   8/2: MD f/u on 8/2; will cont for strength and ROM per MD guidelines   8/4: ex progression held today due to inc soreness; progress when able   8/9: tolerated progressions well  8/16: functional progression made with reaching face and opening fridge door, however, still can't reach to fix hair yet and driving is still on hold; cont with skilled PT for functional gains and strength with overhead reaching   8/25: increased independence and strength noted with some overhead ex  9/1: despite functional gains with strength and ROM with overhead reaching, ER strength remains weak limiting pt's functional progression; IFC cont to help with pain from ex and stretching   9/6: pt edu to hold on hep x a few days to let shoulder rest and calm down; offered options for creating 1# for use at home versus purchasing 1# dumb bell; improved strength noted with RS ex today   9/8: mild soreness during session, but overall tolerated well with resuming wt with ex; improved holding strength with ER in SL  9/13; manage through recent flare up with inc icing frequency and backing of of hep somewhat until pain levels dec and then resume   9/15: ex stopped today due to flare up of pain; possible onset of tendonitis with on/off intense pain over the past week- pt TTP over ant/lat shoulder and biceps tendon and rating pain at 7-8/10; pt instructed to hold on hep and focus on rest/ice; realistic functional long term goals addressed again with pt after reverse TSR with significant loss of RTC mm    9/27: will assess gradual return to wts/overhead ex; pt is reaching plateau with strength gains, will cont to assess for need for skilled PT each week        Treatment/Activity Tolerance:  [x] Patient tolerated treatment well [] Patient limited by fatique  [] Patient limited by pain  [] Patient limited by other medical complications  [] Other:     Overall Progression Towards Functional goals/ Treatment Progress Update:  [x] Patient is progressing as expected towards functional goals listed. [] Progression is improving slowed due to complexities/Impairments listed. [] Progression has been slowed due to co-morbidities.   [] Plan just implemented, too soon to assess goals progression <30days   [] Goals require adjustment due to lack of progress  [] Patient is not progressing as expected and requires additional follow up with physician  [] Other    Prognosis for POC: [x] Good [] Fair  [] Poor    Patient requires continued skilled intervention: [x] Yes  [] No      PLAN: US/STM helping; ease back into ex;   [] Plan of care initiated [] Hold pending MD visit [] Discharge    Electronically signed by: Sarbjit Hernandez, PT MPT [id number]    Note: If patient does not return for scheduled/recommended follow up visits, this note will serve as a discharge from care along with the most recent update on progress.

## 2022-09-29 ENCOUNTER — HOSPITAL ENCOUNTER (OUTPATIENT)
Dept: PHYSICAL THERAPY | Age: 72
Setting detail: THERAPIES SERIES
Discharge: HOME | End: 2022-09-29
Payer: MEDICARE

## 2022-09-29 PROCEDURE — 97140 MANUAL THERAPY 1/> REGIONS: CPT

## 2022-09-29 PROCEDURE — 97035 APP MDLTY 1+ULTRASOUND EA 15: CPT

## 2022-09-29 PROCEDURE — 97110 THERAPEUTIC EXERCISES: CPT

## 2022-09-29 PROCEDURE — 97112 NEUROMUSCULAR REEDUCATION: CPT

## 2022-09-29 NOTE — FLOWSHEET NOTE
low, no longer taking prescription pain meds; some soreness scapular mms from sleeping on R side    7/21: a little sore from the new exercises and just trying to be more active; feels she tenses on R side when using L UE   7-25-22  sore after PT the remainder of the day. Sore this am,  was in hospital , so did a little more around the house. 7-17-22 felt good after last Rx.   8/2: a little sore from sleeping; see goal reassessment   8/4: saw Dr. Anna Sales and he is pleased with progress - ok to push ROM and strength as clover and ok to begin behind the back hygiene gradually; pt notes she did have a fall when getting out of the car to go the appt yesterday landing on R elbow (pt c/o moderate muscle soreness in shoulder and has a few abrasions on R elbow); to PT without sling  8/9: Can sleep okay on her R side, but if she sleeps on her L side she wakes up and her shoulder hurts. Woke up that way today. Had to take pain medication because she just didn't want to move it it hurt so bad  8/11: Shoulder doing pretty good. Just depends on what she's doing.  It's just annoying  8/16: doing well; opening fridge door easier and able to reach to touch forehead easier now   8/18: inc soreness from new ex and trying to use her arm more, also from sleeping on R side   8/23: closing car door is getting easier   8/25: has another appt after PT and is on a little time constraint; mild soreness from sleeping on the other side of the bed and trying to use curling iron on hair this morning (some ex held to day to cont with hep due to time constraint)  8/30: without new c/o; no pain   9/1: slowly feels like she is reaching more and slowly gaining strength with over head reaching   9/6: thinks she is overdoing with hep b/c she doesn't have a 1# and is doing all hep 2-3x per day with a 2# weight and is c/o deltoid muscle soreness  9/8: shoulder is feeling much better with the rest; FOTO next time for visit 20  9/13: not sure what happened but was really sore driving home last session, rubbed some BenGay and that helped, but still feeling it some today with activity and having a harder time getting arm up to head to wash hair; saw MD - X rays good, cont PT to push ROM and strength per MD   9/15: sore after session and lasted for a few hours and then eased up; just annoying today   9/20: has been alternating with heat/ice and cont gentle ex only and avoiding the overhead stuff and it is helping; pain was 1-2/10 with waking and getting ready this morning but inc to 3-4/10 after  9/22: rest and US helping    9/27: doing more reaching forward lately, so a little sore   9/29: Wants to be able to do her own hair. Just can't do it yet.  Can get her arm up there, but can't curl it or anything      OBJECTIVE:   Observation:   Test measurements:      RESTRICTIONS/PRECAUTIONS:   Per MD notes from visit 8/2:  Devi Chakraborty to push aggressive ROM and strength   Ok to begin gradual behind the back hygiene   Ok to d/c sling    DOS 6/13/22 - generic protocol on BK's desk       8/1: 7 wks p/o  8/15: 9 wks p/o  8/22: 10 wks  9/5: 12 wks    Exercises/Interventions:    Therapeutic Ex (32439)  Min: 24 Resistance/Reps Notes/Cues     R REVERSE TSR on 6/13/22     NT    UBE    Fwd x 5 min      Pulley flex  X 20  UE ranger 4 way on wall X 10      Table slides on incline   flexion   scaption        Standing cane   Ext   IR       hep        Biceps curl standing 1# 2 X 10 R    Bent row  Bent ext  1# 1 x 10 R  1# 1 x 10 R Cues for scap retraction    Ball roll on wall     Bounce ball against wall     Assistance from L as needed- rest breaks needed       TB row        Ext        Tricep        Add         IR yellow  x10  yellow  x10  Yellow x10  Yellow x 10   Yellow x 10  Pt has orange TB at home for hep   Cues for scap retraction    WB with wt shifting on table S/s  x 10     Supine:  Cane chest press/flex  Cane ER   Cues to slow down   Supine  Shoulder flex  SA punch  Chest press    1# 1 x 10   1# X 10   0# x 10       SL  ER with AAROM      Abd   Horiz add    X 10 A to full range w/ independent hold x 3 sec at top of ROM and lower slowly  1/2# X 10  1/2# x 10   Improved hold noted        resume 1# when able         Seated         Shoulder flex 0-90  Shoulder flex/scaption   Overhead press   Reach to touch back of head w/ hold   1/2# x 10   Min A to guide 0# 1 x 10       3 x 10 sec          9/27 improved reach            Therapeutic Activity (30218) Min: 8    Finger ladder 3/4 way up R shoulder only x 3    Peg board   X 1 up/dwn to #12 placing in each hole     Baps reach and turn              NMR re-education (72191)  Min:10         SL scap mobs AAROM      Supine    RS arm 90 flex  RS IR/ER  Cw/ccw  Flex 90*-125*  AROM ER 3 x 15 sec   3 x 15 sec  1# X10 ea           Isometrics flex, abd, ext, IR/ER in neutral     ER isometrics supine 10 x 5 sec  hep       Manual Intervention (19276) Min:10    PROM:  As tolerated  Gentle     IR in scap plane <=50*   IASTM     To lat shoulder at deltoid insertion x 5 min           Modalities:  Min: 23      IFC  with CP    CP only x 15 min  Recliner with pillow under R arm   recliner   US 50% 1.5 w/cm2 to lant/at shoulder  X 8 min  Relief       Other Therapeutic Activities:  Pt was educated on PT POC, Diagnosis, Prognosis, pathomechanics as well as frequency and duration of scheduling future physical therapy appointments. Time was also taken on this day to answer all patient questions and participation in PT. Reviewed appointment policy in detail with patient and patient verbalized understanding. X 8 min     Home Exercise Program: Patient instructed in the following for HEP:          .   Patient verbalized/demonstrated understanding and was issued written handout.   7/5: pendulum, scap retraction, shoulder shrug/roll, UT str, AROM elbow, wrist, hand,   7/19: supine cane press, supine UE flex/abd   8/4: cane ER and flex   8/9: Tband row and extension (per pt)  8/11: Isometrics: Flex, ext, abd, IR, ER  8/18: bent row, cane ext, wall climb, SL horiz abd/abd, supine ER  8/23: bent ext, TB add/IR, cane IR, wall ER str     Therapeutic Exercise and NMR EXR  [x] (47293) Provided verbal/tactile cueing for activities related to strengthening, flexibility, endurance, ROM  for improvements in scapular, scapulothoracic and UE control with self care, reaching, carrying, lifting, house/yardwork, driving/computer work.    [] (81017) Provided verbal/tactile cueing for activities related to improving balance, coordination, kinesthetic sense, posture, motor skill, proprioception  to assist with  scapular, scapulothoracic and UE control with self care, reaching, carrying, lifting, house/yardwork, driving/computer work. Therapeutic Activities:    [x] (64775 or 13030) Provided verbal/tactile cueing for activities related to improving balance, coordination, kinesthetic sense, posture, motor skill, proprioception and motor activation to allow for proper function of scapular, scapulothoracic and UE control with self care, carrying, lifting, driving/computer work.      Home Exercise Program:    [x] (58279) Reviewed/Progressed HEP activities related to strengthening, flexibility, endurance, ROM of scapular, scapulothoracic and UE control with self care, reaching, carrying, lifting, house/yardwork, driving/computer work  [] (76142) Reviewed/Progressed HEP activities related to improving balance, coordination, kinesthetic sense, posture, motor skill, proprioception of scapular, scapulothoracic and UE control with self care, reaching, carrying, lifting, house/yardwork, driving/computer work      Manual Treatments:  PROM / STM / Oscillations-Mobs:  G-I, II, III, IV (PA's, Inf., Post.)  [x] (03950) Provided manual therapy to mobilize soft tissue/joints of cervical/CT, scapular GHJ and UE for the purpose of modulating pain, promoting relaxation,  increasing ROM, reducing/eliminating soft tissue swelling/inflammation/restriction, improving soft tissue extensibility and allowing for proper ROM for normal function with self care, reaching, carrying, lifting, house/yardwork, driving/computer work    Charges:  Timed Code Treatment Minutes: 60   Total Treatment Minutes: 75       [] EVAL (LOW) 41485 (typically 20 minutes face-to-face)  [] EVAL (MOD) 96491 (typically 30 minutes face-to-face)  [] EVAL (HIGH) 05266 (typically 45 minutes face-to-face)  [] RE-EVAL     [x] JZ(17814) x   [] Dry needle 1 or 2 Muscles (02898)  [x] NMR (54603) x     [] Dry needle 3+ Muscles (49762)  [x] Manual (31644) x     [x] Ultrasound (24260) x  [] TA (76049) x     [] Mech Traction (70020)  [] ES(attended) (59416)     [] ES (un) (85361):   [] Vasopump (71759) [] Ionto (61110)   [] Other:     GOALS:  Updated 9/1:  Pain ranges from 0-4/10 with sleeping and PT/hep and activity level    Self care improving (showering, dressing) and able to clover light housework (unload )  *able to reach head but difficulty holding it there to style hair; able to reach 1st shelf of cabinet  Able to hold cup but difficulty bringing it to mouth to drink due to ER weakness    Hep 1 x per day   Overall 30% improved   MMT: flex 4+/5, abd 4+/5, IR 4+/5 and ER 3-/5   ROM update: IR 55, ER 40, flex 150, abd/scaption 150    Patient stated goal: \"dec pain\"  [x] Progressing: [] Met: [] Not Met: [] Adjusted     Therapist goals for Patient:   Short Term Goals: To be achieved in: 2 weeks  1. Independent in HEP and progression per patient tolerance, in order to prevent re-injury. [x] Progressing: [] Met: [] Not Met: [] Adjusted  2. Patient will have a decrease in pain by 20-30% to facilitate improvement in movement, function, and ADLs as indicated by Functional Deficits. [] Progressing: [x] Met: [] Not Met: [] Adjusted     Long Term Goals: To be achieved in: at d/c   1.  Increase FOTO functional outcome score from 42 to 60 to assist with reaching prior level of function. [x] Progressing: [] Met: [] Not Met: [] Adjusted  2. Patient will have a decrease in pain by 50-60% to facilitate improvement in movement, function, and ADLs as indicated by Functional Deficits. [x] Progressing: [] Met: [] Not Met: [] Adjusted  3. Patient will demonstrate increased AROM to flex/scap 140, IR/ER to [facility] for ease with self care to allow for proper joint functioning as indicated by Functional Deficits. [x] Progressing: [] Met: [] Not Met: [] Adjusted  4. Patient will demonstrate an increase in NM recruitment/activation and overall GH and scapular strength to 4+/5 - 5/5 for proper functional mobility as indicated by patients Functional Deficits. [x] Progressing: [] Met: [] Not Met: [] Adjusted  5. Patient will return to self care/dressing activities without increased symptoms or restriction. [] Progressing: [x] Met: [] Not Met: [] Adjusted  6. \"full use of arm\"         [x] Progressing: [] Met: [] Not Met: [] Adjusted    ASSESSMENT:  Tolerated eval well; proceed per protocol/MD precautions   7-11-22 tolerated above ex/ PROM well. Steri strips gone , placed 4 pads with E-stim for more coverage area. 7-13-22 progressing with gentle ex  7/19: cont skilled PT for ROM and initiating strength  7-27-22 tolerating above ex well.  Gradually progressing with AAROM.   8/2: MD f/u on 8/2; will cont for strength and ROM per MD guidelines   8/4: ex progression held today due to inc soreness; progress when able   8/9: tolerated progressions well  8/16: functional progression made with reaching face and opening fridge door, however, still can't reach to fix hair yet and driving is still on hold; cont with skilled PT for functional gains and strength with overhead reaching   8/25: increased independence and strength noted with some overhead ex  9/1: despite functional gains with strength and ROM with overhead reaching, ER strength remains weak limiting pt's functional progression; IFC cont to help with pain from ex and stretching   9/6: pt edu to hold on hep x a few days to let shoulder rest and calm down; offered options for creating 1# for use at home versus purchasing 1# dumb bell; improved strength noted with RS ex today   9/8: mild soreness during session, but overall tolerated well with resuming wt with ex; improved holding strength with ER in SL  9/13; manage through recent flare up with inc icing frequency and backing of of hep somewhat until pain levels dec and then resume   9/15: ex stopped today due to flare up of pain; possible onset of tendonitis with on/off intense pain over the past week- pt TTP over ant/lat shoulder and biceps tendon and rating pain at 7-8/10; pt instructed to hold on hep and focus on rest/ice; realistic functional long term goals addressed again with pt after reverse TSR with significant loss of RTC mm    9/27: will assess gradual return to wts/overhead ex; pt is reaching plateau with strength gains, will cont to assess for need for skilled PT each week   9/29: Resumed some of the exercises as prior with good tolerance. Treatment/Activity Tolerance:  [x] Patient tolerated treatment well [] Patient limited by fatique  [] Patient limited by pain  [] Patient limited by other medical complications  [] Other:     Overall Progression Towards Functional goals/ Treatment Progress Update:  [x] Patient is progressing as expected towards functional goals listed. [] Progression is improving slowed due to complexities/Impairments listed. [] Progression has been slowed due to co-morbidities.   [] Plan just implemented, too soon to assess goals progression <30days   [] Goals require adjustment due to lack of progress  [] Patient is not progressing as expected and requires additional follow up with physician  [] Other    Prognosis for POC: [x] Good [] Fair  [] Poor    Patient requires continued skilled intervention: [x] Yes  [] No      PLAN: US/STM helping; ease back into ex;   [] Plan of care initiated [] Hold pending MD visit [] Discharge    Electronically signed by: Jovanni Walker, PTA [id number]    Note: If patient does not return for scheduled/recommended follow up visits, this note will serve as a discharge from care along with the most recent update on progress. Biopsy Type: H and E

## 2022-10-04 ENCOUNTER — HOSPITAL ENCOUNTER (OUTPATIENT)
Dept: PHYSICAL THERAPY | Age: 72
Setting detail: THERAPIES SERIES
Discharge: HOME | End: 2022-10-04
Payer: MEDICARE

## 2022-10-04 PROCEDURE — 97110 THERAPEUTIC EXERCISES: CPT

## 2022-10-04 PROCEDURE — 97140 MANUAL THERAPY 1/> REGIONS: CPT

## 2022-10-04 PROCEDURE — 97530 THERAPEUTIC ACTIVITIES: CPT

## 2022-10-04 PROCEDURE — 97112 NEUROMUSCULAR REEDUCATION: CPT

## 2022-10-04 NOTE — FLOWSHEET NOTE
[facility]  [address] [facility]  Phone: [phone]   Fax:     [phone]                              Physical Therapy Treatment Note/ Progress Report:       Date:  10/4/2022    Patient Name:  Aida Garzon    :  1950  MRN: [id number]    Pertinent Medical History:Additional Pertinent Hx: OA, asthma, DM, HTN, migraine, depression, DVT, pulmonary embolism, cervical fusion    Medical/Treatment Diagnosis Information:  Diagnosis: W45.376 s/p reverse TSR R shoulder  Treatment Diagnosis: recent shoulder surgery R UE; pain, limited ROM and strength    Insurance/Certification information:  PT Insurance Information: [employer] - $40 copay, no auth, visits BMN  Physician Information:  Referring Provider (secondary): Dr. Jayjay Forman of care signed (Y/N): yes    Date of Patient follow up with Physician:  22     Progress Report: []  Yes -  [x]  No     Date Range for reporting period:  Beginnin2022  Ending:      Progress report due (10 Rx/or 30 days whichever is less): /    Recertification due (POC duration/ or 90 days whichever is less):      Visit # POC/Insurance Allowable Auth Needed    BMN []Yes    [x]No     Functional Outcomes Measure:    Date Assessed: at eval  Test: FOTO  Score:42  Foto update : 54  On  at visit 20: 50     Pain level:2/10 today   History of Injury: Patient stated complaint: R shoulder reverse TSR on 22. MD precautions/restrictions:   active and passive fwd flexion and abduction  Limit shoulder extension  Limit ER to 15 degrees  No active IR, adduction (behind back hygiene)       SUBJECTIVE:    22  Tolerated first visit ok.  11  Sore after last Rx until next day. Able to get arm away from side better.    : was able to finally get a decent night of sleep last night laying on her R side; pain levels in shoulder are very low, no longer taking prescription pain meds; some soreness scapular mms from sleeping on R side    7/21: a little sore from the new exercises and just trying to be more active; feels she tenses on R side when using L UE   7-25-22  sore after PT the remainder of the day. Sore this am,  was in hospital , so did a little more around the house. 7-17-22 felt good after last Rx.   8/2: a little sore from sleeping; see goal reassessment   8/4: saw Dr. Renae Chow and he is pleased with progress - ok to push ROM and strength as clover and ok to begin behind the back hygiene gradually; pt notes she did have a fall when getting out of the car to go the appt yesterday landing on R elbow (pt c/o moderate muscle soreness in shoulder and has a few abrasions on R elbow); to PT without sling  8/9: Can sleep okay on her R side, but if she sleeps on her L side she wakes up and her shoulder hurts. Woke up that way today. Had to take pain medication because she just didn't want to move it it hurt so bad  8/11: Shoulder doing pretty good. Just depends on what she's doing.  It's just annoying  8/16: doing well; opening fridge door easier and able to reach to touch forehead easier now   8/18: inc soreness from new ex and trying to use her arm more, also from sleeping on R side   8/23: closing car door is getting easier   8/25: has another appt after PT and is on a little time constraint; mild soreness from sleeping on the other side of the bed and trying to use curling iron on hair this morning (some ex held to day to cont with hep due to time constraint)  8/30: without new c/o; no pain   9/1: slowly feels like she is reaching more and slowly gaining strength with over head reaching   9/6: thinks she is overdoing with hep b/c she doesn't have a 1# and is doing all hep 2-3x per day with a 2# weight and is c/o deltoid muscle soreness  9/8: shoulder is feeling much better with the rest; FOTO next time for visit 20  9/13: not sure what happened but was really sore driving home last session, rubbed some BenGay and that helped, but still feeling it some today with activity and having a harder time getting arm up to head to wash hair; saw MD - X rays good, cont PT to push ROM and strength per MD   9/15: sore after session and lasted for a few hours and then eased up; just annoying today   9/20: has been alternating with heat/ice and cont gentle ex only and avoiding the overhead stuff and it is helping; pain was 1-2/10 with waking and getting ready this morning but inc to 3-4/10 after  9/22: rest and US helping    9/27: doing more reaching forward lately, so a little sore   9/29: Wants to be able to do her own hair. Just can't do it yet.  Can get her arm up there, but can't curl it or anything  10/4: just mild soreness from use of arm throughout the day       OBJECTIVE:   Observation:   Test measurements:      RESTRICTIONS/PRECAUTIONS:   Per MD notes from visit 8/2:  [id number] Digna Joseph to push aggressive ROM and strength   Ok to begin gradual behind the back hygiene   Ok to d/c sling    DOS 6/13/22 - generic protocol on BK's desk       8/1: 7 wks p/o  8/15: 9 wks p/o  8/22: 10 wks  9/5: 12 wks    Exercises/Interventions:    Therapeutic Ex (72548)  Min: 19 Resistance/Reps Notes/Cues     R REVERSE TSR on 6/13/22     NT    UBE    Fwd x 5 min      Pulley flex  X 20  UE ranger 4 way on wall X 10      Table slides on incline   flexion   scaption        Standing cane   Ext   IR       hep        Biceps curl standing 1# 2 X 10 R    Bent row  Bent ext  1# 1 x 10 R  1# 1 x 10 R Cues for scap retraction    Ball roll on wall     Bounce ball against wall  Assistance from L as needed- rest breaks needed   TB row        Ext        Tricep        Add         IR Red x10  Red  x10  Red x10  Red x 10   Red x 10  Pt has orange TB at home for hep   Cues for scap retraction    WB - wt shift on table S/S x 10   Reach and tap L hand across R x 10     Supine:  Cane chest press/flex  [address] [facility] Cues to slow down   Supine  Shoulder flex  SA punch  Chest press    1# 1 x 10   1# X 10   1# x 10       SL  ER with AAROM      Abd   Horiz add    X 10 A to full range w/ independent hold x 3 sec at top of ROM and lower slowly  1# X 10  1# x 10   Improved hold noted                Seated         Shoulder flex 0-90  Shoulder flex/scaption   Overhead press   Reach to touch back of head w/ hold   1# x 10   Min A to guide 0# 1 x 10       1/2# 3 x 10 sec          9/27 improved reach            Therapeutic Activity (21097) Min: 8    Finger ladder To top R shoulder only x 5    Peg board   X 2 up/dwn to #12 placing in each hole     Baps reach and turn L5-1 turn ccw/cw at each level   X 2 rep up and down w/ rest b/w              NMR re-education (44545)  Min:10         SL scap mobs AAROM      Supine    RS arm 90 flex  RS IR/ER  Cw/ccw  Flex 90*-125*  AROM ER 3 x 15 sec   3 x 15 sec  1# X10 ea           Isometrics flex, abd, ext, IR/ER in neutral     ER isometrics supine 10 x 5 sec  hep       Manual Intervention (45427) Min:10    PROM:  As tolerated  Gentle     IR in scap plane <=50*    STM    To lat shoulder at deltoid insertion x 5 min           Modalities:  Min: 23      IFC  with CP    CP only x 15 min  Recliner with pillow under R arm   recliner   US 50% 1.5 w/cm2 to lant/at shoulder  X 8 min  Relief       Other Therapeutic Activities:  Pt was educated on PT POC, Diagnosis, Prognosis, pathomechanics as well as frequency and duration of scheduling future physical therapy appointments. Time was also taken on this day to answer all patient questions and participation in PT. Reviewed appointment policy in detail with patient and patient verbalized understanding. X 8 min     Home Exercise Program: Patient instructed in the following for HEP:          .   Patient verbalized/demonstrated understanding and was issued written handout.   7/5: pendulum, scap retraction, shoulder shrug/roll, UT str, AROM elbow, wrist, hand,   7/19: supine cane press, supine UE flex/abd   8/4: cane ER and flex   8/9: Tband row and extension (per pt)  8/11: Isometrics: Flex, ext, abd, IR, ER  8/18: bent row, cane ext, wall climb, SL horiz abd/abd, supine ER  8/23: bent ext, TB add/IR, cane IR, wall ER str     Therapeutic Exercise and NMR EXR  [x] (40349) Provided verbal/tactile cueing for activities related to strengthening, flexibility, endurance, ROM  for improvements in scapular, scapulothoracic and UE control with self care, reaching, carrying, lifting, house/yardwork, driving/computer work.    [] (86004) Provided verbal/tactile cueing for activities related to improving balance, coordination, kinesthetic sense, posture, motor skill, proprioception  to assist with  scapular, scapulothoracic and UE control with self care, reaching, carrying, lifting, house/yardwork, driving/computer work. Therapeutic Activities:    [x] (15014 or 55235) Provided verbal/tactile cueing for activities related to improving balance, coordination, kinesthetic sense, posture, motor skill, proprioception and motor activation to allow for proper function of scapular, scapulothoracic and UE control with self care, carrying, lifting, driving/computer work.      Home Exercise Program:    [x] (28622) Reviewed/Progressed HEP activities related to strengthening, flexibility, endurance, ROM of scapular, scapulothoracic and UE control with self care, reaching, carrying, lifting, house/yardwork, driving/computer work  [] (22548) Reviewed/Progressed HEP activities related to improving balance, coordination, kinesthetic sense, posture, motor skill, proprioception of scapular, scapulothoracic and UE control with self care, reaching, carrying, lifting, house/yardwork, driving/computer work      Manual Treatments:  PROM / STM / Oscillations-Mobs:  G-I, II, III, IV (PA's, Inf., Post.)  [x] (66963) Provided manual therapy to mobilize soft tissue/joints of cervical/CT, scapular GHJ and UE for the purpose of modulating pain, promoting relaxation,  increasing ROM, reducing/eliminating soft tissue swelling/inflammation/restriction, improving soft tissue extensibility and allowing for proper ROM for normal function with self care, reaching, carrying, lifting, house/yardwork, driving/computer work    Charges:  Timed Code Treatment Minutes: 55   Total Treatment Minutes: 70       [] EVAL (LOW) 08985 (typically 20 minutes face-to-face)  [] EVAL (MOD) 69592 (typically 30 minutes face-to-face)  [] EVAL (HIGH) 35223 (typically 45 minutes face-to-face)  [] RE-EVAL     [x] FR(29413) x   [] Dry needle 1 or 2 Muscles (37613)  [x] NMR (41079) x     [] Dry needle 3+ Muscles (03521)  [x] Manual (66457) x     [] Ultrasound (24318) x  [x] TA (78066) x     [] Mech Traction (10095)  [] ES(attended) (50681)     [] ES (un) (96223):   [] Vasopump (50347) [] Ionto (32495)   [] Other:     GOALS:  Updated 9/1:  Pain ranges from 0-4/10 with sleeping and PT/hep and activity level    Self care improving (showering, dressing) and able to clover light housework (unload )  able to reach head but difficulty holding it there to style hair; able to reach 1st shelf of cabinet  Able to hold cup but difficulty bringing it to mouth to drink due to ER weakness    Hep 1 x per day   Overall 30% improved   MMT: flex 4+/5, abd 4+/5, IR 4+/5 and ER 3-/5   ROM update: IR 55, ER 40, flex 150, abd/scaption 150    Patient stated goal: \"dec pain\"  [x] Progressing: [] Met: [] Not Met: [] Adjusted     Therapist goals for Patient:   Short Term Goals: To be achieved in: 2 weeks  1. Independent in HEP and progression per patient tolerance, in order to prevent re-injury. [x] Progressing: [] Met: [] Not Met: [] Adjusted  2. Patient will have a decrease in pain by 20-30% to facilitate improvement in movement, function, and ADLs as indicated by Functional Deficits. [] Progressing: [x] Met: [] Not Met: [] Adjusted     Long Term Goals:  To be achieved in: at d/c   1. Increase FOTO functional outcome score from 42 to 60 to assist with reaching prior level of function. [x] Progressing: [] Met: [] Not Met: [] Adjusted  2. Patient will have a decrease in pain by 50-60% to facilitate improvement in movement, function, and ADLs as indicated by Functional Deficits. [x] Progressing: [] Met: [] Not Met: [] Adjusted  3. Patient will demonstrate increased AROM to flex/scap 140, IR/ER to [facility] for ease with self care to allow for proper joint functioning as indicated by Functional Deficits. [x] Progressing: [] Met: [] Not Met: [] Adjusted  4. Patient will demonstrate an increase in NM recruitment/activation and overall GH and scapular strength to 4+/5 - 5/5 for proper functional mobility as indicated by patients Functional Deficits. [x] Progressing: [] Met: [] Not Met: [] Adjusted  5. Patient will return to self care/dressing activities without increased symptoms or restriction. [] Progressing: [x] Met: [] Not Met: [] Adjusted  6. \"full use of arm\"         [x] Progressing: [] Met: [] Not Met: [] Adjusted    ASSESSMENT:  Tolerated eval well; proceed per protocol/MD precautions   7-11-22 tolerated above ex/ PROM well. Steri strips gone , placed 4 pads with E-stim for more coverage area. 7-13-22 progressing with gentle ex  7/19: cont skilled PT for ROM and initiating strength  7-27-22 tolerating above ex well.  Gradually progressing with AAROM.   8/2: MD f/u on 8/2; will cont for strength and ROM per MD guidelines   8/4: ex progression held today due to inc soreness; progress when able   8/9: tolerated progressions well  8/16: functional progression made with reaching face and opening fridge door, however, still can't reach to fix hair yet and driving is still on hold; cont with skilled PT for functional gains and strength with overhead reaching   8/25: increased independence and strength noted with some overhead ex  9/1: despite functional gains with strength and ROM with overhead reaching, ER strength remains weak limiting pt's functional progression; IFC cont to help with pain from ex and stretching   9/6: pt edu to hold on hep x a few days to let shoulder rest and calm down; offered options for creating 1# for use at home versus purchasing 1# dumb bell; improved strength noted with RS ex today   9/8: mild soreness during session, but overall tolerated well with resuming wt with ex; improved holding strength with ER in SL  9/13; manage through recent flare up with inc icing frequency and backing of of hep somewhat until pain levels dec and then resume   9/15: ex stopped today due to flare up of pain; possible onset of tendonitis with on/off intense pain over the past week- pt TTP over ant/lat shoulder and biceps tendon and rating pain at 7-8/10; pt instructed to hold on hep and focus on rest/ice; realistic functional long term goals addressed again with pt after reverse TSR with significant loss of RTC mm    9/27: will assess gradual return to wts/overhead ex; pt is reaching plateau with strength gains, will cont to assess for need for skilled PT each week   9/29: Resumed some of the exercises as prior with good tolerance. Treatment/Activity Tolerance:  [x] Patient tolerated treatment well [] Patient limited by fatique  [] Patient limited by pain  [] Patient limited by other medical complications  [] Other:     Overall Progression Towards Functional goals/ Treatment Progress Update:  [x] Patient is progressing as expected towards functional goals listed. [] Progression is improving slowed due to complexities/Impairments listed. [] Progression has been slowed due to co-morbidities.   [] Plan just implemented, too soon to assess goals progression <30days   [] Goals require adjustment due to lack of progress  [] Patient is not progressing as expected and requires additional follow up with physician  [] Other    Prognosis for POC: [x] Good [] Fair  [] Poor    Patient requires continued skilled intervention: [x] Yes  [] No      PLAN: US/STM helping; ease back into ex;   [] Plan of care initiated [] Hold pending MD visit [] Discharge    Electronically signed by: RICKI Murray [id number]    Note: If patient does not return for scheduled/recommended follow up visits, this note will serve as a discharge from care along with the most recent update on progress.

## 2022-10-05 ENCOUNTER — OFFICE VISIT (OUTPATIENT)
Dept: ORTHOPEDIC SURGERY | Age: 72
End: 2022-10-05
Payer: MEDICARE

## 2022-10-05 VITALS — BODY MASS INDEX: 32.78 KG/M2 | WEIGHT: 185 LBS | HEIGHT: 63 IN

## 2022-10-05 DIAGNOSIS — F40.240 CLAUSTROPHOBIA: ICD-10-CM

## 2022-10-05 DIAGNOSIS — M48.061 SPINAL STENOSIS OF LUMBAR REGION, UNSPECIFIED WHETHER NEUROGENIC CLAUDICATION PRESENT: Primary | ICD-10-CM

## 2022-10-05 PROCEDURE — 1123F ACP DISCUSS/DSCN MKR DOCD: CPT | Performed by: PHYSICIAN ASSISTANT

## 2022-10-05 PROCEDURE — 99214 OFFICE O/P EST MOD 30 MIN: CPT | Performed by: PHYSICIAN ASSISTANT

## 2022-10-05 RX ORDER — AMITRIPTYLINE HYDROCHLORIDE 50 MG/1
50 TABLET, FILM COATED ORAL NIGHTLY
COMMUNITY

## 2022-10-05 RX ORDER — IPRATROPIUM BROMIDE 42 UG/1
2 SPRAY, METERED NASAL 4 TIMES DAILY
COMMUNITY
End: 2022-10-05

## 2022-10-05 RX ORDER — HYDROXYZINE HYDROCHLORIDE 25 MG/1
25 TABLET, FILM COATED ORAL EVERY 6 HOURS PRN
COMMUNITY
End: 2022-10-05

## 2022-10-05 RX ORDER — PEN NEEDLE, DIABETIC 31 GX3/16"
NEEDLE, DISPOSABLE MISCELLANEOUS
COMMUNITY
Start: 2022-08-09

## 2022-10-05 RX ORDER — GRANULES FOR ORAL 3 G/1
POWDER ORAL
COMMUNITY
Start: 2022-08-17 | End: 2022-10-05

## 2022-10-05 RX ORDER — DIAZEPAM 10 MG/1
10 TABLET ORAL PRN
Qty: 2 TABLET | Refills: 0 | Status: SHIPPED | OUTPATIENT
Start: 2022-10-05 | End: 2022-10-26

## 2022-10-05 RX ORDER — OMEPRAZOLE 40 MG/1
40 CAPSULE, DELAYED RELEASE ORAL DAILY
COMMUNITY
End: 2022-10-05 | Stop reason: ALTCHOICE

## 2022-10-06 ENCOUNTER — TELEPHONE (OUTPATIENT)
Dept: ORTHOPEDIC SURGERY | Age: 72
End: 2022-10-06

## 2022-10-07 ENCOUNTER — HOSPITAL ENCOUNTER (OUTPATIENT)
Dept: PHYSICAL THERAPY | Age: 72
Setting detail: THERAPIES SERIES
Discharge: HOME | End: 2022-10-07
Payer: MEDICARE

## 2022-10-07 ENCOUNTER — TELEPHONE (OUTPATIENT)
Dept: ORTHOPEDIC SURGERY | Age: 72
End: 2022-10-07

## 2022-10-07 PROCEDURE — 97140 MANUAL THERAPY 1/> REGIONS: CPT

## 2022-10-07 PROCEDURE — 97110 THERAPEUTIC EXERCISES: CPT

## 2022-10-07 PROCEDURE — 97112 NEUROMUSCULAR REEDUCATION: CPT

## 2022-10-07 PROCEDURE — 97530 THERAPEUTIC ACTIVITIES: CPT

## 2022-10-07 NOTE — TELEPHONE ENCOUNTER
I spoke with patient and gave her the number to call and schedule her MRI lumbar. I also told her to please call back and schedule an appt with the office to go over the results.

## 2022-10-07 NOTE — FLOWSHEET NOTE
[facility], [facility]  [address] [place], [facility]  Phone: [phone]   Fax:     [phone]                              Physical Therapy Treatment Note/ Progress Report:       Date:  10/7/2022    Patient Name:  Aida Garzon    :  1950  MRN: [id number]    Pertinent Medical History:Additional Pertinent Hx: OA, asthma, DM, HTN, migraine, depression, DVT, pulmonary embolism, cervical fusion    Medical/Treatment Diagnosis Information:  Diagnosis: U12.130 s/p reverse TSR R shoulder  Treatment Diagnosis: recent shoulder surgery R UE; pain, limited ROM and strength    Insurance/Certification information:  PT Insurance Information: [employer]  - $40 copay, no auth, visits BMN  Physician Information:  Referring Provider (secondary): Dr. Jayjay Forman of care signed (Y/N): yes    Date of Patient follow up with Physician:  22     Progress Report: []  Yes -  [x]  No     Date Range for reporting period:  Beginnin2022  Ending:      Progress report due (10 Rx/or 30 days whichever is less):     Recertification due (POC duration/ or 90 days whichever is less):      Visit # POC/Insurance Allowable Auth Needed    BMN []Yes    [x]No     Functional Outcomes Measure:    Date Assessed: at eval  Test: FOTO  Score:42  Foto update : 54  On  at visit 20: 50     Pain level:2-3 /10 today   History of Injury: Patient stated complaint: R shoulder reverse TSR on 22. MD precautions/restrictions:   active and passive fwd flexion and abduction  Limit shoulder extension  Limit ER to 15 degrees  No active IR, adduction (behind back hygiene)       SUBJECTIVE:    22  Tolerated first visit ok.  11  Sore after last Rx until next day. Able to get arm away from side better.    : was able to finally get a decent night of sleep last night laying on her R side; pain levels in shoulder are very low, no longer taking prescription pain meds; some soreness scapular mms from sleeping on R side    7/21: a little sore from the new exercises and just trying to be more active; feels she tenses on R side when using L UE   7-25-22  sore after PT the remainder of the day. Sore this am,  was in hospital , so did a little more around the house. 7-17-22 felt good after last Rx.   8/2: a little sore from sleeping; see goal reassessment   8/4: saw Dr. Jenelle Hartman and he is pleased with progress - ok to push ROM and strength as clover and ok to begin behind the back hygiene gradually; pt notes she did have a fall when getting out of the car to go the appt yesterday landing on R elbow (pt c/o moderate muscle soreness in shoulder and has a few abrasions on R elbow); to PT without sling  8/9: Can sleep okay on her R side, but if she sleeps on her L side she wakes up and her shoulder hurts. Woke up that way today. Had to take pain medication because she just didn't want to move it it hurt so bad  8/11: Shoulder doing pretty good. Just depends on what she's doing.  It's just annoying  8/16: doing well; opening fridge door easier and able to reach to touch forehead easier now   8/18: inc soreness from new ex and trying to use her arm more, also from sleeping on R side   8/23: closing car door is getting easier   8/25: has another appt after PT and is on a little time constraint; mild soreness from sleeping on the other side of the bed and trying to use curling iron on hair this morning (some ex held to day to cont with hep due to time constraint)  8/30: without new c/o; no pain   9/1: slowly feels like she is reaching more and slowly gaining strength with over head reaching   9/6: thinks she is overdoing with hep b/c she doesn't have a 1# and is doing all hep 2-3x per day with a 2# weight and is c/o deltoid muscle soreness  9/8: shoulder is feeling much better with the rest; FOTO next time for visit 20  9/13: not sure what happened but was really sore driving home last session, rubbed some BenGay and that helped, but still feeling it some today with activity and having a harder time getting arm up to head to wash hair; saw MD - X rays good, cont PT to push ROM and strength per MD   9/15: sore after session and lasted for a few hours and then eased up; just annoying today   9/20: has been alternating with heat/ice and cont gentle ex only and avoiding the overhead stuff and it is helping; pain was 1-2/10 with waking and getting ready this morning but inc to 3-4/10 after  9/22: rest and US helping    9/27: doing more reaching forward lately, so a little sore   9/29: Wants to be able to do her own hair. Just can't do it yet. Can get her arm up there, but can't curl it or anything  10/4: just mild soreness from use of arm throughout the day   10/7 -  Sore today 2/2 increased activity 2-3/10 -  was putting dishes away above her head.     OBJECTIVE:   Observation:   Test measurements:      RESTRICTIONS/PRECAUTIONS:   Per MD notes from visit 8/2:  [id number] Digna Joseph to push aggressive ROM and strength   Ok to begin gradual behind the back hygiene   Ok to d/c sling    DOS 6/13/22 - generic protocol on [employer]'s desk       8/1: 7 wks p/o  8/15: 9 wks p/o  8/22: 10 wks  9/5: 12 wks    Exercises/Interventions:    Therapeutic Ex (55064)  Min: 25 Resistance/Reps Notes/Cues     R REVERSE TSR on 6/13/22        UBE    Fwd x 5 min      Pulley flex  X 20  UE ranger 4 way on wall X 10           Biceps curl standing 1# 2 X 10 R    Bent row  Bent ext  1# 1 x 10 R  1# 1 x 10 R Cues for scap retraction    TB row        Ext        Tricep        Add         IR Red x10  Red  x10  Red x10  Red x 10   Red x 10  Pt has orange TB at home for hep   Cues for scap retraction    WB - wt shift on table S/S x 10   Reach and tap L hand across R x 10     Supine  Shoulder flex  SA punch  Chest press    1# 1 x 10   1# X 10   1# x 10       SL  ER with AAROM      Abd   Horiz add    X 10 A to full range w/ independent hold x 3 sec at top of ROM and lower slowly  1# X 10  1# x 10   Improved hold noted                Seated         Shoulder flex 0-90  Shoulder flex/scaption   Reach to touch back of head w/ hold   1# x 10   Min A to guide 0# 1 x 10       1/2# 3 x 10 sec          9/27 improved reach            Therapeutic Activity (14516) Min: 8    Finger ladder To top R shoulder only x 5    Peg board   X 2 up/dwn to #12 placing in each hole     Baps reach and turn L5-1 turn ccw/cw at each level   X 2 rep up and down w/ rest b/w              NMR re-education (12161)  Min:10              Supine    RS arm 90 flex  RS IR/ER  Cw/ccw  Flex 90*-125*  AROM ER 3 x 15 sec   3 x 15 sec  1# X10 ea             ER isometrics supine 10 x 5 sec  hep       Manual Intervention (10409) Min:10    PROM:  As tolerated  Gentle     IR in scap plane <=50*   IASTM STM    STM To lat shoulder at deltoid insertion x 5 min   To same x 4 min          Modalities:  Min: 8        CP only x 15 min  Recliner with pillow under R arm   recliner   US 50% 1.5 w/cm2 to lant/at shoulder  X 8 min  Relief       Other Therapeutic Activities:  Pt was educated on PT POC, Diagnosis, Prognosis, pathomechanics as well as frequency and duration of scheduling future physical therapy appointments. Time was also taken on this day to answer all patient questions and participation in PT. Reviewed appointment policy in detail with patient and patient verbalized understanding. X 8 min     Home Exercise Program: Patient instructed in the following for HEP:          .   Patient verbalized/demonstrated understanding and was issued written handout.   7/5: pendulum, scap retraction, shoulder shrug/roll, UT str, AROM elbow, wrist, hand,   7/19: supine cane press, supine UE flex/abd   8/4: cane ER and flex   8/9: Tband row and extension (per pt)  8/11: Isometrics: Flex, ext, abd, IR, ER  8/18: bent row, cane ext, wall climb, SL horiz abd/abd, supine ER  8/23: bent ext, TB add/IR, cane IR, wall ER str     Therapeutic Exercise and NMR EXR  [x] (16333) Provided verbal/tactile cueing for activities related to strengthening, flexibility, endurance, ROM  for improvements in scapular, scapulothoracic and UE control with self care, reaching, carrying, lifting, house/yardwork, driving/computer work.    [] (95843) Provided verbal/tactile cueing for activities related to improving balance, coordination, kinesthetic sense, posture, motor skill, proprioception  to assist with  scapular, scapulothoracic and UE control with self care, reaching, carrying, lifting, house/yardwork, driving/computer work. Therapeutic Activities:    [x] (67489 or 18302) Provided verbal/tactile cueing for activities related to improving balance, coordination, kinesthetic sense, posture, motor skill, proprioception and motor activation to allow for proper function of scapular, scapulothoracic and UE control with self care, carrying, lifting, driving/computer work.      Home Exercise Program:    [x] (43268) Reviewed/Progressed HEP activities related to strengthening, flexibility, endurance, ROM of scapular, scapulothoracic and UE control with self care, reaching, carrying, lifting, house/yardwork, driving/computer work  [] (14537) Reviewed/Progressed HEP activities related to improving balance, coordination, kinesthetic sense, posture, motor skill, proprioception of scapular, scapulothoracic and UE control with self care, reaching, carrying, lifting, house/yardwork, driving/computer work      Manual Treatments:  PROM / STM / Oscillations-Mobs:  G-I, II, III, IV (PA's, Inf., Post.)  [x] (15060) Provided manual therapy to mobilize soft tissue/joints of cervical/CT, scapular GHJ and UE for the purpose of modulating pain, promoting relaxation,  increasing ROM, reducing/eliminating soft tissue swelling/inflammation/restriction, improving soft tissue extensibility and allowing for proper ROM for normal function with self care, reaching, carrying, lifting, house/yardwork, driving/computer work    Charges:  Timed Code Treatment Minutes: 53   Total Treatment Minutes: 68       [] EVAL (LOW) 60823 (typically 20 minutes face-to-face)  [] EVAL (MOD) 26917 (typically 30 minutes face-to-face)  [] EVAL (HIGH) 11178 (typically 45 minutes face-to-face)  [] RE-EVAL     [x] GD(51615) x   [] Dry needle 1 or 2 Muscles (11013)  [x] NMR (07504) x     [] Dry needle 3+ Muscles (53292)  [x] Manual (52980) x     [] Ultrasound (94897) x  [x] TA (64710) x     [] Mech Traction (33061)  [] ES(attended) (40869)     [] ES (un) (02550):   [] Vasopump (27942) [] Ionto (67420)   [] Other:     GOALS:  Updated 9/1:  Pain ranges from 0-4/10 with sleeping and PT/hep and activity level    Self care improving (showering, dressing) and able to clover light housework (unload )  able to reach head but difficulty holding it there to style hair; able to reach 1st shelf of cabinet  Able to hold cup but difficulty bringing it to mouth to drink due to ER weakness    Hep 1 x per day   Overall 30% improved   MMT: flex 4+/5, abd 4+/5, IR 4+/5 and ER 3-/5   ROM update: IR 55, ER 40, flex 150, abd/scaption 150    Patient stated goal: \"dec pain\"  [x] Progressing: [] Met: [] Not Met: [] Adjusted     Therapist goals for Patient:   Short Term Goals: To be achieved in: 2 weeks  1. Independent in HEP and progression per patient tolerance, in order to prevent re-injury. [x] Progressing: [] Met: [] Not Met: [] Adjusted  2. Patient will have a decrease in pain by 20-30% to facilitate improvement in movement, function, and ADLs as indicated by Functional Deficits. [] Progressing: [x] Met: [] Not Met: [] Adjusted     Long Term Goals: To be achieved in: at d/c   1. Increase FOTO functional outcome score from 42 to 60 to assist with reaching prior level of function. [x] Progressing: [] Met: [] Not Met: [] Adjusted  2.  Patient will have a decrease in pain by 50-60% to facilitate improvement in movement, function, and ADLs as indicated by Functional Deficits. [x] Progressing: [] Met: [] Not Met: [] Adjusted  3. Patient will demonstrate increased AROM to flex/scap 140, IR/ER to [facility] for ease with self care to allow for proper joint functioning as indicated by Functional Deficits. [x] Progressing: [] Met: [] Not Met: [] Adjusted  4. Patient will demonstrate an increase in NM recruitment/activation and overall GH and scapular strength to 4+/5 - 5/5 for proper functional mobility as indicated by patients Functional Deficits. [x] Progressing: [] Met: [] Not Met: [] Adjusted  5. Patient will return to self care/dressing activities without increased symptoms or restriction. [] Progressing: [x] Met: [] Not Met: [] Adjusted  6. \"full use of arm\"         [x] Progressing: [] Met: [] Not Met: [] Adjusted    ASSESSMENT:  Tolerated eval well; proceed per protocol/MD precautions   7-11-22 tolerated above ex/ PROM well. Steri strips gone , placed 4 pads with E-stim for more coverage area. 7-13-22 progressing with gentle ex  7/19: cont skilled PT for ROM and initiating strength  7-27-22 tolerating above ex well.  Gradually progressing with AAROM.   8/2: MD f/u on 8/2; will cont for strength and ROM per MD guidelines   8/4: ex progression held today due to inc soreness; progress when able   8/9: tolerated progressions well  8/16: functional progression made with reaching face and opening fridge door, however, still can't reach to fix hair yet and driving is still on hold; cont with skilled PT for functional gains and strength with overhead reaching   8/25: increased independence and strength noted with some overhead ex  9/1: despite functional gains with strength and ROM with overhead reaching, ER strength remains weak limiting pt's functional progression; IFC cont to help with pain from ex and stretching   9/6: pt edu to hold on hep x a few days to let shoulder rest and calm down; offered options for creating 1# for use at home versus purchasing 1# dumb bell; improved strength noted with RS ex today   9/8: mild soreness during session, but overall tolerated well with resuming wt with ex; improved holding strength with ER in SL  9/13; manage through recent flare up with inc icing frequency and backing of of hep somewhat until pain levels dec and then resume   9/15: ex stopped today due to flare up of pain; possible onset of tendonitis with on/off intense pain over the past week- pt TTP over ant/lat shoulder and biceps tendon and rating pain at 7-8/10; pt instructed to hold on hep and focus on rest/ice; realistic functional long term goals addressed again with pt after reverse TSR with significant loss of RTC mm    9/27: will assess gradual return to wts/overhead ex; pt is reaching plateau with strength gains, will cont to assess for need for skilled PT each week   9/29: Resumed some of the exercises as prior with good tolerance. 10/7 - Pt able to do ex with resistance - and clover well .  reviewed importance of not overdoing at home. Treatment/Activity Tolerance:  [x] Patient tolerated treatment well [] Patient limited by fatique  [] Patient limited by pain  [] Patient limited by other medical complications  [] Other:     Overall Progression Towards Functional goals/ Treatment Progress Update:  [x] Patient is progressing as expected towards functional goals listed. [] Progression is improving slowed due to complexities/Impairments listed. [] Progression has been slowed due to co-morbidities.   [] Plan just implemented, too soon to assess goals progression <30days   [] Goals require adjustment due to lack of progress  [] Patient is not progressing as expected and requires additional follow up with physician  [] Other    Prognosis for POC: [x] Good [] Fair  [] Poor    Patient requires continued skilled intervention: [x] Yes  [] No      PLAN: US/STM helping; ease back into ex;   [] Plan of care initiated [] Hold pending MD visit [] Discharge    Electronically signed by: Myrna Hughes, PT [id number]    Note: If patient does not return for scheduled/recommended follow up visits, this note will serve as a discharge from care along with the most recent update on progress.

## 2022-10-11 ENCOUNTER — HOSPITAL ENCOUNTER (OUTPATIENT)
Dept: PHYSICAL THERAPY | Age: 72
Setting detail: THERAPIES SERIES
Discharge: HOME | End: 2022-10-11
Payer: MEDICARE

## 2022-10-11 PROCEDURE — 97140 MANUAL THERAPY 1/> REGIONS: CPT

## 2022-10-11 PROCEDURE — 97112 NEUROMUSCULAR REEDUCATION: CPT

## 2022-10-11 PROCEDURE — 97110 THERAPEUTIC EXERCISES: CPT

## 2022-10-11 NOTE — FLOWSHEET NOTE
[facility], [facility]  [address] [place], [facility]  Phone: [phone]   Fax:     [phone]                              Physical Therapy Treatment Note/ Progress Report:       Date:  10/11/2022    Patient Name:  Concetta Franks    :  1950  MRN: [id number]    Pertinent Medical History:Additional Pertinent Hx: OA, asthma, DM, HTN, migraine, depression, DVT, pulmonary embolism, cervical fusion    Medical/Treatment Diagnosis Information:  Diagnosis: T21.696 s/p reverse TSR R shoulder  Treatment Diagnosis: recent shoulder surgery R UE; pain, limited ROM and strength    Insurance/Certification information:  PT Insurance Information: [employer]  - $40 copay, no auth, visits BMN  Physician Information:  Referring Provider (secondary): Dr. Zeenat Jordan of care signed (Y/N): yes    Date of Patient follow up with Physician:  22     Progress Report: []  Yes -  [x]  No     Date Range for reporting period:  Beginnin2022  Ending:      Progress report due (10 Rx/or 30 days whichever is less):     Recertification due (POC duration/ or 90 days whichever is less):      Visit # POC/Insurance Allowable Auth Needed    BMN []Yes    [x]No     Functional Outcomes Measure:    Date Assessed: at eval  Test: FOTO  Score:42  Foto update : 54  On  at visit 20: 50     Pain level:2-3 /10 today   History of Injury: Patient stated complaint: R shoulder reverse TSR on 22. MD precautions/restrictions:   active and passive fwd flexion and abduction  Limit shoulder extension  Limit ER to 15 degrees  No active IR, adduction (behind back hygiene)       SUBJECTIVE:    22  Tolerated first visit ok.  11  Sore after last Rx until next day. Able to get arm away from side better.    : was able to finally get a decent night of sleep last night laying on her R side; pain levels in shoulder are very low, no longer taking prescription pain meds; some soreness scapular mms from sleeping on R side    7/21: a little sore from the new exercises and just trying to be more active; feels she tenses on R side when using L UE   7-25-22  sore after PT the remainder of the day. Sore this am,  was in hospital , so did a little more around the house. 7-17-22 felt good after last Rx.   8/2: a little sore from sleeping; see goal reassessment   8/4: saw Dr. Renae Chow and he is pleased with progress - ok to push ROM and strength as clover and ok to begin behind the back hygiene gradually; pt notes she did have a fall when getting out of the car to go the appt yesterday landing on R elbow (pt c/o moderate muscle soreness in shoulder and has a few abrasions on R elbow); to PT without sling  8/9: Can sleep okay on her R side, but if she sleeps on her L side she wakes up and her shoulder hurts. Woke up that way today. Had to take pain medication because she just didn't want to move it it hurt so bad  8/11: Shoulder doing pretty good. Just depends on what she's doing.  It's just annoying  8/16: doing well; opening fridge door easier and able to reach to touch forehead easier now   8/18: inc soreness from new ex and trying to use her arm more, also from sleeping on R side   8/23: closing car door is getting easier   8/25: has another appt after PT and is on a little time constraint; mild soreness from sleeping on the other side of the bed and trying to use curling iron on hair this morning (some ex held to day to cont with hep due to time constraint)  8/30: without new c/o; no pain   9/1: slowly feels like she is reaching more and slowly gaining strength with over head reaching   9/6: thinks she is overdoing with hep b/c she doesn't have a 1# and is doing all hep 2-3x per day with a 2# weight and is c/o deltoid muscle soreness  9/8: shoulder is feeling much better with the rest; FOTO next time for visit 20  9/13: not sure what happened but was really sore driving home last session, rubbed some BenGay and that helped, but still feeling it some today with activity and having a harder time getting arm up to head to wash hair; saw MD - X rays good, cont PT to push ROM and strength per MD   9/15: sore after session and lasted for a few hours and then eased up; just annoying today   9/20: has been alternating with heat/ice and cont gentle ex only and avoiding the overhead stuff and it is helping; pain was 1-2/10 with waking and getting ready this morning but inc to 3-4/10 after  9/22: rest and US helping    9/27: doing more reaching forward lately, so a little sore   9/29: Wants to be able to do her own hair. Just can't do it yet. Can get her arm up there, but can't curl it or anything  10/4: just mild soreness from use of arm throughout the day   10/7 -  Sore today 2/2 increased activity 2-3/10 -  was putting dishes away above her head.   10/11: without new c/o; pt notes cont strength gains with reaching ADLs; PN due soon     OBJECTIVE:   Observation:   Test measurements:      RESTRICTIONS/PRECAUTIONS:   Per MD notes from visit 8/2:  J[employer] to push aggressive ROM   Ok to begin gradual behind the back hygiene   Ok to d/c sling    DOS 6/13/22 - generic protocol on BK's desk       8/1: 7 wks p/o  8/15: 9 wks p/o  8/22: 10 wks  9/5: 12 wks    Exercises/Interventions:    Therapeutic Ex (51205)  Min: 15 Resistance/Reps Notes/Cues     R REVERSE TSR on 6/13/22        UBE    Fwd x 5 min      Pulley flex  X 20  UE ranger 4 way on wall X 10           Biceps curl standing 1# 2 X 10 R    Bent row  Bent ext  1# 1 x 10 R  1# 1 x 10 R Cues for scap retraction    TB row        Ext        Tricep        Add         IR Red x 20  Red  x 20  Red x 20  Red x 20   Red x 20  Pt has orange TB at home for hep   Cues for scap retraction    WB - wt shift on table S/S x 10   Reach and tap L hand across R x 10     Supine  Shoulder flex  SA punch  Chest press    1# 1 x 10   1# X 10   1# x 10       SL  ER with AAROM      Abd   Horiz add    X 10 A to full range w/ independent hold x 3 sec at top of ROM and lower slowly  1# X 10  1# x 10   Improved hold noted                Seated         Shoulder flex 0-90  Shoulder flex/scaption   Reach to touch back of head w/ hold   1# x 10   Min A to guide 0# 1 x 10       1/2# 3 x 20 sec          9/27 improved reach            Therapeutic Activity (43757) Min:10    Finger ladder To top R shoulder only x 5    Peg board   X 2 up/dwn to #12 placing in each hole     Baps reach and turn L5-1 turn ccw/cw at each level   X 2 rep up and down w/ rest b/w              NMR re-education (88304)  Min:10     Boing  Up/dwn and s/s x 20 each          Supine    RS arm 90 flex  RS IR/ER  Cw/ccw  Flex 90*-125*  AROM ER 3 x 15 sec   3 x 15 sec  1# X10 ea             ER isometrics supine 10 x 5 sec  hep       Manual Intervention ([id number]) Min:10    PROM:  As tolerated  Gentle     IR in scap plane <=50*     STM To lat shoulder at deltoid 5min         Modalities:  Min: 15        CP only x 15 min  Recliner with pillow under R arm   recliner   CP        CP only x 15 min  Trial without - assess     Other Therapeutic Activities:  Pt was educated on PT POC, Diagnosis, Prognosis, pathomechanics as well as frequency and duration of scheduling future physical therapy appointments. Time was also taken on this day to answer all patient questions and participation in PT. Reviewed appointment policy in detail with patient and patient verbalized understanding. X 8 min     Home Exercise Program: Patient instructed in the following for HEP:          .   Patient verbalized/demonstrated understanding and was issued written handout.   7/5: pendulum, scap retraction, shoulder shrug/roll, UT str, AROM elbow, wrist, hand,   7/19: supine cane press, supine UE flex/abd   8/4: cane ER and flex   8/9: Tband row and extension (per pt)  8/11: Isometrics: Flex, ext, abd, IR, ER  8/18: bent row, cane ext, wall climb, SL horiz abd/abd, supine ER  8/23: bent ext, TB add/IR, cane IR, wall ER str     Therapeutic Exercise and NMR EXR  [x] (26184) Provided verbal/tactile cueing for activities related to strengthening, flexibility, endurance, ROM  for improvements in scapular, scapulothoracic and UE control with self care, reaching, carrying, lifting, house/yardwork, driving/computer work.    [] (86586) Provided verbal/tactile cueing for activities related to improving balance, coordination, kinesthetic sense, posture, motor skill, proprioception  to assist with  scapular, scapulothoracic and UE control with self care, reaching, carrying, lifting, house/yardwork, driving/computer work. Therapeutic Activities:    [x] (19224 or 01640) Provided verbal/tactile cueing for activities related to improving balance, coordination, kinesthetic sense, posture, motor skill, proprioception and motor activation to allow for proper function of scapular, scapulothoracic and UE control with self care, carrying, lifting, driving/computer work.      Home Exercise Program:    [x] (50686) Reviewed/Progressed HEP activities related to strengthening, flexibility, endurance, ROM of scapular, scapulothoracic and UE control with self care, reaching, carrying, lifting, house/yardwork, driving/computer work  [] (52732) Reviewed/Progressed HEP activities related to improving balance, coordination, kinesthetic sense, posture, motor skill, proprioception of scapular, scapulothoracic and UE control with self care, reaching, carrying, lifting, house/yardwork, driving/computer work      Manual Treatments:  PROM / STM / Oscillations-Mobs:  G-I, II, III, IV (PA's, Inf., Post.)  [x] (66111) Provided manual therapy to mobilize soft tissue/joints of cervical/CT, scapular GHJ and UE for the purpose of modulating pain, promoting relaxation,  increasing ROM, reducing/eliminating soft tissue swelling/inflammation/restriction, improving soft tissue extensibility and allowing for proper ROM for normal function with self care, reaching, carrying, lifting, house/yardwork, driving/computer work    Charges:  Timed Code Treatment Minutes: 45   Total Treatment Minutes: 60       [] EVAL (LOW) 71972 (typically 20 minutes face-to-face)  [] EVAL (MOD) 49544 (typically 30 minutes face-to-face)  [] EVAL (HIGH) 85305 (typically 45 minutes face-to-face)  [] RE-EVAL     [x] SO(85924) x   [] Dry needle 1 or 2 Muscles (96650)  [x] NMR (14197) x     [] Dry needle 3+ Muscles (01105)  [x] Manual (27843) x     [] Ultrasound (21865) x  [] TA (13088) x     [] Mech Traction (11908)  [] ES(attended) (68071)     [] ES (un) (61883):   [] Vasopump (84368) [] Ionto (53103)   [] Other:     GOALS:  Updated 9/1:  Pain ranges from 0-4/10 with sleeping and PT/hep and activity level    Self care improving (showering, dressing) and able to clover light housework (unload )  able to reach head but difficulty holding it there to style hair; able to reach 1st shelf of cabinet  Able to hold cup but difficulty bringing it to mouth to drink due to ER weakness    Hep 1 x per day   Overall 30% improved   MMT: flex 4+/5, abd 4+/5, IR 4+/5 and ER 3-/5   ROM update: IR 55, ER 40, flex 150, abd/scaption 150    Patient stated goal: \"dec pain\"  [x] Progressing: [] Met: [] Not Met: [] Adjusted     Therapist goals for Patient:   Short Term Goals: To be achieved in: 2 weeks  1. Independent in HEP and progression per patient tolerance, in order to prevent re-injury. [x] Progressing: [] Met: [] Not Met: [] Adjusted  2. Patient will have a decrease in pain by 20-30% to facilitate improvement in movement, function, and ADLs as indicated by Functional Deficits. [] Progressing: [x] Met: [] Not Met: [] Adjusted     Long Term Goals: To be achieved in: at d/c   1. Increase FOTO functional outcome score from 42 to 60 to assist with reaching prior level of function.    [x] Progressing: [] Met: [] Not Met: [] Adjusted  2. Patient will have a decrease in pain by 50-60% to facilitate improvement in movement, function, and ADLs as indicated by Functional Deficits. [x] Progressing: [] Met: [] Not Met: [] Adjusted  3. Patient will demonstrate increased AROM to flex/scap 140, IR/ER to [facility] for ease with self care to allow for proper joint functioning as indicated by Functional Deficits. [x] Progressing: [] Met: [] Not Met: [] Adjusted  4. Patient will demonstrate an increase in NM recruitment/activation and overall GH and scapular strength to 4+/5 - 5/5 for proper functional mobility as indicated by patients Functional Deficits. [x] Progressing: [] Met: [] Not Met: [] Adjusted  5. Patient will return to self care/dressing activities without increased symptoms or restriction. [] Progressing: [x] Met: [] Not Met: [] Adjusted  6. \"full use of arm\"         [x] Progressing: [] Met: [] Not Met: [] Adjusted    ASSESSMENT:  Tolerated eval well; proceed per protocol/MD precautions   7-11-22 tolerated above ex/ PROM well. Steri strips gone , placed 4 pads with E-stim for more coverage area. 7-13-22 progressing with gentle ex  7/19: cont skilled PT for ROM and initiating strength  7-27-22 tolerating above ex well.  Gradually progressing with AAROM.   8/2: MD f/u on 8/2; will cont for strength and ROM per MD guidelines   8/4: ex progression held today due to inc soreness; progress when able   8/9: tolerated progressions well  8/16: functional progression made with reaching face and opening fridge door, however, still can't reach to fix hair yet and driving is still on hold; cont with skilled PT for functional gains and strength with overhead reaching   8/25: increased independence and strength noted with some overhead ex  9/1: despite functional gains with strength and ROM with overhead reaching, ER strength remains weak limiting pt's functional progression; IFC cont to help with pain from ex and stretching   9/6: pt edu to hold on hep x a few days to let shoulder rest and calm down; offered options for creating 1# for use at home versus purchasing 1# dumb bell; improved strength noted with RS ex today   9/8: mild soreness during session, but overall tolerated well with resuming wt with ex; improved holding strength with ER in SL  9/13; manage through recent flare up with inc icing frequency and backing of of hep somewhat until pain levels dec and then resume   9/15: ex stopped today due to flare up of pain; possible onset of tendonitis with on/off intense pain over the past week- pt TTP over ant/lat shoulder and biceps tendon and rating pain at 7-8/10; pt instructed to hold on hep and focus on rest/ice; realistic functional long term goals addressed again with pt after reverse TSR with significant loss of RTC mm    9/27: will assess gradual return to wts/overhead ex; pt is reaching plateau with strength gains, will cont to assess for need for skilled PT each week   9/29: Resumed some of the exercises as prior with good tolerance. 10/7 - Pt able to do ex with resistance - and clover well .  reviewed importance of not overdoing at home. Treatment/Activity Tolerance:  [x] Patient tolerated treatment well [] Patient limited by fatique  [] Patient limited by pain  [] Patient limited by other medical complications  [] Other:     Overall Progression Towards Functional goals/ Treatment Progress Update:  [x] Patient is progressing as expected towards functional goals listed. [] Progression is improving slowed due to complexities/Impairments listed. [] Progression has been slowed due to co-morbidities.   [] Plan just implemented, too soon to assess goals progression <30days   [] Goals require adjustment due to lack of progress  [] Patient is not progressing as expected and requires additional follow up with physician  [] Other    Prognosis for POC: [x] Good [] Fair  [] Poor    Patient requires continued skilled intervention: [x] Yes  [] No      PLAN: Assess without US  [] Plan of care initiated [] Hold pending MD visit [] Discharge    Electronically signed by: Rupali Garcia, PTMPT [id number]    Note: If patient does not return for scheduled/recommended follow up visits, this note will serve as a discharge from care along with the most recent update on progress.

## 2022-10-13 ENCOUNTER — HOSPITAL ENCOUNTER (OUTPATIENT)
Dept: PHYSICAL THERAPY | Age: 72
Setting detail: THERAPIES SERIES
Discharge: HOME | End: 2022-10-13
Payer: MEDICARE

## 2022-10-13 PROCEDURE — 97112 NEUROMUSCULAR REEDUCATION: CPT

## 2022-10-13 PROCEDURE — 97140 MANUAL THERAPY 1/> REGIONS: CPT

## 2022-10-13 PROCEDURE — 97530 THERAPEUTIC ACTIVITIES: CPT

## 2022-10-13 PROCEDURE — 97110 THERAPEUTIC EXERCISES: CPT

## 2022-10-13 NOTE — FLOWSHEET NOTE
[facility], [facility]  [address] [facility] [place], [facility]  Phone: [phone]   Fax:     [phone]                              Physical Therapy Treatment Note/ Progress Report:       Date:  10/13/2022    Patient Name:  Fauzia Chahal    :  1950  MRN: [id number]    Pertinent Medical History:Additional Pertinent Hx: OA, asthma, DM, HTN, migraine, depression, DVT, pulmonary embolism, cervical fusion    Medical/Treatment Diagnosis Information:  Diagnosis: B28.473 s/p reverse TSR R shoulder  Treatment Diagnosis: recent shoulder surgery R UE; pain, limited ROM and strength    Insurance/Certification information:  PT Insurance Information: [employer]  - $40 copay, no auth, visits BMN  Physician Information:  Referring Provider (secondary): Dr. Sunni Fine of care signed (Y/N): yes    Date of Patient follow up with Physician:  11/15/22     Progress Report: [x]  Yes -10/13  []  No     Date Range for reporting period:  Beginnin2022  Ending:      Progress report due (10 Rx/or 30 days whichever is less): 10/4    Recertification due (POC duration/ or 90 days whichever is less):      Visit # POC/Insurance Allowable Auth Needed    BMN []Yes    [x]No     Functional Outcomes Measure:    Date Assessed: at eval  Test: FOTO  Score:42  Foto update : 54  On  at visit 20: 48   On 10/13 for visit 30: 61    Pain level:0 /10 today   History of Injury: Patient stated complaint: R shoulder reverse TSR on 22. MD precautions/restrictions:   active and passive fwd flexion and abduction  Limit shoulder extension  Limit ER to 15 degrees  No active IR, adduction (behind back hygiene)       SUBJECTIVE:    22  Tolerated first visit ok.  11  Sore after last Rx until next day. Able to get arm away from side better.    : was able to finally get a decent night of sleep last night laying on her R side; pain levels in shoulder are very low, no longer taking prescription pain meds; some soreness scapular mms from sleeping on R side    7/21: a little sore from the new exercises and just trying to be more active; feels she tenses on R side when using L UE   7-25-22  sore after PT the remainder of the day. Sore this am,  was in hospital , so did a little more around the house. 7-17-22 felt good after last Rx.   8/2: a little sore from sleeping; see goal reassessment   8/4: saw Dr. Mcintosh Mt and he is pleased with progress - ok to push ROM and strength as clover and ok to begin behind the back hygiene gradually; pt notes she did have a fall when getting out of the car to go the appt yesterday landing on R elbow (pt c/o moderate muscle soreness in shoulder and has a few abrasions on R elbow); to PT without sling  8/9: Can sleep okay on her R side, but if she sleeps on her L side she wakes up and her shoulder hurts. Woke up that way today. Had to take pain medication because she just didn't want to move it it hurt so bad  8/11: Shoulder doing pretty good. Just depends on what she's doing.  It's just annoying  8/16: doing well; opening fridge door easier and able to reach to touch forehead easier now   8/18: inc soreness from new ex and trying to use her arm more, also from sleeping on R side   8/23: closing car door is getting easier   8/25: has another appt after PT and is on a little time constraint; mild soreness from sleeping on the other side of the bed and trying to use curling iron on hair this morning (some ex held to day to cont with hep due to time constraint)  8/30: without new c/o; no pain   9/1: slowly feels like she is reaching more and slowly gaining strength with over head reaching   9/6: thinks she is overdoing with hep b/c she doesn't have a 1# and is doing all hep 2-3x per day with a 2# weight and is c/o deltoid muscle soreness  9/8: shoulder is feeling much better with the rest; FOTO next time for visit 20  9/13: not sure what happened but was really sore driving home last session, rubbed some BenGay and that helped, but still feeling it some today with activity and having a harder time getting arm up to head to wash hair; saw MD - X rays good, cont PT to push ROM and strength per MD   9/15: sore after session and lasted for a few hours and then eased up; just annoying today   9/20: has been alternating with heat/ice and cont gentle ex only and avoiding the overhead stuff and it is helping; pain was 1-2/10 with waking and getting ready this morning but inc to 3-4/10 after  9/22: rest and US helping    9/27: doing more reaching forward lately, so a little sore   9/29: Wants to be able to do her own hair. Just can't do it yet. Can get her arm up there, but can't curl it or anything  10/4: just mild soreness from use of arm throughout the day   10/7 -  Sore today 2/2 increased activity 2-3/10 -  was putting dishes away above her head.   10/11: without new c/o; pt notes cont strength gains with reaching ADLs; PN due soon   10/13: no pain today     OBJECTIVE:   Observation:   Test measurements:      RESTRICTIONS/PRECAUTIONS:   Per MD notes from visit 8/2:  Jaziel Titus to push aggressive ROM   Ok to begin gradual behind the back hygiene   Ok to d/c sling    DOS 6/13/22 - generic protocol on BK's desk       8/1: 7 wks p/o  8/15: 9 wks p/o  8/22: 10 wks  9/5: 12 wks    Exercises/Interventions:    Therapeutic Ex (65059)  Min: 20 Resistance/Reps Notes/Cues     R REVERSE TSR on 6/13/22        UBE    Fwd x 5 min      Pulley flex  X 20  UE ranger 4 way on wall X 10           Biceps curl standing 1# 2 X 10 R    Bent row  Bent ext  1# 1 x 10 R  1# 1 x 10 R Cues for scap retraction    Bounce ball against wall  X 10  TB row        Ext        Tricep        Add         IR Red x 20  Red  x 20  Red x 20  Red x 20   Red x 20  Pt has orange TB at home for hep   Cues for scap retraction    WB - wt shift on table Reach and tap L hand across R x 10     Supine  Shoulder flex  SA punch  Chest press    1# 1 x 10   1# X 10   1# x 10       SL  ER with AAROM      Abd   Horiz add    X 10 A to full range w/ independent hold x 3 sec at top of ROM and lower slowly  1# X 10  1# x 10   Improved hold noted                Seated         Shoulder flex 0-90  Shoulder flex/scaption   Reach to touch    * top of head w/ movement   * back of head w/ hold  1# x 10   Min A to guide 0# 1 x 10         1/2# 3 x 20 sec          9/27 improved reach            Therapeutic Activity (73628) Min:25 See below for PN    Finger ladder To top R shoulder only x 5    Peg board   X 2 up/dwn to #12 placing in each hole     Baps reach and turn L5-1 turn ccw/cw at each level   X 2 rep up and down w/ rest b/w              NMR re-education (25617)  Min:10     Boing  Up/dwn and s/s x 20 each          Supine    RS arm 90 flex  RS IR/ER  Cw/ccw  Flex 90*-125*  AROM ER 3 x 15 sec   3 x 15 sec  1# X10 ea             ER isometrics supine 10 x 5 sec  hep       Manual Intervention (39753) Min:10    PROM:  As tolerated  Gentle     IR in scap plane <=50*     STM To lat shoulder at deltoid 5 min         Modalities:  Min: 15        CP only x 15 min  Recliner with pillow under R arm   recliner   CP        CP only x 15 min  Tolerated well without      Other Therapeutic Activities:  Pt was educated on PT POC, Diagnosis, Prognosis, pathomechanics as well as frequency and duration of scheduling future physical therapy appointments. Time was also taken on this day to answer all patient questions and participation in PT. Reviewed appointment policy in detail with patient and patient verbalized understanding. X 8 min     Home Exercise Program: Patient instructed in the following for HEP:          .   Patient verbalized/demonstrated understanding and was issued written handout.   7/5: pendulum, scap retraction, shoulder shrug/roll, UT str, AROM elbow, wrist, hand,   7/19: supine cane press, supine UE flex/abd 8/4: cane ER and flex   8/9: Tband row and extension (per pt)  8/11: Isometrics: Flex, ext, abd, IR, ER  8/18: bent row, cane ext, wall climb, SL horiz abd/abd, supine ER  8/23: bent ext, TB add/IR, cane IR, wall ER str     Therapeutic Exercise and NMR EXR  [x] (94194) Provided verbal/tactile cueing for activities related to strengthening, flexibility, endurance, ROM  for improvements in scapular, scapulothoracic and UE control with self care, reaching, carrying, lifting, house/yardwork, driving/computer work.    [] (43579) Provided verbal/tactile cueing for activities related to improving balance, coordination, kinesthetic sense, posture, motor skill, proprioception  to assist with  scapular, scapulothoracic and UE control with self care, reaching, carrying, lifting, house/yardwork, driving/computer work. Therapeutic Activities:    [x] (28297 or 09599) Provided verbal/tactile cueing for activities related to improving balance, coordination, kinesthetic sense, posture, motor skill, proprioception and motor activation to allow for proper function of scapular, scapulothoracic and UE control with self care, carrying, lifting, driving/computer work.      Home Exercise Program:    [x] (09282) Reviewed/Progressed HEP activities related to strengthening, flexibility, endurance, ROM of scapular, scapulothoracic and UE control with self care, reaching, carrying, lifting, house/yardwork, driving/computer work  [] (08637) Reviewed/Progressed HEP activities related to improving balance, coordination, kinesthetic sense, posture, motor skill, proprioception of scapular, scapulothoracic and UE control with self care, reaching, carrying, lifting, house/yardwork, driving/computer work      Manual Treatments:  PROM / STM / Oscillations-Mobs:  G-I, II, III, IV (PA's, Inf., Post.)  [x] (89101) Provided manual therapy to mobilize soft tissue/joints of cervical/CT, scapular GHJ and UE for the purpose of modulating pain, promoting relaxation,  increasing ROM, reducing/eliminating soft tissue swelling/inflammation/restriction, improving soft tissue extensibility and allowing for proper ROM for normal function with self care, reaching, carrying, lifting, house/yardwork, driving/computer work    Charges:  Timed Code Treatment Minutes: 65   Total Treatment Minutes: 80       [] EVAL (LOW) 45848 (typically 20 minutes face-to-face)  [] EVAL (MOD) 10220 (typically 30 minutes face-to-face)  [] EVAL (HIGH) 93275 (typically 45 minutes face-to-face)  [] RE-EVAL     [x] ZH(06758) x   [] Dry needle 1 or 2 Muscles (85651)  [x] NMR (76400) x     [] Dry needle 3+ Muscles (68518)  [x] Manual (34695) x     [] Ultrasound (48417) x  [x] TA (76735) x     [] Mech Traction (61553)  [] ES(attended) (46894)     [] ES (un) (31102):   [] Vasopump (48917) [] Ionto (70953)   [] Other:     GOALS:  Updated 10/13:  Pain ranges from 0-2/10 with sleeping and PT/hep and activity level    Self care improving (showering, dressing) and able to clover light housework (unload ); able to reach head to wash hair, still can't use appliances to style it yet; able to reach 1st shelf of cabinet but has to use L to help if its a heavy object; able to pull lever on recliner with R UE now; able to hold cup and drink using R UE now; able to cross body with R UE now for ADLs    Hep 1 x per day   Overall 75% improved   MMT: flex 5/5, abd 5-/5, IR 5/5 and ER 3-/5   ROM update: IR 55, ER 60, flex 160, abd/scaption 180  FOTO update: 61    Patient stated goal: \"dec pain\"  [] Progressing: [x] Met: [] Not Met: [] Adjusted     Therapist goals for Patient:   Short Term Goals: To be achieved in: 2 weeks  1. Independent in HEP and progression per patient tolerance, in order to prevent re-injury. [] Progressing: [x] Met: [] Not Met: [] Adjusted  2. Patient will have a decrease in pain by 20-30% to facilitate improvement in movement, function, and ADLs as indicated by Functional Deficits.   [] Progressing: [x] Met: [] Not Met: [] Adjusted     Long Term Goals: To be achieved in: at d/c   1. Increase FOTO functional outcome score from 42 to 60 to assist with reaching prior level of function. [] Progressing: [x] Met: [] Not Met: [] Adjusted  2. Patient will have a decrease in pain by 50-60% to facilitate improvement in movement, function, and ADLs as indicated by Functional Deficits. [] Progressing: [x] Met: [] Not Met: [] Adjusted  3. Patient will demonstrate increased AROM to flex/scap 140, IR/ER to [facility] for ease with self care to allow for proper joint functioning as indicated by Functional Deficits. [] Progressing: [x] Met: [] Not Met: [] Adjusted  4. Patient will demonstrate an increase in NM recruitment/activation and overall GH and scapular strength to 4+/5 - 5/5 for proper functional mobility as indicated by patients Functional Deficits. [] Progressing: [x] Met: [] Not Met: [] Adjusted  5. Patient will return to self care/dressing activities without increased symptoms or restriction. [] Progressing: [x] Met: [] Not Met: [] Adjusted  6. \"full use of arm\"          [x]Progressing: [] Met: [] Not Met: [] Adjusted    ASSESSMENT:  Tolerated eval well; proceed per protocol/MD precautions   7-11-22 tolerated above ex/ PROM well. Steri strips gone , placed 4 pads with E-stim for more coverage area. 7-13-22 progressing with gentle ex  7/19: cont skilled PT for ROM and initiating strength  7-27-22 tolerating above ex well.  Gradually progressing with AAROM.   8/2: MD f/u on 8/2; will cont for strength and ROM per MD guidelines   8/4: ex progression held today due to inc soreness; progress when able   8/9: tolerated progressions well  8/16: functional progression made with reaching face and opening fridge door, however, still can't reach to fix hair yet and driving is still on hold; cont with skilled PT for functional gains and strength with overhead reaching   8/25: increased independence and strength noted with some overhead ex  9/1: despite functional gains with strength and ROM with overhead reaching, ER strength remains weak limiting pt's functional progression; IFC cont to help with pain from ex and stretching   9/6: pt edu to hold on hep x a few days to let shoulder rest and calm down; offered options for creating 1# for use at home versus purchasing 1# dumb bell; improved strength noted with RS ex today   9/8: mild soreness during session, but overall tolerated well with resuming wt with ex; improved holding strength with ER in SL  9/13; manage through recent flare up with inc icing frequency and backing of of hep somewhat until pain levels dec and then resume   9/15: ex stopped today due to flare up of pain; possible onset of tendonitis with on/off intense pain over the past week- pt TTP over ant/lat shoulder and biceps tendon and rating pain at 7-8/10; pt instructed to hold on hep and focus on rest/ice; realistic functional long term goals addressed again with pt after reverse TSR with significant loss of RTC mm    9/27: will assess gradual return to wts/overhead ex; pt is reaching plateau with strength gains, will cont to assess for need for skilled PT each week   9/29: Resumed some of the exercises as prior with good tolerance. 10/7 - Pt able to do ex with resistance - and clover well .  reviewed importance of not overdoing at home. 10/13: pt feels nearly 100% improved and though improving with overhead strength, is still working towards being able to curl her hair using a curling iron in the R hand; will likely d/c to hep after a few more sessions      Treatment/Activity Tolerance:  [x] Patient tolerated treatment well [] Patient limited by fatique  [] Patient limited by pain  [] Patient limited by other medical complications  [] Other:     Overall Progression Towards Functional goals/ Treatment Progress Update:  [x] Patient is progressing as expected towards functional goals listed.     [] Progression is improving slowed due to complexities/Impairments listed. [] Progression has been slowed due to co-morbidities. [] Plan just implemented, too soon to assess goals progression <30days   [] Goals require adjustment due to lack of progress  [] Patient is not progressing as expected and requires additional follow up with physician  [] Other    Prognosis for POC: [x] Good [] Fair  [] Poor    Patient requires continued skilled intervention: [x] Yes  [] No      PLAN: Probable d/c to hep after remaining sessions   [] Plan of care initiated [] Hold pending MD visit [] Discharge    Electronically signed by: April Torres, PTMPT [id number]    Note: If patient does not return for scheduled/recommended follow up visits, this note will serve as a discharge from care along with the most recent update on progress.

## 2022-10-18 ENCOUNTER — HOSPITAL ENCOUNTER (OUTPATIENT)
Dept: PHYSICAL THERAPY | Age: 72
Setting detail: THERAPIES SERIES
Discharge: HOME | End: 2022-10-18
Payer: MEDICARE

## 2022-10-18 PROCEDURE — 97112 NEUROMUSCULAR REEDUCATION: CPT

## 2022-10-18 PROCEDURE — 97110 THERAPEUTIC EXERCISES: CPT

## 2022-10-18 PROCEDURE — 97140 MANUAL THERAPY 1/> REGIONS: CPT

## 2022-10-18 NOTE — FLOWSHEET NOTE
[facility], [facility]  [address] [facility] [place], [facility]  Phone: [phone]   Fax:     [phone]                              Physical Therapy Treatment Note/ Progress Report:       Date:  10/18/2022    Patient Name:  Kimberly Cavanaugh    :  1950  MRN: [id number]    Pertinent Medical History:Additional Pertinent Hx: OA, asthma, DM, HTN, migraine, depression, DVT, pulmonary embolism, cervical fusion    Medical/Treatment Diagnosis Information:  Diagnosis: E18.798 s/p reverse TSR R shoulder  Treatment Diagnosis: recent shoulder surgery R UE; pain, limited ROM and strength    Insurance/Certification information:  PT Insurance Information: [employer]  - $40 copay, no auth, visits BMN  Physician Information:  Referring Provider (secondary): Dr. Sergio Maharaj of care signed (Y/N): yes    Date of Patient follow up with Physician:  11/15/22     Progress Report: []  Yes -10/13  [x]  No     Date Range for reporting period:  Beginnin2022  Ending:      Progress report due (10 Rx/or 30 days whichever is less):     Recertification due (POC duration/ or 90 days whichever is less):      Visit # POC/Insurance Allowable Auth Needed    BMN []Yes    [x]No     Functional Outcomes Measure:    Date Assessed: at eval  Test: FOTO  Score:42  Foto update : 54  On  at visit 20: 48   On 10/13 for visit 30: 61    Pain level:0 /10 today   History of Injury: Patient stated complaint: R shoulder reverse TSR on 22. MD precautions/restrictions:   active and passive fwd flexion and abduction  Limit shoulder extension  Limit ER to 15 degrees  No active IR, adduction (behind back hygiene)       SUBJECTIVE:    22  Tolerated first visit ok.  11  Sore after last Rx until next day. Able to get arm away from side better.    : was able to finally get a decent night of sleep last night laying on her R side; pain levels in shoulder are very low, no longer taking prescription pain meds; some soreness scapular mms from sleeping on R side    7/21: a little sore from the new exercises and just trying to be more active; feels she tenses on R side when using L UE   7-25-22  sore after PT the remainder of the day. Sore this am,  was in hospital , so did a little more around the house. 7-17-22 felt good after last Rx.   8/2: a little sore from sleeping; see goal reassessment   8/4: saw Dr. Adarsh Cortes and he is pleased with progress - ok to push ROM and strength as clover and ok to begin behind the back hygiene gradually; pt notes she did have a fall when getting out of the car to go the appt yesterday landing on R elbow (pt c/o moderate muscle soreness in shoulder and has a few abrasions on R elbow); to PT without sling  8/9: Can sleep okay on her R side, but if she sleeps on her L side she wakes up and her shoulder hurts. Woke up that way today. Had to take pain medication because she just didn't want to move it it hurt so bad  8/11: Shoulder doing pretty good. Just depends on what she's doing.  It's just annoying  8/16: doing well; opening fridge door easier and able to reach to touch forehead easier now   8/18: inc soreness from new ex and trying to use her arm more, also from sleeping on R side   8/23: closing car door is getting easier   8/25: has another appt after PT and is on a little time constraint; mild soreness from sleeping on the other side of the bed and trying to use curling iron on hair this morning (some ex held to day to cont with hep due to time constraint)  8/30: without new c/o; no pain   9/1: slowly feels like she is reaching more and slowly gaining strength with over head reaching   9/6: thinks she is overdoing with hep b/c she doesn't have a 1# and is doing all hep 2-3x per day with a 2# weight and is c/o deltoid muscle soreness  9/8: shoulder is feeling much better with the rest; FOTO next time for visit 20  9/13: not sure what happened but was really sore driving home last session, rubbed some BenGay and that helped, but still feeling it some today with activity and having a harder time getting arm up to head to wash hair; saw MD - X rays good, cont PT to push ROM and strength per MD   9/15: sore after session and lasted for a few hours and then eased up; just annoying today   9/20: has been alternating with heat/ice and cont gentle ex only and avoiding the overhead stuff and it is helping; pain was 1-2/10 with waking and getting ready this morning but inc to 3-4/10 after  9/22: rest and US helping    9/27: doing more reaching forward lately, so a little sore   9/29: Wants to be able to do her own hair. Just can't do it yet. Can get her arm up there, but can't curl it or anything  10/4: just mild soreness from use of arm throughout the day   10/7 -  Sore today 2/2 increased activity 2-3/10 -  was putting dishes away above her head.   10/11: without new c/o; pt notes cont strength gains with reaching ADLs; PN due soon   10/13: no pain today   10/18: doing well; tried to curl hair this weekend using R hand but was unable to maneuver the curling iron      OBJECTIVE:   Observation:   Test measurements:      RESTRICTIONS/PRECAUTIONS:   Per MD notes from visit 8/2:  Wandra Cooks to push aggressive ROM   Ok to begin gradual behind the back hygiene   Ok to d/c sling    DOS 6/13/22 - generic protocol on BK's desk       8/1: 7 wks p/o  8/15: 9 wks p/o  8/22: 10 wks  9/5: 12 wks    Exercises/Interventions:    Therapeutic Ex (92423)  Min: 20 Resistance/Reps Notes/Cues     R REVERSE TSR on 6/13/22        UBE    Fwd x 5 min      Pulley flex  X 20  UE ranger 4 way on wall X 10           Biceps curl standing 1# 2 X 10 R    Bent row  Bent ext  1# 1 x 10 R  1# 1 x 10 R Cues for scap retraction    Bounce ball against wall  X 10  TB row        Ext        Tricep        Add         IR Red x 20  Red  x 20  Red x 20  Red x 20   Red x 20  Pt has orange TB at home for hep   Cues for scap retraction    WB - wt shift on table Reach and tap L hand across R x 10     Supine  Shoulder flex  SA punch  Chest press    1# 1 x 10   1# X 10   1# x 10       SL  ER with AAROM      Abd   Horiz add    X 10 A to full range w/ independent hold x 3 sec at top of ROM and lower slowly  1# X 10  1# x 10   Improved hold noted                Seated         Shoulder flex 0-90  Shoulder flex/scaption   Reach to touch    * top of head w/ movement   * back of head w/ hold  1# x 10   Min A to guide 0# 1 x 10         1/2# 3 x 20 sec     0# 3 x 10 sec          9/27 improved reach            Therapeutic Activity (80743) Min:5    Finger ladder To top R shoulder only x 5    Peg board   X 2 up/dwn to #12 placing in each hole     Baps reach and turn L5-1 turn ccw/cw at each level   X 2 rep up and down w/ rest b/w              NMR re-education (01920)  Min:10     Boing  Up/dwn and s/s x 20 each          Supine    RS arm 90 flex  RS IR/ER  Cw/ccw  Flex 90*-125*  AROM ER 3 x 15 sec   3 x 15 sec  1# X10 ea             ER isometrics supine 10 x 5 sec  hep       Manual Intervention (39893) Min:10    PROM:  As tolerated  Gentle     IR in scap plane <=50*     STM To lat shoulder at deltoid 5 min         Modalities:  Min: 15        CP only x 15 min  Recliner with pillow under R arm   recliner   CP        CP only x 15 min  Tolerated well without      Other Therapeutic Activities:  Pt was educated on PT POC, Diagnosis, Prognosis, pathomechanics as well as frequency and duration of scheduling future physical therapy appointments. Time was also taken on this day to answer all patient questions and participation in PT. Reviewed appointment policy in detail with patient and patient verbalized understanding. X 8 min     Home Exercise Program: Patient instructed in the following for HEP:          .   Patient verbalized/demonstrated understanding and was issued written handout.   7/5: pendulum, scap retraction, shoulder shrug/roll, UT str, AROM elbow, wrist, hand,   7/19: supine cane press, supine UE flex/abd   8/4: cane ER and flex   8/9: Tband row and extension (per pt)  8/11: Isometrics: Flex, ext, abd, IR, ER  8/18: bent row, cane ext, wall climb, SL horiz abd/abd, supine ER  8/23: bent ext, TB add/IR, cane IR, wall ER str     Therapeutic Exercise and NMR EXR  [x] (23680) Provided verbal/tactile cueing for activities related to strengthening, flexibility, endurance, ROM  for improvements in scapular, scapulothoracic and UE control with self care, reaching, carrying, lifting, house/yardwork, driving/computer work.    [] (58023) Provided verbal/tactile cueing for activities related to improving balance, coordination, kinesthetic sense, posture, motor skill, proprioception  to assist with  scapular, scapulothoracic and UE control with self care, reaching, carrying, lifting, house/yardwork, driving/computer work. Therapeutic Activities:    [x] (17259 or 73866) Provided verbal/tactile cueing for activities related to improving balance, coordination, kinesthetic sense, posture, motor skill, proprioception and motor activation to allow for proper function of scapular, scapulothoracic and UE control with self care, carrying, lifting, driving/computer work.      Home Exercise Program:    [x] (80751) Reviewed/Progressed HEP activities related to strengthening, flexibility, endurance, ROM of scapular, scapulothoracic and UE control with self care, reaching, carrying, lifting, house/yardwork, driving/computer work  [] (76665) Reviewed/Progressed HEP activities related to improving balance, coordination, kinesthetic sense, posture, motor skill, proprioception of scapular, scapulothoracic and UE control with self care, reaching, carrying, lifting, house/yardwork, driving/computer work      Manual Treatments:  PROM / STM / Oscillations-Mobs:  G-I, II, III, IV (PA's, Inf., Post.)  [x] (59063) Provided manual therapy to mobilize soft tissue/joints of cervical/CT, scapular GHJ and UE for the purpose of modulating pain, promoting relaxation,  increasing ROM, reducing/eliminating soft tissue swelling/inflammation/restriction, improving soft tissue extensibility and allowing for proper ROM for normal function with self care, reaching, carrying, lifting, house/yardwork, driving/computer work    Charges:  Timed Code Treatment Minutes: 45   Total Treatment Minutes: 60       [] EVAL (LOW) 96323 (typically 20 minutes face-to-face)  [] EVAL (MOD) 75752 (typically 30 minutes face-to-face)  [] EVAL (HIGH) 58679 (typically 45 minutes face-to-face)  [] RE-EVAL     [x] LK(36097) x   [] Dry needle 1 or 2 Muscles (56841)  [x] NMR (40779) x     [] Dry needle 3+ Muscles (23790)  [x] Manual (16169) x     [] Ultrasound (50096) x  [] TA (92429) x     [] Mech Traction (99885)  [] ES(attended) (67714)     [] ES (un) (13182):   [] Vasopump (05691) [] Ionto (12426)   [] Other:     GOALS:  Updated 10/13:  Pain ranges from 0-2/10 with sleeping and PT/hep and activity level    Self care improving (showering, dressing) and able to clover light housework (unload ); able to reach head to wash hair, still can't use appliances to style it yet; able to reach 1st shelf of cabinet but has to use L to help if its a heavy object; able to pull lever on recliner with R UE now; able to hold cup and drink using R UE now; able to cross body with R UE now for ADLs    Hep 1 x per day   Overall 75% improved   MMT: flex 5/5, abd 5-/5, IR 5/5 and ER 3-/5   ROM update: IR 55, ER 60, flex 160, abd/scaption 180  FOTO update: 61    Patient stated goal: \"dec pain\"  [] Progressing: [x] Met: [] Not Met: [] Adjusted     Therapist goals for Patient:   Short Term Goals: To be achieved in: 2 weeks  1. Independent in HEP and progression per patient tolerance, in order to prevent re-injury. [] Progressing: [x] Met: [] Not Met: [] Adjusted  2.  Patient will have a decrease in pain by 20-30% to facilitate improvement in movement, function, and ADLs as indicated by Functional Deficits. [] Progressing: [x] Met: [] Not Met: [] Adjusted     Long Term Goals: To be achieved in: at d/c   1. Increase FOTO functional outcome score from 42 to 60 to assist with reaching prior level of function. [] Progressing: [x] Met: [] Not Met: [] Adjusted  2. Patient will have a decrease in pain by 50-60% to facilitate improvement in movement, function, and ADLs as indicated by Functional Deficits. [] Progressing: [x] Met: [] Not Met: [] Adjusted  3. Patient will demonstrate increased AROM to flex/scap 140, IR/ER to [facility] for ease with self care to allow for proper joint functioning as indicated by Functional Deficits. [] Progressing: [x] Met: [] Not Met: [] Adjusted  4. Patient will demonstrate an increase in NM recruitment/activation and overall GH and scapular strength to 4+/5 - 5/5 for proper functional mobility as indicated by patients Functional Deficits. [] Progressing: [x] Met: [] Not Met: [] Adjusted  5. Patient will return to self care/dressing activities without increased symptoms or restriction. [] Progressing: [x] Met: [] Not Met: [] Adjusted  6. \"full use of arm\"          [x]Progressing: [] Met: [] Not Met: [] Adjusted    ASSESSMENT:  Tolerated eval well; proceed per protocol/MD precautions   7-11-22 tolerated above ex/ PROM well. Steri strips gone , placed 4 pads with E-stim for more coverage area. 7-13-22 progressing with gentle ex  7/19: cont skilled PT for ROM and initiating strength  7-27-22 tolerating above ex well.  Gradually progressing with AAROM.   8/2: MD f/u on 8/2; will cont for strength and ROM per MD guidelines   8/4: ex progression held today due to inc soreness; progress when able   8/9: tolerated progressions well  8/16: functional progression made with reaching face and opening fridge door, however, still can't reach to fix hair yet and driving is still on hold; cont with skilled PT for functional gains and strength with overhead reaching   8/25: increased independence and strength noted with some overhead ex  9/1: despite functional gains with strength and ROM with overhead reaching, ER strength remains weak limiting pt's functional progression; IFC cont to help with pain from ex and stretching   9/6: pt edu to hold on hep x a few days to let shoulder rest and calm down; offered options for creating 1# for use at home versus purchasing 1# dumb bell; improved strength noted with RS ex today   9/8: mild soreness during session, but overall tolerated well with resuming wt with ex; improved holding strength with ER in SL  9/13; manage through recent flare up with inc icing frequency and backing of of hep somewhat until pain levels dec and then resume   9/15: ex stopped today due to flare up of pain; possible onset of tendonitis with on/off intense pain over the past week- pt TTP over ant/lat shoulder and biceps tendon and rating pain at 7-8/10; pt instructed to hold on hep and focus on rest/ice; realistic functional long term goals addressed again with pt after reverse TSR with significant loss of RTC mm    9/27: will assess gradual return to wts/overhead ex; pt is reaching plateau with strength gains, will cont to assess for need for skilled PT each week   9/29: Resumed some of the exercises as prior with good tolerance. 10/7 - Pt able to do ex with resistance - and clover well .  reviewed importance of not overdoing at home.   10/13: pt feels nearly 100% improved and though improving with overhead strength, is still working towards being able to curl her hair using a curling iron in the R hand; will likely d/c to hep after a few more sessions   10/18: improved ease with overhead reaching noticed and less pain with this; focus remaining sessions on functional strength through R shoulder then likely dc to hep      Treatment/Activity Tolerance:  [x] Patient tolerated treatment well [] Patient limited by fatique  [] Patient limited by pain  [] Patient limited by other medical complications  [] Other:     Overall Progression Towards Functional goals/ Treatment Progress Update:  [x] Patient is progressing as expected towards functional goals listed. [] Progression is improving slowed due to complexities/Impairments listed. [] Progression has been slowed due to co-morbidities. [] Plan just implemented, too soon to assess goals progression <30days   [] Goals require adjustment due to lack of progress  [] Patient is not progressing as expected and requires additional follow up with physician  [] Other    Prognosis for POC: [x] Good [] Fair  [] Poor    Patient requires continued skilled intervention: [x] Yes  [] No      PLAN: Probable d/c to hep after remaining sessions   [] Plan of care initiated [] Hold pending MD visit [] Discharge    Electronically signed by: Ana Hua, PTMPT [id number]    Note: If patient does not return for scheduled/recommended follow up visits, this note will serve as a discharge from care along with the most recent update on progress.

## 2022-10-20 ENCOUNTER — HOSPITAL ENCOUNTER (OUTPATIENT)
Dept: PHYSICAL THERAPY | Age: 72
Setting detail: THERAPIES SERIES
Discharge: HOME | End: 2022-10-20
Payer: MEDICARE

## 2022-10-20 PROCEDURE — 97112 NEUROMUSCULAR REEDUCATION: CPT

## 2022-10-20 PROCEDURE — 97110 THERAPEUTIC EXERCISES: CPT

## 2022-10-20 PROCEDURE — 97140 MANUAL THERAPY 1/> REGIONS: CPT

## 2022-10-20 NOTE — FLOWSHEET NOTE
[facility], [facility]  [address] [facility] [place], [facility]  Phone: [phone]   Fax:     [phone]                              Physical Therapy Treatment Note/ Progress Report:       Date:  10/20/2022    Patient Name:  Nimco Gautam    :  1950  MRN: [id number]    Pertinent Medical History:Additional Pertinent Hx: OA, asthma, DM, HTN, migraine, depression, DVT, pulmonary embolism, cervical fusion    Medical/Treatment Diagnosis Information:  Diagnosis: Q90.183 s/p reverse TSR R shoulder  Treatment Diagnosis: recent shoulder surgery R UE; pain, limited ROM and strength    Insurance/Certification information:  PT Insurance Information: [employer]  - $40 copay, no auth, visits BMN  Physician Information:  Referring Provider (secondary): Dr. Harshad Armijo of care signed (Y/N): yes    Date of Patient follow up with Physician:  11/15/22     Progress Report: []  Yes -10/13  [x]  No     Date Range for reporting period:  Beginnin2022  Ending:      Progress report due (10 Rx/or 30 days whichever is less):     Recertification due (POC duration/ or 90 days whichever is less):      Visit # POC/Insurance Allowable Auth Needed    BMN []Yes    [x]No     Functional Outcomes Measure:    Date Assessed: at eval  Test: FOTO  Score:42  Foto update : 54  On  at visit 20: 48   On 10/13 for visit 30: 61    Pain level:0 /10 today   History of Injury: Patient stated complaint: R shoulder reverse TSR on 22. MD precautions/restrictions:   active and passive fwd flexion and abduction  Limit shoulder extension  Limit ER to 15 degrees  No active IR, adduction (behind back hygiene)       SUBJECTIVE:    22  Tolerated first visit ok.  11  Sore after last Rx until next day. Able to get arm away from side better.    : was able to finally get a decent night of sleep last night laying on her R side; pain levels in shoulder are very low, no longer taking prescription pain meds; some soreness scapular mms from sleeping on R side    7/21: a little sore from the new exercises and just trying to be more active; feels she tenses on R side when using L UE   7-25-22  sore after PT the remainder of the day. Sore this am,  was in hospital , so did a little more around the house. 7-17-22 felt good after last Rx.   8/2: a little sore from sleeping; see goal reassessment   8/4: saw  [facility] and he is pleased with progress - ok to push ROM and strength as clover and ok to begin behind the back hygiene gradually; pt notes she did have a fall when getting out of the car to go the appt yesterday landing on R elbow (pt c/o moderate muscle soreness in shoulder and has a few abrasions on R elbow); to PT without sling  8/9: Can sleep okay on her R side, but if she sleeps on her L side she wakes up and her shoulder hurts. Woke up that way today. Had to take pain medication because she just didn't want to move it it hurt so bad  8/11: Shoulder doing pretty good. Just depends on what she's doing.  It's just annoying  8/16: doing well; opening fridge door easier and able to reach to touch forehead easier now   8/18: inc soreness from new ex and trying to use her arm more, also from sleeping on R side   8/23: closing car door is getting easier   8/25: has another appt after PT and is on a little time constraint; mild soreness from sleeping on the other side of the bed and trying to use curling iron on hair this morning (some ex held to day to cont with hep due to time constraint)  8/30: without new c/o; no pain   9/1: slowly feels like she is reaching more and slowly gaining strength with over head reaching   9/6: thinks she is overdoing with hep b/c she doesn't have a 1# and is doing all hep 2-3x per day with a 2# weight and is c/o deltoid muscle soreness  9/8: shoulder is feeling much better with the rest; FOTO next time for visit 20  9/13: not sure what happened but was really sore driving home last session, rubbed some BenGay and that helped, but still feeling it some today with activity and having a harder time getting arm up to head to wash hair; saw MD - X rays good, cont PT to push ROM and strength per MD   9/15: sore after session and lasted for a few hours and then eased up; just annoying today   9/20: has been alternating with heat/ice and cont gentle ex only and avoiding the overhead stuff and it is helping; pain was 1-2/10 with waking and getting ready this morning but inc to 3-4/10 after  9/22: rest and US helping    9/27: doing more reaching forward lately, so a little sore   9/29: Wants to be able to do her own hair. Just can't do it yet. Can get her arm up there, but can't curl it or anything  10/4: just mild soreness from use of arm throughout the day   10/7 -  Sore today 2/2 increased activity 2-3/10 -  was putting dishes away above her head.   10/11: without new c/o; pt notes cont strength gains with reaching ADLs; PN due soon   10/13: no pain today   10/18: doing well; tried to curl hair this weekend using R hand but was unable to maneuver the curling iron    10/20: progressing with reaching ADLs slowly; still difficulty with lowering something that has weight to it (has to help with L hand)      OBJECTIVE:   Observation:   Test measurements:      RESTRICTIONS/PRECAUTIONS:   Per MD notes from visit 8/2:  [id number] Digna Joseph to push aggressive ROM   Ok to begin gradual behind the back hygiene   Ok to d/c sling    DOS 6/13/22 - generic protocol on BK's desk       8/1: 7 wks p/o  8/15: 9 wks p/o  8/22: 10 wks  9/5: 12 wks    Exercises/Interventions:    Therapeutic Ex (08046)  Min: 20 Resistance/Reps Notes/Cues     R REVERSE TSR on 6/13/22        UBE    Fwd x 5 min      Pulley flex  X 20  UE ranger 4 way on wall X 10           Biceps curl standing 1# 2 X 10 R    Bent row  Bent ext  1# 1 x 10 R  1# 1 x 10 R Cues for scap retraction Bounce ball against wall  X 10  TB row        Ext        Tricep        Add         IR Red x 20  Red  x 20  Red x 20  Red x 20   Red x 20  Pt has orange TB at home for hep   Cues for scap retraction    WB - wt shift on table Reach and tap L hand across R x 10     Supine  Shoulder flex  SA punch  Chest press    1# 1 x 10   1# X 10   1# x 10       SL  ER with AAROM      Abd   Horiz add    X 10 A to full range w/ independent hold x 3 sec at top of ROM and lower slowly  1# X 10  1# x 10   Improved hold noted                Seated         Shoulder flex 0-90  Shoulder flex/scaption overhead w/ slow lower  Reach to touch    * top of head w/ movement   * back of head w/ hold  1# x 10   Min A to guide 1/2# 1 x 10 cues for slow lower        1/2# 3 x 20 sec     0# 3 x 10 sec                      Therapeutic Activity (81736) Min:5    Finger ladder To top R shoulder only x 5    Peg board   X 2 up/dwn to #12 placing in each hole     Baps reach and turn L5-1 turn ccw/cw at each level   X 2 rep up and down w/ rest b/w              NMR re-education (82434)  Min:10     Boing  Up/dwn and s/s x 20 each          Supine    RS arm 90 flex  RS IR/ER  Cw/ccw  Flex 90*-125*  AROM ER 3 x 15 sec   3 x 15 sec  1# X10 ea             ER isometrics supine 10 x 5 sec  hep       Manual Intervention (22629) Min:10    PROM:  As tolerated  Gentle     IR in scap plane <=50*     STM To lat shoulder at deltoid 5 min         Modalities:  Min: 15        CP only x 15 min  Recliner with pillow under R arm   recliner   CP        CP only x 15 min  Tolerated well without      Other Therapeutic Activities:  Pt was educated on PT POC, Diagnosis, Prognosis, pathomechanics as well as frequency and duration of scheduling future physical therapy appointments. Time was also taken on this day to answer all patient questions and participation in PT. Reviewed appointment policy in detail with patient and patient verbalized understanding.  X 8 min     Home Exercise Program: Patient instructed in the following for HEP:          .   Patient verbalized/demonstrated understanding and was issued written handout. 7/5: pendulum, scap retraction, shoulder shrug/roll, UT str, AROM elbow, wrist, hand,   7/19: supine cane press, supine UE flex/abd   8/4: cane ER and flex   8/9: Tband row and extension (per pt)  8/11: Isometrics: Flex, ext, abd, IR, ER  8/18: bent row, cane ext, wall climb, SL horiz abd/abd, supine ER  8/23: bent ext, TB add/IR, cane IR, wall ER str     Therapeutic Exercise and NMR EXR  [x] (54139) Provided verbal/tactile cueing for activities related to strengthening, flexibility, endurance, ROM  for improvements in scapular, scapulothoracic and UE control with self care, reaching, carrying, lifting, house/yardwork, driving/computer work.    [] (10768) Provided verbal/tactile cueing for activities related to improving balance, coordination, kinesthetic sense, posture, motor skill, proprioception  to assist with  scapular, scapulothoracic and UE control with self care, reaching, carrying, lifting, house/yardwork, driving/computer work. Therapeutic Activities:    [x] (11197 or 26324) Provided verbal/tactile cueing for activities related to improving balance, coordination, kinesthetic sense, posture, motor skill, proprioception and motor activation to allow for proper function of scapular, scapulothoracic and UE control with self care, carrying, lifting, driving/computer work.      Home Exercise Program:    [x] (84459) Reviewed/Progressed HEP activities related to strengthening, flexibility, endurance, ROM of scapular, scapulothoracic and UE control with self care, reaching, carrying, lifting, house/yardwork, driving/computer work  [] (77555) Reviewed/Progressed HEP activities related to improving balance, coordination, kinesthetic sense, posture, motor skill, proprioception of scapular, scapulothoracic and UE control with self care, reaching, carrying, lifting, house/yardwork, driving/computer work      Manual Treatments:  PROM / STM / Oscillations-Mobs:  G-I, II, III, IV (PA's, Inf., Post.)  [x] (15673) Provided manual therapy to mobilize soft tissue/joints of cervical/CT, scapular GHJ and UE for the purpose of modulating pain, promoting relaxation,  increasing ROM, reducing/eliminating soft tissue swelling/inflammation/restriction, improving soft tissue extensibility and allowing for proper ROM for normal function with self care, reaching, carrying, lifting, house/yardwork, driving/computer work    Charges:  Timed Code Treatment Minutes: 45   Total Treatment Minutes: 60       [] EVAL (LOW) 66751 (typically 20 minutes face-to-face)  [] EVAL (MOD) 73189 (typically 30 minutes face-to-face)  [] EVAL (HIGH) 05340 (typically 45 minutes face-to-face)  [] RE-EVAL     [x] QG(03309) x   [] Dry needle 1 or 2 Muscles (27327)  [x] NMR (17594) x     [] Dry needle 3+ Muscles (67002)  [x] Manual (15071) x     [] Ultrasound (14895) x  [] TA (96017) x     [] Mech Traction (11592)  [] ES(attended) (37163)     [] ES (un) (62417):   [] Vasopump (60610) [] Ionto (22256)   [] Other:     GOALS:  Updated 10/13:  Pain ranges from 0-2/10 with sleeping and PT/hep and activity level    Self care improving (showering, dressing) and able to clover light housework (unload ); able to reach head to wash hair, still can't use appliances to style it yet; able to reach 1st shelf of cabinet but has to use L to help if its a heavy object; able to pull lever on recliner with R UE now; able to hold cup and drink using R UE now; able to cross body with R UE now for ADLs    Hep 1 x per day   Overall 75% improved   MMT: flex 5/5, abd 5-/5, IR 5/5 and ER 3-/5   ROM update: IR 55, ER 60, flex 160, abd/scaption 180  FOTO update: 61    Patient stated goal: \"dec pain\"  [] Progressing: [x] Met: [] Not Met: [] Adjusted     Therapist goals for Patient:   Short Term Goals: To be achieved in: 2 weeks  1.  Independent in HEP and progression per patient tolerance, in order to prevent re-injury. [] Progressing: [x] Met: [] Not Met: [] Adjusted  2. Patient will have a decrease in pain by 20-30% to facilitate improvement in movement, function, and ADLs as indicated by Functional Deficits. [] Progressing: [x] Met: [] Not Met: [] Adjusted     Long Term Goals: To be achieved in: at d/c   1. Increase FOTO functional outcome score from 42 to 60 to assist with reaching prior level of function. [] Progressing: [x] Met: [] Not Met: [] Adjusted  2. Patient will have a decrease in pain by 50-60% to facilitate improvement in movement, function, and ADLs as indicated by Functional Deficits. [] Progressing: [x] Met: [] Not Met: [] Adjusted  3. Patient will demonstrate increased AROM to flex/scap 140, IR/ER to [facility] for ease with self care to allow for proper joint functioning as indicated by Functional Deficits. [] Progressing: [x] Met: [] Not Met: [] Adjusted  4. Patient will demonstrate an increase in NM recruitment/activation and overall GH and scapular strength to 4+/5 - 5/5 for proper functional mobility as indicated by patients Functional Deficits. [] Progressing: [x] Met: [] Not Met: [] Adjusted  5. Patient will return to self care/dressing activities without increased symptoms or restriction. [] Progressing: [x] Met: [] Not Met: [] Adjusted  6. \"full use of arm\"          [x]Progressing: [] Met: [] Not Met: [] Adjusted    ASSESSMENT:  Tolerated eval well; proceed per protocol/MD precautions   7-11-22 tolerated above ex/ PROM well. Steri strips gone , placed 4 pads with E-stim for more coverage area. 7-13-22 progressing with gentle ex  7/19: cont skilled PT for ROM and initiating strength  7-27-22 tolerating above ex well.  Gradually progressing with AAROM.   8/2: MD f/u on 8/2; will cont for strength and ROM per MD guidelines   8/4: ex progression held today due to inc soreness; progress when able   8/9: tolerated progressions well  8/16: functional progression made with reaching face and opening fridge door, however, still can't reach to fix hair yet and driving is still on hold; cont with skilled PT for functional gains and strength with overhead reaching   8/25: increased independence and strength noted with some overhead ex  9/1: despite functional gains with strength and ROM with overhead reaching, ER strength remains weak limiting pt's functional progression; IFC cont to help with pain from ex and stretching   9/6: pt edu to hold on hep x a few days to let shoulder rest and calm down; offered options for creating 1# for use at home versus purchasing 1# dumb bell; improved strength noted with RS ex today   9/8: mild soreness during session, but overall tolerated well with resuming wt with ex; improved holding strength with ER in SL  9/13; manage through recent flare up with inc icing frequency and backing of of hep somewhat until pain levels dec and then resume   9/15: ex stopped today due to flare up of pain; possible onset of tendonitis with on/off intense pain over the past week- pt TTP over ant/lat shoulder and biceps tendon and rating pain at 7-8/10; pt instructed to hold on hep and focus on rest/ice; realistic functional long term goals addressed again with pt after reverse TSR with significant loss of RTC mm    9/27: will assess gradual return to wts/overhead ex; pt is reaching plateau with strength gains, will cont to assess for need for skilled PT each week   9/29: Resumed some of the exercises as prior with good tolerance. 10/7 - Pt able to do ex with resistance - and clover well .  reviewed importance of not overdoing at home.   10/13: pt feels nearly 100% improved and though improving with overhead strength, is still working towards being able to curl her hair using a curling iron in the R hand; will likely d/c to hep after a few more sessions   10/18: improved ease with overhead reaching noticed and less pain with this; focus remaining sessions on functional strength through R shoulder then likely dc to hep      Treatment/Activity Tolerance:  [x] Patient tolerated treatment well [] Patient limited by fatique  [] Patient limited by pain  [] Patient limited by other medical complications  [] Other:     Overall Progression Towards Functional goals/ Treatment Progress Update:  [x] Patient is progressing as expected towards functional goals listed. [] Progression is improving slowed due to complexities/Impairments listed. [] Progression has been slowed due to co-morbidities. [] Plan just implemented, too soon to assess goals progression <30days   [] Goals require adjustment due to lack of progress  [] Patient is not progressing as expected and requires additional follow up with physician  [] Other    Prognosis for POC: [x] Good [] Fair  [] Poor    Patient requires continued skilled intervention: [x] Yes  [] No      PLAN: Probable d/c to hep after remaining sessions   [] Plan of care initiated [] Hold pending MD visit [] Discharge    Electronically signed by: Nida Greer, PTMPT [id number]    Note: If patient does not return for scheduled/recommended follow up visits, this note will serve as a discharge from care along with the most recent update on progress.

## 2022-10-21 ENCOUNTER — HOSPITAL ENCOUNTER (OUTPATIENT)
Dept: MRI IMAGING | Age: 72
Discharge: HOME | End: 2022-10-21
Payer: MEDICARE

## 2022-10-21 DIAGNOSIS — M48.061 SPINAL STENOSIS OF LUMBAR REGION, UNSPECIFIED WHETHER NEUROGENIC CLAUDICATION PRESENT: ICD-10-CM

## 2022-10-21 LAB
GFR SERPL CREATININE-BSD FRML MDRD: >60 ML/MIN/{1.73_M2}
PERFORMED ON: ABNORMAL
POC CREATININE: 0.4 MG/DL (ref 0.6–1.2)
POC SAMPLE TYPE: ABNORMAL

## 2022-10-21 PROCEDURE — 82565 ASSAY OF CREATININE: CPT

## 2022-10-21 PROCEDURE — 6360000004 HC RX CONTRAST MEDICATION: Performed by: PHYSICIAN ASSISTANT

## 2022-10-21 PROCEDURE — A9577 INJ MULTIHANCE: HCPCS | Performed by: PHYSICIAN ASSISTANT

## 2022-10-21 PROCEDURE — 72158 MRI LUMBAR SPINE W/O & W/DYE: CPT

## 2022-10-21 RX ADMIN — GADOBENATE DIMEGLUMINE 17 ML: 529 INJECTION, SOLUTION INTRAVENOUS at 13:02

## 2022-10-25 ENCOUNTER — HOSPITAL ENCOUNTER (OUTPATIENT)
Dept: PHYSICAL THERAPY | Age: 72
Setting detail: THERAPIES SERIES
Discharge: HOME | End: 2022-10-25
Payer: MEDICARE

## 2022-10-25 PROCEDURE — 97110 THERAPEUTIC EXERCISES: CPT

## 2022-10-25 PROCEDURE — 97140 MANUAL THERAPY 1/> REGIONS: CPT

## 2022-10-25 PROCEDURE — 97112 NEUROMUSCULAR REEDUCATION: CPT

## 2022-10-25 NOTE — FLOWSHEET NOTE
[place] [facility], [facility]  [address] [facility] [facility] [place], [facility]  Phone: [phone]   Fax:     [phone]                              Physical Therapy Treatment Note/ Progress Report:       Date:  10/25/2022    Patient Name:  Myke Esposito    :  1950  MRN: [id number]    Pertinent Medical History:Additional Pertinent Hx: OA, asthma, DM, HTN, migraine, depression, DVT, pulmonary embolism, cervical fusion    Medical/Treatment Diagnosis Information:  Diagnosis: H61.148 s/p reverse TSR R shoulder  Treatment Diagnosis: recent shoulder surgery R UE; pain, limited ROM and strength    Insurance/Certification information:  PT Insurance Information: [employer]  - $40 copay, no auth, visits BMN  Physician Information:  Referring Provider (secondary): Dr. Irvin Parra of [facility] signed (Y/N): yes    Date of Patient follow up with Physician:  11/15/22     Progress Report: []  Yes -10/13  [x]  No     Date Range for reporting period:  Beginnin2022  Ending:      Progress report due (10 Rx/or 30 days whichever is less): /3    Recertification due (POC duration/ or 90 days whichever is less):      Visit # POC/Insurance Allowable Auth Needed    BMN []Yes    [x]No     Functional Outcomes Measure:    Date Assessed: at eval  Test: FOTO  Score:42  Foto update : 54  On  at visit 20: 48   On 10/13 for visit 30: 61    Pain level:0 /10 today   History of Injury: Patient stated complaint: R shoulder reverse TSR on 22. MD precautions/restrictions:   active and passive fwd flexion and abduction  Limit shoulder extension  Limit ER to 15 degrees  No active IR, adduction (behind back hygiene)       SUBJECTIVE:    22  Tolerated first visit ok.  11  Sore after last Rx until next day. Able to get arm away from side better.    : was able to finally get a decent night of sleep last night laying on her R side; pain levels in shoulder are very low, no longer taking prescription pain meds; some soreness scapular mms from sleeping on R side    7/21: a little sore from the new exercises and just trying to be more active; feels she tenses on R side when using L UE   7-25-22  sore after PT the remainder of the day. Sore this am,  was in hospital , so did a little more around the house. 7-17-22 felt good after last Rx.   8/2: a little sore from sleeping; see goal reassessment   8/4: saw Dr. Daphne Hernandez and he is pleased with progress - ok to push ROM and strength as clover and ok to begin behind the back hygiene gradually; pt notes she did have a fall when getting out of the car to go the appt yesterday landing on R elbow (pt c/o moderate muscle soreness in shoulder and has a few abrasions on R elbow); to PT without sling  8/9: Can sleep okay on her R side, but if she sleeps on her L side she wakes up and her shoulder hurts. Woke up that way today. Had to take pain medication because she just didn't want to move it it hurt so bad  8/11: Shoulder doing pretty good. Just depends on what she's doing.  It's just annoying  8/16: doing well; opening fridge door easier and able to reach to touch forehead easier now   8/18: inc soreness from new ex and trying to use her arm more, also from sleeping on R side   8/23: closing car door is getting easier   8/25: has another appt after PT and is on a little time constraint; mild soreness from sleeping on the other side of the bed and trying to use curling iron on hair this morning (some ex held to day to cont with hep due to time constraint)  8/30: without new c/o; no pain   9/1: slowly feels like she is reaching more and slowly gaining strength with over head reaching   9/6: thinks she is overdoing with hep b/c she doesn't have a 1# and is doing all hep 2-3x per day with a 2# weight and is c/o deltoid muscle soreness  9/8: shoulder is feeling much better with the rest; FOTO next time for visit 20  9/13: not sure what happened but was really sore driving home last session, rubbed some BenGay and that helped, but still feeling it some today with activity and having a harder time getting arm up to head to wash hair; saw MD - X rays good, cont PT to push ROM and strength per MD   9/15: sore after session and lasted for a few hours and then eased up; just annoying today   9/20: has been alternating with heat/ice and cont gentle ex only and avoiding the overhead stuff and it is helping; pain was 1-2/10 with waking and getting ready this morning but inc to 3-4/10 after  9/22: rest and US helping    9/27: doing more reaching forward lately, so a little sore   9/29: Wants to be able to do her own hair. Just can't do it yet. Can get her arm up there, but can't curl it or anything  10/4: just mild soreness from use of arm throughout the day   10/7 -  Sore today 2/2 increased activity 2-3/10 -  was putting dishes away above her head.   10/11: without new c/o; pt notes cont strength gains with reaching ADLs; PN due soon   10/13: no pain today   10/18: doing well; tried to curl hair this weekend using R hand but was unable to maneuver the curling iron    10/20: progressing with reaching ADLs slowly; still difficulty with lowering something that has weight to it (has to help with L hand)   10/25: just general muscle soreness; able to get necklaces on independently now      OBJECTIVE:   Observation:   Test measurements:      RESTRICTIONS/PRECAUTIONS:   Per MD notes from visit 8/2:  Kerrie Carolina to push aggressive ROM   Ok to begin gradual behind the back hygiene   Ok to d/c sling    DOS 6/13/22 - generic protocol on BK's desk       8/1: 7 wks p/o  8/15: 9 wks p/o  8/22: 10 wks  9/5: 12 wks    Exercises/Interventions:    Therapeutic Ex (03382)  Min: 20 Resistance/Reps Notes/Cues     R REVERSE TSR on 6/13/22        UBE    Fwd x 5 min      Pulley flex  X 20  UE ranger 4 way on wall X 10           Biceps curl standing 1# 2 X 10 R Bent row  Bent ext  1# 1 x 10 R  1# 1 x 10 R Cues for scap retraction    Bounce ball against wall  X 10  TB row        Ext        Tricep        Add         IR Red x 20  Red  x 20  Red x 20  Red x 20   Red x 20  Pt has orange TB at home for hep   Cues for scap retraction    WB - wt shift on table Reach and tap L hand across R x 10     Supine  Shoulder flex  SA punch  Chest press    1# 1 x 10   1# X 10   1# x 10       SL  ER with AAROM      Abd   Horiz add    X 10 A to full range w/ independent hold x 3 sec at top of ROM and lower slowly  1# X 10  1# x 10   Improved hold noted                Seated         Shoulder flex 0-90  Shoulder flex/scaption overhead w/ slow lower  Reach to touch    * top of head w/ movement   * back of head w/ hold  1# x 10   Min A to guide 1/2# 1 x 10 cues for slow lower        1/2# 3 x 20 sec     0# 3 x 10 sec                      Therapeutic Activity (43666) Min:5    Finger ladder To top R shoulder only x 5    Peg board   X 2 up/dwn to #12 placing in each hole     Baps reach and turn L5-1 turn ccw/cw at each level   X 2 rep up and down w/ rest b/w              NMR re-education (14820)  Min:10     Boing  Up/dwn and s/s x 20 each          Supine    RS arm 90 flex  RS IR/ER  Cw/ccw  Flex 90*-125*  AROM ER 3 x 15 sec   3 x 15 sec  1# X10 ea             ER isometrics supine 10 x 5 sec  hep       Manual Intervention (01575) Min:10    PROM:  As tolerated  Gentle     IR in scap plane <=50*     STM To lat shoulder at deltoid 5 min         Modalities:  Min: 15        CP only x 15 min  Recliner with pillow under R arm   recliner   CP        CP only x 15 min  Tolerated well without      Other Therapeutic Activities:  Pt was educated on PT POC, Diagnosis, Prognosis, pathomechanics as well as frequency and duration of scheduling future physical therapy appointments. Time was also taken on this day to answer all patient questions and participation in PT.  Reviewed appointment policy in detail with patient and patient verbalized understanding. X 8 min     Home Exercise Program: Patient instructed in the following for HEP:          .   Patient verbalized/demonstrated understanding and was issued written handout. 7/5: pendulum, scap retraction, shoulder shrug/roll, UT str, AROM elbow, wrist, hand,   7/19: supine cane press, supine UE flex/abd   8/4: cane ER and flex   8/9: Tband row and extension (per pt)  8/11: Isometrics: Flex, ext, abd, IR, ER  8/18: bent row, cane ext, wall climb, SL horiz abd/abd, supine ER  8/23: bent ext, TB add/IR, cane IR, wall ER str     Therapeutic Exercise and NMR EXR  [x] (05965) Provided verbal/tactile cueing for activities related to strengthening, flexibility, endurance, ROM  for improvements in scapular, scapulothoracic and UE control with self care, reaching, carrying, lifting, house/yardwork, driving/computer work.    [] (42590) Provided verbal/tactile cueing for activities related to improving balance, coordination, kinesthetic sense, posture, motor skill, proprioception  to assist with  scapular, scapulothoracic and UE control with self care, reaching, carrying, lifting, house/yardwork, driving/computer work. Therapeutic Activities:    [x] (12152 or 55055) Provided verbal/tactile cueing for activities related to improving balance, coordination, kinesthetic sense, posture, motor skill, proprioception and motor activation to allow for proper function of scapular, scapulothoracic and UE control with self care, carrying, lifting, driving/computer work.      Home Exercise Program:    [x] (15104) Reviewed/Progressed HEP activities related to strengthening, flexibility, endurance, ROM of scapular, scapulothoracic and UE control with self care, reaching, carrying, lifting, house/yardwork, driving/computer work  [] (37662) Reviewed/Progressed HEP activities related to improving balance, coordination, kinesthetic sense, posture, motor skill, proprioception of scapular, scapulothoracic and UE control with self care, reaching, carrying, lifting, house/yardwork, driving/computer work      Manual Treatments:  PROM / STM / Oscillations-Mobs:  G-I, II, III, IV (PA's, Inf., Post.)  [x] (55673) Provided manual therapy to mobilize soft tissue/joints of cervical/CT, scapular GHJ and UE for the purpose of modulating pain, promoting relaxation,  increasing ROM, reducing/eliminating soft tissue swelling/inflammation/restriction, improving soft tissue extensibility and allowing for proper ROM for normal function with self care, reaching, carrying, lifting, house/yardwork, driving/computer work    Charges:  Timed Code Treatment Minutes: 45   Total Treatment Minutes: 60       [] EVAL (LOW) 75820 (typically 20 minutes face-to-face)  [] EVAL (MOD) 14061 (typically 30 minutes face-to-face)  [] EVAL (HIGH) 47148 (typically 45 minutes face-to-face)  [] RE-EVAL     [x] AL(50022) x   [] Dry needle 1 or 2 Muscles (34172)  [x] NMR (13685) x     [] Dry needle 3+ Muscles (26939)  [x] Manual (90168) x     [] Ultrasound (44690) x  [] TA (26259) x     [] Mech Traction (33126)  [] ES(attended) (87973)     [] ES (un) (29257):   [] Vasopump (33575) [] Ionto (57346)   [] Other:     GOALS:  Updated 10/13:  Pain ranges from 0-2/10 with sleeping and PT/hep and activity level    Self care improving (showering, dressing) and able to clover light housework (unload ); able to reach head to wash hair, still can't use appliances to style it yet; able to reach 1st shelf of cabinet but has to use L to help if its a heavy object; able to pull lever on recliner with R UE now; able to hold cup and drink using R UE now; able to cross body with R UE now for ADLs    Hep 1 x per day   Overall 75% improved   MMT: flex 5/5, abd 5-/5, IR 5/5 and ER 3-/5   ROM update: IR 55, ER 60, flex 160, abd/scaption 180  FOTO update: 61    Patient stated goal: \"dec pain\"  [] Progressing: [x] Met: [] Not Met: [] Adjusted     Therapist goals for Patient:   Short Term Goals: To be achieved in: 2 weeks  1. Independent in HEP and progression per patient tolerance, in order to prevent re-injury. [] Progressing: [x] Met: [] Not Met: [] Adjusted  2. Patient will have a decrease in pain by 20-30% to facilitate improvement in movement, function, and ADLs as indicated by Functional Deficits. [] Progressing: [x] Met: [] Not Met: [] Adjusted     Long Term Goals: To be achieved in: at d/c   1. Increase FOTO functional outcome score from 42 to 60 to assist with reaching prior level of function. [] Progressing: [x] Met: [] Not Met: [] Adjusted  2. Patient will have a decrease in pain by 50-60% to facilitate improvement in movement, function, and ADLs as indicated by Functional Deficits. [] Progressing: [x] Met: [] Not Met: [] Adjusted  3. Patient will demonstrate increased AROM to flex/scap 140, IR/ER to [facility] for ease with self care to allow for proper joint functioning as indicated by Functional Deficits. [] Progressing: [x] Met: [] Not Met: [] Adjusted  4. Patient will demonstrate an increase in NM recruitment/activation and overall GH and scapular strength to 4+/5 - 5/5 for proper functional mobility as indicated by patients Functional Deficits. [] Progressing: [x] Met: [] Not Met: [] Adjusted  5. Patient will return to self care/dressing activities without increased symptoms or restriction. [] Progressing: [x] Met: [] Not Met: [] Adjusted  6. \"full use of arm\"          [x]Progressing: [] Met: [] Not Met: [] Adjusted    ASSESSMENT:  Tolerated eval well; proceed per protocol/MD precautions   7-11-22 tolerated above ex/ PROM well. Steri strips gone , placed 4 pads with E-stim for more coverage area. 7-13-22 progressing with gentle ex  7/19: cont skilled PT for ROM and initiating strength  7-27-22 tolerating above ex well.  Gradually progressing with AAROM.   8/2: MD f/u on 8/2; will cont for strength and ROM per MD guidelines   8/4: ex progression held today due to inc soreness; progress when able   8/9: tolerated progressions well  8/16: functional progression made with reaching face and opening fridge door, however, still can't reach to fix hair yet and driving is still on hold; cont with skilled PT for functional gains and strength with overhead reaching   8/25: increased independence and strength noted with some overhead ex  9/1: despite functional gains with strength and ROM with overhead reaching, ER strength remains weak limiting pt's functional progression; IFC cont to help with pain from ex and stretching   9/6: pt edu to hold on hep x a few days to let shoulder rest and calm down; offered options for creating 1# for use at home versus purchasing 1# dumb bell; improved strength noted with RS ex today   9/8: mild soreness during session, but overall tolerated well with resuming wt with ex; improved holding strength with ER in SL  9/13; manage through recent flare up with inc icing frequency and backing of of hep somewhat until pain levels dec and then resume   9/15: ex stopped today due to flare up of pain; possible onset of tendonitis with on/off intense pain over the past week- pt TTP over ant/lat shoulder and biceps tendon and rating pain at 7-8/10; pt instructed to hold on hep and focus on rest/ice; realistic functional long term goals addressed again with pt after reverse TSR with significant loss of RTC mm    9/27: will assess gradual return to wts/overhead ex; pt is reaching plateau with strength gains, will cont to assess for need for skilled PT each week   9/29: Resumed some of the exercises as prior with good tolerance. 10/7 - Pt able to do ex with resistance - and clover well .  reviewed importance of not overdoing at home.   10/13: pt feels nearly 100% improved and though improving with overhead strength, is still working towards being able to curl her hair using a curling iron in the R hand; will likely d/c to hep after a few more sessions 10/18: improved ease with overhead reaching noticed and less pain with this; focus remaining sessions on functional strength through R shoulder then likely dc to hep   10/15: able to put on necklaces independently now      Treatment/Activity Tolerance:  [x] Patient tolerated treatment well [] Patient limited by fatique  [] Patient limited by pain  [] Patient limited by other medical complications  [] Other:     Overall Progression Towards Functional goals/ Treatment Progress Update:  [x] Patient is progressing as expected towards functional goals listed. [] Progression is improving slowed due to complexities/Impairments listed. [] Progression has been slowed due to co-morbidities. [] Plan just implemented, too soon to assess goals progression <30days   [] Goals require adjustment due to lack of progress  [] Patient is not progressing as expected and requires additional follow up with physician  [] Other    Prognosis for POC: [x] Good [] Fair  [] Poor    Patient requires continued skilled intervention: [x] Yes  [] No      PLAN: Probable d/c to hep after 1 more session  [] Plan of care initiated [] Hold pending MD visit [] Discharge    Electronically signed by: Ana Hua, PTMPT [id number]    Note: If patient does not return for scheduled/recommended follow up visits, this note will serve as a discharge from care along with the most recent update on progress.

## 2022-10-27 ENCOUNTER — HOSPITAL ENCOUNTER (OUTPATIENT)
Dept: PHYSICAL THERAPY | Age: 72
Setting detail: THERAPIES SERIES
Discharge: HOME | End: 2022-10-27
Payer: MEDICARE

## 2022-10-27 PROCEDURE — 97110 THERAPEUTIC EXERCISES: CPT

## 2022-10-27 PROCEDURE — 97112 NEUROMUSCULAR REEDUCATION: CPT

## 2022-10-27 PROCEDURE — 97140 MANUAL THERAPY 1/> REGIONS: CPT

## 2022-10-27 NOTE — FLOWSHEET NOTE
[facility] [place] [facility], [facility]  [address] [facility]  Phone: [phone]   Fax:     [phone]             Physical Therapy Discharge Summary    Dear Yoli Liang MD,    We had the pleasure of treating the following patient for physical therapy services at [address]. A summary of our findings can be found in the discharge summary below. If you have any questions or concerns regarding these findings, please do not hesitate to contact me at the office phone number above.   Thank you for the referral.     Physician Signature:________________________________Date:__________________  By signing above (or electronic signature), therapists plan is approved by physician      Overall Response to Treatment:   [x]Patient is responding well to treatment and improvement is noted with regards  to goals   [x]Patient should continue to improve in reasonable time if they continue HEP   []Patient has plateaued and is no longer responding to skilled PT intervention    []Patient is getting worse and would benefit from return to referring MD   []Patient unable to adhere to initial POC   []Other:     Date range of Visits: 22-10/27/22  Total Visits: 33                   Physical Therapy Treatment Note/ Progress Report:       Date:  10/27/2022    Patient Name:  Vero Epperson    :  1950  MRN: [id number]    Pertinent Medical History:Additional Pertinent Hx: OA, asthma, DM, HTN, migraine, depression, DVT, pulmonary embolism, cervical fusion    Medical/Treatment Diagnosis Information:  Diagnosis: Z96.611 s/p reverse TSR R shoulder  Treatment Diagnosis: recent shoulder surgery R UE; pain, limited ROM and strength    Insurance/Certification information:  PT Insurance Information: [employer]  - $40 copay, no auth, visits BMN  Physician Information:  Referring Provider (secondary):  [id number] of care signed (Y/N): yes    Date of Patient follow up with Physician:  11/15/22     Progress Report: []  Yes -10/13  [x]  No     Date Range for reporting period:  Beginnin2022  Ending:      Progress report due (10 Rx/or 30 days whichever is less):     Recertification due (POC duration/ or 90 days whichever is less):      Visit # POC/Insurance Allowable Auth Needed    BMN []Yes    [x]No     Functional Outcomes Measure:    Date Assessed: at eval  Test: FOTO  Score:42  Foto update : 54  On  at visit 20: 50   On 10/13 for visit 30: 61    Pain level:0 /10 today   History of Injury: Patient stated complaint: R shoulder reverse TSR on 22. MD precautions/restrictions:   active and passive fwd flexion and abduction  Limit shoulder extension  Limit ER to 15 degrees  No active IR, adduction (behind back hygiene)       SUBJECTIVE:    22  Tolerated first visit ok.  11  Sore after last Rx until next day. Able to get arm away from side better. : was able to finally get a decent night of sleep last night laying on her R side; pain levels in shoulder are very low, no longer taking prescription pain meds; some soreness scapular mms from sleeping on R side    : a little sore from the new exercises and just trying to be more active; feels she tenses on R side when using L UE   22  sore after PT the remainder of the day. Sore this am,  was in hospital , so did a little more around the house.    22 felt good after last Rx.   : a little sore from sleeping; see goal reassessment   : saw Dr. Meenakshi Alexander and he is pleased with progress - ok to push ROM and strength as clover and ok to begin behind the back hygiene gradually; pt notes she did have a fall when getting out of the car to go the appt yesterday landing on R elbow (pt c/o moderate muscle soreness in shoulder and has a few abrasions on R elbow); to PT without sling  : Can sleep okay on her R side, but if she sleeps on her L side she wakes up and her shoulder hurts. Woke up that way today. Had to take pain medication because she just didn't want to move it it hurt so bad  8/11: Shoulder doing pretty good. Just depends on what she's doing. It's just annoying  8/16: doing well; opening fridge door easier and able to reach to touch forehead easier now   8/18: inc soreness from new ex and trying to use her arm more, also from sleeping on R side   8/23: closing car door is getting easier   8/25: has another appt after PT and is on a little time constraint; mild soreness from sleeping on the other side of the bed and trying to use curling iron on hair this morning (some ex held to day to cont with hep due to time constraint)  8/30: without new c/o; no pain   9/1: slowly feels like she is reaching more and slowly gaining strength with over head reaching   9/6: thinks she is overdoing with hep b/c she doesn't have a 1# and is doing all hep 2-3x per day with a 2# weight and is c/o deltoid muscle soreness  9/8: shoulder is feeling much better with the rest; FOTO next time for visit 20  9/13: not sure what happened but was really sore driving home last session, rubbed some BenGay and that helped, but still feeling it some today with activity and having a harder time getting arm up to head to wash hair; saw MD - X rays good, cont PT to push ROM and strength per MD   9/15: sore after session and lasted for a few hours and then eased up; just annoying today   9/20: has been alternating with heat/ice and cont gentle ex only and avoiding the overhead stuff and it is helping; pain was 1-2/10 with waking and getting ready this morning but inc to 3-4/10 after  9/22: rest and US helping    9/27: doing more reaching forward lately, so a little sore   9/29: Wants to be able to do her own hair. Just can't do it yet.  Can get her arm up there, but can't curl it or anything  10/4: just mild soreness from use of arm throughout the day 10/7 -  Sore today 2/2 increased activity 2-3/10 -  was putting dishes away above her head.   10/11: without new c/o; pt notes cont strength gains with reaching ADLs; PN due soon   10/13: no pain today   10/18: doing well; tried to curl hair this weekend using R hand but was unable to maneuver the curling iron    10/20: progressing with reaching ADLs slowly; still difficulty with lowering something that has weight to it (has to help with L hand)   10/25: just general muscle soreness; able to get necklaces on independently now  10/27: curled the front of her hair by herself today; ready for d/c to hep - pt without questions       OBJECTIVE:   Observation:   Test measurements:      RESTRICTIONS/PRECAUTIONS:   Per MD notes from visit 8/2:  [id number] [address] to push aggressive ROM   Ok to begin gradual behind the back hygiene   Ok to d/c sling    DOS 6/13/22 - generic protocol on BK's desk       8/1: 7 wks p/o  8/15: 9 wks p/o  8/22: 10 wks  9/5: 12 wks    Exercises/Interventions:    Therapeutic Ex (73869)  Min: 20 Resistance/Reps Notes/Cues     R REVERSE TSR on 6/13/22        UBE    Fwd x 5 min      Pulley flex  X 20  UE ranger 4 way on wall X 10           Biceps curl standing 1# 2 X 10 R    Bent row  Bent ext  1# 1 x 10 R  1# 1 x 10 R Cues for scap retraction    Bounce ball against wall  X 10  TB row        Ext        Tricep        Add         IR Red x 20  Red  x 20  Red x 20  Red x 20   Red x 20  Pt has orange TB at home for hep   Cues for scap retraction    WB - wt shift on table Reach and tap L hand across R x 10     Supine  Shoulder flex  SA punch  Chest press    1# 1 x 10   1# X 10   1# x 10       SL  ER with AAROM      Abd   Horiz add    X 10 A to full range w/ independent hold x 3 sec at top of ROM and lower slowly  1# X 10  1# x 10   Improved hold noted                Seated         Shoulder flex 0-90  Shoulder flex/scaption overhead w/ slow lower  Reach to touch    * top of head w/ movement   * back of head w/ hold  1# x 10   Min A to guide 1/2# 1 x 10 cues for slow lower          0# 3 x 10 sec                      Therapeutic Activity (24469) Min:5    Finger ladder To top R shoulder only x 5    Peg board   X 2 up/dwn to #12 placing in each hole     Baps reach and turn L5-1 turn ccw/cw at each level   X 1 rep up and down w/ rest b/w              NMR re-education (87912)  Min:10     Boing  Up/dwn and s/s x 20 each          Supine    RS arm 90 flex  RS IR/ER  Cw/ccw  Flex 90*-125*  AROM ER 3 x 15 sec   3 x 15 sec  1# X10 ea             ER isometrics supine 10 x 5 sec  hep       Manual Intervention (63909) Min:10    PROM:  As tolerated  Gentle     IR in scap plane <=50*     STM To lat shoulder at deltoid 5 min         Modalities:  Min: 15         recliner   CP        CP only x 15 min  Tolerated well without      Other Therapeutic Activities:  Pt was educated on PT POC, Diagnosis, Prognosis, pathomechanics as well as frequency and duration of scheduling future physical therapy appointments. Time was also taken on this day to answer all patient questions and participation in PT. Reviewed appointment policy in detail with patient and patient verbalized understanding. X 8 min     Home Exercise Program: Patient instructed in the following for HEP:          .   Patient verbalized/demonstrated understanding and was issued written handout.   7/5: pendulum, scap retraction, shoulder shrug/roll, UT str, AROM elbow, wrist, hand,   7/19: supine cane press, supine UE flex/abd   8/4: cane ER and flex   8/9: Tband row and extension (per pt)  8/11: Isometrics: Flex, ext, abd, IR, ER  8/18: bent row, cane ext, wall climb, SL horiz abd/abd, supine ER  8/23: bent ext, TB add/IR, cane IR, wall ER str     Therapeutic Exercise and NMR EXR  [x] (15640) Provided verbal/tactile cueing for activities related to strengthening, flexibility, endurance, ROM  for improvements in scapular, scapulothoracic and UE control with self care, reaching, carrying, lifting, house/yardwork, driving/computer work.    [] (17979) Provided verbal/tactile cueing for activities related to improving balance, coordination, kinesthetic sense, posture, motor skill, proprioception  to assist with  scapular, scapulothoracic and UE control with self care, reaching, carrying, lifting, house/yardwork, driving/computer work. Therapeutic Activities:    [x] (31666 or 48402) Provided verbal/tactile cueing for activities related to improving balance, coordination, kinesthetic sense, posture, motor skill, proprioception and motor activation to allow for proper function of scapular, scapulothoracic and UE control with self care, carrying, lifting, driving/computer work.      Home Exercise Program:    [x] (57530) Reviewed/Progressed HEP activities related to strengthening, flexibility, endurance, ROM of scapular, scapulothoracic and UE control with self care, reaching, carrying, lifting, house/yardwork, driving/computer work  [] (78019) Reviewed/Progressed HEP activities related to improving balance, coordination, kinesthetic sense, posture, motor skill, proprioception of scapular, scapulothoracic and UE control with self care, reaching, carrying, lifting, house/yardwork, driving/computer work      Manual Treatments:  PROM / STM / Oscillations-Mobs:  G-I, II, III, IV (PA's, Inf., Post.)  [x] (63769) Provided manual therapy to mobilize soft tissue/joints of cervical/CT, scapular GHJ and UE for the purpose of modulating pain, promoting relaxation,  increasing ROM, reducing/eliminating soft tissue swelling/inflammation/restriction, improving soft tissue extensibility and allowing for proper ROM for normal function with self care, reaching, carrying, lifting, house/yardwork, driving/computer work    Charges:  Timed Code Treatment Minutes: 45   Total Treatment Minutes: 60       [] EVAL (LOW) 28673 (typically 20 minutes face-to-face)  [] EVAL (MOD) 75830 (typically 30 minutes face-to-face)  [] EVAL (HIGH) 84970 (typically 45 minutes face-to-face)  [] RE-EVAL     [x] JACKIE(21555) x   [] Dry needle 1 or 2 Muscles (20038)  [x] NMR (75898) x     [] Dry needle 3+ Muscles (87598)  [x] Manual (25939) x     [] Ultrasound (70200) x  [] TA (82498) x     [] Mech Traction (28393)  [] ES(attended) (19778)     [] ES (un) (18702):   [] Vasopump (36750) [] Ionto (20252)   [] Other:     GOALS:  Updated 10/13:  Pain ranges from 0-2/10 with sleeping and PT/hep and activity level    Self care improving (showering, dressing) and able to clover light housework (unload ); able to reach head to wash hair, still can't use appliances to style it yet; able to reach 1st shelf of cabinet but has to use L to help if its a heavy object; able to pull lever on recliner with R UE now; able to hold cup and drink using R UE now; able to cross body with R UE now for ADLs   Able to curl hair in the front of her head today and starting to use banister with going up steps now    Hep 1 x per day   Overall 75% improved   MMT: flex 5/5, abd 5-/5, IR 5/5 and ER 3-/5   ROM update: IR 55, ER 60, flex 160, abd/scaption 180  FOTO update: 61    Patient stated goal: \"dec pain\"  [] Progressing: [x] Met: [] Not Met: [] Adjusted     Therapist goals for Patient:   Short Term Goals: To be achieved in: 2 weeks  1. Independent in HEP and progression per patient tolerance, in order to prevent re-injury. [] Progressing: [x] Met: [] Not Met: [] Adjusted  2. Patient will have a decrease in pain by 20-30% to facilitate improvement in movement, function, and ADLs as indicated by Functional Deficits. [] Progressing: [x] Met: [] Not Met: [] Adjusted     Long Term Goals: To be achieved in: at d/c   1. Increase FOTO functional outcome score from 42 to 60 to assist with reaching prior level of function. [] Progressing: [x] Met: [] Not Met: [] Adjusted  2.  Patient will have a decrease in pain by 50-60% to facilitate improvement in movement, function, and ADLs as indicated by Functional Deficits. [] Progressing: [x] Met: [] Not Met: [] Adjusted  3. Patient will demonstrate increased AROM to flex/scap 140, IR/ER to [facility] for ease with self care to allow for proper joint functioning as indicated by Functional Deficits. [] Progressing: [x] Met: [] Not Met: [] Adjusted  4. Patient will demonstrate an increase in NM recruitment/activation and overall GH and scapular strength to 4+/5 - 5/5 for proper functional mobility as indicated by patients Functional Deficits. [] Progressing: [x] Met: [] Not Met: [] Adjusted  5. Patient will return to self care/dressing activities without increased symptoms or restriction. [] Progressing: [x] Met: [] Not Met: [] Adjusted  6. \"full use of arm\"          [x]Progressing: [] Met: [] Not Met: [] Adjusted    ASSESSMENT:  Tolerated eval well; proceed per protocol/MD precautions   7-11-22 tolerated above ex/ PROM well. Steri strips gone , placed 4 pads with E-stim for more coverage area. 7-13-22 progressing with gentle ex  7/19: cont skilled PT for ROM and initiating strength  7-27-22 tolerating above ex well.  Gradually progressing with AAROM.   8/2: MD f/u on 8/2; will cont for strength and ROM per MD guidelines   8/4: ex progression held today due to inc soreness; progress when able   8/9: tolerated progressions well  8/16: functional progression made with reaching face and opening fridge door, however, still can't reach to fix hair yet and driving is still on hold; cont with skilled PT for functional gains and strength with overhead reaching   8/25: increased independence and strength noted with some overhead ex  9/1: despite functional gains with strength and ROM with overhead reaching, ER strength remains weak limiting pt's functional progression; IFC cont to help with pain from ex and stretching   9/6: pt edu to hold on hep x a few days to let shoulder rest and calm down; offered options for creating 1# for use at home versus purchasing 1# dumb bell; improved strength noted with RS ex today   9/8: mild soreness during session, but overall tolerated well with resuming wt with ex; improved holding strength with ER in SL  9/13; manage through recent flare up with inc icing frequency and backing of of hep somewhat until pain levels dec and then resume   9/15: ex stopped today due to flare up of pain; possible onset of tendonitis with on/off intense pain over the past week- pt TTP over ant/lat shoulder and biceps tendon and rating pain at 7-8/10; pt instructed to hold on hep and focus on rest/ice; realistic functional long term goals addressed again with pt after reverse TSR with significant loss of RTC mm    9/27: will assess gradual return to wts/overhead ex; pt is reaching plateau with strength gains, will cont to assess for need for skilled PT each week   9/29: Resumed some of the exercises as prior with good tolerance. 10/7 - Pt able to do ex with resistance - and clover well .  reviewed importance of not overdoing at home. 10/13: pt feels nearly 100% improved and though improving with overhead strength, is still working towards being able to curl her hair using a curling iron in the R hand; will likely d/c to hep after a few more sessions   10/18: improved ease with overhead reaching noticed and less pain with this; focus remaining sessions on functional strength through R shoulder then likely dc to hep   10/15: able to put on necklaces independently now      Treatment/Activity Tolerance:  [x] Patient tolerated treatment well [] Patient limited by fatique  [] Patient limited by pain  [] Patient limited by other medical complications  [] Other:     Overall Progression Towards Functional goals/ Treatment Progress Update:  [x] Patient is progressing as expected towards functional goals listed. [] Progression is improving slowed due to complexities/Impairments listed. [] Progression has been slowed due to co-morbidities.   [] Plan just implemented, too soon to assess goals progression <30days   [] Goals require adjustment due to lack of progress  [] Patient is not progressing as expected and requires additional follow up with physician  [] Other    Prognosis for POC: [x] Good [] Fair  [] Poor    Patient requires continued skilled intervention: [] Yes  [x] No      PLAN: d/c to hep   [] Plan of care initiated [] Hold pending MD visit [x] Discharge    Electronically signed by: Nohemi Márquez, PTMPT [id number]    Note: If patient does not return for scheduled/recommended follow up visits, this note will serve as a discharge from care along with the most recent update on progress.

## 2022-11-02 ENCOUNTER — OFFICE VISIT (OUTPATIENT)
Dept: ORTHOPEDIC SURGERY | Age: 72
End: 2022-11-02
Payer: MEDICARE

## 2022-11-02 VITALS — BODY MASS INDEX: 32.78 KG/M2 | RESPIRATION RATE: 16 BRPM | HEIGHT: 63 IN | WEIGHT: 185 LBS

## 2022-11-02 DIAGNOSIS — M48.062 NEUROGENIC CLAUDICATION DUE TO LUMBAR SPINAL STENOSIS: Primary | ICD-10-CM

## 2022-11-02 PROCEDURE — 99213 OFFICE O/P EST LOW 20 MIN: CPT | Performed by: PHYSICIAN ASSISTANT

## 2022-11-02 PROCEDURE — 1123F ACP DISCUSS/DSCN MKR DOCD: CPT | Performed by: PHYSICIAN ASSISTANT

## 2022-11-02 NOTE — PROGRESS NOTES
Subjective:      Patient ID: Anand Granados is a 74 y.o. female who is here to review her lumbar spine MRI results. She was recently seen for evaluation of her low back pain and bilateral leg pain. Her pain began rather suddenly 2 months ago. Pain has steadily worsened since onset. Back pain 10/10 VAS, right and left buttock pain 10/10 VAS, right and left leg pain 10/10 VAS. Pain is describes as aching, throbbing. She reports numbness and tingling into her buttocks. She denies weakness of her right and left leg. She denies bowel or bladder dysfunction and saddle anesthesia. She can sit for a maximum of unlimited minutes and stand for a maximum 5-10 minutes. Pain does not disrupt her sleep. Prior medications and treatment tried include Tylenol without relief. She has a history of a prior lumbar laminectomy discectomy L4-5 performed in 1990 by Dr. Vida Blackwood. Review Of Systems:      Review of symptoms was reviewed and is significant for back pain and negative for recent weight loss, fatigue, chills, visual disturbances, blood in stool or urine, recent infection. Past Medical History:   Diagnosis Date    Asthma     Colon polyp     Depression     DVT (deep venous thrombosis) (HCC)     as a teenager    Esophageal reflux     GERD with stricture     Hx of blood clots     Hyperlipidemia     Hypertension     Insomnia     Migraine 12/29/1995    Osteoarthritis     Protein deficiency ([address]     protein S defic.     Pulmonary embolism (HCC)     x`s 2 post-op after Hysterectomy & Gall Bladder    Thyroid adenoma     NO MEDS    Type II or unspecified type diabetes mellitus without mention of complication, not stated as uncontrolled        Family History   Problem Relation Age of Onset    Heart Attack Father     Diabetes Maternal Grandmother     Leukemia Brother        Past Surgical History:   Procedure Laterality Date    APPENDECTOMY      BACK SURGERY      LUMBAR BULGING DISC    CERVICAL [facility] N/A 11/4/2019    ANTERIOR CERVICAL DISCECTOMY FUSION C4-5 AND C6-7 WITH MEDTRONIC PLATE AND SCREWS, AND DONOR BONE WITH C-ARM performed by Martha Samuel MD at [address] [facility] [address] [place] [address],[address]      COLONOSCOPY N/A 7/9/2020    COLONOSCOPY POLYPECTOMY SNARE/COLD BIOPSY performed by Rola Gross DO at [address] [facility], [place], DIAGNOSTIC      HIATAL HERNIA REPAIR      HYSTERECTOMY (CERVIX STATUS UNKNOWN)      OVARIAN CYST SURGERY      SHOULDER ARTHROSCOPY Right 12/28/2016    SAD, labral debridement    SHOULDER SURGERY Right 6/13/2022    RIGHT REVERSE TOTAL SHOULDER REPLACEMENT performed by Emry Apley, MD at [facility], PARTIAL         Social History     Occupational History    Occupation: retired   Tobacco Use    Smoking status: Never    Smokeless tobacco: Never   Vaping Use    Vaping Use: Never used   Substance and Sexual Activity    Alcohol use: No    Drug use: Never    Sexual activity: Yes       Current Outpatient Medications   Medication Sig Dispense Refill    amitriptyline (ELAVIL) 50 MG tablet Take 50 mg by mouth nightly      UNIFINE ULTRA PEN NEEDLE 31G X 5 MM MISC       SITagliptin-metFORMIN (JANUMET XR)  MG TB24 per extended release tablet Take 50-1,000 mg by mouth daily      montelukast (SINGULAIR) 10 MG tablet Take 10 mg by mouth nightly      Omega-3 Fatty Acids (FISH OIL) 1000 MG CAPS Take 1 capsule by mouth daily HOLD for 14 days after shoulder surgery 90 capsule 3    vitamin E 1000 units capsule Take 1 capsule by mouth daily HOLD for 14 days after shoulder surgery 30 capsule 3    zinc 50 MG CAPS Take 1 capsule by mouth daily      vitamin C (ASCORBIC ACID) 500 MG tablet Take 1,000 mg by mouth daily      gemfibrozil (LOPID) 600 MG tablet Take 600 mg by mouth 2 times daily (before meals)      buPROPion (WELLBUTRIN XL) 150 MG extended release tablet Take 150 mg by mouth every morning      JANTOVEN 2 MG tablet Restart 11/7/19.  Please dispense Jantoven and not Warfarin 1 tablet 0    metFORMIN (GLUCOPHAGE) 1000 MG tablet Take 1 tablet by mouth 2 times daily (with meals) 60 tablet 5    insulin detemir (LEVEMIR FLEXTOUCH) 100 UNIT/ML injection pen Inject 58 Units into the skin nightly (Patient taking differently: Inject 50 Units into the skin nightly) 5 Pen 3    sertraline (ZOLOFT) 100 MG tablet Take 1 tablet by mouth daily 90 tablet 3    losartan (COZAAR) 25 MG tablet Take 25 mg by mouth every evening  30 tablet 0    NOVOLOG FLEXPEN 100 UNIT/ML injection pen Inject 5 Units into the skin 3 times daily (before meals) Or as directed      omeprazole (PRILOSEC) 40 MG capsule Take 1 capsule by mouth 2 times daily (Patient taking differently: Take 40 mg by mouth at bedtime) 180 capsule 3    glucose blood VI test strips (ASCENSIA AUTODISC VI;ONE TOUCH ULTRA TEST VI) strip 1 each by In Vitro route 3 times daily. As needed. 100 each 3    ONE TOUCH LANCETS MISC 1 each by Does not apply route 3 times daily. 100 each 3    atorvastatin (LIPITOR) 40 MG tablet Take 1 tablet by mouth daily. (Patient taking differently: Take 40 mg by mouth Daily with supper) 30 tablet 3    fluticasone (FLONASE) 50 MCG/ACT nasal spray 1 spray by Nasal route daily. (Patient taking differently: 1 spray by Nasal route nightly) 1 Bottle 3    ondansetron (ZOFRAN-ODT) 4 MG disintegrating tablet DISSOLVE ONE TABLET BY MOUTH EVERY 6 HOURS AS NEEDED 90 tablet 2     No current facility-administered medications for this visit. Objective:     She is alert, oriented x 3, pleasant, well nourished, developed and in no   acute distress. Resp 16   Ht 5' 3\" (1.6 m)   Wt 185 lb (83.9 kg)   BMI 32.77 kg/m²      General exam:  She is well-developed and well-nourished, is in obvious discomfort and alert and oriented to person, place, and time. She demonstrates appropriate mood and affect. Her skin is warm and dry.    Her gait is normal and she walks heel to toe without significant limp or instability. Back:  She stands with slight lumbar flexion. Her lumbar flexion, extension and lateral bending are moderately reduced with pain. She has mild tenderness over her lumbar spine without obvious muscle spasm. The skin over her lumbar spine is normal with a surgical scar. Lower extremities:  She has 5/5 motor strength of bilateral lower extremities. She has a negative straight leg raise, bilaterally. Deep tendon reflexes: Absent patella and Achilles symmetrically  Sensation is intact to light touch L3 to S1 bilaterally. She has no clonus. X Rays: not performed in the office today:   Lumbar spine MRI dated 10/21/2022 was reviewed with Eleni Hernandez today. Impression   1. Severe central spinal canal narrowing at L3-L4 and L4-L5. 2. Moderate-severe central spinal canal narrowing at L5-S1, and moderate   central spinal canal narrowing at L2-L3. 3. Multilevel foraminal narrowing, as above, greatest on the right at L4-L5   and L5-S1. This is greatest on the left at L3-L4. Diagnosis:       ICD-10-CM    1. Neurogenic claudication due to lumbar spinal stenosis  M48.062            Assessment and Plan:       Assessment:  Moderate to severe spinal canal narrowing L3-S1. Multilevel foraminal narrowing. I had an extensive discussion with Ms. Jacquelyn Fallon regarding the natural history, etiology, and long term consequences of her condition. I have presented reasonable alternatives to the patient's proposed care, treatment, and services. Risks and benefits of the treatment options also reviewed in detail. I have outlined a treatment plan with them. She has had full opportunity to ask her questions. I have answered them all to her satisfaction. I feel that Ms. Jacquelyn Fallon understands our discussion today. Plan:    Procedures-I discussed trying epidural steroid injections versus surgical decompression. She has had previous C4-5 and C6-7 ACDF by Dr. Myranda Welch.    She would prefer

## 2022-11-15 ENCOUNTER — OFFICE VISIT (OUTPATIENT)
Dept: ORTHOPEDIC SURGERY | Age: 72
End: 2022-11-15
Payer: MEDICARE

## 2022-11-15 VITALS — BODY MASS INDEX: 33.13 KG/M2 | HEIGHT: 63 IN | WEIGHT: 187 LBS

## 2022-11-15 DIAGNOSIS — Z96.611 STATUS POST REVERSE TOTAL REPLACEMENT OF RIGHT SHOULDER: Primary | ICD-10-CM

## 2022-11-15 PROCEDURE — 1123F ACP DISCUSS/DSCN MKR DOCD: CPT | Performed by: ORTHOPAEDIC SURGERY

## 2022-11-15 PROCEDURE — 99213 OFFICE O/P EST LOW 20 MIN: CPT | Performed by: ORTHOPAEDIC SURGERY

## 2022-11-15 NOTE — PROGRESS NOTES
Armaan Hill  [id number]  November 15, 2022    Chief Complaint   Patient presents with    Follow-up       Right shoulder. S/P Reverse TSA; DOS 6/13/22. History: The patient is here in follow up regarding the right reverse TSA performed approximately 5 months ago. She is much improved. She rates her pain as 1-2/10 with aggressive activities. She denies any numbness or tingling. The patient's  past medical history, medications, allergies,  family history, social history, and review of systems have been reviewed, and dated and are recorded in the chart. Vitals:  Ht 5' 3\" (1.6 m)   Wt 187 lb (84.8 kg)   BMI 33.13 kg/m²       Physical:   Ms. Armaan Hill appears well, and is in no apparent distress, She demonstrates appropriate mood & affect. She is alert and oriented to person, place and time. The patient has moderate swelling. The patient is neurovascularly intact distally. Sensation is intact in the axillary nerve distribution. There is no evidence of DVT. right shoulder passive range of motion: 160 degrees abduction, 160 degrees forward flexion, 35 degrees external rotation. The patient has minimal pain with light range of motion of the shoulder. The incisions are clean, dry and intact and without erythema. Right shoulder strength is 4+/5. Imaging: 3 views of the right shoulder obtained in the office today were reviewed. They show good alignment of the prosthesis. No loosening of components, fractures, or dislocation. Impression: right Reverse Total Shoulder Arthroplasty    Plan: At this time, the patient may continue to work on range of motion and strengthening of the right shoulder. She was encouraged to gradually resume regular activities. She will follow-up with me in 3 months and we will reassess her then.   Orders Placed This Encounter   Procedures    XR SHOULDER RIGHT (MIN 2 VIEWS)     Rm 19. 3v     Standing Status:   Future     Number of Occurrences:   1     Standing Expiration Date:   12/15/2022     Order Specific Question:   Reason for exam:     Answer:   Reverse TSA

## 2022-11-21 ENCOUNTER — OFFICE VISIT (OUTPATIENT)
Dept: ORTHOPEDIC SURGERY | Age: 72
End: 2022-11-21
Payer: MEDICARE

## 2022-11-21 VITALS — HEIGHT: 63 IN | WEIGHT: 187 LBS | BODY MASS INDEX: 33.13 KG/M2

## 2022-11-21 DIAGNOSIS — M48.062 LUMBAR STENOSIS WITH NEUROGENIC CLAUDICATION: Primary | ICD-10-CM

## 2022-11-21 PROCEDURE — 1123F ACP DISCUSS/DSCN MKR DOCD: CPT | Performed by: ORTHOPAEDIC SURGERY

## 2022-11-21 PROCEDURE — 99214 OFFICE O/P EST MOD 30 MIN: CPT | Performed by: ORTHOPAEDIC SURGERY

## 2022-11-21 NOTE — PROGRESS NOTES
New Patient: LUMBAR SPINE    Referring Provider:  No ref. provider found    CHIEF COMPLAINT:  No chief complaint on file. HISTORY OF PRESENT ILLNESS:    Ms. Tati Macias  is a pleasant 74 y.o. female currently referred by Luis Villanueva PA-C for the evaluation of low back and bilateral leg pain. Alan Powers Her symptoms began insidiously 4 months ago. They have increased since that time. She rates her pain 10/10. Her pain rates in her buttocks and posterior thighs. Her pain is substantially worse with standing and ambulating. She denies numbness tingling of her lower extremities, saddle anesthesia and bowel bladder abnormality. She is status post MLD L4-L5. Current/Past Treatment:   Physical Therapy: Distant past  Chiropractic: No  Injection: No  Medications: None    Past Medical History:   Past Medical History:   Diagnosis Date    Asthma     Colon polyp     Depression     DVT (deep venous thrombosis) (HCC)     as a teenager    Esophageal reflux     GERD with stricture     Hx of blood clots     Hyperlipidemia     Hypertension     Insomnia     Migraine 12/29/1995    Osteoarthritis     Protein deficiency ([address]     protein S defic.     Pulmonary embolism (HCC)     x`s 2 post-op after Hysterectomy & Gall Bladder    Thyroid adenoma     NO MEDS    Type II or unspecified type diabetes mellitus without mention of complication, not stated as uncontrolled       Past Surgical History:     Past Surgical History:   Procedure Laterality Date    APPENDECTOMY      BACK SURGERY      LUMBAR BULGING DISC    CERVICAL FUSION  1993    CERVICAL FUSION N/A 11/4/2019    ANTERIOR CERVICAL DISCECTOMY FUSION C4-5 AND C6-7 WITH MEDTRONIC PLATE AND SCREWS, AND DONOR BONE WITH C-ARM performed by Anjelica Ridley MD at [address]      COLONOSCOPY N/A 7/9/2020    COLONOSCOPY POLYPECTOMY SNARE/COLD BIOPSY performed by Erik Kurtz DO at [address] [place] [address], [address] [place] [address] HYSTERECTOMY (CERVIX STATUS UNKNOWN)      OVARIAN CYST SURGERY      SHOULDER ARTHROSCOPY Right 12/28/2016    SAD, labral debridement    SHOULDER SURGERY Right 6/13/2022    RIGHT REVERSE TOTAL SHOULDER REPLACEMENT performed by Tana Cooper MD at [address] [place] [address]       Current Medications:     Current Outpatient Medications:     amitriptyline (ELAVIL) 50 MG tablet, Take 50 mg by mouth nightly, Disp: , Rfl:     UNIFINE ULTRA PEN NEEDLE 31G X 5 MM MISC, , Disp: , Rfl:     SITagliptin-metFORMIN (JANUMET XR)  MG TB24 per extended release tablet, Take 50-1,000 mg by mouth daily, Disp: , Rfl:     montelukast (SINGULAIR) 10 MG tablet, Take 10 mg by mouth nightly, Disp: , Rfl:     Omega-3 Fatty Acids (FISH OIL) 1000 MG CAPS, Take 1 capsule by mouth daily HOLD for 14 days after shoulder surgery, Disp: 90 capsule, Rfl: 3    vitamin E 1000 units capsule, Take 1 capsule by mouth daily HOLD for 14 days after shoulder surgery, Disp: 30 capsule, Rfl: 3    zinc 50 MG CAPS, Take 1 capsule by mouth daily, Disp: , Rfl:     vitamin C (ASCORBIC ACID) 500 MG tablet, Take 1,000 mg by mouth daily, Disp: , Rfl:     gemfibrozil (LOPID) 600 MG tablet, Take 600 mg by mouth 2 times daily (before meals), Disp: , Rfl:     buPROPion (WELLBUTRIN XL) 150 MG extended release tablet, Take 150 mg by mouth every morning, Disp: , Rfl:     JANTOVEN 2 MG tablet, Restart 11/7/19.  Please dispense Jantoven and not Warfarin, Disp: 1 tablet, Rfl: 0    metFORMIN (GLUCOPHAGE) 1000 MG tablet, Take 1 tablet by mouth 2 times daily (with meals), Disp: 60 tablet, Rfl: 5    insulin detemir (LEVEMIR FLEXTOUCH) 100 UNIT/ML injection pen, Inject 58 Units into the skin nightly (Patient taking differently: Inject 50 Units into the skin nightly), Disp: 5 Pen, Rfl: 3    sertraline (ZOLOFT) 100 MG tablet, Take 1 tablet by mouth daily, Disp: 90 tablet, Rfl: 3    losartan (COZAAR) 25 MG tablet, Take 25 mg by mouth every evening , Disp: 30 tablet, Rfl: 0    NOVOLOG FLEXPEN 100 UNIT/ML injection pen, Inject 5 Units into the skin 3 times daily (before meals) Or as directed, Disp: , Rfl:     omeprazole (PRILOSEC) 40 MG capsule, Take 1 capsule by mouth 2 times daily (Patient taking differently: Take 40 mg by mouth at bedtime), Disp: 180 capsule, Rfl: 3    glucose blood VI test strips (ASCENSIA AUTODISC VI;ONE TOUCH ULTRA TEST VI) strip, 1 each by In Vitro route 3 times daily. As needed. , Disp: 100 each, Rfl: 3    ONE TOUCH LANCETS MISC, 1 each by Does not apply route 3 times daily. , Disp: 100 each, Rfl: 3    atorvastatin (LIPITOR) 40 MG tablet, Take 1 tablet by mouth daily. (Patient taking differently: Take 40 mg by mouth Daily with supper), Disp: 30 tablet, Rfl: 3    fluticasone (FLONASE) 50 MCG/ACT nasal spray, 1 spray by Nasal route daily. (Patient taking differently: 1 spray by Nasal route nightly), Disp: 1 Bottle, Rfl: 3    ondansetron (ZOFRAN-ODT) 4 MG disintegrating tablet, DISSOLVE ONE TABLET BY MOUTH EVERY 6 HOURS AS NEEDED, Disp: 90 tablet, Rfl: 2  Allergies:  Morphine  Social History:    reports that she has never smoked. She has never used smokeless tobacco. She reports that she does not drink alcohol and does not use drugs.   Family History:   Family History   Problem Relation Age of Onset    Heart Attack Father     Diabetes Maternal Grandmother     Leukemia Brother        REVIEW OF SYSTEMS: Full ROS noted & scanned   CONSTITUTIONAL: Denies unexplained weight loss, fevers, chills or fatigue  NEUROLOGICAL: Denies unsteady gait or progressive weakness  MUSCULOSKELETAL: Denies joint swelling or redness  PSYCHOLOGICAL: Denies anxiety, depression   SKIN: Denies skin changes, delayed healing, rash, itching   HEMATOLOGIC: Denies easy bleeding or bruising  ENDOCRINE: Denies excessive thirst, urination, heat/cold  RESPIRATORY: Denies current dyspnea, cough  GI: Denies nausea, vomiting, diarrhea   : Denies bowel or bladder issues      PHYSICAL EXAM: Vitals: not currently breastfeeding. GENERAL EXAM:  General Apparence: Patient is adequately groomed with no evidence of malnutrition. Orientation: The patient is oriented to time, place and person. Mood & Affect:The patient's mood and affect are appropriate. Vascular: Examination reveals no swelling tenderness in upper or lower extremities. Good capillary refill. Lymphatic: The lymphatic examination bilaterally reveals all areas to be without enlargement or induration  Sensation: Sensation is intact without deficit  Coordination/Balance: Good coordination     LUMBAR/SACRAL EXAMINATION:  Inspection: Local inspection shows no step-off or bruising. Lumbar alignment is normal.  Sagittal and Coronal balance is neutral.      Palpation:   No evidence of tenderness at the midline. No tenderness bilaterally at the paraspinal or trochanters. There is no step-off or paraspinal spasm. Range of Motion: Lumbar flexion, extension and rotation are mildly limited due to pain. Strength:   Strength testing is 5/5 in all muscle groups tested. Special Tests:   Straight leg raise and crossed SLR negative. Leg length and pelvis level. Skin: There are no rashes, ulcerations or lesions. Reflexes: Reflexes are symmetrically 2+ at the patellar and ankle tendons. Clonus absent bilaterally at the feet. Gait & station: normal, patient ambulates without assistance    Additional Examinations:   RIGHT LOWER EXTREMITY: Inspection/examination of the right lower extremity does not show any tenderness, deformity or injury. Range of motion is unremarkable. There is no gross instability. There are no rashes, ulcerations or lesions. Strength and tone are normal.    LEFT LOWER EXTREMITY:  Inspection/examination of the left lower extremity does not show any tenderness, deformity or injury. Range of motion is unremarkable. There is no gross instability. There are no rashes, ulcerations or lesions.   Strength and tone are normal.    Diagnostic Testing:    I reviewed MRI images of her lumbar spine from 10/21/2022 in the office today. Those show severe central stenosis L3-L4 and L4-L5 with bilateral foraminal narrowing and postsurgical changes left L4-L5    Impression:  Lumbar stenosis with neurogenic claudication L3-4 and L4-L5    Plan:    Discussed treatment options including observation, physical therapy, epidural injection, and microlumbar decompression. We discussed the risks, benefits, and alternatives to surgery including the risks of nerve or vessel injury, paralysis, spinal blood clot, spinal fluid leak, death, infection, need for additional  spinal surgery, failure of surgery to alleviate her symptoms and worse symptoms following surgery. She understands these risks and wishes to proceed with surgery. Her questions were answered.

## 2022-12-06 ENCOUNTER — HOSPITAL ENCOUNTER (OUTPATIENT)
Dept: WOMENS IMAGING | Age: 72
Discharge: HOME | End: 2022-12-06
Payer: MEDICARE

## 2022-12-06 DIAGNOSIS — Z12.31 BREAST CANCER SCREENING BY MAMMOGRAM: ICD-10-CM

## 2022-12-06 PROCEDURE — 77067 SCR MAMMO BI INCL CAD: CPT

## 2022-12-27 ENCOUNTER — TELEPHONE (OUTPATIENT)
Dept: ORTHOPEDIC SURGERY | Age: 72
End: 2022-12-27

## 2022-12-27 NOTE — TELEPHONE ENCOUNTER
Prescription Refill     Medication Name:  PAIN  TRAMADOL NOT WORKING  Pharmacy: [facility]  Patient Contact Number: [phone]

## 2022-12-30 ENCOUNTER — TELEPHONE (OUTPATIENT)
Dept: ORTHOPEDIC SURGERY | Age: 72
End: 2022-12-30

## 2023-01-04 RX ORDER — ALBUTEROL SULFATE 90 UG/1
2 AEROSOL, METERED RESPIRATORY (INHALATION) EVERY 6 HOURS PRN
COMMUNITY

## 2023-01-04 NOTE — PROGRESS NOTES
DOS    [birth year]             Pre-op appointment with Amelia Treviño CNP on 23 @ [phone] , asked to fax to [facility]

## 2023-01-04 NOTE — PROGRESS NOTES
[address] [facility] time__0600__________        Surgery time___0730_________    Take the following medications with a sip of water: Follow your MD/Surgeons pre-procedure instructions regarding your medications     Do not eat or drink anything after 12:00 midnight prior to your surgery. This includes water chewing gum, mints and ice chips. You may brush your teeth and gargle the morning of your surgery, but do not swallow the water     Please see your family doctor/pediatrician for a history and physical and/or concerning medications. Bring any test results/reports from your physicians office. If you are under the care of a heart doctor or specialist doctor, please be aware that you may be asked to them for clearance    You may be asked to stop blood thinners such as Coumadin, Plavix, Fragmin, Lovenox, etc., or any anti-inflammatories such as:  Aspirin, Ibuprofen, Advil, Naproxen prior to your surgery. We also ask that you stop any OTC medications such as fish oil, vitamin E, glucosamine, garlic, Multivitamins, COQ 10, etc.    We ask that you do not smoke 24 hours prior to surgery  We ask that you do not  drink any alcoholic beverages 24 hours prior to surgery     You must make arrangements for a responsible adult to take you home after your surgery. For your safety you will not be allowed to leave alone or drive yourself home. Your surgery will be cancelled if you do not have a ride home. Also for your safety, it is strongly suggested that someone stay with you the first 24 hours after your surgery. A parent or legal guardian must accompany a child scheduled for surgery and plan to stay at the hospital until the child is discharged. Please do not bring other children with you. For your comfort, please wear simple loose fitting clothing to the hospital.  Please do not bring valuables.     Do not wear any make-up or nail polish on your fingers or toes      For your safety, please do not wear any jewelry or body piercing's on the day of surgery. All jewelry must be removed. If you have dentures, they will be removed before going to operating room. For your convenience, we will provide you with a container. If you wear contact lenses or glasses, they will be removed, please bring a case for them. If you have a living will and a durable power of  for healthcare, please bring in a copy. As part of our patient safety program to minimize surgical site infections, we ask you to do the following:    Please notify your surgeon if you develop any illness between         now and the  day of your surgery. This includes a cough, cold, fever, sore throat, nausea,         or vomiting, and diarrhea, etc.   Please notify your surgeon if you experience dizziness, shortness         of breath or blurred vision between now and the time of your surgery. Do not shave your operative site 96 hours prior to surgery. For face and neck surgery, men may use an electric razor 48 hours   prior to surgery. You may shower the night before surgery or the morning of   your surgery with an antibacterial soap. You will need to bring a photo ID and insurance card    [facility] has an onsite pharmacy, would you like to utilize our pharmacy     If you will be staying overnight and use a C-pap machine, please bring   your C-pap to hospital     Our goal is to provide you with excellent care, therefore, visitors will be limited to two(2) in the room at a time so that we may focus on providing this care for you. Please contact pre-admission testing if you have any further questions. [facility] phone number:  [phone] PAT fax number:  [phone]  Please note these are generalized instructions for all surgical cases, you may be provided with more specific instructions according to your surgery.     C-Difficile admission screening and protocol:       * Admitted with diarrhea? [] YES    [x]  NO     *Prior history of C-Diff. In last 3 months? [] YES    [x]  NO     *Antibiotic use in the past 6-8 weeks? [x]  NO    []  YES                 If yes, which ANTIBIOTIC AND REASON______     *Prior hospitalization or nursing home in the last month? []  YES    [x]  NO        SAFETY FIRST. .call before you fall

## 2023-01-09 NOTE — TELEPHONE ENCOUNTER
Auth: # [id number]    Date: 01/06/23 thru 07/06/23  Type of SX:  Outpatient  Location: [facility]  CPT: 84891, [id number]   DX Code: M48.062  SX area: Lumbar Spine  Insurance: [employer]

## 2023-01-13 ENCOUNTER — ANESTHESIA EVENT (OUTPATIENT)
Dept: OPERATING ROOM | Age: 73
End: 2023-01-13
Payer: MEDICARE

## 2023-01-16 ENCOUNTER — ANESTHESIA (OUTPATIENT)
Dept: OPERATING ROOM | Age: 73
End: 2023-01-16
Payer: MEDICARE

## 2023-01-16 ENCOUNTER — APPOINTMENT (OUTPATIENT)
Dept: GENERAL RADIOLOGY | Age: 73
End: 2023-01-16
Attending: ORTHOPAEDIC SURGERY
Payer: MEDICARE

## 2023-01-16 ENCOUNTER — HOSPITAL ENCOUNTER (OUTPATIENT)
Age: 73
Discharge: HOME | End: 2023-01-17
Attending: ORTHOPAEDIC SURGERY | Admitting: ORTHOPAEDIC SURGERY
Payer: MEDICARE

## 2023-01-16 DIAGNOSIS — M48.062 SPINAL STENOSIS OF LUMBAR REGION WITH NEUROGENIC CLAUDICATION: Primary | ICD-10-CM

## 2023-01-16 LAB
ANION GAP SERPL CALCULATED.3IONS-SCNC: 14 MMOL/L (ref 3–16)
BASOPHILS ABSOLUTE: 0 K/UL (ref 0–0.2)
BASOPHILS RELATIVE PERCENT: 0.7 %
BUN BLDV-MCNC: 14 MG/DL (ref 7–20)
CALCIUM SERPL-MCNC: 9.7 MG/DL (ref 8.3–10.6)
CHLORIDE BLD-SCNC: 103 MMOL/L (ref 99–110)
CO2: 25 MMOL/L (ref 21–32)
CREAT SERPL-MCNC: <0.5 MG/DL (ref 0.6–1.2)
EOSINOPHILS ABSOLUTE: 0.1 K/UL (ref 0–0.6)
EOSINOPHILS RELATIVE PERCENT: 2.3 %
GFR SERPL CREATININE-BSD FRML MDRD: >60 ML/MIN/{1.73_M2}
GLUCOSE BLD-MCNC: 141 MG/DL (ref 70–99)
GLUCOSE BLD-MCNC: 142 MG/DL (ref 70–99)
GLUCOSE BLD-MCNC: 158 MG/DL (ref 70–99)
GLUCOSE BLD-MCNC: 176 MG/DL (ref 70–99)
HCT VFR BLD CALC: 37.9 % (ref 36–48)
HEMOGLOBIN: 12.3 G/DL (ref 12–16)
INR BLD: 1.09 (ref 0.87–1.14)
LYMPHOCYTES ABSOLUTE: 1.9 K/UL (ref 1–5.1)
LYMPHOCYTES RELATIVE PERCENT: 34.8 %
MCH RBC QN AUTO: 25.8 PG (ref 26–34)
MCHC RBC AUTO-ENTMCNC: 32.4 G/DL (ref 31–36)
MCV RBC AUTO: 79.6 FL (ref 80–100)
MONOCYTES ABSOLUTE: 0.5 K/UL (ref 0–1.3)
MONOCYTES RELATIVE PERCENT: 8.6 %
NEUTROPHILS ABSOLUTE: 2.9 K/UL (ref 1.7–7.7)
NEUTROPHILS RELATIVE PERCENT: 53.6 %
PDW BLD-RTO: 16 % (ref 12.4–15.4)
PERFORMED ON: ABNORMAL
PLATELET # BLD: 241 K/UL (ref 135–450)
PMV BLD AUTO: 7.3 FL (ref 5–10.5)
POTASSIUM SERPL-SCNC: 3.5 MMOL/L (ref 3.5–5.1)
PROTHROMBIN TIME: 14 SEC (ref 11.7–14.5)
RBC # BLD: 4.76 M/UL (ref 4–5.2)
SODIUM BLD-SCNC: 142 MMOL/L (ref 136–145)
WBC # BLD: 5.4 K/UL (ref 4–11)

## 2023-01-16 PROCEDURE — 2500000003 HC RX 250 WO HCPCS: Performed by: ORTHOPAEDIC SURGERY

## 2023-01-16 PROCEDURE — 2580000003 HC RX 258: Performed by: ORTHOPAEDIC SURGERY

## 2023-01-16 PROCEDURE — 2500000003 HC RX 250 WO HCPCS: Performed by: ANESTHESIOLOGY

## 2023-01-16 PROCEDURE — 6360000002 HC RX W HCPCS: Performed by: ORTHOPAEDIC SURGERY

## 2023-01-16 PROCEDURE — 85610 PROTHROMBIN TIME: CPT

## 2023-01-16 PROCEDURE — 6360000002 HC RX W HCPCS: Performed by: ANESTHESIOLOGY

## 2023-01-16 PROCEDURE — 80048 BASIC METABOLIC PNL TOTAL CA: CPT

## 2023-01-16 PROCEDURE — 2500000003 HC RX 250 WO HCPCS: Performed by: NURSE ANESTHETIST, CERTIFIED REGISTERED

## 2023-01-16 PROCEDURE — 2720000010 HC SURG SUPPLY STERILE: Performed by: ORTHOPAEDIC SURGERY

## 2023-01-16 PROCEDURE — 2580000003 HC RX 258: Performed by: NURSE ANESTHETIST, CERTIFIED REGISTERED

## 2023-01-16 PROCEDURE — 3209999900 FLUORO FOR SURGICAL PROCEDURES

## 2023-01-16 PROCEDURE — 6370000000 HC RX 637 (ALT 250 FOR IP): Performed by: ORTHOPAEDIC SURGERY

## 2023-01-16 PROCEDURE — 7100000001 HC PACU RECOVERY - ADDTL 15 MIN: Performed by: ORTHOPAEDIC SURGERY

## 2023-01-16 PROCEDURE — 85025 COMPLETE CBC W/AUTO DIFF WBC: CPT

## 2023-01-16 PROCEDURE — 2580000003 HC RX 258: Performed by: ANESTHESIOLOGY

## 2023-01-16 PROCEDURE — 72020 X-RAY EXAM OF SPINE 1 VIEW: CPT

## 2023-01-16 PROCEDURE — 6360000002 HC RX W HCPCS

## 2023-01-16 PROCEDURE — 3700000000 HC ANESTHESIA ATTENDED CARE: Performed by: ORTHOPAEDIC SURGERY

## 2023-01-16 PROCEDURE — 2709999900 HC NON-CHARGEABLE SUPPLY: Performed by: ORTHOPAEDIC SURGERY

## 2023-01-16 PROCEDURE — 3600000014 HC SURGERY LEVEL 4 ADDTL 15MIN: Performed by: ORTHOPAEDIC SURGERY

## 2023-01-16 PROCEDURE — 3600000004 HC SURGERY LEVEL 4 BASE: Performed by: ORTHOPAEDIC SURGERY

## 2023-01-16 PROCEDURE — 7100000000 HC PACU RECOVERY - FIRST 15 MIN: Performed by: ORTHOPAEDIC SURGERY

## 2023-01-16 PROCEDURE — 6360000002 HC RX W HCPCS: Performed by: NURSE ANESTHETIST, CERTIFIED REGISTERED

## 2023-01-16 PROCEDURE — 3700000001 HC ADD 15 MINUTES (ANESTHESIA): Performed by: ORTHOPAEDIC SURGERY

## 2023-01-16 PROCEDURE — A4217 STERILE WATER/SALINE, 500 ML: HCPCS | Performed by: ORTHOPAEDIC SURGERY

## 2023-01-16 PROCEDURE — 2780000010 HC IMPLANT OTHER: Performed by: ORTHOPAEDIC SURGERY

## 2023-01-16 DEVICE — DURASEAL® DURAL SEALANT SYSTEM 5ML 5 PACK
Type: IMPLANTABLE DEVICE | Site: BACK | Status: FUNCTIONAL
Brand: DURASEAL®

## 2023-01-16 RX ORDER — DOCUSATE SODIUM 100 MG/1
100 CAPSULE, LIQUID FILLED ORAL 2 TIMES DAILY PRN
Qty: 30 CAPSULE | Refills: 1 | Status: SHIPPED | OUTPATIENT
Start: 2023-01-16

## 2023-01-16 RX ORDER — SODIUM CHLORIDE 0.9 % (FLUSH) 0.9 %
5-40 SYRINGE (ML) INJECTION PRN
Status: DISCONTINUED | OUTPATIENT
Start: 2023-01-16 | End: 2023-01-17 | Stop reason: HOSPADM

## 2023-01-16 RX ORDER — SODIUM CHLORIDE AND POTASSIUM CHLORIDE 150; 900 MG/100ML; MG/100ML
INJECTION, SOLUTION INTRAVENOUS CONTINUOUS
Status: DISCONTINUED | OUTPATIENT
Start: 2023-01-16 | End: 2023-01-17 | Stop reason: HOSPADM

## 2023-01-16 RX ORDER — FLUTICASONE PROPIONATE 50 MCG
1 SPRAY, SUSPENSION (ML) NASAL DAILY
Status: DISCONTINUED | OUTPATIENT
Start: 2023-01-17 | End: 2023-01-17 | Stop reason: HOSPADM

## 2023-01-16 RX ORDER — SUMATRIPTAN 50 MG/1
100 TABLET, FILM COATED ORAL
Status: DISCONTINUED | OUTPATIENT
Start: 2023-01-16 | End: 2023-01-17 | Stop reason: HOSPADM

## 2023-01-16 RX ORDER — PROPOFOL 10 MG/ML
INJECTION, EMULSION INTRAVENOUS PRN
Status: DISCONTINUED | OUTPATIENT
Start: 2023-01-16 | End: 2023-01-16 | Stop reason: SDUPTHER

## 2023-01-16 RX ORDER — DEXAMETHASONE SODIUM PHOSPHATE 4 MG/ML
INJECTION, SOLUTION INTRA-ARTICULAR; INTRALESIONAL; INTRAMUSCULAR; INTRAVENOUS; SOFT TISSUE PRN
Status: DISCONTINUED | OUTPATIENT
Start: 2023-01-16 | End: 2023-01-16 | Stop reason: SDUPTHER

## 2023-01-16 RX ORDER — RIZATRIPTAN BENZOATE 10 MG/1
10 TABLET ORAL PRN
COMMUNITY
Start: 2022-10-31

## 2023-01-16 RX ORDER — FENTANYL CITRATE 50 UG/ML
INJECTION, SOLUTION INTRAMUSCULAR; INTRAVENOUS PRN
Status: DISCONTINUED | OUTPATIENT
Start: 2023-01-16 | End: 2023-01-16 | Stop reason: SDUPTHER

## 2023-01-16 RX ORDER — PROMETHAZINE HYDROCHLORIDE 25 MG/1
12.5 TABLET ORAL EVERY 6 HOURS PRN
Status: DISCONTINUED | OUTPATIENT
Start: 2023-01-16 | End: 2023-01-17 | Stop reason: HOSPADM

## 2023-01-16 RX ORDER — OXYCODONE HYDROCHLORIDE AND ACETAMINOPHEN 5; 325 MG/1; MG/1
2 TABLET ORAL EVERY 4 HOURS PRN
Qty: 40 TABLET | Refills: 0 | Status: SHIPPED | OUTPATIENT
Start: 2023-01-16 | End: 2023-01-23

## 2023-01-16 RX ORDER — GEMFIBROZIL 600 MG/1
600 TABLET, FILM COATED ORAL
Status: DISCONTINUED | OUTPATIENT
Start: 2023-01-16 | End: 2023-01-17 | Stop reason: HOSPADM

## 2023-01-16 RX ORDER — ACETAMINOPHEN 325 MG/1
650 TABLET ORAL EVERY 6 HOURS
Status: DISCONTINUED | OUTPATIENT
Start: 2023-01-16 | End: 2023-01-17 | Stop reason: HOSPADM

## 2023-01-16 RX ORDER — WARFARIN SODIUM 1 MG/1
3.5 TABLET ORAL DAILY
Qty: 1 TABLET | Refills: 0 | Status: SHIPPED | OUTPATIENT
Start: 2023-01-19

## 2023-01-16 RX ORDER — TRAMADOL HYDROCHLORIDE 50 MG/1
50 TABLET ORAL PRN
Status: ON HOLD | COMMUNITY
Start: 2023-01-09 | End: 2023-01-16 | Stop reason: HOSPADM

## 2023-01-16 RX ORDER — DEXTROSE MONOHYDRATE 100 MG/ML
INJECTION, SOLUTION INTRAVENOUS CONTINUOUS PRN
Status: DISCONTINUED | OUTPATIENT
Start: 2023-01-16 | End: 2023-01-17 | Stop reason: HOSPADM

## 2023-01-16 RX ORDER — SODIUM CHLORIDE 0.9 % (FLUSH) 0.9 %
5-40 SYRINGE (ML) INJECTION PRN
Status: DISCONTINUED | OUTPATIENT
Start: 2023-01-16 | End: 2023-01-16 | Stop reason: HOSPADM

## 2023-01-16 RX ORDER — ROCURONIUM BROMIDE 10 MG/ML
INJECTION, SOLUTION INTRAVENOUS PRN
Status: DISCONTINUED | OUTPATIENT
Start: 2023-01-16 | End: 2023-01-16 | Stop reason: SDUPTHER

## 2023-01-16 RX ORDER — METHOCARBAMOL 100 MG/ML
INJECTION, SOLUTION INTRAMUSCULAR; INTRAVENOUS PRN
Status: DISCONTINUED | OUTPATIENT
Start: 2023-01-16 | End: 2023-01-16 | Stop reason: SDUPTHER

## 2023-01-16 RX ORDER — ONDANSETRON 2 MG/ML
INJECTION INTRAMUSCULAR; INTRAVENOUS
Status: COMPLETED
Start: 2023-01-16 | End: 2023-01-16

## 2023-01-16 RX ORDER — SODIUM CHLORIDE 0.9 % (FLUSH) 0.9 %
5-40 SYRINGE (ML) INJECTION EVERY 12 HOURS SCHEDULED
Status: DISCONTINUED | OUTPATIENT
Start: 2023-01-16 | End: 2023-01-16 | Stop reason: HOSPADM

## 2023-01-16 RX ORDER — ASCORBIC ACID 500 MG
1000 TABLET ORAL DAILY
Status: DISCONTINUED | OUTPATIENT
Start: 2023-01-17 | End: 2023-01-17 | Stop reason: HOSPADM

## 2023-01-16 RX ORDER — GABAPENTIN 300 MG/1
300 CAPSULE ORAL 3 TIMES DAILY
Status: DISCONTINUED | OUTPATIENT
Start: 2023-01-16 | End: 2023-01-17 | Stop reason: HOSPADM

## 2023-01-16 RX ORDER — PHENYLEPHRINE HCL IN 0.9% NACL 1 MG/10 ML
SYRINGE (ML) INTRAVENOUS PRN
Status: DISCONTINUED | OUTPATIENT
Start: 2023-01-16 | End: 2023-01-16 | Stop reason: SDUPTHER

## 2023-01-16 RX ORDER — ONDANSETRON 4 MG/1
4 TABLET, ORALLY DISINTEGRATING ORAL EVERY 6 HOURS PRN
Status: DISCONTINUED | OUTPATIENT
Start: 2023-01-16 | End: 2023-01-17 | Stop reason: HOSPADM

## 2023-01-16 RX ORDER — LOSARTAN POTASSIUM 25 MG/1
25 TABLET ORAL EVERY EVENING
Status: DISCONTINUED | OUTPATIENT
Start: 2023-01-16 | End: 2023-01-17 | Stop reason: HOSPADM

## 2023-01-16 RX ORDER — ALBUTEROL SULFATE 90 UG/1
2 AEROSOL, METERED RESPIRATORY (INHALATION) EVERY 6 HOURS PRN
Status: DISCONTINUED | OUTPATIENT
Start: 2023-01-16 | End: 2023-01-17 | Stop reason: HOSPADM

## 2023-01-16 RX ORDER — DROPERIDOL 2.5 MG/ML
0.62 INJECTION, SOLUTION INTRAMUSCULAR; INTRAVENOUS
Status: DISCONTINUED | OUTPATIENT
Start: 2023-01-16 | End: 2023-01-16 | Stop reason: HOSPADM

## 2023-01-16 RX ORDER — INSULIN LISPRO 100 [IU]/ML
0-4 INJECTION, SOLUTION INTRAVENOUS; SUBCUTANEOUS NIGHTLY
Status: DISCONTINUED | OUTPATIENT
Start: 2023-01-16 | End: 2023-01-17 | Stop reason: HOSPADM

## 2023-01-16 RX ORDER — FAMOTIDINE 10 MG/ML
INJECTION, SOLUTION INTRAVENOUS
Status: COMPLETED
Start: 2023-01-16 | End: 2023-01-16

## 2023-01-16 RX ORDER — ONDANSETRON 2 MG/ML
4 INJECTION INTRAMUSCULAR; INTRAVENOUS
Status: COMPLETED | OUTPATIENT
Start: 2023-01-16 | End: 2023-01-16

## 2023-01-16 RX ORDER — OXYCODONE HYDROCHLORIDE AND ACETAMINOPHEN 5; 325 MG/1; MG/1
1 TABLET ORAL EVERY 4 HOURS PRN
Status: DISCONTINUED | OUTPATIENT
Start: 2023-01-16 | End: 2023-01-16 | Stop reason: SDUPTHER

## 2023-01-16 RX ORDER — POLYETHYLENE GLYCOL 3350 17 G/17G
17 POWDER, FOR SOLUTION ORAL DAILY
Status: DISCONTINUED | OUTPATIENT
Start: 2023-01-16 | End: 2023-01-17 | Stop reason: HOSPADM

## 2023-01-16 RX ORDER — OXYCODONE HYDROCHLORIDE 5 MG/1
5 TABLET ORAL EVERY 4 HOURS PRN
Status: DISCONTINUED | OUTPATIENT
Start: 2023-01-16 | End: 2023-01-17 | Stop reason: HOSPADM

## 2023-01-16 RX ORDER — SUCCINYLCHOLINE/SOD CL,ISO/PF 200MG/10ML
SYRINGE (ML) INTRAVENOUS PRN
Status: DISCONTINUED | OUTPATIENT
Start: 2023-01-16 | End: 2023-01-16 | Stop reason: SDUPTHER

## 2023-01-16 RX ORDER — METHOCARBAMOL 750 MG/1
750 TABLET, FILM COATED ORAL EVERY 8 HOURS PRN
Status: DISCONTINUED | OUTPATIENT
Start: 2023-01-16 | End: 2023-01-17 | Stop reason: HOSPADM

## 2023-01-16 RX ORDER — ONDANSETRON 2 MG/ML
4 INJECTION INTRAMUSCULAR; INTRAVENOUS EVERY 6 HOURS PRN
Status: DISCONTINUED | OUTPATIENT
Start: 2023-01-16 | End: 2023-01-17 | Stop reason: HOSPADM

## 2023-01-16 RX ORDER — PROMETHAZINE HYDROCHLORIDE 12.5 MG/1
3.25 TABLET ORAL EVERY 6 HOURS PRN
COMMUNITY

## 2023-01-16 RX ORDER — SODIUM CHLORIDE 9 MG/ML
INJECTION, SOLUTION INTRAVENOUS CONTINUOUS PRN
Status: DISCONTINUED | OUTPATIENT
Start: 2023-01-16 | End: 2023-01-16 | Stop reason: SDUPTHER

## 2023-01-16 RX ORDER — ONDANSETRON 2 MG/ML
INJECTION INTRAMUSCULAR; INTRAVENOUS PRN
Status: DISCONTINUED | OUTPATIENT
Start: 2023-01-16 | End: 2023-01-16 | Stop reason: SDUPTHER

## 2023-01-16 RX ORDER — MONTELUKAST SODIUM 10 MG/1
10 TABLET ORAL NIGHTLY
Status: DISCONTINUED | OUTPATIENT
Start: 2023-01-16 | End: 2023-01-17 | Stop reason: HOSPADM

## 2023-01-16 RX ORDER — FAMOTIDINE 10 MG/ML
INJECTION, SOLUTION INTRAVENOUS PRN
Status: DISCONTINUED | OUTPATIENT
Start: 2023-01-16 | End: 2023-01-16 | Stop reason: SDUPTHER

## 2023-01-16 RX ORDER — BUPIVACAINE HYDROCHLORIDE AND EPINEPHRINE 5; 5 MG/ML; UG/ML
INJECTION, SOLUTION EPIDURAL; INTRACAUDAL; PERINEURAL
Status: COMPLETED | OUTPATIENT
Start: 2023-01-16 | End: 2023-01-16

## 2023-01-16 RX ORDER — INSULIN LISPRO 100 [IU]/ML
0-8 INJECTION, SOLUTION INTRAVENOUS; SUBCUTANEOUS
Status: DISCONTINUED | OUTPATIENT
Start: 2023-01-16 | End: 2023-01-17 | Stop reason: HOSPADM

## 2023-01-16 RX ORDER — ATORVASTATIN CALCIUM 40 MG/1
40 TABLET, FILM COATED ORAL
Status: DISCONTINUED | OUTPATIENT
Start: 2023-01-16 | End: 2023-01-17 | Stop reason: HOSPADM

## 2023-01-16 RX ORDER — SODIUM CHLORIDE 0.9 % (FLUSH) 0.9 %
5-40 SYRINGE (ML) INJECTION EVERY 12 HOURS SCHEDULED
Status: DISCONTINUED | OUTPATIENT
Start: 2023-01-16 | End: 2023-01-17 | Stop reason: HOSPADM

## 2023-01-16 RX ORDER — INSULIN GLARGINE 100 [IU]/ML
50 INJECTION, SOLUTION SUBCUTANEOUS NIGHTLY
Status: DISCONTINUED | OUTPATIENT
Start: 2023-01-16 | End: 2023-01-17 | Stop reason: HOSPADM

## 2023-01-16 RX ORDER — GABAPENTIN 300 MG/1
300 CAPSULE ORAL 3 TIMES DAILY
Qty: 90 CAPSULE | Refills: 3 | Status: SHIPPED | OUTPATIENT
Start: 2023-01-16 | End: 2023-01-26

## 2023-01-16 RX ORDER — SODIUM CHLORIDE 9 MG/ML
INJECTION, SOLUTION INTRAVENOUS PRN
Status: DISCONTINUED | OUTPATIENT
Start: 2023-01-16 | End: 2023-01-16 | Stop reason: HOSPADM

## 2023-01-16 RX ORDER — SODIUM CHLORIDE 9 MG/ML
INJECTION, SOLUTION INTRAVENOUS PRN
Status: DISCONTINUED | OUTPATIENT
Start: 2023-01-16 | End: 2023-01-17 | Stop reason: HOSPADM

## 2023-01-16 RX ORDER — SERTRALINE HYDROCHLORIDE 100 MG/1
100 TABLET, FILM COATED ORAL DAILY
Status: DISCONTINUED | OUTPATIENT
Start: 2023-01-17 | End: 2023-01-17 | Stop reason: HOSPADM

## 2023-01-16 RX ORDER — AMITRIPTYLINE HYDROCHLORIDE 50 MG/1
50 TABLET, FILM COATED ORAL NIGHTLY
Status: DISCONTINUED | OUTPATIENT
Start: 2023-01-16 | End: 2023-01-17 | Stop reason: HOSPADM

## 2023-01-16 RX ORDER — BUPROPION HYDROCHLORIDE 150 MG/1
150 TABLET ORAL EVERY MORNING
Status: DISCONTINUED | OUTPATIENT
Start: 2023-01-17 | End: 2023-01-17 | Stop reason: HOSPADM

## 2023-01-16 RX ORDER — WARFARIN SODIUM 1 MG/1
1 TABLET ORAL DAILY
Status: ON HOLD | COMMUNITY
Start: 2022-12-20 | End: 2023-01-16 | Stop reason: SDUPTHER

## 2023-01-16 RX ORDER — LIDOCAINE HYDROCHLORIDE 20 MG/ML
INJECTION, SOLUTION EPIDURAL; INFILTRATION; INTRACAUDAL; PERINEURAL PRN
Status: DISCONTINUED | OUTPATIENT
Start: 2023-01-16 | End: 2023-01-16 | Stop reason: SDUPTHER

## 2023-01-16 RX ORDER — OXYCODONE HYDROCHLORIDE AND ACETAMINOPHEN 5; 325 MG/1; MG/1
2 TABLET ORAL EVERY 4 HOURS PRN
Status: DISCONTINUED | OUTPATIENT
Start: 2023-01-16 | End: 2023-01-17 | Stop reason: HOSPADM

## 2023-01-16 RX ORDER — PANTOPRAZOLE SODIUM 40 MG/1
40 TABLET, DELAYED RELEASE ORAL
Status: DISCONTINUED | OUTPATIENT
Start: 2023-01-17 | End: 2023-01-17 | Stop reason: HOSPADM

## 2023-01-16 RX ORDER — FENTANYL CITRATE 50 UG/ML
25 INJECTION, SOLUTION INTRAMUSCULAR; INTRAVENOUS EVERY 5 MIN PRN
Status: DISCONTINUED | OUTPATIENT
Start: 2023-01-16 | End: 2023-01-16 | Stop reason: HOSPADM

## 2023-01-16 RX ORDER — ZINC SULFATE 50(220)MG
50 CAPSULE ORAL DAILY
Status: DISCONTINUED | OUTPATIENT
Start: 2023-01-17 | End: 2023-01-17 | Stop reason: HOSPADM

## 2023-01-16 RX ADMIN — GABAPENTIN 300 MG: 300 CAPSULE ORAL at 22:31

## 2023-01-16 RX ADMIN — AMITRIPTYLINE HYDROCHLORIDE 50 MG: 50 TABLET, FILM COATED ORAL at 23:00

## 2023-01-16 RX ADMIN — HYDROMORPHONE HYDROCHLORIDE 0.5 MG: 1 INJECTION, SOLUTION INTRAMUSCULAR; INTRAVENOUS; SUBCUTANEOUS at 10:14

## 2023-01-16 RX ADMIN — OXYCODONE AND ACETAMINOPHEN 2 TABLET: 5; 325 TABLET ORAL at 22:31

## 2023-01-16 RX ADMIN — SUGAMMADEX 200 MG: 100 INJECTION, SOLUTION INTRAVENOUS at 09:21

## 2023-01-16 RX ADMIN — PROMETHAZINE HYDROCHLORIDE 12.5 MG: 25 TABLET ORAL at 18:21

## 2023-01-16 RX ADMIN — ONDANSETRON 4 MG: 2 INJECTION INTRAMUSCULAR; INTRAVENOUS at 20:28

## 2023-01-16 RX ADMIN — ATORVASTATIN CALCIUM 40 MG: 40 TABLET, FILM COATED ORAL at 18:21

## 2023-01-16 RX ADMIN — INSULIN GLARGINE 50 UNITS: 100 INJECTION, SOLUTION SUBCUTANEOUS at 21:11

## 2023-01-16 RX ADMIN — ROCURONIUM BROMIDE 40 MG: 10 INJECTION, SOLUTION INTRAVENOUS at 07:40

## 2023-01-16 RX ADMIN — SODIUM CHLORIDE: 9 INJECTION, SOLUTION INTRAVENOUS at 07:27

## 2023-01-16 RX ADMIN — HYDROMORPHONE HYDROCHLORIDE 0.5 MG: 1 INJECTION, SOLUTION INTRAMUSCULAR; INTRAVENOUS; SUBCUTANEOUS at 12:27

## 2023-01-16 RX ADMIN — FENTANYL CITRATE 100 MCG: 50 INJECTION INTRAMUSCULAR; INTRAVENOUS at 07:35

## 2023-01-16 RX ADMIN — LOSARTAN POTASSIUM 25 MG: 25 TABLET, FILM COATED ORAL at 18:52

## 2023-01-16 RX ADMIN — ONDANSETRON 4 MG: 2 INJECTION INTRAMUSCULAR; INTRAVENOUS at 15:19

## 2023-01-16 RX ADMIN — ACETAMINOPHEN 650 MG: 325 TABLET ORAL at 23:00

## 2023-01-16 RX ADMIN — ONDANSETRON 4 MG: 2 INJECTION INTRAMUSCULAR; INTRAVENOUS at 09:39

## 2023-01-16 RX ADMIN — CEFAZOLIN 2000 MG: 2 INJECTION, POWDER, FOR SOLUTION INTRAMUSCULAR; INTRAVENOUS at 07:41

## 2023-01-16 RX ADMIN — HYDROMORPHONE HYDROCHLORIDE 0.5 MG: 1 INJECTION, SOLUTION INTRAMUSCULAR; INTRAVENOUS; SUBCUTANEOUS at 14:38

## 2023-01-16 RX ADMIN — METHOCARBAMOL 1000 MG: 100 INJECTION, SOLUTION INTRAMUSCULAR; INTRAVENOUS at 20:51

## 2023-01-16 RX ADMIN — HYDROMORPHONE HYDROCHLORIDE 0.5 MG: 1 INJECTION, SOLUTION INTRAMUSCULAR; INTRAVENOUS; SUBCUTANEOUS at 10:07

## 2023-01-16 RX ADMIN — HYDROMORPHONE HYDROCHLORIDE 0.5 MG: 1 INJECTION, SOLUTION INTRAMUSCULAR; INTRAVENOUS; SUBCUTANEOUS at 10:20

## 2023-01-16 RX ADMIN — HYDROMORPHONE HYDROCHLORIDE 0.5 MG: 1 INJECTION, SOLUTION INTRAMUSCULAR; INTRAVENOUS; SUBCUTANEOUS at 11:35

## 2023-01-16 RX ADMIN — HYDROMORPHONE HYDROCHLORIDE 0.5 MG: 1 INJECTION, SOLUTION INTRAMUSCULAR; INTRAVENOUS; SUBCUTANEOUS at 12:39

## 2023-01-16 RX ADMIN — DEXAMETHASONE SODIUM PHOSPHATE 8 MG: 4 INJECTION, SOLUTION INTRAMUSCULAR; INTRAVENOUS at 07:44

## 2023-01-16 RX ADMIN — FAMOTIDINE 20 MG: 10 INJECTION, SOLUTION INTRAVENOUS at 07:02

## 2023-01-16 RX ADMIN — POTASSIUM CHLORIDE AND SODIUM CHLORIDE: 900; 150 INJECTION, SOLUTION INTRAVENOUS at 18:34

## 2023-01-16 RX ADMIN — FENTANYL CITRATE 50 MCG: 50 INJECTION INTRAMUSCULAR; INTRAVENOUS at 09:44

## 2023-01-16 RX ADMIN — PROPOFOL 50 MG: 10 INJECTION, EMULSION INTRAVENOUS at 09:42

## 2023-01-16 RX ADMIN — FENTANYL CITRATE 50 MCG: 50 INJECTION INTRAMUSCULAR; INTRAVENOUS at 09:22

## 2023-01-16 RX ADMIN — ROCURONIUM BROMIDE 10 MG: 10 INJECTION, SOLUTION INTRAVENOUS at 07:35

## 2023-01-16 RX ADMIN — SODIUM CHLORIDE: 9 INJECTION, SOLUTION INTRAVENOUS at 09:06

## 2023-01-16 RX ADMIN — LIDOCAINE HYDROCHLORIDE 80 MG: 20 INJECTION, SOLUTION EPIDURAL; INFILTRATION; INTRACAUDAL; PERINEURAL at 07:35

## 2023-01-16 RX ADMIN — HYDROMORPHONE HYDROCHLORIDE 0.5 MG: 1 INJECTION, SOLUTION INTRAMUSCULAR; INTRAVENOUS; SUBCUTANEOUS at 21:38

## 2023-01-16 RX ADMIN — PROPOFOL 160 MG: 10 INJECTION, EMULSION INTRAVENOUS at 07:35

## 2023-01-16 RX ADMIN — METHOCARBAMOL 500 MG: 100 INJECTION INTRAMUSCULAR; INTRAVENOUS at 07:58

## 2023-01-16 RX ADMIN — HYDROMORPHONE HYDROCHLORIDE 0.5 MG: 1 INJECTION, SOLUTION INTRAMUSCULAR; INTRAVENOUS; SUBCUTANEOUS at 10:01

## 2023-01-16 RX ADMIN — METHOCARBAMOL 1000 MG: 100 INJECTION, SOLUTION INTRAMUSCULAR; INTRAVENOUS at 10:43

## 2023-01-16 RX ADMIN — ONDANSETRON 4 MG: 2 INJECTION INTRAMUSCULAR; INTRAVENOUS at 07:02

## 2023-01-16 RX ADMIN — HYDROMORPHONE HYDROCHLORIDE 0.5 MG: 1 INJECTION, SOLUTION INTRAMUSCULAR; INTRAVENOUS; SUBCUTANEOUS at 18:23

## 2023-01-16 RX ADMIN — Medication 160 MG: at 07:36

## 2023-01-16 RX ADMIN — Medication 100 MCG: at 08:14

## 2023-01-16 RX ADMIN — MONTELUKAST 10 MG: 10 TABLET, FILM COATED ORAL at 23:00

## 2023-01-16 ASSESSMENT — PAIN SCALES - GENERAL
PAINLEVEL_OUTOF10: 8
PAINLEVEL_OUTOF10: 8
PAINLEVEL_OUTOF10: 7
PAINLEVEL_OUTOF10: 10
PAINLEVEL_OUTOF10: 7
PAINLEVEL_OUTOF10: 0
PAINLEVEL_OUTOF10: 10
PAINLEVEL_OUTOF10: 10
PAINLEVEL_OUTOF10: 6
PAINLEVEL_OUTOF10: 3
PAINLEVEL_OUTOF10: 10
PAINLEVEL_OUTOF10: 8
PAINLEVEL_OUTOF10: 10

## 2023-01-16 ASSESSMENT — PAIN DESCRIPTION - PAIN TYPE
TYPE: SURGICAL PAIN

## 2023-01-16 ASSESSMENT — PAIN DESCRIPTION - FREQUENCY
FREQUENCY: CONTINUOUS

## 2023-01-16 ASSESSMENT — PAIN - FUNCTIONAL ASSESSMENT
PAIN_FUNCTIONAL_ASSESSMENT: PREVENTS OR INTERFERES SOME ACTIVE ACTIVITIES AND ADLS
PAIN_FUNCTIONAL_ASSESSMENT: PREVENTS OR INTERFERES SOME ACTIVE ACTIVITIES AND ADLS
PAIN_FUNCTIONAL_ASSESSMENT: PREVENTS OR INTERFERES WITH MANY ACTIVE NOT PASSIVE ACTIVITIES
PAIN_FUNCTIONAL_ASSESSMENT: PREVENTS OR INTERFERES SOME ACTIVE ACTIVITIES AND ADLS
PAIN_FUNCTIONAL_ASSESSMENT: ACTIVITIES ARE NOT PREVENTED
PAIN_FUNCTIONAL_ASSESSMENT: PREVENTS OR INTERFERES SOME ACTIVE ACTIVITIES AND ADLS
PAIN_FUNCTIONAL_ASSESSMENT: PREVENTS OR INTERFERES SOME ACTIVE ACTIVITIES AND ADLS
PAIN_FUNCTIONAL_ASSESSMENT: 0-10
PAIN_FUNCTIONAL_ASSESSMENT: PREVENTS OR INTERFERES SOME ACTIVE ACTIVITIES AND ADLS

## 2023-01-16 ASSESSMENT — PAIN DESCRIPTION - LOCATION
LOCATION: BACK
LOCATION: BACK;HIP

## 2023-01-16 ASSESSMENT — PAIN DESCRIPTION - ORIENTATION
ORIENTATION: LOWER
ORIENTATION: RIGHT
ORIENTATION: LOWER

## 2023-01-16 ASSESSMENT — PAIN DESCRIPTION - DESCRIPTORS
DESCRIPTORS: ACHING;SHOOTING
DESCRIPTORS: SHARP;ACHING
DESCRIPTORS: SHARP
DESCRIPTORS: ACHING;SHOOTING
DESCRIPTORS: ACHING
DESCRIPTORS: ACHING;DISCOMFORT

## 2023-01-16 ASSESSMENT — PAIN SCALES - WONG BAKER
WONGBAKER_NUMERICALRESPONSE: 6
WONGBAKER_NUMERICALRESPONSE: 0
WONGBAKER_NUMERICALRESPONSE: 6
WONGBAKER_NUMERICALRESPONSE: 0
WONGBAKER_NUMERICALRESPONSE: 4
WONGBAKER_NUMERICALRESPONSE: 0
WONGBAKER_NUMERICALRESPONSE: 0

## 2023-01-16 ASSESSMENT — PAIN DESCRIPTION - ONSET
ONSET: ON-GOING

## 2023-01-16 ASSESSMENT — ENCOUNTER SYMPTOMS: SHORTNESS OF BREATH: 0

## 2023-01-16 ASSESSMENT — LIFESTYLE VARIABLES: SMOKING_STATUS: 0

## 2023-01-16 NOTE — PROGRESS NOTES
4 Eyes Skin Assessment     NAME:  Davida Fritz OF BIRTH:  1950  MEDICAL RECORD NUMBER:  [id number]    The patient is being assessed for  Admission    I agree that One RN have performed a thorough Head to Toe Skin Assessment on the patient. ALL assessment sites listed below have been assessed. Areas assessed by both nurses:    Head, Face, Ears, Shoulders, Back, Chest, Arms, Elbows, Hands, Sacrum. Buttock, Coccyx, Ischium, and Legs. Feet and Heels        Does the Patient have a Wound?  No noted wound(s)       Fazal Prevention initiated by RN: No   Wound Care Orders initiated by RN: No    Pressure Injury (Stage 3,4, Unstageable, DTI, NWPT, and Complex wounds) if present place referral order by RN under : No    New and Established Ostomies, if present place, referral order under : No      Nurse 1 eSignature: Electronically signed by Erick Cabrera RN on 1/16/23 at 7:00 PM EST    **SHARE this note so that the co-signing nurse is able to place an eSignature**    Nurse 2 eSignature: Electronically signed by Mei House RN on 1/16/23 at 7:10 PM EST

## 2023-01-16 NOTE — PROGRESS NOTES
8134 pt arrived from OR, vitals stable on 2L NC. Bilateral pedal pulses 2+. Pt too sedate for sensation assessment. Back incision skin glued- clean, dry, and intact.

## 2023-01-16 NOTE — ADDENDUM NOTE
Addendum  created 01/16/23 1442 by Valente Hamlin MD    Order list changed, Pharmacy for encounter modified

## 2023-01-16 NOTE — ANESTHESIA PRE PROCEDURE
[facility] Department of Anesthesiology  Pre-Anesthesia Evaluation/Consultation       Name:  Joseph Roche  : 1950  Age:  74 y.o. MRN:  [id number]  Date: 2023           Surgeon: Surgeon(s):  Milagro Manning MD    Procedure: Procedure(s):   MICROLUMBAR LAMINECTOMY L3-4, L4-5     Allergies   Allergen Reactions    Morphine Itching, Rash and Hives     Patient Active Problem List   Diagnosis    Migraine    Depression    Hypertension    Asthma    Insomnia    Osteoarthritis    Emphysema of lung (HCC)    Hyperlipidemia    GERD with stricture    Esophageal reflux    Type II or unspecified type diabetes mellitus without mention of complication, not stated as uncontrolled    Thyroid adenoma    DVT (deep venous thrombosis) (HCC)    Colon polyp    Protein S deficiency (HCC)    Myalgia    Fracture of phalanx of toe    Need for prophylactic vaccination and inoculation against influenza    AB (asthmatic bronchitis)    Tendonitis of shoulder    Autonomic postural hypotension    Shoulder bursitis    Constipated    Need for prophylactic vaccination against Streptococcus pneumoniae (pneumococcus)    Bronchitis    Bronchospasm    Allergic rhinitis    Otitis media    Diarrhea    Bacterial gastroenteritis    GI bleed    Diverticulosis    Rotator cuff tendinitis    Biceps tendinitis    Glenohumeral arthritis    Acromioclavicular joint arthritis    Precordial pain    Systolic murmur    Coronary artery calcification seen on CAT scan    Closed displaced fracture of proximal phalanx of left great toe    Osteoarthritis of spine with radiculopathy, cervical region    Cervical stenosis of spinal canal    Rotator cuff arthropathy, right    Rotator cuff arthropathy of right shoulder    Spinal stenosis of lumbar region    Claustrophobia     Past Medical History:   Diagnosis Date    Asthma     Colon polyp     Depression     DVT (deep venous thrombosis) ([facility] [address]     as a teenager    Esophageal reflux     GERD with stricture     Hx of blood clots     Hyperlipidemia     Hypertension     Insomnia     Migraine 12/29/1995    Osteoarthritis     Protein deficiency (HCC)     protein S defic.  Pulmonary embolism (HCC)     x`s 2 post-op after Hysterectomy & Gall Bladder    Thyroid adenoma     NO MEDS    Type II or unspecified type diabetes mellitus without mention of complication, not stated as uncontrolled      Past Surgical History:   Procedure Laterality Date    APPENDECTOMY      BACK SURGERY      LUMBAR BULGING DISC    CERVICAL FUSION  1993    CERVICAL FUSION N/A 11/4/2019    ANTERIOR CERVICAL DISCECTOMY FUSION C4-5 AND C6-7 WITH MEDTRONIC PLATE AND SCREWS, AND DONOR BONE WITH C-ARM performed by Jacques Brown MD at [address]  COLONOSCOPY N/A 7/9/2020    COLONOSCOPY POLYPECTOMY SNARE/COLD BIOPSY performed by Madhav Elizalde DO at [facility], COLON, DIAGNOSTIC      HIATAL HERNIA REPAIR      HYSTERECTOMY (CERVIX STATUS UNKNOWN)      OVARIAN CYST SURGERY      SHOULDER ARTHROSCOPY Right 12/28/2016    SAD, labral debridement    SHOULDER SURGERY Right 6/13/2022    RIGHT REVERSE TOTAL SHOULDER REPLACEMENT performed by Dana Hernandez MD at [address] [facility], PARTIAL       Social History     Tobacco Use    Smoking status: Never    Smokeless tobacco: Never   Vaping Use    Vaping Use: Never used   Substance Use Topics    Alcohol use: No    Drug use: Never     Medications  No current facility-administered medications on file prior to encounter.      Current Outpatient Medications on File Prior to Encounter   Medication Sig Dispense Refill    promethazine (PHENERGAN) 12.5 MG tablet Take 3.25 mg by mouth every 6 hours as needed for Nausea      albuterol sulfate HFA (PROVENTIL;VENTOLIN;PROAIR) 108 (90 Base) MCG/ACT inhaler Inhale 2 puffs into the lungs every 6 hours as needed for Wheezing Take your inhaler as directed the DOS and bring with you DOS.  rizatriptan (MAXALT) 10 MG tablet Take 10 mg by mouth as needed      traMADol (ULTRAM) 50 MG tablet Take 50 mg by mouth as needed.  JANTOVEN 1 MG tablet Take 1 mg by mouth daily (Patient not taking: Reported on 1/16/2023)      amitriptyline (ELAVIL) 50 MG tablet Take 50 mg by mouth nightly      UNIFINE ULTRA PEN NEEDLE 31G X 5 MM MISC       montelukast (SINGULAIR) 10 MG tablet Take 10 mg by mouth nightly      Omega-3 Fatty Acids (FISH OIL) 1000 MG CAPS Take 1 capsule by mouth daily HOLD for 14 days after shoulder surgery 90 capsule 3    vitamin E 1000 units capsule Take 1 capsule by mouth daily HOLD for 14 days after shoulder surgery 30 capsule 3    zinc 50 MG CAPS Take 1 capsule by mouth daily      vitamin C (ASCORBIC ACID) 500 MG tablet Take 1,000 mg by mouth daily      gemfibrozil (LOPID) 600 MG tablet Take 600 mg by mouth 2 times daily (before meals)      buPROPion (WELLBUTRIN XL) 150 MG extended release tablet Take 150 mg by mouth every morning      JANTOVEN 2 MG tablet Restart 11/7/19. Please dispense Thurston Box and not Warfarin (Patient taking differently: 3.5 mg Restart 11/7/19.  Please dispense Jantoven and not Warfarin) 1 tablet 0    metFORMIN (GLUCOPHAGE) 1000 MG tablet Take 1 tablet by mouth 2 times daily (with meals) 60 tablet 5    insulin detemir (LEVEMIR FLEXTOUCH) 100 UNIT/ML injection pen Inject 58 Units into the skin nightly (Patient taking differently: Inject 50 Units into the skin nightly) 5 Pen 3    sertraline (ZOLOFT) 100 MG tablet Take 1 tablet by mouth daily 90 tablet 3    losartan (COZAAR) 25 MG tablet Take 25 mg by mouth every evening  30 tablet 0    NOVOLOG FLEXPEN 100 UNIT/ML injection pen Inject 5 Units into the skin 3 times daily (before meals) Or as directed      omeprazole (PRILOSEC) 40 MG capsule Take 1 capsule by mouth 2 times daily (Patient taking differently: Take 40 mg by mouth at bedtime) 180 capsule 3    glucose blood VI test strips (ASCENSIA AUTODISC VI;ONE TOUCH ULTRA TEST VI) strip 1 each by In Vitro route 3 times daily. As needed. 100 each 3    ONE TOUCH LANCETS MISC 1 each by Does not apply route 3 times daily. 100 each 3    atorvastatin (LIPITOR) 40 MG tablet Take 1 tablet by mouth daily. (Patient taking differently: Take 40 mg by mouth Daily with supper) 30 tablet 3    fluticasone (FLONASE) 50 MCG/ACT nasal spray 1 spray by Nasal route daily.  (Patient taking differently: 1 spray by Nasal route nightly) 1 Bottle 3    ondansetron (ZOFRAN-ODT) 4 MG disintegrating tablet DISSOLVE ONE TABLET BY MOUTH EVERY 6 HOURS AS NEEDED 90 tablet 2     Current Facility-Administered Medications   Medication Dose Route Frequency Provider Last Rate Last Admin    ceFAZolin (ANCEF) 2,000 mg in dextrose 5 % 50 mL IVPB (mini-bag)  2,000 mg IntraVENous Once Norris Luke MD        sodium chloride flush 0.9 % injection 5-40 mL  5-40 mL IntraVENous 2 times per day Antonina Vergara MD        sodium chloride flush 0.9 % injection 5-40 mL  5-40 mL IntraVENous PRN Antonina Vergara MD        0.9 % sodium chloride infusion   IntraVENous PRN Antonina Vergara MD        ondansetron [facility]) 4 MG/2ML injection             famotidine (PEPCID) 20 MG/2ML injection              Vital Signs (Current)   Vitals:    23 1301 23   BP:  (!) 144/89   Pulse:  80   Resp:  18   Temp:  97.2 °F (36.2 °C)   TempSrc:  Temporal   SpO2:  95%   Weight: 185 lb (83.9 kg) 183 lb 9.6 oz (83.3 kg)   Height: 5' 3\" (1.6 m) 5' 3\" (1.6 m)                                            Vital Signs Statistics (for past 48 hrs)     Temp  Av.2 °F (36.2 °C)  Min: 97.2 °F (36.2 °C)   Min taken time: 23  Max: 97.2 °F (36.2 °C)   Max taken time: 23  Pulse  Av  Min: [de-identified]   Min taken time: 23  Max: [de-identified]   Max taken time: 23  Resp  Av  Min: 25   Min taken time: 23 6948  Max: 18   Max taken time: 23  BP  Min: 144/89   Min taken time: 23  Max: 144/89   Max taken time: 23  SpO2  Av %  Min: 95 %   Min taken time: 23  Max: 95 %   Max taken time: 23  BP Readings from Last 3 Encounters:   23 (!) 144/89   22 (!) 134/59   22 (!) 146/86       BMI  Body mass index is 32.52 kg/m². Estimated body mass index is 32.52 kg/m² as calculated from the following:    Height as of this encounter: 5' 3\" (1.6 m). Weight as of this encounter: 183 lb 9.6 oz (83.3 kg).     CBC   Lab Results   Component Value Date/Time    WBC 5.4 2023 06:47 AM    RBC 4.76 2023 06:47 AM    HGB 12.3 2023 06:47 AM    HCT 37.9 2023 06:47 AM    MCV 79.6 2023 06:47 AM    RDW 16.0 2023 06:47 AM     2023 06:47 AM     CMP    Lab Results   Component Value Date/Time     2022 12:00 PM    K 4.1 2022 12:00 PM     2022 12:00 PM    CO2 22 2022 12:00 PM    BUN 11 2022 12:00 PM    CREATININE 0.4 10/21/2022 12:25 PM    CREATININE <0.5 2022 12:00 PM    GFRAA >60 2022 12:00 PM    GFRAA >60 2013 09:28 AM    AGRATIO 1.6 2017 12:05 PM    LABGLOM >60 10/21/2022 12:25 PM    GLUCOSE 207 2022 12:00 PM    PROT 6.8 04/15/2022 09:41 AM    PROT 6.8 2012 12:14 PM    CALCIUM 9.5 2022 12:00 PM    BILITOT 0.3 04/15/2022 09:41 AM    ALKPHOS 83 04/15/2022 09:41 AM    AST 23 04/15/2022 09:41 AM    ALT 25 04/15/2022 09:41 AM     BMP    Lab Results   Component Value Date/Time     2022 12:00 PM    K 4.1 2022 12:00 PM     2022 12:00 PM    CO2 22 2022 12:00 PM    BUN 11 2022 12:00 PM    CREATININE 0.4 10/21/2022 12:25 PM    CREATININE <0.5 2022 12:00 PM    CALCIUM 9.5 2022 12:00 PM    GFRAA >60 2022 12:00 PM    GFRAA >60 2013 09:28 AM    LABGLOM >60 10/21/2022 12:25 PM    GLUCOSE 207 2022 12:00 PM     POCGlucose  No results for input(s): GLUCOSE in the last 72 hours.   Coags    Lab Results   Component Value Date/Time    PROTIME 13.8 06/14/2022 10:30 AM    PROTIME 1.5 12/16/2016 12:00 AM    INR 1.06 06/14/2022 10:30 AM    APTT 32.9 11/04/2019 10:20 AM     HCG (If Applicable) No results found for: PREGTESTUR, PREGSERUM, HCG, HCGQUANT   ABGs   Lab Results   Component Value Date/Time    PHART 7.384 05/10/2010 04:15 PM    PO2ART 97.1 05/10/2010 04:15 PM    KRF8WOW 44.9 05/10/2010 04:15 PM    IXG0FWN 26.2 05/10/2010 04:15 PM    BEART 1.3 05/10/2010 04:15 PM    A9JEKUTZ 97.8 05/10/2010 04:15 PM      Type & Screen (If Applicable)  No results found for: LABABO, LABRH                         BMI: Wt Readings from Last 3 Encounters:       NPO Status:   Date of last liquid consumption: 01/15/23   Time of last liquid consumption: 2330   Date of last solid food consumption: 01/15/23      Time of last solid consumption: 2330       Anesthesia Evaluation  Patient summary reviewed no history of anesthetic complications:   Airway: Mallampati: III  TM distance: >3 FB   Neck ROM: full  Comment: Prior airway was grade 2a with hillman 2 blade  Mouth opening: > = 3 FB   Dental: normal exam         Pulmonary: breath sounds clear to auscultation  (+) COPD:  asthma:     (-) shortness of breath, sleep apnea and not a current smoker                           Cardiovascular:  Exercise tolerance: good (>4 METS),   (+) hypertension:, CAD:, hyperlipidemia    (-) past MI and  angina        Rate: normal                 ROS comment: TTE 2022:  Conclusions      Summary   Normal left ventricle size, wall thickness, and systolic function with an   estimated ejection fraction of 60-65%. No regional wall motion abnormalities   are seen.   Normal right ventricular size and function.   The left atrium is mildly dilated.   Trivial mitral regurgitation.    Stress 2022:     Conclusions      Summary   Normal myocardial perfusion study.   Normal myocardial  perfusion.   Normal LV size and systolic function.   Overall findings represent a low risk study. Neuro/Psych:   (+) neuromuscular disease:, headaches:, psychiatric history:   (-) seizures, TIA and CVA           GI/Hepatic/Renal:   (+) GERD:,      (-) liver disease, no renal disease and no morbid obesity       Endo/Other:    (+) DiabetesType II DM, using insulin, blood dyscrasia (Jantoven (coumadin, held since 1/11/2023, rechecking PT-INR))::., .    (-) hypothyroidism               Abdominal:             Vascular:   + DVT, PE. Other Findings:             Anesthesia Plan      general     ASA 3     (68 yo with COPD/asthma, HTN, CAD, HLD, GERD, protein c def, and DMII presents for microlumbar laminectomy L3-L4, L4-L5. She is on coumadin for Protein C deficiency which has been held for 5 days. Rechecking PT-INR prior to OR. I discussed with the patient the risks and benefits to general anesthesia including possible anesthetic complications but not limited to: PONV, damage to the airway and surrounding structures (teeth, lips, gums, tongue, etc.), adverse reactions to medications, cardiac complications (MI, CHF, arrhythmias, etc.), respiratory complications (post-op ventilation, respiratory failure, etc.), neurologic complications (nerve damage, stroke, seizure) and death. The patient was given the opportunity to ask questions and all questions were answered to the patient's satisfaction.  )  Induction: intravenous. MIPS: Postoperative opioids intended and Prophylactic antiemetics administered. Anesthetic plan and risks discussed with patient. Plan discussed with CRNA. This pre-anesthesia assessment may be used as a history and physical.    DOS STAFF ADDENDUM:    Pt seen and examined, chart reviewed (including anesthesia, drug and allergy history). No interval changes to history and physical examination.   Anesthetic plan, risks, benefits, alternatives, and personnel involved discussed with patient. Patient verbalized an understanding and agrees to proceed.       Thu Barnes MD  January 16, 2023  7:02 AM

## 2023-01-16 NOTE — LETTER
Billing and Coding Fee Ticket    Name:LAYNE PHILLIPS  : 1950  Diagnosis:    Diagnosis Orders   1. Spinal stenosis of lumbar region with neurogenic claudication  oxyCODONE-acetaminophen (PERCOCET) 5-325 MG per tablet        Surgeon: Dorene Smith    DOS: 23    Procedure:  1. Microlumbar bilateral laminotomy, partial facetectomy, and foraminotomy [id number] [id number]  2.  Each additional level L4-5 [id number]

## 2023-01-16 NOTE — H&P
I have reviewed the history and physical and examined the patient and find no relevant changes. I have reviewed with the patient and/or family the risks, benefits, and alternatives to the procedure. The patient was counseled at length about the risks of calli Covid-19 during their perioperative period and any recovery window from their procedure. The patient was made aware that calli Covid-19  may worsen their prognosis for recovering from their procedure  and lend to a higher morbidity and/or mortality risk. All material risks, benefits, and reasonable alternatives including postponing the procedure were discussed. The patient does wish to proceed with the procedure at this time. Tylor Carrasco MD  1/16/2023 No intake/output data recorded.

## 2023-01-16 NOTE — ANESTHESIA POSTPROCEDURE EVALUATION
Department of Anesthesiology  Postprocedure Note    Patient: Arian Renner  MRN: [id number]  YOB: 1950  Date of evaluation: 1/16/2023      Procedure Summary     Date: 01/16/23 Room / Location: [address]    Anesthesia Start: 8502 Anesthesia Stop: 0957    Procedure: MICROLUMBAR LAMINECTOMY L3-4, L4-5 (Back) Diagnosis:       Lumbar stenosis with neurogenic claudication      (LUMBAR STENOSIS WITH NEUROGENIC CLAUDICATION)    Surgeons: Brendan Kelly MD Responsible Provider: Lindy Munoz MD    Anesthesia Type: general ASA Status: 3          Anesthesia Type: No value filed. Kristie Phase I: Kristie Score: 8    Kristie Phase II:        Anesthesia Post Evaluation    Patient location during evaluation: PACU  Patient participation: complete - patient participated  Level of consciousness: awake  Airway patency: patent  Nausea & Vomiting: no nausea and no vomiting  Cardiovascular status: blood pressure returned to baseline  Respiratory status: acceptable  Hydration status: stable  Comments: Vital signs stable  OK to discharge from Stage I post anesthesia care.   Care transferred from Anesthesiology department on discharge from perioperative area   Multimodal analgesia pain management approach

## 2023-01-16 NOTE — PROGRESS NOTES
Patient arrived in room 3113 at 1730. Patient oriented to room. Medications administered and tolerated well. Pain 10/10, Dialudid given as patient experiences nausea with oral pain medications. No other acute complaints at this time. Call light in reach, fall precautions in place, will continue to monitor.  Electronically signed by Ary Talamantes RN on 1/16/2023 at 6:20 PM

## 2023-01-16 NOTE — DISCHARGE INSTRUCTIONS
D[facility]    Post Operative Lumbar Spine Surgery Instructions    Patients with leg pain before surgery generally note marked improvement in their leg symptoms soon after surgery. However it may take weeks for complete relief of your leg pain. Many patients note an increase in their leg symptoms 2 to 4 days after surgery. Such pain is usually related to inflammation and improves with time. Your leg numbness may be a little worse following surgery and should improve with time. Please call our office even at night if you began to experience pain, numbness or weakness in your previously asymptomatic leg, numbness in your groin or saddle area, difficulty controlling your bladder or bowels, or severe pain, increasing numbness or increasing weakness of your previously symptomatic leg. Incision/Showering: You may remove your dressing 3 to 4 days after your surgery. The small pieces of tape over your incision will fall off several days after surgery, please do not remove them. You may shower with the dressing on the first 3 to 4 days after surgery and without a dressing thereafter. Keep your incision clean and dry, other than when you are showering. Please avoid bathing in a tub until discussed with the doctor. Look at your wound daily. Call the office if you notice a foul odor, drainage, or the skin is red around your incision. Call for fevers over 101.0 degrees or go to the emergency room for evaluation. While it is rare, you may be developing an infection. Activity/Driving:  Walking is encouraged as a daily routine. Walk as far as you like. This builds and maintains muscles. Avoid twisting, bending and most importantly, lifting over 10 pounds. Steps are permissible. You may begin driving when you feel you can safely do so. Remember that while you are taking narcotic medications you cannot react or move as quickly and it may be unsafe to drive.     Diet/Bowel routine:  Eat three healthy meals a day to assist your body to heal well. Constipation is common after surgery and especially while on pain medications. Apple or prune juice may help with this problem. Over the counter stool softeners are often helpful. You may also try any over the counter laxative you feel may be helpful. Drinking more water is another way to keep your bowels moving. Braces: You may have been given a back brace at the hospital.  If you have received one,  then you should be wearing it at all times when in an upright position. It can be removed for bathing or sleep. Medications: You can resume your usual home medicines after you are discharged from the hospital.  Additionally, pain medications or anti-inflammatory medications may have been prescribed. Please take the anti-inflammatory medication even if you do not take the pain medication. The anti-inflammatory and pain medications may be taken together. Please do not take the anti-inflammatory medication if you have a history of ulcers or gastritis. Please stop the anti-inflammatory medication if it upsets your stomach or you notice blood in you stools or black stools. These medicines have been given to you to assist in relief of pain related to your surgery. It will be helpful to you to keep your pain under control in order to assist you in completing the recommended daily walks. Returning to work:  Some patients can return to a sedentary job in 1-3 days. Heavy  lifting jobs require more time off of work. Please discuss this with the doctor on your first postoperative office visit. Healing may take up to 3 or more  months after a major spine surgery. Diabetics:  Sometimes blood sugars are elevated after a surgery and may take up to a week to return to your usual levels. As well, if you have been prescribed a Medrol Steroid Pack, your sugars are likely to become somewhat elevated for a short period of time.   Please monitor your blood sugars daily and call your primary doctor if the levels become higher than 200. Follow-up Appointment:  You should contact the office for an appointment with the doctor or his assistant 2-3 weeks after surgery. Dr. Ivan Coronel.  Sarah       [phone]

## 2023-01-16 NOTE — PROGRESS NOTES
Pt alert, having pain. Denies numbness, tingling or pain in BLE. Pain unrelieved by dilaudid, Dr. Moises Schwab notified, Robaxin ordered. Pt moves all extremities well.

## 2023-01-16 NOTE — DISCHARGE INSTR - DIET
Good nutrition is important when healing from an illness, injury, or surgery. Follow any nutrition recommendations given to you during your hospital stay. If you were given an oral nutrition supplement while in the hospital, continue to take this supplement at home. You can take it with meals, in-between meals, and/or before bedtime. These supplements can be purchased at most local grocery stores, pharmacies, and chain [employer]ores. If you have any questions about your diet or nutrition, call the hospital and ask for the dietitian.   Advance to your regular diet as tolerated

## 2023-01-16 NOTE — OP NOTE
Operative Note      Patient: Raymondo Galeazzi  YOB: 1950  MRN: [id number]    Date of Procedure: 1/16/2023    Pre-Op Diagnosis: LUMBAR STENOSIS WITH NEUROGENIC CLAUDICATION    Post-Op Diagnosis: Same       Procedure(s): MICROLUMBAR LAMINECTOMY L3-4, L4-5    Surgeon(s):  Laury Hawk MD    Assistant:   Surgical Assistant: Stephen Melvin    Anesthesia: General    Estimated Blood Loss (mL): less than 50     Complications: Other: small dural tear    Specimens:   * No specimens in log *    Implants:  Implant Name Type Inv. Item Serial No.  Lot No. LRB No. Used Action   SYSTEM SEAL 5ML SPINE DURA FOR CRAN [employer] - [id number]  SYSTEM SEAL 5ML SPINE DURA FOR CRAN SURG [employer] MARBIN- [id number] N/A 1 Implanted         Drains: * No LDAs found *    Findings: stenosis    Detailed Description of Procedure:     INDICATIONS FOR SURGERY: Cori Hernandez is a 74 y.o. female with back and bilateral leg pain. The symptoms failed to respond to conservative intervention. An MRI scan was performed and this showed evidence of severe spinal stenosis L3/L4 and L4-5. Having failed conservative management and experiencing persistent symptoms, the patient elected to proceed ahead with the surgical option listed above. DETAILS OF PROCEDURE: The patient was brought to the operating room and placed under general anesthesia. The patient was then placed prone on a Andrew table. All bony prominences were inspected and padded prior to sterile draping. Using a #10 blade knife, the skin was incised in the midline and monopolar cautery was used to dissect through the subcutaneous tissue to open the fascia and reflect the paraspinal muscles laterally exposing the L3/L4 interspace on the right side. The microscope was then brought into the field and used to assist with performing a microsurgical hemilaminotomy, partial facetectomy and foraminotomy.  Using the Rebls Eben drill, I burred down the hemilamina of L3 and the medial portion of the facet joint. The underlying ligamentum flavum was freed with the micronerve hook from the underlying dura and removed with the 3 mm punch laterally, exposing the lateral dural tube and takeoff of the L4 nerve root. I carefully dissected the ligamentum flavum from the dura using a micronerve hook and removed it. I then performed a foraminotomy with a 3 mm punch. The L4 nerve root and the thecal sac were identified and noted to be without dura tears. I then made an incision in the fascia to the right of the spinous process. I then performed a right hemilaminotomy, partial facetectomy and foraminotomy at the L3/L4 using the previously described method. I then performed a bilateral hemilaminotomy, partial facetectomy and foraminotomy at the L4-5 using the previously described method. I identified a small dural tear in the L4-5 scar tissue on the left at L4-5. I placed duraseal over that area. The wound was copiously irrigated with antibiotic solution. 0.5% Marcaine in subcutaneous tissues for analgesia. The fascia was then reapproximated using interrupted 0 Vicryl sutures and interrupted 3-0 Vicryl sutures followed by a 4-0 Vicryl subcuticular suture were used to reapproximate the subcuticular layer. A sterile dressing was then applied. The patient was extubated in the operating room and transferred to the recovery room in stable condition. There were no complications. Jorge Kumar M.D.         Electronically signed by Leela Snyder MD on 1/16/2023 at 9:50 AM

## 2023-01-17 VITALS
WEIGHT: 203.71 LBS | BODY MASS INDEX: 36.09 KG/M2 | RESPIRATION RATE: 16 BRPM | HEIGHT: 63 IN | SYSTOLIC BLOOD PRESSURE: 167 MMHG | DIASTOLIC BLOOD PRESSURE: 73 MMHG | OXYGEN SATURATION: 94 % | HEART RATE: 70 BPM | TEMPERATURE: 98.2 F

## 2023-01-17 LAB
GLUCOSE BLD-MCNC: 115 MG/DL (ref 70–99)
GLUCOSE BLD-MCNC: 248 MG/DL (ref 70–99)
PERFORMED ON: ABNORMAL
PERFORMED ON: ABNORMAL

## 2023-01-17 PROCEDURE — 6360000002 HC RX W HCPCS: Performed by: ORTHOPAEDIC SURGERY

## 2023-01-17 PROCEDURE — 97530 THERAPEUTIC ACTIVITIES: CPT

## 2023-01-17 PROCEDURE — 97116 GAIT TRAINING THERAPY: CPT

## 2023-01-17 PROCEDURE — 6370000000 HC RX 637 (ALT 250 FOR IP): Performed by: ORTHOPAEDIC SURGERY

## 2023-01-17 PROCEDURE — 94760 N-INVAS EAR/PLS OXIMETRY 1: CPT

## 2023-01-17 PROCEDURE — 97161 PT EVAL LOW COMPLEX 20 MIN: CPT

## 2023-01-17 RX ADMIN — OXYCODONE AND ACETAMINOPHEN 2 TABLET: 5; 325 TABLET ORAL at 06:45

## 2023-01-17 RX ADMIN — METFORMIN HYDROCHLORIDE 1000 MG: 500 TABLET ORAL at 08:26

## 2023-01-17 RX ADMIN — GEMFIBROZIL 600 MG: 600 TABLET ORAL at 08:26

## 2023-01-17 RX ADMIN — INSULIN LISPRO 2 UNITS: 100 INJECTION, SOLUTION INTRAVENOUS; SUBCUTANEOUS at 11:57

## 2023-01-17 RX ADMIN — ACETAMINOPHEN 650 MG: 325 TABLET ORAL at 05:33

## 2023-01-17 RX ADMIN — HYDROMORPHONE HYDROCHLORIDE 0.5 MG: 1 INJECTION, SOLUTION INTRAMUSCULAR; INTRAVENOUS; SUBCUTANEOUS at 00:29

## 2023-01-17 RX ADMIN — GABAPENTIN 300 MG: 300 CAPSULE ORAL at 08:26

## 2023-01-17 RX ADMIN — HYDROMORPHONE HYDROCHLORIDE 0.5 MG: 1 INJECTION, SOLUTION INTRAMUSCULAR; INTRAVENOUS; SUBCUTANEOUS at 10:00

## 2023-01-17 RX ADMIN — OXYCODONE HYDROCHLORIDE 5 MG: 5 TABLET ORAL at 15:09

## 2023-01-17 RX ADMIN — OXYCODONE HYDROCHLORIDE AND ACETAMINOPHEN 1000 MG: 500 TABLET ORAL at 08:26

## 2023-01-17 RX ADMIN — PANTOPRAZOLE SODIUM 40 MG: 40 TABLET, DELAYED RELEASE ORAL at 05:33

## 2023-01-17 RX ADMIN — GABAPENTIN 300 MG: 300 CAPSULE ORAL at 15:02

## 2023-01-17 RX ADMIN — POTASSIUM CHLORIDE AND SODIUM CHLORIDE: 900; 150 INJECTION, SOLUTION INTRAVENOUS at 05:50

## 2023-01-17 RX ADMIN — SERTRALINE 100 MG: 100 TABLET, FILM COATED ORAL at 08:26

## 2023-01-17 RX ADMIN — POLYETHYLENE GLYCOL 3350 17 G: 17 POWDER, FOR SOLUTION ORAL at 08:41

## 2023-01-17 RX ADMIN — ACETAMINOPHEN 650 MG: 325 TABLET ORAL at 11:57

## 2023-01-17 RX ADMIN — METHOCARBAMOL TABLETS 750 MG: 750 TABLET, COATED ORAL at 05:33

## 2023-01-17 RX ADMIN — ONDANSETRON 4 MG: 2 INJECTION INTRAMUSCULAR; INTRAVENOUS at 02:40

## 2023-01-17 RX ADMIN — HYDROMORPHONE HYDROCHLORIDE 0.5 MG: 1 INJECTION, SOLUTION INTRAMUSCULAR; INTRAVENOUS; SUBCUTANEOUS at 03:51

## 2023-01-17 RX ADMIN — OXYCODONE AND ACETAMINOPHEN 2 TABLET: 5; 325 TABLET ORAL at 02:40

## 2023-01-17 RX ADMIN — GEMFIBROZIL 600 MG: 600 TABLET ORAL at 15:02

## 2023-01-17 RX ADMIN — BUPROPION HYDROCHLORIDE 150 MG: 150 TABLET, EXTENDED RELEASE ORAL at 08:26

## 2023-01-17 RX ADMIN — Medication 1000 UNITS: at 08:26

## 2023-01-17 ASSESSMENT — PAIN SCALES - GENERAL
PAINLEVEL_OUTOF10: 8
PAINLEVEL_OUTOF10: 7
PAINLEVEL_OUTOF10: 6
PAINLEVEL_OUTOF10: 7
PAINLEVEL_OUTOF10: 10
PAINLEVEL_OUTOF10: 8
PAINLEVEL_OUTOF10: 3
PAINLEVEL_OUTOF10: 5
PAINLEVEL_OUTOF10: 3

## 2023-01-17 ASSESSMENT — PAIN DESCRIPTION - PAIN TYPE
TYPE: SURGICAL PAIN

## 2023-01-17 ASSESSMENT — PAIN DESCRIPTION - FREQUENCY
FREQUENCY: CONTINUOUS

## 2023-01-17 ASSESSMENT — PAIN SCALES - WONG BAKER
WONGBAKER_NUMERICALRESPONSE: 0
WONGBAKER_NUMERICALRESPONSE: 2
WONGBAKER_NUMERICALRESPONSE: 4
WONGBAKER_NUMERICALRESPONSE: 0
WONGBAKER_NUMERICALRESPONSE: 0
WONGBAKER_NUMERICALRESPONSE: 4
WONGBAKER_NUMERICALRESPONSE: 0
WONGBAKER_NUMERICALRESPONSE: 4
WONGBAKER_NUMERICALRESPONSE: 4

## 2023-01-17 ASSESSMENT — PAIN - FUNCTIONAL ASSESSMENT
PAIN_FUNCTIONAL_ASSESSMENT: PREVENTS OR INTERFERES SOME ACTIVE ACTIVITIES AND ADLS

## 2023-01-17 ASSESSMENT — PAIN DESCRIPTION - ORIENTATION
ORIENTATION: LOWER

## 2023-01-17 ASSESSMENT — PAIN DESCRIPTION - DESCRIPTORS
DESCRIPTORS: ACHING;DISCOMFORT
DESCRIPTORS: ACHING;DISCOMFORT
DESCRIPTORS: ACHING
DESCRIPTORS: ACHING;DISCOMFORT
DESCRIPTORS: ACHING
DESCRIPTORS: ACHING
DESCRIPTORS: ACHING;DISCOMFORT
DESCRIPTORS: ACHING;DISCOMFORT

## 2023-01-17 ASSESSMENT — PAIN DESCRIPTION - LOCATION
LOCATION: BACK

## 2023-01-17 ASSESSMENT — PAIN DESCRIPTION - ONSET
ONSET: ON-GOING

## 2023-01-17 NOTE — PLAN OF CARE
Problem: Chronic Conditions and Co-morbidities  Goal: Patient's chronic conditions and co-morbidity symptoms are monitored and maintained or improved  1/17/2023 0910 by Bette Franco RN  Outcome: Progressing     Problem: Discharge Planning  Goal: Discharge to home or other facility with appropriate resources  1/17/2023 0910 by Bette Franco RN  Outcome: Progressing     Problem: Pain  Goal: Verbalizes/displays adequate comfort level or baseline comfort level  1/17/2023 0910 by Bette Franco RN  Outcome: Progressing     Problem: Safety - Adult  Goal: Free from fall injury  1/17/2023 0910 by Bette Franco RN  Outcome: Progressing  Flowsheets (Taken 1/17/2023 0908)  Free From Fall Injury: Instruct family/caregiver on patient safety     Problem: ABCDS Injury Assessment  Goal: Absence of physical injury  1/17/2023 0910 by Bette Franco RN  Outcome: Progressing  Flowsheets (Taken 1/17/2023 0908)  Absence of Physical Injury: Implement safety measures based on patient assessment     Problem: Skin/Tissue Integrity  Goal: Absence of new skin breakdown  Description: 1. Monitor for areas of redness and/or skin breakdown  2. Assess vascular access sites hourly  3. Every 4-6 hours minimum:  Change oxygen saturation probe site  4. Every 4-6 hours:  If on nasal continuous positive airway pressure, respiratory therapy assess nares and determine need for appliance change or resting period.   1/17/2023 0910 by Bette Franco RN  Outcome: Progressing     Problem: Neurosensory - Adult  Goal: Achieves stable or improved neurological status  1/17/2023 0910 by Bette Franco RN  Outcome: Progressing     Problem: Neurosensory - Adult  Goal: Achieves maximal functionality and self care  1/17/2023 0910 by Bette Franco RN  Outcome: Progressing     Problem: Respiratory - Adult  Goal: Achieves optimal ventilation and oxygenation  1/17/2023 0910 by Bette Franco RN  Outcome: Progressing     Problem: Skin/Tissue Integrity - Adult  Goal: Skin integrity remains intact  1/17/2023 0910 by Clement Lagos RN  Outcome: Progressing  Flowsheets (Taken 1/17/2023 0908)  Skin Integrity Remains Intact: Monitor for areas of redness and/or skin breakdown     Problem: Skin/Tissue Integrity - Adult  Goal: Incisions, wounds, or drain sites healing without S/S of infection  1/17/2023 0910 by Clement Lagos RN  Outcome: Progressing  Flowsheets (Taken 1/17/2023 0908)  Incisions, Wounds, or Drain Sites Healing Without Sign and Symptoms of Infection: ADMISSION and DAILY: Assess and document risk factors for pressure ulcer development     Problem: Musculoskeletal - Adult  Goal: Return mobility to safest level of function  1/17/2023 0910 by Clement Lagos RN  Outcome: Progressing     Problem: Gastrointestinal - Adult  Goal: Minimal or absence of nausea and vomiting  1/17/2023 0910 by Clement Lagos RN  Outcome: Progressing

## 2023-01-17 NOTE — PROGRESS NOTES
Physical Therapy  Facility/Department: [facility]  Physical Therapy Initial Assessment / Discharge    Name: Cheri Gramajo  : 1950  MRN: [id number]  Date of Service: 2023    Discharge Recommendations:  24 hour supervision or assist   Cheri Gramajo scored a /24 on the AM-PAC short mobility form. Patient Diagnosis(es): The encounter diagnosis was Spinal stenosis of lumbar region with neurogenic claudication. Past Medical History:  has a past medical history of Asthma, Colon polyp, Depression, DVT (deep venous thrombosis) (HCC), Esophageal reflux, GERD with stricture, Hx of blood clots, Hyperlipidemia, Hypertension, Insomnia, Migraine, Osteoarthritis, Protein deficiency ([address] Pulmonary embolism ([address] Thyroid adenoma, and Type II or unspecified type diabetes mellitus without mention of complication, not stated as uncontrolled. Past Surgical History:  has a past surgical history that includes Thyroidectomy, partial; Appendectomy; Ovarian cyst surgery; hiatal hernia repair; Endoscopy, colon, diagnostic; Colonoscopy; Shoulder arthroscopy (Right, 2016); cervical fusion (); Hysterectomy; cervical fusion (N/A, 2019); Colonoscopy (N/A, 2020); back surgery; shoulder surgery (Right, 2022); and Lumbar spine surgery (N/A, 2023). Assessment   Body Structures, Functions, Activity Limitations Requiring Skilled Therapeutic Intervention: Decreased functional mobility   Assessment: 73 yo female with pre op dx of lumbar stenosis with neurogenic claudication, to hospital 2023 for Microlumbar laminectomy L3-4, L4-5 via Dr. Marcianne Favre. Pther PMHx as noted, including prior cervical surgery and prior lumbar surgery (remote). Also with persistent cough which patient states due to sinus issues. Prior status: lives at home with family, and independence in all functional mobility. Status 23:  Bed mobility SBA with cues for log rolling. Transfers SBA-CGA and cues. Gait with Power Height RW 76' with SBA-CGA with good quality and tolerance. Performs stairs as needed for home with CGA-SBA and cues. Patient and  educated in back protection via Log rolling bed mobility, and no bending/lifting/twisting. Patient and  verb understanding.  very supportive. Patient appears appropriate for DC to home with inital 24/7 support. Patient requires a Power Height RW to promote safe and quality mobility. Therapy Prognosis: Good  Decision Making: Low Complexity  History: as noted  Clinical Presentation: Stable  Requires PT Follow-Up: No  Activity Tolerance  Activity Tolerance: Patient tolerated evaluation without incident     Plan   Safety Devices  Type of Devices: Gait belt, Bed alarm in place, Call light within reach, Patient at risk for falls, Left in bed, Nurse notified (Small ice pack filled and placed to lowback.)     Restrictions  Restrictions/Precautions  Restrictions/Precautions: Fall Risk  Required Braces or Orthoses?: No  Position Activity Restriction  Spinal Precautions: No Bending, No Lifting, No Twisting     Subjective   General  Additional Pertinent Hx: 73 yo female with pre op dx of lumbar stenosis with neurogenic claudication, to hospital 1- for Microlumbar laminectomy L3-4, L4-5 via Dr. Carmencita Bojorquez. Pther PMHx as noted, including prior cervical surgery and prior lumbar surgery (remote). Also with persistent cough which patient states due to sinus issues. Response To Previous Treatment: Not applicable  Family / Caregiver Present: Yes ()  Referring Practitioner: Tiffanie Euceda MD  Referral Date : 01/17/23  Subjective  Subjective: Patient in bed. Agreeable to therapy. Reports pain primarily at R waist level and into R buttock, 8/10 during mobility.          Social/Functional History  Social/Functional History  Lives With: Spouse, Daughter  Home Layout: Two level, 1/2 bath on main level, Bed/Bath upstairs, Laundry in basement (L rail ascending part way; R rail all the way up.)  Home Access: Level entry  Bathroom Shower/Tub: Tub/Shower unit, Shower chair without back  Bathroom Toilet: Standard (vanity near)  Home Equipment: [employer], rolling  Has the patient had two or more falls in the past year or any fall with injury in the past year?: No  ADL Assistance: [facility] [address]: Independent  Ambulation Assistance: Independent  Transfer Assistance: Independent  Active : Yes  Additional Comments:  and dtr (and if needed son) will be with patient 24 hours as patient needs. Vision/Hearing  Vision  Vision: Impaired  Vision Exceptions: Wears glasses at all times  Hearing  Hearing: Within functional limits    Cognition   Orientation  Overall Orientation Status: Within Normal Limits  Cognition  Overall Cognitive Status: WNL     Objective   AROM RLE (degrees)  RLE AROM: WFL  AROM LLE (degrees)  LLE AROM : WFL  Strength RLE  Strength RLE: WFL  Comment: including EHL, ankle DF. Strength LLE  Strength LLE: WFL  Comment: including EHL, ankle DF. Bed mobility  Supine to Sit: Stand by assistance (verbal/tactile cues for log rolling.)  Sit to Supine: Stand by assistance (verbal / tactile cues for technique. )  Transfers  Sit to Stand: Stand by assistance;Contact guard assistance (cues for technique EOB, BS chair, commode)  Stand to Sit: Stand by assistance;Contact guard assistance (cues for technique EOB, BS chair, commode)  Ambulation  Surface: Level tile  Device: Rolling Walker (Power Height)  Assistance: Stand by assistance  Quality of Gait: Initial cues to stay within RW base, and maintain controlled gerber. Step through pattern. Stable. Distance: 76'  Comments: Patient uses commode and urinates. Manages briefs, aamir care, and stance at sink to wash hands, with SBA. Stairs/Curb  Stairs?: Yes  Stairs  # Steps : 4  Stairs Height: 6\"  Rails: Bilateral  Assistance: Stand by assistance  Comment: Controlled step to pattern.   Cues for sequencing. Balance  Posture: Good  Comments: Supervision unsupported sitting. SBA RW gait. AM-PAC Score  AM-PAC Inpatient Mobility Raw Score : 21 (01/17/23 1644)  AM-PAC Inpatient T-Scale Score : 50.25 (01/17/23 1644)  Mobility Inpatient CMS 0-100% Score: 28.97 (01/17/23 1644)  Mobility Inpatient CMS G-Code Modifier : Lori Lips (01/17/23 1644)    Goals  Patient Goals   Patient Goals : \"To go home and get better. \"       Education  Patient Education  Education Given To: Patient; Family  Education Provided: Role of Therapy;Plan of Care;Transfer Training;Precautions  Education Provided Comments: back precautions, log rolling for bed mobility. Use of ice and pain medication to control pain.       Therapy Time   Individual Concurrent Group Co-treatment   Time In 1788         Time Out 1410         Minutes 59            Ev 15; TA 30; Gt 19    Griselda Young PT  Electronically signed by Vicky Arreola, [address] [place] [address] Drive [phone])  on 1/17/2023 at 4:47 PM

## 2023-01-17 NOTE — CARE COORDINATION
INITIAL CASE MANAGEMENT ASSESSMENT    Reviewed chart, met with patient to assess possible discharge needs. Explained Case Management role/services. Living Situation: lives at home with her  and daughter    ADLs: independent     DME: shower chair    PT/OT Recs: pending     Active Services: none   Transportation: family will transport at [employer]      Medications: no issues --Beatris burr    PCP: Allyson SANTIAGO      HD/PD: n/a    PLAN/COMMENTS: patient plans to return home at dc- denies dc needs    SW/CM provided contact information for patient or family to call with any questions. SW/CM will follow and assist as needed.   Electronically signed by Maco Brewer on 1/17/2023 at 12:23 PM  #[phone]

## 2023-01-17 NOTE — ACP (ADVANCE CARE PLANNING)
Advance Care Planning     Advance Care Planning Activator (Inpatient)  Conversation Note      Date of ACP Conversation: 1/17/2023     Conversation Conducted with: Patient with Decision Making Capacity    ACP Activator: [address] Decision Maker:     Current Designated Health Care Decision Maker:     Primary Decision Maker: [facility] - [phone]    Secondary Decision Maker: Concha Contreras Child - [phone]  Click here to complete Healthcare Decision Makers including section of the Healthcare Decision Maker Relationship (ie \"Primary\")      Care Preferences    Ventilation: \"If you were in your present state of health and suddenly became very ill and were unable to breathe on your own, what would your preference be about the use of a ventilator (breathing machine) if it were available to you? \"      Would the patient desire the use of ventilator (breathing machine)?: yes    \"If your health worsens and it becomes clear that your chance of recovery is unlikely, what would your preference be about the use of a ventilator (breathing machine) if it were available to you? \"     Would the patient desire the use of ventilator (breathing machine)?: No      Resuscitation  \"CPR works best to restart the heart when there is a sudden event, like a heart attack, in someone who is otherwise healthy. Unfortunately, CPR does not typically restart the heart for people who have serious health conditions or who are very sick. \"    \"In the event your heart stopped as a result of an underlying serious health condition, would you want attempts to be made to restart your heart (answer \"yes\" for attempt to resuscitate) or would you prefer a natural death (answer \"no\" for do not attempt to resuscitate)? \" yes       [] Yes   [] No   Educated Patient / Priscilla Sanchez regarding differences between Advance Directives and portable DNR orders.     Length of ACP Conversation in minutes:      Zach Ray Outcomes:  [x] ACP discussion completed  [] Existing advance directive reviewed with patient; no changes to patient's previously recorded wishes  [] New Advance Directive completed  [] Portable Do Not Rescitate prepared for Provider review and signature  [] POLST/POST/MOLST/MOST prepared for Provider review and signature      Follow-up plan:    [] Schedule follow-up conversation to continue planning  [] Referred individual to Provider for additional questions/concerns   [] Advised patient/agent/surrogate to review completed ACP document and update if needed with changes in condition, patient preferences or care setting    [x] This note routed to one or more involved healthcare providers        Electronically signed by Farhad Irizarry on 1/17/2023 at 12:18 PM  #[phone]

## 2023-01-17 NOTE — CARE COORDINATION
CASE MANAGEMENT DISCHARGE SUMMARY:    DISCHARGE DATE: 1/17/2023    DISCHARGED TO HOME     TRANSPORTATION: family                DME: [facility] [place]. Jennifer Vega              COMMENTS: Patient plans to return tom. Rolling walker recommended. Referral to [facility]. Rn aware.     Electronically signed by MARCELLUS Rush, LISW, Case Management on 1/17/2023 at 2:27 PM  Joaquin [phone]

## 2023-01-17 NOTE — PROGRESS NOTES
At start of shift, patient in bed flat/supine and  at bedside. Patient complains of pain and nausea and cannot tolerate PO at this time. Patient states when Dilaudid was given by day shift RN she immediately had \"dry heaving. \"     2001- Perfect to sent to Dr. Preston Burger (on call for Dr. Benedict Horner) about changing PO antiemetics to IV.     2002- Dr. Preston Burger called this RN and states to change PO Zofran to IV.    2028 - IV Zofran administered, patient states she would like to try IV Robaxin due to nausea. 2051 - IV Robaxin hung. 2138 - Patient states pain is 10/10 and wants to try IV Dilaudid again - had no nausea after administration. 2231- Patient called this RN in 10/10 pain and crying, states nausea has improved and wants to try PO Percocet, medication administered. 2300 - Patient log rolled to left side and pillow placed between legs per orders, new ice pack applied to surgical incision on back - clean, dry and intact. Patient states she feels she can tolerate all PO scheduled medications now- administered. Vital signs remain stable, bailey draining clear, yellow urine, IVF infusing, DVT boots and ALFREDO hose on, neuro checks WDL, will continue to monitor.

## 2023-01-17 NOTE — PLAN OF CARE
Problem: Chronic Conditions and Co-morbidities  Goal: Patient's chronic conditions and co-morbidity symptoms are monitored and maintained or improved  1/17/2023 0049 by Ty Love RN  Outcome: Progressing  Flowsheets (Taken 1/17/2023 0049)  Care Plan - Patient's Chronic Conditions and Co-Morbidity Symptoms are Monitored and Maintained or Improved: Monitor and assess patient's chronic conditions and comorbid symptoms for stability, deterioration, or improvement  1/16/2023 1941 by Laly Boogie RN  Outcome: Progressing  Flowsheets (Taken 1/16/2023 1941)  Care Plan - Patient's Chronic Conditions and Co-Morbidity Symptoms are Monitored and Maintained or Improved: Monitor and assess patient's chronic conditions and comorbid symptoms for stability, deterioration, or improvement     Problem: Discharge Planning  Goal: Discharge to home or other facility with appropriate resources  1/17/2023 0049 by Ty Love RN  Outcome: Progressing  Flowsheets (Taken 1/17/2023 0049)  Discharge to home or other facility with appropriate resources: Identify barriers to discharge with patient and caregiver  1/16/2023 1941 by Laly Boogie RN  Outcome: Progressing  Flowsheets (Taken 1/16/2023 1941)  Discharge to home or other facility with appropriate resources:   Identify barriers to discharge with patient and caregiver   Refer to discharge planning if patient needs post-hospital services based on physician order or complex needs related to functional status, cognitive ability or social support system   Arrange for needed discharge resources and transportation as appropriate     Problem: Pain  Goal: Verbalizes/displays adequate comfort level or baseline comfort level  1/17/2023 0049 by Ty Love RN  Outcome: Progressing  Flowsheets (Taken 1/17/2023 0049)  Verbalizes/displays adequate comfort level or baseline comfort level: Encourage patient to monitor pain and request assistance  1/16/2023 1941 by Shakeel Gee Daisy Lynn RN  Outcome: Progressing  Flowsheets (Taken 1/16/2023 1941)  Verbalizes/displays adequate comfort level or baseline comfort level:   Encourage patient to monitor pain and request assistance   Administer analgesics based on type and severity of pain and evaluate response   Consider cultural and social influences on pain and pain management   Assess pain using appropriate pain scale   Implement non-pharmacological measures as appropriate and evaluate response   Notify Licensed Independent Practitioner if interventions unsuccessful or patient reports new pain     Problem: Safety - Adult  Goal: Free from fall injury  1/17/2023 0049 by Zakia Shen RN  Outcome: Progressing  Flowsheets (Taken 1/17/2023 0049)  Free From Fall Injury: Instruct family/caregiver on patient safety  1/16/2023 1941 by Carol Jackson RN  Outcome: Progressing  Flowsheets (Taken 1/16/2023 1941)  Free From Fall Injury: Instruct family/caregiver on patient safety  Note: Patient remains free from falls during this shift. Fall precautions remain in place. Patient educated on the need to implement call light use prior to ambulation. Will continue to monitor and assess. Problem: ABCDS Injury Assessment  Goal: Absence of physical injury  1/17/2023 0049 by Zakia Shen RN  Outcome: Progressing  Flowsheets (Taken 1/17/2023 0049)  Absence of Physical Injury: Implement safety measures based on patient assessment  1/16/2023 1941 by Carol Jackson RN  Outcome: Progressing  Note: Patient remains free from physical harm during this shift. Will continue to monitor and assess patient. Problem: Skin/Tissue Integrity  Goal: Absence of new skin breakdown  Description: 1. Monitor for areas of redness and/or skin breakdown  2. Assess vascular access sites hourly  3. Every 4-6 hours minimum:  Change oxygen saturation probe site  4.   Every 4-6 hours:  If on nasal continuous positive airway pressure, respiratory therapy assess nares and determine need for appliance change or resting period. 1/17/2023 0049 by Kamran Sparrow RN  Outcome: Progressing  1/16/2023 1941 by Tasha Naidu RN  Outcome: Progressing  Note: Patient skin condition and mucus membrane integrity remain unchanged during this shift. Skin breakdown prevention interventions are in place. Will continue to monitor and assess.         Problem: Neurosensory - Adult  Goal: Achieves stable or improved neurological status  1/17/2023 0049 by Kamran Sparrow RN  Outcome: Progressing  Flowsheets (Taken 1/17/2023 0049)  Achieves stable or improved neurological status: Assess for and report changes in neurological status  1/16/2023 1941 by Tasha Naidu RN  Outcome: Progressing  Flowsheets (Taken 1/16/2023 1941)  Achieves stable or improved neurological status: Assess for and report changes in neurological status  Goal: Achieves maximal functionality and self care  1/17/2023 0049 by Kamran Sparrow RN  Outcome: Progressing  [address] (Taken 1/17/2023 0049)  Achieves maximal functionality and self care: Monitor swallowing and airway patency with patient fatigue and changes in neurological status  1/16/2023 1941 by Tasha Naidu RN  Outcome: Progressing  Flowsheets (Taken 1/16/2023 1941)  Achieves maximal functionality and self care: Monitor swallowing and airway patency with patient fatigue and changes in neurological status     Problem: Respiratory - Adult  Goal: Achieves optimal ventilation and oxygenation  1/17/2023 0049 by Kamran Sparrow RN  Outcome: Progressing  Flowsheets (Taken 1/17/2023 0049)  Achieves optimal ventilation and oxygenation: Assess for changes in respiratory status  1/16/2023 1941 by Tasha Naidu RN  Outcome: Progressing  Flowsheets (Taken 1/16/2023 1941)  Achieves optimal ventilation and oxygenation:   Assess for changes in respiratory status   Assess for changes in mentation and behavior     Problem: Skin/Tissue Integrity - Adult  Goal: Skin integrity remains intact  1/17/2023 0049 by Juan Peace RN  Outcome: Progressing  Flowsheets (Taken 1/17/2023 0049)  Skin Integrity Remains Intact: Monitor for areas of redness and/or skin breakdown  1/16/2023 1941 by Tami Isaacs RN  Outcome: Progressing  Goal: Incisions, wounds, or drain sites healing without S/S of infection  1/17/2023 0049 by Juan Peace RN  Outcome: Progressing  Flowsheets (Taken 1/17/2023 0049)  Incisions, Wounds, or Drain Sites Healing Without Sign and Symptoms of Infection: ADMISSION and DAILY: Assess and document risk factors for pressure ulcer development  1/16/2023 1941 by Tami Isaacs RN  Outcome: Progressing  Flowsheets (Taken 1/16/2023 1941)  Incisions, Wounds, or Drain Sites Healing Without Sign and Symptoms of Infection:   ADMISSION and DAILY: Assess and document risk factors for pressure ulcer development   TWICE DAILY: Assess and document skin integrity     Problem: Musculoskeletal - Adult  Goal: Return mobility to safest level of function  1/17/2023 0049 by Juan Peace RN  Outcome: Progressing  Flowsheets (Taken 1/17/2023 0049)  Return Mobility to Safest Level of Function: Assess patient stability and activity tolerance for standing, transferring and ambulating with or without assistive devices  1/16/2023 1941 by Tami Isaacs RN  Outcome: Progressing  Flowsheets (Taken 1/16/2023 1941)  Return Mobility to Safest Level of Function:   Assess patient stability and activity tolerance for standing, transferring and ambulating with or without assistive devices   Assist with transfers and ambulation using safe patient handling equipment as needed   Instruct patient/family in ordered activity level     Problem: Gastrointestinal - Adult  Goal: Minimal or absence of nausea and vomiting  1/17/2023 0049 by Juan Peace RN  Outcome: Progressing  Flowsheets (Taken 1/17/2023 0049)  Minimal or absence of nausea and vomiting: Administer IV fluids as ordered to ensure adequate hydration  1/16/2023 1941 by Tami Isaacs RN  Outcome: Progressing  Flowsheets (Taken 1/16/2023 1941)  Minimal or absence of nausea and vomiting:   Administer IV fluids as ordered to ensure adequate hydration   Provide nonpharmacologic comfort measures as appropriate   Administer ordered antiemetic medications as needed

## 2023-01-17 NOTE — PROGRESS NOTES
Patient has been discharged to home with her  and daughter. Meds to bed provided. AVS reviewed with patient and .  transported patient home via private car. All questions answered, no concerns at this time.  Electronically signed by Arnaldo Ma RN on 1/17/2023 at 3:48 PM

## 2023-01-17 NOTE — PROGRESS NOTES
POD#1    Ms. Kyle Rodriguez reports improvement in her leg pain. She denies saddle anesthesia and new lower extremity, bowel or bladder symptoms. AF, VSS    her dressing is dry and her incision shows no signs of infection. She has 5/5 strength of her  EHLs, ankle DFs/PFs bilaterally. Her sensation is intact L3 to S1.     Plan  DC to home today

## 2023-01-17 NOTE — PLAN OF CARE
Problem: Chronic Conditions and Co-morbidities  Goal: Patient's chronic conditions and co-morbidity symptoms are monitored and maintained or improved  Outcome: Progressing  Flowsheets (Taken 1/16/2023 1941)  Care Plan - Patient's Chronic Conditions and Co-Morbidity Symptoms are Monitored and Maintained or Improved: Monitor and assess patient's chronic conditions and comorbid symptoms for stability, deterioration, or improvement     Problem: Discharge Planning  Goal: Discharge to home or other facility with appropriate resources  Outcome: Progressing  Flowsheets (Taken 1/16/2023 1941)  Discharge to home or other facility with appropriate resources:   Identify barriers to discharge with patient and caregiver   Refer to discharge planning if patient needs post-hospital services based on physician order or complex needs related to functional status, cognitive ability or social support system   Arrange for needed discharge resources and transportation as appropriate     Problem: Pain  Goal: Verbalizes/displays adequate comfort level or baseline comfort level  Outcome: Progressing  Flowsheets (Taken 1/16/2023 1941)  Verbalizes/displays adequate comfort level or baseline comfort level:   Encourage patient to monitor pain and request assistance   Administer analgesics based on type and severity of pain and evaluate response   Consider cultural and social influences on pain and pain management   Assess pain using appropriate pain scale   Implement non-pharmacological measures as appropriate and evaluate response   Notify Licensed Independent Practitioner if interventions unsuccessful or patient reports new pain     Problem: Safety - Adult  Goal: Free from fall injury  Outcome: Progressing  Flowsheets (Taken 1/16/2023 1941)  Free From Fall Injury: Instruct family/caregiver on patient safety  Note: Patient remains free from falls during this shift. Fall precautions remain in place.  Patient educated on the need to implement call light use prior to ambulation. Will continue to monitor and assess. Problem: ABCDS Injury Assessment  Goal: Absence of physical injury  Outcome: Progressing  Note: Patient remains free from physical harm during this shift. Will continue to monitor and assess patient. Problem: Skin/Tissue Integrity  Goal: Absence of new skin breakdown  Description: 1. Monitor for areas of redness and/or skin breakdown  2. Assess vascular access sites hourly  3. Every 4-6 hours minimum:  Change oxygen saturation probe site  4. Every 4-6 hours:  If on nasal continuous positive airway pressure, respiratory therapy assess nares and determine need for appliance change or resting period. Outcome: Progressing  Note: Patient skin condition and mucus membrane integrity remain unchanged during this shift. Skin breakdown prevention interventions are in place. Will continue to monitor and assess.         Problem: Neurosensory - Adult  Goal: Achieves stable or improved neurological status  Outcome: Progressing  Flowsheets (Taken 1/16/2023 1941)  Achieves stable or improved neurological status: Assess for and report changes in neurological status  Goal: Achieves maximal functionality and self care  Outcome: Progressing  Flowsheets (Taken 1/16/2023 1941)  Achieves maximal functionality and self care: Monitor swallowing and airway patency with patient fatigue and changes in neurological status     Problem: Respiratory - Adult  Goal: Achieves optimal ventilation and oxygenation  Outcome: Progressing  Flowsheets (Taken 1/16/2023 1941)  Achieves optimal ventilation and oxygenation:   Assess for changes in respiratory status   Assess for changes in mentation and behavior     Problem: Skin/Tissue Integrity - Adult  Goal: Skin integrity remains intact  Outcome: Progressing  Goal: Incisions, wounds, or drain sites healing without S/S of infection  Outcome: Progressing  Flowsheets (Taken 1/16/2023 1941)  Incisions, Wounds, or Drain Sites Healing Without Sign and Symptoms of Infection:   ADMISSION and DAILY: Assess and document risk factors for pressure ulcer development   TWICE DAILY: Assess and document skin integrity     Problem: Musculoskeletal - Adult  Goal: Return mobility to safest level of function  Outcome: Progressing  Flowsheets (Taken 1/16/2023 1941)  Return Mobility to Safest Level of Function:   Assess patient stability and activity tolerance for standing, transferring and ambulating with or without assistive devices   Assist with transfers and ambulation using safe patient handling equipment as needed   Instruct patient/family in ordered activity level     Problem: Gastrointestinal - Adult  Goal: Minimal or absence of nausea and vomiting  Outcome: Progressing  Flowsheets (Taken 1/16/2023 1941)  Minimal or absence of nausea and vomiting:   Administer IV fluids as ordered to ensure adequate hydration   Provide nonpharmacologic comfort measures as appropriate   Administer ordered antiemetic medications as needed

## 2023-01-17 NOTE — PROGRESS NOTES
Patient is alert & oriented x4 and resting in bed. Bedrest ended at 0815. Morris removed this AM per order. Patient has since voided with no irritation. Awaiting PT/OT eval. Patient has no complaints of nausea or headache. Patient reports pain of 6/10 at surgical site, Dilaudid given, patient tolerated well with no nausea or vomiting. Fall precautions in place, call light in reach, will continue to monitor.  Electronically signed by Tete Trinh RN on 1/17/2023 at 11:32 AM

## 2023-01-17 NOTE — PROGRESS NOTES
Patient continues to rest in bed this am, pain and nausea have been controlled this morning with medications and patient is tolerating PO without difficulty. Patient repositioned to supine position with pillow under knees, IV fluids continue to infuse.  DVT and ALFREDO hose on, vital signs remains stable, all needs met

## 2023-01-18 NOTE — DISCHARGE SUMMARY
Date of admission:10/16/23    Date of discharge: 10/17/23    Discharge diagnosis: severe stenosis L34 L4/L5    Procedure: Microlumbar laminotomy, partial facetectomy, L34, L4-5    Discharge condition: Good    Consultations: none    F/U: with me in two weeks    D/C meds: Percocet 5/325 1 po q4 hours prn. Colace 100 mg p.o. twice daily and gabapentin 300 mg 3 times daily, Resume preop medications    D/C instructions: given to patient    Hospital course: Patient awakened from anesthesia noting some relief of her LE and groin pain. She was D/C'd to home the day after hours after surgery. She was ambulating, voiding and taking pos well before D/C. She headache and nausea. There was no drainage from her incision. . She had 5/5 strength of her EHL, and ankle DF/PF.  Her sensation was intact L3 to S1.

## 2023-01-23 ENCOUNTER — TELEPHONE (OUTPATIENT)
Dept: ORTHOPEDIC SURGERY | Age: 73
End: 2023-01-23

## 2023-01-23 DIAGNOSIS — M48.062 SPINAL STENOSIS OF LUMBAR REGION WITH NEUROGENIC CLAUDICATION: ICD-10-CM

## 2023-01-23 RX ORDER — OXYCODONE HYDROCHLORIDE AND ACETAMINOPHEN 5; 325 MG/1; MG/1
1 TABLET ORAL EVERY 6 HOURS PRN
Qty: 28 TABLET | Refills: 0 | Status: SHIPPED | OUTPATIENT
Start: 2023-01-23 | End: 2023-01-30

## 2023-01-23 NOTE — TELEPHONE ENCOUNTER
Prescription Refill     Medication Name: OXYCODONE  Pharmacy: [facility]  [facility]: [phone]  Patient Contact Number: [phone]

## 2023-01-31 ENCOUNTER — OFFICE VISIT (OUTPATIENT)
Dept: ORTHOPEDIC SURGERY | Age: 73
End: 2023-01-31

## 2023-01-31 VITALS — HEIGHT: 63 IN | BODY MASS INDEX: 36.09 KG/M2 | WEIGHT: 203.71 LBS

## 2023-01-31 DIAGNOSIS — M48.062 LUMBAR STENOSIS WITH NEUROGENIC CLAUDICATION: Primary | ICD-10-CM

## 2023-01-31 PROCEDURE — 99024 POSTOP FOLLOW-UP VISIT: CPT | Performed by: ORTHOPAEDIC SURGERY

## 2023-01-31 NOTE — PROGRESS NOTES
Ms. Bartholomew All returns today 2 weeks s/p MLL L34 and L4-5 severe stenosis and bilateral leg pain complicated by a small dural tear. She reports marked improvement of her leg symptoms. Her incisions show no signs of infection. She has a normal gait and 5/5 strength of her ankle DFs/PFs, bilaterally. Her sensation is intact from L3 to S1 bilaterally. I cautioned her to avoid lifting more than 10 pounds, bending and impact type aerobic exercise for 8 week after surgery, then slowly increase her activity over the next month.

## 2023-03-23 ENCOUNTER — HOSPITAL ENCOUNTER (OUTPATIENT)
Dept: WOMENS IMAGING | Age: 73
Discharge: HOME | End: 2023-03-23
Payer: MEDICARE

## 2023-03-23 DIAGNOSIS — Z78.0 MENOPAUSE: ICD-10-CM

## 2023-03-23 PROCEDURE — 77080 DXA BONE DENSITY AXIAL: CPT

## 2023-04-28 ENCOUNTER — HOSPITAL ENCOUNTER (OUTPATIENT)
Dept: CT IMAGING | Age: 73
Discharge: HOME | End: 2023-04-28
Payer: MEDICARE

## 2023-04-28 DIAGNOSIS — R91.1 PULMONARY NODULE: ICD-10-CM

## 2023-04-28 LAB
PERFORMED ON: NORMAL
POC CREATININE: 0.7 MG/DL (ref 0.6–1.2)
POC SAMPLE TYPE: NORMAL

## 2023-04-28 PROCEDURE — 6360000004 HC RX CONTRAST MEDICATION: Performed by: FAMILY MEDICINE

## 2023-04-28 PROCEDURE — 82565 ASSAY OF CREATININE: CPT

## 2023-04-28 PROCEDURE — 71260 CT THORAX DX C+: CPT

## 2023-04-28 RX ADMIN — IOPAMIDOL 75 ML: 755 INJECTION, SOLUTION INTRAVENOUS at 13:29

## 2023-07-05 ENCOUNTER — HOSPITAL ENCOUNTER (OUTPATIENT)
Age: 73
Discharge: HOME | End: 2023-07-05
Payer: MEDICARE

## 2023-07-05 ENCOUNTER — HOSPITAL ENCOUNTER (OUTPATIENT)
Dept: GENERAL RADIOLOGY | Age: 73
Discharge: HOME | End: 2023-07-05
Attending: INTERNAL MEDICINE
Payer: MEDICARE

## 2023-07-05 DIAGNOSIS — M19.90 ARTHRITIS: ICD-10-CM

## 2023-07-05 DIAGNOSIS — M13.841 ALLERGIC ARTHRITIS OF RIGHT HAND: ICD-10-CM

## 2023-07-05 PROCEDURE — 73130 X-RAY EXAM OF HAND: CPT

## 2023-07-05 PROCEDURE — 73521 X-RAY EXAM HIPS BI 2 VIEWS: CPT

## 2023-07-05 PROCEDURE — 73630 X-RAY EXAM OF FOOT: CPT

## 2023-07-19 ENCOUNTER — HOSPITAL ENCOUNTER (OUTPATIENT)
Dept: CT IMAGING | Age: 73
Discharge: HOME | End: 2023-07-19
Payer: MEDICARE

## 2023-07-19 DIAGNOSIS — R31.9 HEMATURIA SYNDROME: ICD-10-CM

## 2023-07-19 DIAGNOSIS — R10.9 STOMACH ACHE: ICD-10-CM

## 2023-07-19 PROCEDURE — 74176 CT ABD & PELVIS W/O CONTRAST: CPT

## 2023-09-12 ENCOUNTER — OFFICE VISIT (OUTPATIENT)
Dept: ORTHOPEDIC SURGERY | Age: 73
End: 2023-09-12
Payer: MEDICARE

## 2023-09-12 VITALS — HEIGHT: 63 IN | WEIGHT: 203 LBS | BODY MASS INDEX: 35.97 KG/M2

## 2023-09-12 DIAGNOSIS — M48.061 FORAMINAL STENOSIS OF LUMBAR REGION: Primary | ICD-10-CM

## 2023-09-12 PROCEDURE — 99214 OFFICE O/P EST MOD 30 MIN: CPT | Performed by: ORTHOPAEDIC SURGERY

## 2023-09-12 PROCEDURE — 1123F ACP DISCUSS/DSCN MKR DOCD: CPT | Performed by: ORTHOPAEDIC SURGERY

## 2023-09-12 RX ORDER — GABAPENTIN 300 MG/1
300 CAPSULE ORAL 4 TIMES DAILY
Qty: 120 CAPSULE | Refills: 0 | Status: SHIPPED | OUTPATIENT
Start: 2023-09-12 | End: 2023-10-12

## 2023-09-12 NOTE — PROGRESS NOTES
New Patient: LUMBAR SPINE    Referring Provider:  No ref. provider found    CHIEF COMPLAINT: Low back pain    HISTORY OF PRESENT ILLNESS:    Ms. Matt Sarkar turns today 7 months status post M LL L3-4 and L4-L5 with severe bilateral leg pain. She reports return of right greater than left pain over her sacroiliac joints with standing and ambulating. Her previous radicular symptoms have resolved. She denies new saddle anesthesia and bowel or bladder abnormality    Current/Past Treatment:   Physical Therapy: prior to surgery  Chiropractic:  no   Injection: Prior to surgery  Medications: Neurontin and oral steroids    Past Medical History:   Past Medical History:   Diagnosis Date    Asthma     Colon polyp     Depression     DVT (deep venous thrombosis) ([facility])     as a teenager    Esophageal reflux     GERD with stricture     Hx of blood clots     Hyperlipidemia     Hypertension     Insomnia     Migraine 12/29/1995    Osteoarthritis     Protein deficiency ([address])     protein S defic.     Pulmonary embolism (HCC)     x`s 2 post-op after Hysterectomy & Gall Bladder    Thyroid adenoma     NO MEDS    Type II or unspecified type diabetes mellitus without mention of complication, not stated as uncontrolled       Past Surgical History:     Past Surgical History:   Procedure Laterality Date    APPENDECTOMY      BACK SURGERY      LUMBAR BULGING DISC    CERVICAL FUSION  1993    CERVICAL FUSION N/A 11/4/2019    ANTERIOR CERVICAL DISCECTOMY FUSION C4-5 AND C6-7 WITH MEDTRONIC PLATE AND SCREWS, AND DONOR BONE WITH C-ARM performed by Conchita Mathur MD at [address]      COLONOSCOPY N/A 7/9/2020    COLONOSCOPY POLYPECTOMY SNARE/COLD BIOPSY performed by Geeta West DO at [address] [facility], COLON, DIAGNOSTIC      HIATAL HERNIA REPAIR      HYSTERECTOMY (CERVIX STATUS UNKNOWN)      LUMBAR SPINE SURGERY N/A 1/16/2023    MICROLUMBAR LAMINECTOMY L3-4, L4-5 performed by Conchita Mathur MD at [address] [facility]

## 2023-09-19 ENCOUNTER — TELEPHONE (OUTPATIENT)
Dept: ORTHOPEDIC SURGERY | Age: 73
End: 2023-09-19

## 2023-09-25 ENCOUNTER — TELEPHONE (OUTPATIENT)
Dept: ORTHOPEDIC SURGERY | Age: 73
End: 2023-09-25

## 2023-09-25 DIAGNOSIS — H91.90 HEARING LOSS, UNSPECIFIED HEARING LOSS TYPE, UNSPECIFIED LATERALITY: Primary | ICD-10-CM

## 2023-09-25 NOTE — TELEPHONE ENCOUNTER
General Question     Subject: LUMBAR INJECTION STATUS   Patient and /or Facility Request: Simona Body  Contact Number: [phone]    PATIENT CALLED IN TO SE IF SHE CAN SPEAK TO SOMEONE ABOUT HER INJECTION IN HER BACK. ..     PLEASE ADVISE

## 2023-09-25 NOTE — TELEPHONE ENCOUNTER
CPT: [id number]  AUTH: [id number]  DATE RANGE: 9/22/23 TO 3/20/24  INSURANCE: Akua López  LOCATION EG  AREA OF SX LUMBAR  NOTE: DOC SCANNED

## 2023-10-02 ENCOUNTER — OFFICE VISIT (OUTPATIENT)
Dept: ENT CLINIC | Age: 73
End: 2023-10-02
Payer: MEDICARE

## 2023-10-02 ENCOUNTER — PROCEDURE VISIT (OUTPATIENT)
Dept: AUDIOLOGY | Age: 73
End: 2023-10-02
Payer: MEDICARE

## 2023-10-02 VITALS
BODY MASS INDEX: 32.07 KG/M2 | HEART RATE: 82 BPM | HEIGHT: 63 IN | WEIGHT: 181 LBS | OXYGEN SATURATION: 96 % | SYSTOLIC BLOOD PRESSURE: 116 MMHG | DIASTOLIC BLOOD PRESSURE: 76 MMHG

## 2023-10-02 DIAGNOSIS — R42 DIZZY: ICD-10-CM

## 2023-10-02 DIAGNOSIS — H90.3 SENSORINEURAL HEARING LOSS (SNHL) OF BOTH EARS: Primary | ICD-10-CM

## 2023-10-02 DIAGNOSIS — R42 DIZZINESS: Primary | ICD-10-CM

## 2023-10-02 DIAGNOSIS — H93.13 TINNITUS OF BOTH EARS: ICD-10-CM

## 2023-10-02 DIAGNOSIS — H90.3 SENSORINEURAL HEARING LOSS (SNHL) OF BOTH EARS: ICD-10-CM

## 2023-10-02 PROCEDURE — 1123F ACP DISCUSS/DSCN MKR DOCD: CPT | Performed by: OTOLARYNGOLOGY

## 2023-10-02 PROCEDURE — 92567 TYMPANOMETRY: CPT | Performed by: AUDIOLOGIST

## 2023-10-02 PROCEDURE — 92557 COMPREHENSIVE HEARING TEST: CPT | Performed by: AUDIOLOGIST

## 2023-10-02 PROCEDURE — 99203 OFFICE O/P NEW LOW 30 MIN: CPT | Performed by: OTOLARYNGOLOGY

## 2023-10-02 PROCEDURE — 3078F DIAST BP <80 MM HG: CPT | Performed by: OTOLARYNGOLOGY

## 2023-10-02 PROCEDURE — 3074F SYST BP LT 130 MM HG: CPT | Performed by: OTOLARYNGOLOGY

## 2023-10-02 NOTE — PROGRESS NOTES
evaluation to further investigate symptoms of dizziness.        Samm Mcnulty  Audiologist    Chart CC'd to: Goldie Canales MD      Degree of   Hearing Sensitivity dB Range   Within Normal Limits (WNL) 0 - 20   Mild 25 - 40   Moderate 45 - 55   Moderately-Severe 60 - 70   Severe 75 - 90   Profound 95 +

## 2023-10-02 NOTE — PROGRESS NOTES
[address]      Patient Name: [address], [facility] Record Number:  [id number]  Primary Care Physician:  JACK Tavera NP  Date of Consultation: 10/2/2023    Chief Complaint: Dizziness        HISTORY OF PRESENT Yaya Wynn is a(n) 74 y.o. female who presents for evaluation dizziness. The patient says that she has been having some dizziness for the last couple weeks. She says its somewhat lightheadedness and occurs when she gets up or moves quickly. Sometimes she has fallen and not really realize she had fallen. She denies any vertigo. She denies changes in her hearing. She is never had surgery on her ears. She denies head trauma. REVIEW OF SYSTEMS  As above    PHYSICAL EXAM  GENERAL: No Acute Distress, Alert and Oriented, no Hoarseness, strong voice  EYES: EOMI, Anti-icteric  HENT:   Head: Normocephalic and atraumatic. Face:  Symmetric, facial nerve intact, no sinus tenderness  Right Ear: Normal external ear, normal external auditory canal, intact tympanic membrane with normal mobility and aerated middle ear  Left Ear: Normal external ear, normal external auditory canal, intact tympanic membrane with normal mobility and aerated middle ear  Mouth/Oral Cavity:  normal lips, Uvula is midline, no mucosal lesions, no trismus  Oropharynx/Larynx:  normal oropharynx,  Nose:Normal external nasal appearance. NECK: Well-healed thyroidectomy incision  Neuro: Negative Purdin-Hallpike      Patient had an audiogram that shows normal sloping to moderately severe sensorineural hearing loss symmetrically          ASSESSMENT/PLAN  1. Sensorineural hearing loss (SNHL) of both ears  Secondary to presbycusis. We discussed hearing aids. She is not interested. Recommend 1 to 2-year follow-up with audiogram.    2. Dizzy  Dizziness does not sound like an ear disorder.   she would probably benefit from working with her physical therapist who

## 2023-10-03 ENCOUNTER — TELEPHONE (OUTPATIENT)
Dept: ORTHOPEDIC SURGERY | Age: 73
End: 2023-10-03

## 2023-10-03 NOTE — TELEPHONE ENCOUNTER
General Question     Subject: RETURNING CALL FOR SARITA  Patient and /or Facility Request: Mirella Landin  Contact Number: [phone]    PT RETURNING CALL FOR SARITA REGARDING GOING TO THE SURGERY CENTER. PT WILL NEED TO BE PROVIDED THE DIRECTIONS TO SURGERY CENTER. PLEASE CALL BACK PT AT THE NUMBER ABOVE.

## 2023-10-10 ENCOUNTER — OFFICE VISIT (OUTPATIENT)
Dept: CARDIOLOGY CLINIC | Age: 73
End: 2023-10-10
Payer: MEDICARE

## 2023-10-10 VITALS
BODY MASS INDEX: 32.78 KG/M2 | HEART RATE: 80 BPM | HEIGHT: 63 IN | OXYGEN SATURATION: 97 % | DIASTOLIC BLOOD PRESSURE: 74 MMHG | WEIGHT: 185 LBS | SYSTOLIC BLOOD PRESSURE: 130 MMHG

## 2023-10-10 DIAGNOSIS — R94.31 ABNORMAL EKG: ICD-10-CM

## 2023-10-10 DIAGNOSIS — E78.5 HYPERLIPIDEMIA, UNSPECIFIED HYPERLIPIDEMIA TYPE: ICD-10-CM

## 2023-10-10 DIAGNOSIS — R26.81 UNSTEADINESS: Primary | ICD-10-CM

## 2023-10-10 DIAGNOSIS — I10 HYPERTENSION, UNSPECIFIED TYPE: ICD-10-CM

## 2023-10-10 DIAGNOSIS — R01.1 SYSTOLIC MURMUR: ICD-10-CM

## 2023-10-10 DIAGNOSIS — I77.810 ASCENDING AORTA DILATATION (HCC): ICD-10-CM
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
## 2023-10-10 DIAGNOSIS — E66.9 CLASS 1 OBESITY WITH SERIOUS COMORBIDITY AND BODY MASS INDEX (BMI) OF 33.0 TO 33.9 IN ADULT, UNSPECIFIED OBESITY TYPE: ICD-10-CM

## 2023-10-10 PROCEDURE — 3078F DIAST BP <80 MM HG: CPT | Performed by: INTERNAL MEDICINE

## 2023-10-10 PROCEDURE — 1123F ACP DISCUSS/DSCN MKR DOCD: CPT | Performed by: INTERNAL MEDICINE

## 2023-10-10 PROCEDURE — 99214 OFFICE O/P EST MOD 30 MIN: CPT | Performed by: INTERNAL MEDICINE

## 2023-10-10 PROCEDURE — 93000 ELECTROCARDIOGRAM COMPLETE: CPT | Performed by: INTERNAL MEDICINE

## 2023-10-10 PROCEDURE — 3074F SYST BP LT 130 MM HG: CPT | Performed by: INTERNAL MEDICINE

## 2023-10-10 NOTE — PROGRESS NOTES
[facility]      Cardiology Progress Note    Dilia Ventura  [birth year]    October 10, 2023    Referring Physician: Edward Burnette CNP      Chief Complaint   Patient presents with    Annual Exam    Follow-up     SOB on exertion. HPI:  The patient is 74 y.o. female with a past medical history significant for obesity, DM, PE, protein S deficient, lung nodules and HTN. She initially presented 10/2016 for  evaluation of chest pain and abnormal EKG. Patient states she has had dull left sided chest pain. She denies any radiating pain to her jaw. She does get some shortness of breath. She states she was on a hay ride over the past weekend and after sitting she began to walk and became very short of breath. She denies orthopnea. She states she takes Coumadin for her history of PE after a hysterectomy over 20 yrs ago. She denies a history of atrial fibrillation. She has a \"spot\" on her lung and has yearly scans to monitor this, she has never smoked. She denies any other cardiac symptoms. Systolic murmur per physical exam. Completed an echo and stress test 10/2016 which were both normal.     4/7/2022: returned after 6 years and had been referred back to our office for an abnormal EKG and jaw pain, lasting for 2 week, off/on with complete resolution. MAXWELL with climbing stairs and also cant walk and cough at the same time, reports chronic Pain neck/back. Otherwise, patient specifically denies exertional chest pain/pressure, dyspnea at rest, MAXWELL, PND, orthopnea, palpitations, lightheadedness, weight changes, changes in LE edema, and syncope. Non smoker, socially ETOH use. Father MI -avl, age 67 y.o. completion of an echo and stress test 4/2022 which were both normal.     Today, presents per her PCP request for imbalance issues and multiple falls over the last year. She is not able to describe the feeling. She denies any lightheaded, dizziness, near syncope, syncope.  When she stands from a seated

## 2023-10-18 ENCOUNTER — HOSPITAL ENCOUNTER (OUTPATIENT)
Age: 73
Setting detail: OUTPATIENT SURGERY
Discharge: HOME | End: 2023-10-18
Attending: ORTHOPAEDIC SURGERY | Admitting: ORTHOPAEDIC SURGERY
Payer: MEDICARE

## 2023-10-18 VITALS
TEMPERATURE: 97.8 F | BODY MASS INDEX: 31.89 KG/M2 | SYSTOLIC BLOOD PRESSURE: 158 MMHG | RESPIRATION RATE: 16 BRPM | WEIGHT: 180 LBS | HEIGHT: 63 IN | HEART RATE: 79 BPM | OXYGEN SATURATION: 98 % | DIASTOLIC BLOOD PRESSURE: 98 MMHG

## 2023-10-18 LAB
GLUCOSE BLD-MCNC: 161 MG/DL (ref 70–99)
PERFORMED ON: ABNORMAL

## 2023-10-18 PROCEDURE — 2500000003 HC RX 250 WO HCPCS: Performed by: ORTHOPAEDIC SURGERY

## 2023-10-18 PROCEDURE — 3600000002 HC SURGERY LEVEL 2 BASE: Performed by: ORTHOPAEDIC SURGERY

## 2023-10-18 PROCEDURE — 6360000002 HC RX W HCPCS: Performed by: ORTHOPAEDIC SURGERY

## 2023-10-18 PROCEDURE — 7100000010 HC PHASE II RECOVERY - FIRST 15 MIN: Performed by: ORTHOPAEDIC SURGERY

## 2023-10-18 PROCEDURE — 6360000004 HC RX CONTRAST MEDICATION: Performed by: ORTHOPAEDIC SURGERY

## 2023-10-18 RX ORDER — DEXAMETHASONE SODIUM PHOSPHATE 10 MG/ML
INJECTION, SOLUTION INTRAMUSCULAR; INTRAVENOUS
Status: DISCONTINUED
Start: 2023-10-18 | End: 2023-10-18 | Stop reason: HOSPADM

## 2023-10-18 RX ORDER — LIDOCAINE HYDROCHLORIDE 10 MG/ML
INJECTION, SOLUTION INFILTRATION; PERINEURAL PRN
Status: DISCONTINUED | OUTPATIENT
Start: 2023-10-18 | End: 2023-10-18 | Stop reason: HOSPADM

## 2023-10-18 ASSESSMENT — PAIN - FUNCTIONAL ASSESSMENT
PAIN_FUNCTIONAL_ASSESSMENT: PREVENTS OR INTERFERES SOME ACTIVE ACTIVITIES AND ADLS
PAIN_FUNCTIONAL_ASSESSMENT: 0-10

## 2023-10-18 ASSESSMENT — PAIN DESCRIPTION - DESCRIPTORS: DESCRIPTORS: SHARP;ACHING

## 2023-10-18 NOTE — H&P
I have reviewed the history and physical and examined the patient and find no relevant changes. I have reviewed with the patient and/or family the risks, benefits, and alternatives to the procedure. Lionel Barragan MD  10/18/2023 No intake/output data recorded.
History         Family History   Problem Relation Age of Onset    Clotting Disorder Mother      Heart Attack Father      Leukemia Brother      Diabetes Maternal Grandmother              REVIEW OF SYSTEMS: Full ROS noted & scanned   CONSTITUTIONAL: Denies unexplained weight loss, fevers, chills or fatigue  NEUROLOGICAL: Denies unsteady gait or progressive weakness  MUSCULOSKELETAL: Denies joint swelling or redness  PSYCHOLOGICAL: Denies anxiety, depression   SKIN: Denies skin changes, delayed healing, rash, itching   HEMATOLOGIC: Denies easy bleeding or bruising  ENDOCRINE: Denies excessive thirst, urination, heat/cold  RESPIRATORY: Denies current dyspnea, cough  GI: Denies nausea, vomiting, diarrhea   : Denies bowel or bladder issues       PHYSICAL EXAM:    Vitals: not currently breastfeeding. GENERAL EXAM:  General Apparence: Patient is adequately groomed with no evidence of malnutrition. Orientation: The patient is oriented to time, place and person. Mood & Affect:The patient's mood and affect are appropriate. Vascular: Examination reveals no swelling tenderness in upper or lower extremities. Good capillary refill. Lymphatic: The lymphatic examination bilaterally reveals all areas to be without enlargement or induration  Sensation: Sensation is intact without deficit  Coordination/Balance: Good coordination        Eyes: PERRL, EOM intact  Lungs: clear to auscultation  Heart: RRR, S1,S2, without murmur  Abdomen: soft, nontender, positive bowel sounds     LUMBAR/SACRAL EXAMINATION:  Inspection: Local inspection shows no step-off or bruising. Lumbar alignment is normal.  Sagittal and Coronal balance is neutral.      Palpation:   No evidence of tenderness at the midline. No tenderness bilaterally at the paraspinal or trochanters. There is no step-off or paraspinal spasm. Range of Motion: Lumbar flexion, extension and rotation are mildly limited due to pain.   Strength:   Strength testing is 5/5 in all

## 2023-10-18 NOTE — DISCHARGE INSTRUCTIONS
[facility]    [phone]    Post Pain Management Injection    PATIENT INSTRUCTIONS:     -Resume Normal Diet  -Other    ACTIVITY:    -No driving or operating machinery for 8 hours post procedure without sedation and 24 hours with sedation. If you are seen driving during this time the proper authorities will be notified.  -Do not stay alone for 4-6 hours after the procedure.  -If you have had IV sedation, do not sign legal documents, make any major decisions, or be involved in work decisions for the remainder of the day. -May shower or bathe.  -Resume normal activity when full movement/sensation has returned in extremities. 3)  SITE CARE:    -Observe puncture site for signs of infection (redness, warmth swelling, drainage with a foul odor, fever or increased tenderness). 4)  EXPECTED SIDE EFFECTS:    -Numbness/tingling/weakness in extremities, if this lasts more than 6 hours notify Dr. Altamirano Oh  -Muscle stiffness, soreness at puncture site (soreness may last 2-4 days). DIABETIC PATIENTS ONLY:    -Increased glucose levels in all diabetic patients who have received a steroid injection. -Monitor blood sugars frequently for the first 5 days following procedure.      -Adjust medication accordingly. 6)  TO REACH DR. Smith: Call [phone]    ADDITIONAL INSTRUCTIONS:    Follow-up as scheduled or call for appointment if not already done. Patients taking Coumadin may resume taking as before the procedure.

## 2023-10-18 NOTE — PROGRESS NOTES
Discharge  instructions reviewed. Pt and family verbalize understanding with no further questions. VSS. Pt discharged via [facility] to car. Assessment unchanged.

## 2023-10-24 ENCOUNTER — OFFICE VISIT (OUTPATIENT)
Dept: ORTHOPEDIC SURGERY | Age: 73
End: 2023-10-24
Payer: MEDICARE

## 2023-10-24 VITALS — BODY MASS INDEX: 31.89 KG/M2 | HEIGHT: 63 IN | WEIGHT: 180 LBS

## 2023-10-24 DIAGNOSIS — M47.816 LUMBAR SPONDYLOSIS: Primary | ICD-10-CM

## 2023-10-24 PROCEDURE — 1123F ACP DISCUSS/DSCN MKR DOCD: CPT | Performed by: ORTHOPAEDIC SURGERY

## 2023-10-24 PROCEDURE — 99213 OFFICE O/P EST LOW 20 MIN: CPT | Performed by: ORTHOPAEDIC SURGERY

## 2023-10-24 NOTE — PROGRESS NOTES
SURGERY N/A 1/16/2023    MICROLUMBAR LAMINECTOMY L3-4, L4-5 performed by Sharon Montes De Oca MD at [address] Right 10/18/2023    RIGHT LUMBAR THREE FOUR LUMBAR FOUR FIVE EPIDURAL STEROID INJECTION SITE CONFIRMED BY FLUOROSCOPY performed by Sharon Montes De Oca MD at [facility] OR    SHOULDER ARTHROSCOPY Right 12/28/2016    SAD, labral debridement    SHOULDER SURGERY Right 6/13/2022    RIGHT REVERSE TOTAL SHOULDER REPLACEMENT performed by Dragan Hines MD at [facility]       Current Medications:     Current Outpatient Medications:     traMADol (ULTRAM) 50 MG tablet, , Disp: , Rfl:     topiramate (TOPAMAX) 25 MG tablet, , Disp: , Rfl:     promethazine (PHENERGAN) 12.5 MG tablet, Take 3.25 mg by mouth every 6 hours as needed for Nausea, Disp: , Rfl:     rizatriptan (MAXALT) 10 MG tablet, Take 1 tablet by mouth as needed, Disp: , Rfl:     JANTOVEN 1 MG tablet, Take 3.5 tablets by mouth daily, Disp: 1 tablet, Rfl: 0    gabapentin (NEURONTIN) 300 MG capsule, Take 1 capsule by mouth 3 times daily for 30 doses. , Disp: 90 capsule, Rfl: 3    albuterol sulfate HFA (PROVENTIL;VENTOLIN;PROAIR) 108 (90 Base) MCG/ACT inhaler, Inhale 2 puffs into the lungs every 6 hours as needed for Wheezing Take your inhaler as directed the DOS and bring with you DOS. , Disp: , Rfl:     amitriptyline (ELAVIL) 50 MG tablet, Take 1 tablet by mouth nightly, Disp: , Rfl:     UNIFINE ULTRA PEN NEEDLE 31G X 5 MM MISC, , Disp: , Rfl:     montelukast (SINGULAIR) 10 MG tablet, Take 1 tablet by mouth nightly, Disp: , Rfl:     Omega-3 Fatty Acids (FISH OIL) 1000 MG CAPS, Take 1 capsule by mouth daily HOLD for 14 days after shoulder surgery, Disp: 90 capsule, Rfl: 3    vitamin E 1000 units capsule, Take 1 capsule by mouth daily HOLD for 14 days after shoulder surgery, Disp: 30 capsule, Rfl: 3    zinc 50 MG CAPS, Take 50 mg by mouth daily, Disp: , Rfl:     vitamin C (ASCORBIC ACID) 500 MG

## 2023-10-26 ENCOUNTER — TELEPHONE (OUTPATIENT)
Dept: ORTHOPEDIC SURGERY | Age: 73
End: 2023-10-26

## 2023-10-26 NOTE — TELEPHONE ENCOUNTER
General Question     Subject: [address] [facility] Pt   Patient and /or Facility Request: Aldo Garcia  Contact Number: [phone]    Pt ASKING FOR SARITA TO CALL. TOO INVOLVED FOR A MESSAGE.

## 2023-11-29 ENCOUNTER — TELEPHONE (OUTPATIENT)
Dept: ORTHOPEDIC SURGERY | Age: 73
End: 2023-11-29

## 2023-11-29 NOTE — TELEPHONE ENCOUNTER
General Question     Subject: [address] [place] [address]   Patient and /or Facility Request: Angie Meza  Contact Number: [phone]

## 2023-12-05 ENCOUNTER — OFFICE VISIT (OUTPATIENT)
Dept: ORTHOPEDIC SURGERY | Age: 73
End: 2023-12-05
Payer: MEDICARE

## 2023-12-05 DIAGNOSIS — M47.816 LUMBAR SPONDYLOSIS: Primary | ICD-10-CM

## 2023-12-05 PROCEDURE — 1123F ACP DISCUSS/DSCN MKR DOCD: CPT | Performed by: ORTHOPAEDIC SURGERY

## 2023-12-05 PROCEDURE — 99213 OFFICE O/P EST LOW 20 MIN: CPT | Performed by: ORTHOPAEDIC SURGERY

## 2023-12-05 NOTE — PROGRESS NOTES
New Patient: LUMBAR SPINE    Referring Provider:  No ref. provider found    CHIEF COMPLAINT: Low back pain    HISTORY OF PRESENT ILLNESS:    Ms. Bird Re turns today 9 months status post M LL L3-4 and L4-L5 with severe bilateral leg pain and 1 week status post transforaminal injections right L3-4 and L4-L5. The injections gave her no relief. She reports return of right greater than left pain over her sacroiliac joints with standing and ambulating. Her previous radicular symptoms have resolved. She denies new saddle anesthesia and bowel or bladder abnormality    Current/Past Treatment:   Physical Therapy: prior to surgery  Chiropractic:  no   Injection: Prior to surgery  Medications: Neurontin and oral steroids    Past Medical History:   Past Medical History:   Diagnosis Date    Asthma     Colon polyp     Depression     DVT (deep venous thrombosis) (HCC)     as a teenager    Esophageal reflux     GERD with stricture     Hx of blood clots     Hyperlipidemia     Hypertension     Insomnia     Migraine 12/29/1995    Osteoarthritis     Protein deficiency ([address])     protein S defic.     Pulmonary embolism (HCC)     x`s 2 post-op after Hysterectomy & Gall Bladder    Thyroid adenoma     NO MEDS    Type II or unspecified type diabetes mellitus without mention of complication, not stated as uncontrolled       Past Surgical History:     Past Surgical History:   Procedure Laterality Date    APPENDECTOMY      BACK SURGERY      LUMBAR BULGING DISC    CERVICAL FUSION  1993    CERVICAL FUSION N/A 11/4/2019    ANTERIOR CERVICAL DISCECTOMY FUSION C4-5 AND C6-7 WITH MEDTRONIC PLATE AND SCREWS, AND DONOR BONE WITH C-ARM performed by Emely Wells MD at [address]      COLONOSCOPY N/A 7/9/2020    COLONOSCOPY POLYPECTOMY SNARE/COLD BIOPSY performed by Chris Valverde.,  at [address], COLON, DIAGNOSTIC      HIATAL HERNIA REPAIR      HYSTERECTOMY (CERVIX STATUS UNKNOWN)      LUMBAR SPINE

## 2023-12-06 ENCOUNTER — HOSPITAL ENCOUNTER (OUTPATIENT)
Dept: WOMENS IMAGING | Age: 73
Discharge: HOME | End: 2023-12-06
Payer: MEDICARE

## 2023-12-06 VITALS — BODY MASS INDEX: 31.89 KG/M2 | WEIGHT: 180 LBS | HEIGHT: 63 IN

## 2023-12-06 DIAGNOSIS — Z12.31 VISIT FOR SCREENING MAMMOGRAM: ICD-10-CM

## 2023-12-06 PROCEDURE — 77067 SCR MAMMO BI INCL CAD: CPT

## 2023-12-07 ENCOUNTER — TELEPHONE (OUTPATIENT)
Dept: ORTHOPEDIC SURGERY | Age: 73
End: 2023-12-07

## 2023-12-07 NOTE — TELEPHONE ENCOUNTER
PA REQUESTED [address] [place] [address],[facility] 5867  [phone]

## 2023-12-12 NOTE — TELEPHONE ENCOUNTER
Pending denial  Notes do not show a plan to do a radiofrequency ablation  [id number]  Phone number and fax number are canned into media

## 2023-12-14 NOTE — TELEPHONE ENCOUNTER
CPT: [address]: [id number]  DATE RANGE: 12/14/23 TO 6/11/24  INSURANCE: Lesley Morales  LOCATION EG  AREA OF SX LUMBAR  NOTE: DOC SCANNED

## 2023-12-19 NOTE — H&P
CHIEF COMPLAINT: Low back pain     HISTORY OF PRESENT ILLNESS:    Ms. Kenny Live turns today 9 months status post M LL L3-4 and L4-L5 with severe bilateral leg pain and 1 week status post transforaminal injections right L3-4 and L4-L5. The injections gave her no relief. She reports return of right greater than left pain over her sacroiliac joints with standing and ambulating. Her previous radicular symptoms have resolved. She denies new saddle anesthesia and bowel or bladder abnormality     Current/Past Treatment:   Physical Therapy: prior to surgery  Chiropractic:  no   Injection: Prior to surgery  Medications: Neurontin and oral steroids     Past Medical History:   Past Medical History        Past Medical History:   Diagnosis Date    Asthma      Colon polyp      Depression      DVT (deep venous thrombosis) ([facility])       as a teenager    Esophageal reflux      GERD with stricture      Hx of blood clots      Hyperlipidemia      Hypertension      Insomnia      Migraine 12/29/1995    Osteoarthritis      Protein deficiency ([address])       protein S defic.     Pulmonary embolism (HCC)       x`s 2 post-op after Hysterectomy & Gall Bladder    Thyroid adenoma       NO MEDS    Type II or unspecified type diabetes mellitus without mention of complication, not stated as uncontrolled           Past Surgical History:     Past Surgical History         Past Surgical History:   Procedure Laterality Date    APPENDECTOMY        BACK SURGERY         LUMBAR BULGING DISC    CERVICAL FUSION   1993    CERVICAL FUSION N/A 11/4/2019     ANTERIOR CERVICAL DISCECTOMY FUSION C4-5 AND C6-7 WITH MEDTRONIC PLATE AND SCREWS, AND DONOR BONE WITH C-ARM performed by Filomena Alfaro MD at [address]        COLONOSCOPY N/A 7/9/2020     COLONOSCOPY POLYPECTOMY SNARE/COLD BIOPSY performed by Nicolas New.DO at [address] [facility], COLON, DIAGNOSTIC        HIATAL HERNIA REPAIR        HYSTERECTOMY (CERVIX STATUS UNKNOWN)

## 2023-12-26 ENCOUNTER — HOSPITAL ENCOUNTER (OUTPATIENT)
Age: 73
Setting detail: OUTPATIENT SURGERY
Discharge: HOME | End: 2023-12-26
Attending: ORTHOPAEDIC SURGERY | Admitting: ORTHOPAEDIC SURGERY
Payer: MEDICARE

## 2023-12-26 VITALS
BODY MASS INDEX: 32.78 KG/M2 | WEIGHT: 185 LBS | HEIGHT: 63 IN | HEART RATE: 83 BPM | SYSTOLIC BLOOD PRESSURE: 174 MMHG | DIASTOLIC BLOOD PRESSURE: 87 MMHG | OXYGEN SATURATION: 97 % | TEMPERATURE: 98 F | RESPIRATION RATE: 12 BRPM

## 2023-12-26 LAB
GLUCOSE BLD-MCNC: 166 MG/DL (ref 70–99)
PERFORMED ON: ABNORMAL

## 2023-12-26 PROCEDURE — 6360000002 HC RX W HCPCS: Performed by: ORTHOPAEDIC SURGERY

## 2023-12-26 PROCEDURE — 7100000010 HC PHASE II RECOVERY - FIRST 15 MIN: Performed by: ORTHOPAEDIC SURGERY

## 2023-12-26 PROCEDURE — 3600000002 HC SURGERY LEVEL 2 BASE: Performed by: ORTHOPAEDIC SURGERY

## 2023-12-26 PROCEDURE — 3600000012 HC SURGERY LEVEL 2 ADDTL 15MIN: Performed by: ORTHOPAEDIC SURGERY

## 2023-12-26 RX ORDER — BUPIVACAINE HYDROCHLORIDE 7.5 MG/ML
INJECTION, SOLUTION EPIDURAL; RETROBULBAR PRN
Status: DISCONTINUED | OUTPATIENT
Start: 2023-12-26 | End: 2023-12-26 | Stop reason: ALTCHOICE

## 2023-12-26 NOTE — H&P
I have reviewed the history and physical and examined the patient and find no relevant changes. I have reviewed with the patient and/or family the risks, benefits, and alternatives to the procedure. Isael Moore MD  12/26/2023 No intake/output data recorded.

## 2023-12-26 NOTE — PROGRESS NOTES
Discharge instructions given to pt and verbalized understanding, states pain is 50% better and Dr Daigle Look aware, no numbness or weakness noted, pt ambulated out to car without complications

## 2023-12-26 NOTE — DISCHARGE INSTRUCTIONS
[address]    [phone]    Post - Medial Branch Block     PATIENT INSTRUCTIONS:     -Resume Normal Diet  -Resume medications and you can resume blood thinners as prescribed. ACTIVITY:     -Be active   -No driving or operating machinery for 8 hours post procedure without sedation and 24 hours with sedation. If you are seen driving during this time the proper authorities will be notified.  -Do not stay alone for 4-6 hours after the procedure.  -If you have had IV sedation, do not sign legal documents, make any major decisions, or be involved in work decisions for the remainder of the day. -Resume normal activity as tolerated when full movement/sensation has returned in extremities. 3)  SITE CARE:     -Ice if needed  -Observe puncture site for signs of infection (redness, warmth swelling, drainage with a foul odor, fever or increased tenderness) if any signs of infection -report to Dr. Per Hector.   -Keep the site dry for 12 hours then shower or bathe as normal    -If a band-aid was applied to the injections site that can be removed the following day. 4)  EXPECTED SIDE EFFECTS:    -Numbness/tingling/weakness in extremities, if this lasts more than 6 hours notify Dr. Per Hector. -Muscle stiffness, soreness at puncture site (soreness may last 2-4 days). -pre procedure pain           5)  TO REACH DR. ACOSTA'S OFFICE- [phone]    6)  ADDITIONAL INSTRUCTIONS:    -Follow-up as scheduled or call for appointment if not already done.

## 2023-12-26 NOTE — OP NOTE
Operative Note      Patient: Lukasz Reyna  YOB: 1950  MRN: [id number]    Date of Procedure: 12/26/2023    Pre-Op Diagnosis Codes:     * Lumbar spondylosis [M47.816]    Post-Op Diagnosis: Same       Procedure(s):  BILATERAL LUMBAR THREE LUMBAR FOUR LUMBAR FIVE MEDIAL BRANCH BLOCK SITE CONFIRMED BY FLUOROSCOPY    Surgeon(s):  Heber Sam MD    Assistant:   * No surgical staff found *    Anesthesia: None    Estimated Blood Loss (mL): Minimal    Complications: None    Specimens:   * No specimens in log *    Implants:  * No implants in log *      Drains: * No LDAs found *    Findings: na        Detailed Description of Procedure: The patient was admitted through pre-op and written consent was obtained. The patient was advised of the risks and benefits of the procedure, including but not limited to the following: bleeding, pain, infection, temporary paralysis, nerve damage and spinal headache. The patient was given the opportunity to ask questions. There were no contraindications for this procedure. The appropriate area was prepped and draped in a sterile fashion. Landmarks were identified and marked. Three 25G spinal needle were directed to the right L3, L4, and L5 medial medial branches using fluoroscopic guidance with ideal needle tip confirmed by multiple views. There were no signs of intravascular or intrathecal injection. 0.5cc of 0.75% marcaine were injected at each site. I then repeat the same technique on the left    There were no complications and the patient tolerated the procedure well. The patient was transferred to the recovery area and monitored. Discharge instructions were given. The patient is to contact me for any post-procedure concerns. The patient is to follow up as scheduled. She noted 50% reduction in her pain 10 minutes after the procedure    Pain diary was discussed and provided.     Estimated blood loss: none    LAURIE Small MD, MD

## 2024-02-05 ENCOUNTER — OFFICE VISIT (OUTPATIENT)
Dept: ORTHOPEDIC SURGERY | Age: 74
End: 2024-02-05
Payer: MEDICARE

## 2024-02-05 VITALS — WEIGHT: 184.97 LBS | BODY MASS INDEX: 32.77 KG/M2 | HEIGHT: 63 IN

## 2024-02-05 DIAGNOSIS — M47.816 LUMBAR SPONDYLOSIS: Primary | ICD-10-CM

## 2024-02-05 DIAGNOSIS — M48.062 LUMBAR STENOSIS WITH NEUROGENIC CLAUDICATION: Primary | ICD-10-CM

## 2024-02-05 PROCEDURE — 99213 OFFICE O/P EST LOW 20 MIN: CPT | Performed by: ORTHOPAEDIC SURGERY

## 2024-02-05 PROCEDURE — 1123F ACP DISCUSS/DSCN MKR DOCD: CPT | Performed by: ORTHOPAEDIC SURGERY

## 2024-02-05 RX ORDER — ALPRAZOLAM 1 MG/1
1 TABLET ORAL 2 TIMES DAILY
Qty: 2 TABLET | Refills: 0 | Status: SHIPPED | OUTPATIENT
Start: 2024-02-05 | End: 2024-02-06

## 2024-02-05 NOTE — PROGRESS NOTES
L4-L5    Plan:    Discussed treatment options including observation, physical therapy, epidural injection, and additional imaging.  We will repeat her lumbar MRI to see if she is a candidate for surgery

## 2024-02-06 ENCOUNTER — TELEPHONE (OUTPATIENT)
Dept: ORTHOPEDIC SURGERY | Age: 74
End: 2024-02-06

## 2024-02-06 NOTE — TELEPHONE ENCOUNTER
Stacy Marquez [facility] Ortho Clinical Staff  Approved for MRI of Lumbar Spine  Approval Letter in Media  Approved Facility: [facility]  Approval Dates: 2/5/2024 to 8/3/2024  Auth #: [id number]  Spoke to patient advising that the MRI was approved and they can call to schedule. Patient had already scheduled the MRI. Patient's follow-up appointment was moved to a sooner date.

## 2024-02-23 ENCOUNTER — HOSPITAL ENCOUNTER (OUTPATIENT)
Dept: MRI IMAGING | Age: 74
Discharge: HOME | End: 2024-02-23
Attending: ORTHOPAEDIC SURGERY
Payer: MEDICARE

## 2024-02-23 DIAGNOSIS — M48.062 LUMBAR STENOSIS WITH NEUROGENIC CLAUDICATION: ICD-10-CM

## 2024-02-23 PROCEDURE — A9579 GAD-BASE MR CONTRAST NOS,1ML: HCPCS | Performed by: ORTHOPAEDIC SURGERY

## 2024-02-23 PROCEDURE — 6360000004 HC RX CONTRAST MEDICATION: Performed by: ORTHOPAEDIC SURGERY

## 2024-02-23 PROCEDURE — 72158 MRI LUMBAR SPINE W/O & W/DYE: CPT

## 2024-02-23 RX ADMIN — GADOTERIDOL 15 ML: 279.3 INJECTION, SOLUTION INTRAVENOUS at 16:36

## 2024-02-27 ENCOUNTER — OFFICE VISIT (OUTPATIENT)
Dept: ORTHOPEDIC SURGERY | Age: 74
End: 2024-02-27
Payer: MEDICARE

## 2024-02-27 VITALS — BODY MASS INDEX: 32.6 KG/M2 | HEIGHT: 63 IN | WEIGHT: 184 LBS

## 2024-02-27 DIAGNOSIS — M48.062 LUMBAR STENOSIS WITH NEUROGENIC CLAUDICATION: Primary | ICD-10-CM

## 2024-02-27 PROCEDURE — 99214 OFFICE O/P EST MOD 30 MIN: CPT | Performed by: ORTHOPAEDIC SURGERY

## 2024-02-27 PROCEDURE — 1123F ACP DISCUSS/DSCN MKR DOCD: CPT | Performed by: ORTHOPAEDIC SURGERY

## 2024-02-27 RX ORDER — CLOTRIMAZOLE AND BETAMETHASONE DIPROPIONATE 10; .64 MG/G; MG/G
CREAM TOPICAL
COMMUNITY
Start: 2024-01-09

## 2024-02-27 RX ORDER — ALPRAZOLAM 1 MG/1
TABLET ORAL
COMMUNITY
Start: 2024-02-07

## 2024-02-27 RX ORDER — HYDROXYCHLOROQUINE SULFATE 200 MG/1
TABLET, FILM COATED ORAL
COMMUNITY
Start: 2023-12-11

## 2024-02-27 NOTE — PROGRESS NOTES
deficit  Coordination/Balance: Good coordination     LUMBAR/SACRAL EXAMINATION:  Inspection: Local inspection shows no step-off or bruising.  Lumbar alignment is normal.  Sagittal and Coronal balance is neutral.      Palpation:   No evidence of tenderness at the midline.  No tenderness bilaterally at the paraspinal or trochanters.  There is no step-off or paraspinal spasm.   Range of Motion: Lumbar flexion, extension and rotation are mildly limited due to pain.  Strength:   Strength testing is 5/5 in all muscle groups tested.   Special Tests:   Straight leg raise and crossed SLR negative.  Leg length and pelvis level.     Skin: There are no rashes, ulcerations or lesions.  Reflexes: Reflexes are symmetrically 2+ at the patellar and ankle tendons.  Clonus absent bilaterally at the feet.  Gait & station: normal, patient ambulates without assistance    Additional Examinations:   RIGHT LOWER EXTREMITY: Inspection/examination of the right lower extremity does not show any tenderness, deformity or injury. Range of motion is unremarkable. There is no gross instability.  There are no rashes, ulcerations or lesions. Strength and tone are normal.    LEFT LOWER EXTREMITY:  Inspection/examination of the left lower extremity does not show any tenderness, deformity or injury. Range of motion is unremarkable. There is no gross instability. There are no rashes, ulcerations or lesions.  Strength and tone are normal.    Diagnostic Testing:    I reviewed AP and lateral x-rays of the lumbar spine that were obtained in the office 6/13/23.  Those show lumbar degenerative disc disease    I reviewed MRI images of her lumbar spine from 2/23/2024 in the office today.  Those show substantial improvement of her stenosis at the L4-L5 levels and less so L3-L4 with moderate stenosis L1-L2 L2-L3 and the caudal aspect of her L3-4 decompression  I reviewed MRI images of her lumbar spine from 10/21/2022 in the office today.  Those show severe central

## 2024-03-05 ENCOUNTER — TELEPHONE (OUTPATIENT)
Dept: ORTHOPEDIC SURGERY | Age: 74
End: 2024-03-05

## 2024-03-18 DIAGNOSIS — M48.062 LUMBAR STENOSIS WITH NEUROGENIC CLAUDICATION: Primary | ICD-10-CM

## 2024-03-18 DIAGNOSIS — M47.816 LUMBAR SPONDYLOSIS: ICD-10-CM

## 2024-03-18 NOTE — TELEPHONE ENCOUNTER
DENIED  CPT-51664 [id number]  NOT MEDICALLY NECESSARY  APPEAL INFORMATION IS SCANNED INTO MEDIA  REFERENCE-[id number]  DOC SCANNED

## 2024-03-19 ENCOUNTER — HOSPITAL ENCOUNTER (OUTPATIENT)
Dept: PHYSICAL THERAPY | Age: 74
Setting detail: THERAPIES SERIES
Discharge: HOME | End: 2024-03-19
Attending: ORTHOPAEDIC SURGERY
Payer: MEDICARE

## 2024-03-19 DIAGNOSIS — M25.60 STIFFNESS IN JOINT: ICD-10-CM

## 2024-03-19 DIAGNOSIS — Z74.09 DECREASED FUNCTIONAL MOBILITY AND ENDURANCE: ICD-10-CM

## 2024-03-19 DIAGNOSIS — R53.1 WEAKNESS: Primary | ICD-10-CM

## 2024-03-19 DIAGNOSIS — R52 PAIN: ICD-10-CM

## 2024-03-19 PROCEDURE — 97161 PT EVAL LOW COMPLEX 20 MIN: CPT

## 2024-03-19 PROCEDURE — 97035 APP MDLTY 1+ULTRASOUND EA 15: CPT

## 2024-03-19 PROCEDURE — 97530 THERAPEUTIC ACTIVITIES: CPT

## 2024-03-19 NOTE — PLAN OF CARE
[facility]- Outpatient Rehabilitation and Therapy [address] [place] [address] office: [phone] fax: [phone]     Physical Therapy Initial Evaluation Certification      Dear Jorge Smith MD,    We had the pleasure of evaluating the following patient for physical therapy services at [facility] Outpatient Physical Therapy.  A summary of our findings can be found in the initial assessment below.  This includes our plan of care.  If you have any questions or concerns regarding these findings, please do not hesitate to contact me at the office phone number listed above.  Thank you for the referral.     Physician Signature:_______________________________Date:__________________  By signing above (or electronic signature), therapist’s plan is approved by physician       Physical Therapy: TREATMENT/PROGRESS NOTE   Patient: Jillian Stinson (73 y.o. female)   Examination Date: 2024   :  1950 MRN: [id number]   Visit #:   Insurance Allowable Auth Needed   [place] []Yes    [x]No    Insurance: Payor: AETNA MEDICARE / Plan: AETNA MEDICARE-ADVANTAGE PPO / Product Type: Medicare /   Insurance ID: [id number] - (Medicare Managed)  Secondary Insurance (if applicable):    Treatment Diagnosis:     ICD-10-CM    1. Weakness  R53.1       2. Stiffness in joint  M25.60       3. Pain  R52       4. Decreased functional mobility and endurance  Z74.09          Medical Diagnosis:  Lumbar stenosis with neurogenic claudication [M48.062]  Lumbar spondylosis [M47.816]   Referring Physician: Jorge Smith MD  PCP: Rylee Scott, APRN - NP       Plan of care signed (Y/N):     Date of Patient follow up with Physician:      Progress Report/POC: EVAL today  POC update due: (10 visits /OR AUTH LIMITS, whichever is less)  2024                                             Precautions/ Contra-indications:           Latex allergy:  NO  Pacemaker:    NO  Contraindications for Manipulation:

## 2024-03-21 ENCOUNTER — HOSPITAL ENCOUNTER (OUTPATIENT)
Dept: PHYSICAL THERAPY | Age: 74
Setting detail: THERAPIES SERIES
Discharge: HOME | End: 2024-03-21
Attending: ORTHOPAEDIC SURGERY
Payer: MEDICARE

## 2024-03-21 PROCEDURE — 97035 APP MDLTY 1+ULTRASOUND EA 15: CPT

## 2024-03-21 PROCEDURE — 97110 THERAPEUTIC EXERCISES: CPT

## 2024-03-21 NOTE — FLOWSHEET NOTE
progress  exercise difficulty on lumbar stretches and isometrics. Continues to display deficits in weakness and issues with significant pain in standing and gait which required ongoing skilled physical therapy and decision making.     Medical Necessity Documentation:  I certify that this patient meets the below criteria necessary for medical necessity for care and/or justification of therapy services:  The patient has a musculoskeletal condition(s) with a corresponding ICD-10 code that is of complexity and severity that require skilled therapeutic intervention. This has a direct and significant impact on the need for therapy and significantly impacts the rate of recovery.     Return to Play: NA    Prognosis for POC: [x] Good [] Fair  [] Poor    Patient requires continued skilled intervention: [x] Yes  [] No      CHARGE CAPTURE     PT CHARGE GRID   CPT Code (TIMED) minutes # CPT Code (UNTIMED) #     Therex (28948)  30 2  EVAL:LOW (57314 - Typically 20 minutes face-to-face)     Neuromusc. Re-ed (97692)    Re-Eval (13948)     Manual (25542)    Estim Unattended (23592)     Ther. Act (36221)    Mech. Traction (18907)     Gait (57197)    Dry Needle 1-2 muscle (21519)     Aquatic Therex (40930)    Dry Needle 3+ muscle (20561)     Iontophoresis (19879)    VASO (71768)     Ultrasound (81167) 8 1  Group Therapy (79648)     Estim Attended (06610)    Canalith Repositioning (74443)     Other:    Other:    Total Timed Code Tx Minutes         Total Treatment Minutes 38        Charge Justification:  (73319) THERAPEUTIC EXERCISE - Provided verbal/tactile cueing for activities related to strengthening, flexibility, endurance, ROM performed to prevent loss of range of motion, maintain or improve muscular strength or increase flexibility, following either an injury or surgery.   (01801) HOME EXERCISE PROGRAM - Reviewed/Progressed HEP activities related to strengthening, flexibility, endurance, ROM performed to prevent loss of range of

## 2024-03-25 ENCOUNTER — HOSPITAL ENCOUNTER (OUTPATIENT)
Dept: PHYSICAL THERAPY | Age: 74
Setting detail: THERAPIES SERIES
Discharge: HOME | End: 2024-03-25
Attending: ORTHOPAEDIC SURGERY
Payer: MEDICARE

## 2024-03-25 PROCEDURE — 97110 THERAPEUTIC EXERCISES: CPT

## 2024-03-25 PROCEDURE — 97035 APP MDLTY 1+ULTRASOUND EA 15: CPT

## 2024-03-25 NOTE — FLOWSHEET NOTE
[facility]- Outpatient Rehabilitation and Therapy [address] [place] [address] office: [phone] fax: [phone]         Physical Therapy: TREATMENT/PROGRESS NOTE   Patient: Jillian Stinson (73 y.o. female)   Examination Date: 2024   :  1950 MRN: [id number]   Visit #: 3 /8  Insurance Allowable Auth Needed   [place] []Yes    [x]No    Insurance: Payor: AETNA MEDICARE / Plan: AETNA MEDICARE-ADVANTAGE PPO / Product Type: Medicare /   Insurance ID: [id number] - (Medicare Managed)  Secondary Insurance (if applicable):    Treatment Diagnosis:     ICD-10-CM    1. Weakness  R53.1       2. Stiffness in joint  M25.60       3. Pain  R52       4. Decreased functional mobility and endurance  Z74.09          Medical Diagnosis:  Lumbar stenosis with neurogenic claudication [M48.062]  Lumbar spondylosis [M47.816]   Referring Physician: Jorge Smith MD  PCP: Rylee Scott APRN - NP       Plan of care signed (Y/N):     Date of Patient follow up with Physician:      Progress Report/POC: NO  POC update due: (10 visits /OR AUTH LIMITS, whichever is less)  2024                                             Precautions/ Contra-indications:           Latex allergy:  NO  Pacemaker:    NO  Contraindications for Manipulation: NA  Date of Surgery: NA  Other:    Red Flags:  None    C-SSRS Triggered by Intake questionnaire:   [x] No, Questionnaire did not trigger screening.   [] Yes, Patient intake triggered further evaluation      [] C-SSRS Screening completed  [] PCP notified via Plan of Care  [] Emergency services notified     Preferred Language for Healthcare:   [x] English       [] other:    SUBJECTIVE EXAMINATION   Current/Past Treatment:   Physical Therapy: prior to surgery  Chiropractic:  no   Injection: Medial branch block 2023, transforaminal injections right L3-4 and L4-L5 10/18/2020  Medications: Neurontin and oral steroids    Diagnostic Testing:    I reviewed AP and

## 2024-03-28 ENCOUNTER — HOSPITAL ENCOUNTER (OUTPATIENT)
Dept: PHYSICAL THERAPY | Age: 74
Setting detail: THERAPIES SERIES
Discharge: HOME | End: 2024-03-28
Attending: ORTHOPAEDIC SURGERY
Payer: MEDICARE

## 2024-03-28 PROCEDURE — 97035 APP MDLTY 1+ULTRASOUND EA 15: CPT

## 2024-03-28 PROCEDURE — 97110 THERAPEUTIC EXERCISES: CPT

## 2024-03-28 NOTE — FLOWSHEET NOTE
interventions:    Interventions:  [x] Therapeutic exercise including: strength training, ROM, including postural re-education.   [x] NMR activation and proprioception, including postural re-education.    [x] Manual therapy as indicated to include: PROM, Gr I-IV mobilizations, and STM  [x] Modalities as needed that may include: Cryotherapy, Electrical Stimulation, Thermal Agents, and Ultrasound  [x] Patient education on joint protection, postural re-education, activity modification, progression of HEP.        [] Aquatic Therapy    Plan: Cont POC- Continue emphasis/focus on exercise progression, modulating pain, and increasing ROM. Next visit plan to add as tolerated and indicated above.     Electronically Signed by Beverly Kang PT DPT, MS  [id number] Date: 03/28/2024     Note: Portions of this note have been templated and/or copied from initial evaluation, reassessments and prior notes for documentation efficiency.

## 2024-04-02 ENCOUNTER — HOSPITAL ENCOUNTER (OUTPATIENT)
Dept: PHYSICAL THERAPY | Age: 74
Setting detail: THERAPIES SERIES
End: 2024-04-02
Attending: ORTHOPAEDIC SURGERY
Payer: MEDICARE

## 2024-04-04 ENCOUNTER — HOSPITAL ENCOUNTER (OUTPATIENT)
Dept: PHYSICAL THERAPY | Age: 74
Setting detail: THERAPIES SERIES
Discharge: HOME | End: 2024-04-04
Attending: ORTHOPAEDIC SURGERY
Payer: MEDICARE

## 2024-04-04 PROCEDURE — 97035 APP MDLTY 1+ULTRASOUND EA 15: CPT

## 2024-04-04 PROCEDURE — 97110 THERAPEUTIC EXERCISES: CPT

## 2024-04-04 NOTE — FLOWSHEET NOTE
[facility]- Outpatient Rehabilitation and Therapy [address] [place] [address] office: [phone] fax: [phone]         Physical Therapy: TREATMENT/PROGRESS NOTE   Patient: Jillian Stinson (74 y.o. female)   Examination Date: 2024   :  1950 MRN: [id number]   Visit #:   Insurance Allowable Auth Needed   [place]40 copay []Yes    [x]No    Insurance: Payor: [employer] MEDICARE / Plan: [employer] MEDICARE-ADVANTAGE PPO / Product Type: Medicare /   Insurance ID: [id number] - (Medicare Managed)  Secondary Insurance (if applicable):    Treatment Diagnosis:     ICD-10-CM    1. Weakness  R53.1       2. Stiffness in joint  M25.60       3. Pain  R52       4. Decreased functional mobility and endurance  Z74.09          Medical Diagnosis:  Lumbar stenosis with neurogenic claudication [M48.062]  Lumbar spondylosis [M47.816]   Referring Physician: Jorge Smith MD  PCP: Rylee Scott APRN - NP       Plan of care signed (Y/N):     Date of Patient follow up with Physician:      Progress Report/POC: NO  POC update due: (10 visits /OR AUTH LIMITS, whichever is less)  2024                                             Precautions/ Contra-indications:           Latex allergy:  NO  Pacemaker:    NO  Contraindications for Manipulation: NA  Date of Surgery: NA  Other:    Red Flags:  None    C-SSRS Triggered by Intake questionnaire:   [x] No, Questionnaire did not trigger screening.   [] Yes, Patient intake triggered further evaluation      [] C-SSRS Screening completed  [] PCP notified via Plan of Care  [] Emergency services notified     Preferred Language for Healthcare:   [x] English       [] other:    SUBJECTIVE EXAMINATION   Current/Past Treatment:   Physical Therapy: prior to surgery  Chiropractic:  no   Injection: Medial branch block 2023, transforaminal injections right L3-4 and L4-L5 10/18/2020  Medications: Neurontin and oral steroids    Diagnostic Testing:    I reviewed AP

## 2024-04-10 ENCOUNTER — HOSPITAL ENCOUNTER (OUTPATIENT)
Dept: PHYSICAL THERAPY | Age: 74
Setting detail: THERAPIES SERIES
Discharge: HOME | End: 2024-04-10
Attending: ORTHOPAEDIC SURGERY
Payer: MEDICARE

## 2024-04-10 PROCEDURE — 97110 THERAPEUTIC EXERCISES: CPT

## 2024-04-10 PROCEDURE — 97035 APP MDLTY 1+ULTRASOUND EA 15: CPT

## 2024-04-10 NOTE — FLOWSHEET NOTE
[facility]- Outpatient Rehabilitation and Therapy [address] [place] [address] office: [phone] fax: [phone]         Physical Therapy: TREATMENT/PROGRESS NOTE   Patient: Jillian Stinson (74 y.o. female)   Examination Date: 04/10/2024   :  1950 MRN: [id number]   Visit #:   Insurance Allowable Auth Needed   [place]  $40 copay []Yes    [x]No    Insurance: Payor: [employer] MEDICARE / Plan: A[employer]NA MEDICARE-ADVANTAGE PPO / Product Type: Medicare /   Insurance ID: [id number] - (Medicare Managed)  Secondary Insurance (if applicable):    Treatment Diagnosis:     ICD-10-CM    1. Weakness  R53.1       2. Stiffness in joint  M25.60       3. Pain  R52       4. Decreased functional mobility and endurance  Z74.09          Medical Diagnosis:  Lumbar stenosis with neurogenic claudication [M48.062]  Lumbar spondylosis [M47.816]   Referring Physician: Jorge Smith MD  PCP: Rylee Scott APRN - NP       Plan of care signed (Y/N):     Date of Patient follow up with Physician:      Progress Report/POC: NO  POC update due: (10 visits /OR AUTH LIMITS, whichever is less)  2024                                             Precautions/ Contra-indications:           Latex allergy:  NO  Pacemaker:    NO  Contraindications for Manipulation: NA  Date of Surgery: NA  Other:    Red Flags:  None    C-SSRS Triggered by Intake questionnaire:   [x] No, Questionnaire did not trigger screening.   [] Yes, Patient intake triggered further evaluation      [] C-SSRS Screening completed  [] PCP notified via Plan of Care  [] Emergency services notified     Preferred Language for Healthcare:   [x] English       [] other:    SUBJECTIVE EXAMINATION   Current/Past Treatment:   Physical Therapy: prior to surgery  Chiropractic:  no   Injection: Medial branch block 2023, transforaminal injections right L3-4 and L4-L5 10/18/2020  Medications: Neurontin and oral steroids    Diagnostic Testing:    I reviewed AP

## 2024-04-12 ENCOUNTER — HOSPITAL ENCOUNTER (OUTPATIENT)
Dept: PHYSICAL THERAPY | Age: 74
Setting detail: THERAPIES SERIES
Discharge: HOME | End: 2024-04-12
Attending: ORTHOPAEDIC SURGERY
Payer: MEDICARE

## 2024-04-12 PROCEDURE — 97035 APP MDLTY 1+ULTRASOUND EA 15: CPT

## 2024-04-12 PROCEDURE — 97110 THERAPEUTIC EXERCISES: CPT

## 2024-04-12 NOTE — FLOWSHEET NOTE
[facility]- Outpatient Rehabilitation and Therapy [address] [place] [address] office: [phone] fax: [phone]         Physical Therapy: TREATMENT/PROGRESS NOTE   Patient: Jillian Stinson (74 y.o. female)   Examination Date: 2024   :  1950 MRN: [id number]   Visit #:   Insurance Allowable Auth Needed   [place]  $40 copay []Yes    [x]No    Insurance: Payor: [employer] MEDICARE / Plan: AETNA MEDICARE-ADVANTAGE PPO / Product Type: Medicare /   Insurance ID: [id number] - (Medicare Managed)  Secondary Insurance (if applicable):    Treatment Diagnosis:     ICD-10-CM    1. Weakness  R53.1       2. Stiffness in joint  M25.60       3. Pain  R52       4. Decreased functional mobility and endurance  Z74.09          Medical Diagnosis:  Lumbar stenosis with neurogenic claudication [M48.062]  Lumbar spondylosis [M47.816]   Referring Physician: Jorge Smith MD  PCP: Rylee Scott APRN - NP       Plan of care signed (Y/N):     Date of Patient follow up with Physician:      Progress Report/POC: NO  POC update due: (10 visits /OR AUTH LIMITS, whichever is less)  2024                                             Precautions/ Contra-indications:           Latex allergy:  NO  Pacemaker:    NO  Contraindications for Manipulation: NA  Date of Surgery: NA  Other:    Red Flags:  None    C-SSRS Triggered by Intake questionnaire:   [x] No, Questionnaire did not trigger screening.   [] Yes, Patient intake triggered further evaluation      [] C-SSRS Screening completed  [] PCP notified via Plan of Care  [] Emergency services notified     Preferred Language for Healthcare:   [x] English       [] other:    SUBJECTIVE EXAMINATION   Current/Past Treatment:   Physical Therapy: prior to surgery  Chiropractic:  no   Injection: Medial branch block 2023, transforaminal injections right L3-4 and L4-L5 10/18/2020  Medications: Neurontin and oral steroids    Diagnostic Testing:    I reviewed AP

## 2024-04-15 ENCOUNTER — HOSPITAL ENCOUNTER (OUTPATIENT)
Dept: PHYSICAL THERAPY | Age: 74
Setting detail: THERAPIES SERIES
Discharge: HOME | End: 2024-04-15
Attending: ORTHOPAEDIC SURGERY
Payer: MEDICARE

## 2024-04-15 PROCEDURE — 97110 THERAPEUTIC EXERCISES: CPT

## 2024-04-15 PROCEDURE — 97035 APP MDLTY 1+ULTRASOUND EA 15: CPT

## 2024-04-15 NOTE — FLOWSHEET NOTE
NEUROMUSCULAR RE-EDUCATION - Therapeutic procedure, 1 or more areas, each 15 minutes; neuromuscular reeducation of movement, balance, coordination, kinesthetic sense, posture, and/or proprioception for sitting and/or standing activities  (04013) THERAPEUTIC ACTIVITY - use of dynamic activities to improve functional performance. (Ex include squatting, ascending/descending stairs, walking, bending, lifting, catching, throwing, pushing, pulling, jumping.)  Direct, one on one contact, billed in 15-minute increments.  (70014) MANUAL THERAPY -  Manual therapy techniques, 1 or more regions, each 15 minutes (Mobilization/manipulation, manual lymphatic drainage, manual traction) for the purpose of modulating pain, promoting relaxation,  increasing ROM, reducing/eliminating soft tissue swelling/inflammation/restriction, improving soft tissue extensibility and allowing for proper ROM for normal function with self care, mobility, lifting and ambulation      GOALS     Patient stated goal: \"less pain and prepare for therapy\"   Status: [] Progressing: [] Met: [] Not Met: [] Adjusted    Therapist goals for Patient:   Short Term Goals: To be achieved in: 2 weeks  Independent in HEP and progression per patient tolerance, in order to progress toward full function and prevent re-injury.    Status: [] Progressing: [] Met: [] Not Met: [] Adjusted  Patient will have a decrease in pain temporarily after sessions to help facilitate improvement in movement, function, and ADLs as indicated by functional deficits.   Status: [] Progressing: [] Met: [] Not Met: [] Adjusted    Long Term Goals: To be achieved in: DC  Disability index score of 22 or less for the Modified Oswestry to assist with return top prior level of function.   Status: [] Progressing: [] Met: [] Not Met: [] Adjusted  Improve hip AROM to WFL to allow for proper joint functioning as indicated by patients functional deficits.  Status: [] Progressing: [] Met: [] Not Met: []

## 2024-04-18 ENCOUNTER — HOSPITAL ENCOUNTER (OUTPATIENT)
Dept: PHYSICAL THERAPY | Age: 74
Setting detail: THERAPIES SERIES
Discharge: HOME | End: 2024-04-18
Attending: ORTHOPAEDIC SURGERY
Payer: MEDICARE

## 2024-04-18 PROCEDURE — 97110 THERAPEUTIC EXERCISES: CPT

## 2024-04-18 PROCEDURE — 97035 APP MDLTY 1+ULTRASOUND EA 15: CPT

## 2024-04-18 NOTE — FLOWSHEET NOTE
[facility]- Outpatient Rehabilitation and Therapy [address] [place] [address] office: [phone] fax: [phone]         Physical Therapy: TREATMENT/PROGRESS NOTE   Patient: Jillian Stinson (74 y.o. female)   Examination Date: 2024   :  1950 MRN: [id number]   Visit #:   Insurance Allowable Auth Needed   [place]  $40 copay []Yes    [x]No    Insurance: Payor: AETNA MEDICARE / Plan: AETNA MEDICARE-ADVANTAGE PPO / Product Type: Medicare /   Insurance ID: [id number] - (Medicare Managed)  Secondary Insurance (if applicable):    Treatment Diagnosis:     ICD-10-CM    1. Weakness  R53.1       2. Stiffness in joint  M25.60       3. Pain  R52       4. Decreased functional mobility and endurance  Z74.09          Medical Diagnosis:  Lumbar stenosis with neurogenic claudication [M48.062]  Lumbar spondylosis [M47.816]   Referring Physician: Jorge Smith MD  PCP: Rylee Scott APRN - NP       Plan of care signed (Y/N):     Date of Patient follow up with Physician:      Progress Report/POC: NO  POC update due: (10 visits /OR AUTH LIMITS, whichever is less)  2024                                             Precautions/ Contra-indications:           Latex allergy:  NO  Pacemaker:    NO  Contraindications for Manipulation: NA  Date of Surgery: NA  Other:    Red Flags:  None    C-SSRS Triggered by Intake questionnaire:   [x] No, Questionnaire did not trigger screening.   [] Yes, Patient intake triggered further evaluation      [] C-SSRS Screening completed  [] PCP notified via Plan of Care  [] Emergency services notified     Preferred Language for Healthcare:   [x] English       [] other:    SUBJECTIVE EXAMINATION   Current/Past Treatment:   Physical Therapy: prior to surgery  Chiropractic:  no   Injection: Medial branch block 2023, transforaminal injections right L3-4 and L4-L5 10/18/2020  Medications: Neurontin and oral steroids    Diagnostic Testing:    I reviewed AP

## 2024-04-22 ENCOUNTER — HOSPITAL ENCOUNTER (OUTPATIENT)
Dept: PHYSICAL THERAPY | Age: 74
Setting detail: THERAPIES SERIES
Discharge: HOME | End: 2024-04-22
Attending: ORTHOPAEDIC SURGERY
Payer: MEDICARE

## 2024-04-22 PROCEDURE — 97110 THERAPEUTIC EXERCISES: CPT

## 2024-04-22 PROCEDURE — 97530 THERAPEUTIC ACTIVITIES: CPT

## 2024-04-22 NOTE — FLOWSHEET NOTE
Code Tx Minutes 30 2       Total Treatment Minutes 30        Charge Justification:  (08064) THERAPEUTIC EXERCISE - Provided verbal/tactile cueing for activities related to strengthening, flexibility, endurance, ROM performed to prevent loss of range of motion, maintain or improve muscular strength or increase flexibility, following either an injury or surgery.   (41798) THERAPEUTIC ACTIVITY - use of dynamic activities to improve functional performance. (Ex include squatting, ascending/descending stairs, walking, bending, lifting, catching, throwing, pushing, pulling, jumping.)  Direct, one on one contact, billed in 15-minute increments.      GOALS   Updated 4/22:  *no overall change or improvement noted with therapy since starting on 3/19/24  *some temporary relief after sessions, but once she resumes ADLs and home activities the pain returns  *pain cont to limit sleep, standing/ambulation tolerance (pain with trying to stand to do dishes/meal prep/self care)  *consistent with hep as she is able   *pt with poor tolerance to supine ex on hard plinth and tolerates seated ex best; poor tolerance with exercise progression and unable to tolerate several ex due to inc pain c/o - Pt notes that she feels better w/ PT sessions since we have held on the below aggravating exercises and focused on mostly seated ex  *hip ROM: min tight all with pain at end range   *MMT: hip flex 4+/5 B; abd L 5/5, R 5-/5             QD 5             HS 5             DF 5  *Typical day - wakes w/ pain 3-4/10 and inc to 7-8/10 by end of day with ADLs    Patient stated goal: \"less pain and prepare for therapy\"   Status: [x] Progressing: [] Met: [] Not Met: [] Adjusted    Therapist goals for Patient:   Short Term Goals: To be achieved in: 2 weeks  Independent in HEP and progression per patient tolerance, in order to progress toward full function and prevent re-injury.    Status: [] Progressing: [x] Met: [] Not Met: [] Adjusted  Patient will have a

## 2024-04-25 ENCOUNTER — HOSPITAL ENCOUNTER (OUTPATIENT)
Dept: PHYSICAL THERAPY | Age: 74
Setting detail: THERAPIES SERIES
Discharge: HOME | End: 2024-04-25
Attending: ORTHOPAEDIC SURGERY
Payer: MEDICARE

## 2024-04-25 PROCEDURE — 97110 THERAPEUTIC EXERCISES: CPT

## 2024-04-25 PROCEDURE — 97530 THERAPEUTIC ACTIVITIES: CPT

## 2024-04-25 NOTE — FLOWSHEET NOTE
[facility]- Outpatient Rehabilitation and Therapy [address] [place] [address] office: [phone] fax: [phone]         Physical Therapy: TREATMENT/PROGRESS NOTE   Patient: Jillian Stinson (74 y.o. female)   Examination Date: 2024   :  1950 MRN: [id number]   Visit #:   Insurance Allowable Auth Needed   [place]  $40 copay []Yes    [x]No    Insurance: Payor: AETNA MEDICARE / Plan: AETNA MEDICARE-ADVANTAGE PPO / Product Type: Medicare /   Insurance ID: [id number] - (Medicare Managed)  Secondary Insurance (if applicable):    Treatment Diagnosis:     ICD-10-CM    1. Weakness  R53.1       2. Stiffness in joint  M25.60       3. Pain  R52       4. Decreased functional mobility and endurance  Z74.09          Medical Diagnosis:  Lumbar stenosis with neurogenic claudication [M48.062]  Lumbar spondylosis [M47.816]   Referring Physician: Jorge Smith MD  PCP: Rylee Scott APRN - NP       Plan of care signed (Y/N):     Date of Patient follow up with Physician:      Progress Report/POC: NO  POC update due: (10 visits /OR AUTH LIMITS, whichever is less)  2024                                             Precautions/ Contra-indications:           Latex allergy:  NO  Pacemaker:    NO  Contraindications for Manipulation: NA  Date of Surgery: NA  Other:    Red Flags:  None    C-SSRS Triggered by Intake questionnaire:   [x] No, Questionnaire did not trigger screening.   [] Yes, Patient intake triggered further evaluation      [] C-SSRS Screening completed  [] PCP notified via Plan of Care  [] Emergency services notified     Preferred Language for Healthcare:   [x] English       [] other:    SUBJECTIVE EXAMINATION   Current/Past Treatment:   Physical Therapy: prior to surgery  Chiropractic:  no   Injection: Medial branch block 2023, transforaminal injections right L3-4 and L4-L5 10/18/2020  Medications: Neurontin and oral steroids    Diagnostic Testing:    I reviewed AP

## 2024-04-29 ENCOUNTER — HOSPITAL ENCOUNTER (OUTPATIENT)
Dept: PHYSICAL THERAPY | Age: 74
Setting detail: THERAPIES SERIES
Discharge: HOME | End: 2024-04-29
Attending: ORTHOPAEDIC SURGERY
Payer: MEDICARE

## 2024-04-29 PROCEDURE — 97530 THERAPEUTIC ACTIVITIES: CPT

## 2024-04-29 PROCEDURE — 97110 THERAPEUTIC EXERCISES: CPT

## 2024-04-29 NOTE — PLAN OF CARE
Visits: 12       Physical Therapy: TREATMENT/PROGRESS NOTE   Patient: Jillian Stinson (74 y.o. female)   Examination Date: 2024   :  1950 MRN: [id number]   Visit #:   Insurance Allowable Auth Needed   [place]  $40 copay []Yes    [x]No    Insurance: Payor: AETNA MEDICARE / Plan: AETNA MEDICARE-ADVANTAGE PPO / Product Type: Medicare /   Insurance ID: [id number] - (Medicare Managed)  Secondary Insurance (if applicable):    Treatment Diagnosis:     ICD-10-CM    1. Weakness  R53.1       2. Stiffness in joint  M25.60       3. Pain  R52       4. Decreased functional mobility and endurance  Z74.09          Medical Diagnosis:  Lumbar stenosis with neurogenic claudication [M48.062]  Lumbar spondylosis [M47.816]   Referring Physician: Jorge Smith MD  PCP: Rylee Scott APRN - NP       Plan of care signed (Y/N):     Date of Patient follow up with Physician:      Progress Report/POC: YES, Date Range for this report: eval to 24  POC update due: (10 visits /OR AUTH LIMITS, whichever is less)  2024                                             Precautions/ Contra-indications:           Latex allergy:  NO  Pacemaker:    NO  Contraindications for Manipulation: NA  Date of Surgery: NA  Other:    Red Flags:  None    C-SSRS Triggered by Intake questionnaire:   [x] No, Questionnaire did not trigger screening.   [] Yes, Patient intake triggered further evaluation      [] C-SSRS Screening completed  [] PCP notified via Plan of Care  [] Emergency services notified     Preferred Language for Healthcare:   [x] English       [] other:    SUBJECTIVE EXAMINATION   Current/Past Treatment:   Physical Therapy: prior to surgery  Chiropractic:  no   Injection: Medial branch block 2023, transforaminal injections right L3-4 and L4-L5 10/18/2020  Medications: Neurontin and oral steroids    Diagnostic Testing:    I reviewed AP and lateral x-rays of the lumbar spine that were obtained in the office 23.

## 2024-06-04 ENCOUNTER — TELEPHONE (OUTPATIENT)
Dept: ORTHOPEDIC SURGERY | Age: 74
End: 2024-06-04

## 2024-06-04 NOTE — TELEPHONE ENCOUNTER
----- Message from Jorge Smith MD sent at 6/4/2024 10:53 AM EDT -----  Can change to L2-L3 and L3-L4 only  Does not need surgery at L4-L5    ----- Message -----  From: Allyson Evans MA  Sent: 6/4/2024  10:42 AM EDT  To: Jorge Smith MD    [employer] denying levels. They felt the L1-L2 was mild to moderate and L4-L5 was not included on surgical plan and it was moderate to severe on MRI.     Laminectomy L1-L2, L2-3, L3-L4.

## 2024-06-07 ENCOUNTER — TELEPHONE (OUTPATIENT)
Dept: ORTHOPEDIC SURGERY | Age: 74
End: 2024-06-07

## 2024-06-07 NOTE — TELEPHONE ENCOUNTER
----- Message from Jorge Smith MD sent at 6/7/2024 11:58 AM EDT -----  Bilateral     ----- Message -----  From: Allyson Evans MA  Sent: 6/7/2024  11:06 AM EDT  To: Jorge Smith MD    Please confirm side? Bilateral?

## 2024-06-10 PROBLEM — M48.062 LUMBAR STENOSIS WITH NEUROGENIC CLAUDICATION: Status: ACTIVE | Noted: 2024-06-07

## 2024-06-10 PROBLEM — M48.062 SPINAL STENOSIS, LUMBAR REGION, WITH NEUROGENIC CLAUDICATION: Status: ACTIVE | Noted: 2024-06-07

## 2024-06-13 RX ORDER — M-VIT,TX,IRON,MINS/CALC/FOLIC 27MG-0.4MG
1 TABLET ORAL DAILY
COMMUNITY

## 2024-06-13 NOTE — PROGRESS NOTES
[facility] Pre-Admission Testing Electronic Communication Worksheet for OR/ENDO Procedures        Patient: Jillian Stinson    DOS: 7/1/24    Transportation Confirmed: [x] YES    []  NO  PATRICIA     History and Physical:  [x] YES    []  NO  [] N/A  If yes, please list doctor or Urgent Care and date of H&P: 6/4/24-TALIB KRUEGER CNP    Additional Clearance(Cardiac, Pulmonary, etc):  [] YES    [x]  NO    Pre-Admission Testing Visit:  [] YES    [x]  NO If no, do labs/testing need to be done DOS?  [] YES    []  NO    Medication Reconciliation Complete:  [x] YES    []  NO        Additional Notes:                Interview Complete: [x] YES    []  NO          Kaylie Browning, RN  6:09 PM

## 2024-06-13 NOTE — PROGRESS NOTES
[facility] PRE-OPERATIVE INSTRUCTIONS    Day of Procedure: 7/1/24               Arrival time:  0725              Surgery time: 0925    Take the following medications with a sip of water:  Follow your MD/Surgeons pre-procedure instructions regarding your medications     Do not eat or drink anything after 12:00 midnight prior to your surgery.  This includes water chewing gum, mints and ice chips.   You may brush your teeth and gargle the morning of your surgery, but do not swallow the water     Please see your family doctor/pediatrician for a history and physical and/or concerning medications.   Bring any test results/reports from your physicians office.   If you are under the care of a heart doctor or specialist doctor, please be aware that you may be asked to them for clearance    You may be asked to stop blood thinners such as Coumadin, Plavix, Fragmin, Lovenox, etc., or any anti-inflammatories such as:  Aspirin, Ibuprofen, Advil, Naproxen prior to your surgery.    We also ask that you stop any OTC medications such as fish oil, vitamin E, glucosamine, garlic, Multivitamins, COQ 10, etc. MAY TAKE TYLENOL-STOP JANTOVEN AS DIRECTED    We ask that you do not smoke 24 hours prior to surgery  We ask that you do not  drink any alcoholic beverages 24 hours prior to surgery     You must make arrangements for a responsible adult to take you home after your surgery.    For your safety you will not be allowed to leave alone or drive yourself home.  Your surgery will be cancelled if you do not have a ride home.     Also for your safety, it is strongly suggested that someone stay with you the first 24 hours after your surgery.     A parent or legal guardian must accompany a child scheduled for surgery and plan to stay at the hospital until the child is discharged.    Please do not bring other children with you.    For your comfort, please wear simple loose fitting clothing to the hospital.  Please do not bring

## 2024-06-25 ENCOUNTER — TELEPHONE (OUTPATIENT)
Dept: ORTHOPEDIC SURGERY | Age: 74
End: 2024-06-25

## 2024-06-25 NOTE — TELEPHONE ENCOUNTER
----- Message from Amber Coker sent at 6/25/2024  6:53 AM EDT -----  Time changed to 7:30... kelsy  ----- Message -----  From: Allyson Evans MA  Sent: 6/24/2024   4:25 PM EDT  To: [facility] Or Marita Schedulers    Please change surgery time to 7:30 am

## 2024-06-25 NOTE — TELEPHONE ENCOUNTER
----- Message from Amber Coker sent at 6/25/2024  6:53 AM EDT -----  Time changed to 7:30... kelsy  ----- Message -----  From: Allyson Evans MA  Sent: 6/24/2024   4:25 PM EDT  To: [facility] Or [facility] Schedulers    Please change surgery time to 7:30 am

## 2024-06-28 ENCOUNTER — ANESTHESIA EVENT (OUTPATIENT)
Dept: OPERATING ROOM | Age: 74
End: 2024-06-28
Payer: MEDICARE

## 2024-07-01 ENCOUNTER — ANESTHESIA (OUTPATIENT)
Dept: OPERATING ROOM | Age: 74
End: 2024-07-01
Payer: MEDICARE

## 2024-07-01 ENCOUNTER — HOSPITAL ENCOUNTER (OUTPATIENT)
Age: 74
Setting detail: OUTPATIENT SURGERY
Discharge: HOME | DRG: 908 | End: 2024-07-01
Attending: ORTHOPAEDIC SURGERY | Admitting: ORTHOPAEDIC SURGERY
Payer: MEDICARE

## 2024-07-01 ENCOUNTER — APPOINTMENT (OUTPATIENT)
Dept: GENERAL RADIOLOGY | Age: 74
DRG: 908 | End: 2024-07-01
Attending: ORTHOPAEDIC SURGERY
Payer: MEDICARE

## 2024-07-01 VITALS
WEIGHT: 181.99 LBS | RESPIRATION RATE: 20 BRPM | DIASTOLIC BLOOD PRESSURE: 53 MMHG | TEMPERATURE: 97.2 F | HEIGHT: 63 IN | HEART RATE: 84 BPM | SYSTOLIC BLOOD PRESSURE: 122 MMHG | OXYGEN SATURATION: 97 % | BODY MASS INDEX: 32.25 KG/M2

## 2024-07-01 DIAGNOSIS — M48.062 LUMBAR STENOSIS WITH NEUROGENIC CLAUDICATION: Primary | ICD-10-CM

## 2024-07-01 LAB
ANION GAP SERPL CALCULATED.3IONS-SCNC: 13 MMOL/L (ref 3–16)
BUN SERPL-MCNC: 12 MG/DL (ref 7–20)
CALCIUM SERPL-MCNC: 9.7 MG/DL (ref 8.3–10.6)
CHLORIDE SERPL-SCNC: 104 MMOL/L (ref 99–110)
CO2 SERPL-SCNC: 22 MMOL/L (ref 21–32)
CREAT SERPL-MCNC: <0.5 MG/DL (ref 0.6–1.2)
DEPRECATED RDW RBC AUTO: 14.1 % (ref 12.4–15.4)
GFR SERPLBLD CREATININE-BSD FMLA CKD-EPI: >90 ML/MIN/{1.73_M2}
GLUCOSE BLD-MCNC: 117 MG/DL (ref 70–99)
GLUCOSE BLD-MCNC: 99 MG/DL (ref 70–99)
GLUCOSE SERPL-MCNC: 95 MG/DL (ref 70–99)
HCT VFR BLD AUTO: 40 % (ref 36–48)
HGB BLD-MCNC: 13.9 G/DL (ref 12–16)
INR PPP: 0.97 (ref 0.85–1.15)
MCH RBC QN AUTO: 30.3 PG (ref 26–34)
MCHC RBC AUTO-ENTMCNC: 34.7 G/DL (ref 31–36)
MCV RBC AUTO: 87.4 FL (ref 80–100)
PERFORMED ON: ABNORMAL
PERFORMED ON: NORMAL
PLATELET # BLD AUTO: 243 K/UL (ref 135–450)
PMV BLD AUTO: 7.7 FL (ref 5–10.5)
POTASSIUM SERPL-SCNC: 4.1 MMOL/L (ref 3.5–5.1)
PROTHROMBIN TIME: 13.1 SEC (ref 11.9–14.9)
RBC # BLD AUTO: 4.57 M/UL (ref 4–5.2)
SODIUM SERPL-SCNC: 139 MMOL/L (ref 136–145)
WBC # BLD AUTO: 6.4 K/UL (ref 4–11)

## 2024-07-01 PROCEDURE — 6360000002 HC RX W HCPCS

## 2024-07-01 PROCEDURE — 2500000003 HC RX 250 WO HCPCS

## 2024-07-01 PROCEDURE — 2580000003 HC RX 258: Performed by: ORTHOPAEDIC SURGERY

## 2024-07-01 PROCEDURE — 01NB0ZZ RELEASE LUMBAR NERVE, OPEN APPROACH: ICD-10-PCS | Performed by: ORTHOPAEDIC SURGERY

## 2024-07-01 PROCEDURE — 3600000004 HC SURGERY LEVEL 4 BASE: Performed by: ORTHOPAEDIC SURGERY

## 2024-07-01 PROCEDURE — 3700000001 HC ADD 15 MINUTES (ANESTHESIA): Performed by: ORTHOPAEDIC SURGERY

## 2024-07-01 PROCEDURE — 7100000011 HC PHASE II RECOVERY - ADDTL 15 MIN: Performed by: ORTHOPAEDIC SURGERY

## 2024-07-01 PROCEDURE — 2709999900 HC NON-CHARGEABLE SUPPLY: Performed by: ORTHOPAEDIC SURGERY

## 2024-07-01 PROCEDURE — 80048 BASIC METABOLIC PNL TOTAL CA: CPT

## 2024-07-01 PROCEDURE — 3700000000 HC ANESTHESIA ATTENDED CARE: Performed by: ORTHOPAEDIC SURGERY

## 2024-07-01 PROCEDURE — 85610 PROTHROMBIN TIME: CPT

## 2024-07-01 PROCEDURE — 7100000000 HC PACU RECOVERY - FIRST 15 MIN: Performed by: ORTHOPAEDIC SURGERY

## 2024-07-01 PROCEDURE — 85027 COMPLETE CBC AUTOMATED: CPT

## 2024-07-01 PROCEDURE — 6370000000 HC RX 637 (ALT 250 FOR IP)

## 2024-07-01 PROCEDURE — 3600000014 HC SURGERY LEVEL 4 ADDTL 15MIN: Performed by: ORTHOPAEDIC SURGERY

## 2024-07-01 PROCEDURE — 6360000002 HC RX W HCPCS: Performed by: ORTHOPAEDIC SURGERY

## 2024-07-01 PROCEDURE — 2580000003 HC RX 258: Performed by: STUDENT IN AN ORGANIZED HEALTH CARE EDUCATION/TRAINING PROGRAM

## 2024-07-01 PROCEDURE — 7100000001 HC PACU RECOVERY - ADDTL 15 MIN: Performed by: ORTHOPAEDIC SURGERY

## 2024-07-01 PROCEDURE — 7100000010 HC PHASE II RECOVERY - FIRST 15 MIN: Performed by: ORTHOPAEDIC SURGERY

## 2024-07-01 PROCEDURE — 2720000010 HC SURG SUPPLY STERILE: Performed by: ORTHOPAEDIC SURGERY

## 2024-07-01 PROCEDURE — A4217 STERILE WATER/SALINE, 500 ML: HCPCS | Performed by: ORTHOPAEDIC SURGERY

## 2024-07-01 PROCEDURE — 6360000002 HC RX W HCPCS: Performed by: ANESTHESIOLOGY

## 2024-07-01 PROCEDURE — 72020 X-RAY EXAM OF SPINE 1 VIEW: CPT

## 2024-07-01 RX ORDER — FENTANYL CITRATE 0.05 MG/ML
25 INJECTION, SOLUTION INTRAMUSCULAR; INTRAVENOUS EVERY 5 MIN PRN
Status: DISCONTINUED | OUTPATIENT
Start: 2024-07-01 | End: 2024-07-01 | Stop reason: HOSPADM

## 2024-07-01 RX ORDER — DEXAMETHASONE SODIUM PHOSPHATE 4 MG/ML
INJECTION, SOLUTION INTRA-ARTICULAR; INTRALESIONAL; INTRAMUSCULAR; INTRAVENOUS; SOFT TISSUE PRN
Status: DISCONTINUED | OUTPATIENT
Start: 2024-07-01 | End: 2024-07-01 | Stop reason: SDUPTHER

## 2024-07-01 RX ORDER — GABAPENTIN 300 MG/1
300 CAPSULE ORAL 3 TIMES DAILY
Qty: 30 CAPSULE | Refills: 0 | Status: SHIPPED | OUTPATIENT
Start: 2024-07-01 | End: 2024-07-11

## 2024-07-01 RX ORDER — ONDANSETRON 2 MG/ML
INJECTION INTRAMUSCULAR; INTRAVENOUS PRN
Status: DISCONTINUED | OUTPATIENT
Start: 2024-07-01 | End: 2024-07-01 | Stop reason: SDUPTHER

## 2024-07-01 RX ORDER — NALOXONE HYDROCHLORIDE 0.4 MG/ML
INJECTION, SOLUTION INTRAMUSCULAR; INTRAVENOUS; SUBCUTANEOUS PRN
Status: DISCONTINUED | OUTPATIENT
Start: 2024-07-01 | End: 2024-07-01 | Stop reason: HOSPADM

## 2024-07-01 RX ORDER — OXYCODONE HYDROCHLORIDE AND ACETAMINOPHEN 5; 325 MG/1; MG/1
1 TABLET ORAL
Status: DISCONTINUED | OUTPATIENT
Start: 2024-07-01 | End: 2024-07-01 | Stop reason: HOSPADM

## 2024-07-01 RX ORDER — SODIUM CHLORIDE 0.9 % (FLUSH) 0.9 %
5-40 SYRINGE (ML) INJECTION EVERY 12 HOURS SCHEDULED
Status: DISCONTINUED | OUTPATIENT
Start: 2024-07-01 | End: 2024-07-01 | Stop reason: HOSPADM

## 2024-07-01 RX ORDER — GLUCAGON 1 MG/ML
1 KIT INJECTION PRN
Status: DISCONTINUED | OUTPATIENT
Start: 2024-07-01 | End: 2024-07-01 | Stop reason: HOSPADM

## 2024-07-01 RX ORDER — METHOCARBAMOL 100 MG/ML
INJECTION, SOLUTION INTRAMUSCULAR; INTRAVENOUS PRN
Status: DISCONTINUED | OUTPATIENT
Start: 2024-07-01 | End: 2024-07-01 | Stop reason: SDUPTHER

## 2024-07-01 RX ORDER — LIDOCAINE HYDROCHLORIDE 20 MG/ML
INJECTION, SOLUTION EPIDURAL; INFILTRATION; INTRACAUDAL; PERINEURAL PRN
Status: DISCONTINUED | OUTPATIENT
Start: 2024-07-01 | End: 2024-07-01 | Stop reason: SDUPTHER

## 2024-07-01 RX ORDER — EPHEDRINE SULFATE/0.9% NACL/PF 25 MG/5 ML
SYRINGE (ML) INTRAVENOUS PRN
Status: DISCONTINUED | OUTPATIENT
Start: 2024-07-01 | End: 2024-07-01 | Stop reason: SDUPTHER

## 2024-07-01 RX ORDER — PROPOFOL 10 MG/ML
INJECTION, EMULSION INTRAVENOUS PRN
Status: DISCONTINUED | OUTPATIENT
Start: 2024-07-01 | End: 2024-07-01 | Stop reason: SDUPTHER

## 2024-07-01 RX ORDER — GLYCOPYRROLATE 0.2 MG/ML
INJECTION INTRAMUSCULAR; INTRAVENOUS PRN
Status: DISCONTINUED | OUTPATIENT
Start: 2024-07-01 | End: 2024-07-01 | Stop reason: SDUPTHER

## 2024-07-01 RX ORDER — SODIUM CHLORIDE 0.9 % (FLUSH) 0.9 %
5-40 SYRINGE (ML) INJECTION PRN
Status: DISCONTINUED | OUTPATIENT
Start: 2024-07-01 | End: 2024-07-01 | Stop reason: HOSPADM

## 2024-07-01 RX ORDER — ROCURONIUM BROMIDE 10 MG/ML
INJECTION, SOLUTION INTRAVENOUS PRN
Status: DISCONTINUED | OUTPATIENT
Start: 2024-07-01 | End: 2024-07-01 | Stop reason: SDUPTHER

## 2024-07-01 RX ORDER — OXYCODONE HYDROCHLORIDE AND ACETAMINOPHEN 5; 325 MG/1; MG/1
1 TABLET ORAL ONCE
Status: DISCONTINUED | OUTPATIENT
Start: 2024-07-01 | End: 2024-07-01

## 2024-07-01 RX ORDER — SODIUM CHLORIDE 0.9 % (FLUSH) 0.9 %
5-40 SYRINGE (ML) INJECTION EVERY 12 HOURS SCHEDULED
Status: DISCONTINUED | OUTPATIENT
Start: 2024-07-01 | End: 2024-07-01 | Stop reason: SDUPTHER

## 2024-07-01 RX ORDER — OXYCODONE HYDROCHLORIDE AND ACETAMINOPHEN 5; 325 MG/1; MG/1
2 TABLET ORAL
Qty: 40 TABLET | Refills: 0 | Status: ON HOLD | OUTPATIENT
Start: 2024-07-01 | End: 2024-07-05 | Stop reason: HOSPADM

## 2024-07-01 RX ORDER — DEXTROSE MONOHYDRATE 100 MG/ML
INJECTION, SOLUTION INTRAVENOUS CONTINUOUS PRN
Status: DISCONTINUED | OUTPATIENT
Start: 2024-07-01 | End: 2024-07-01 | Stop reason: HOSPADM

## 2024-07-01 RX ORDER — SUCCINYLCHOLINE/SOD CL,ISO/PF 200MG/10ML
SYRINGE (ML) INTRAVENOUS PRN
Status: DISCONTINUED | OUTPATIENT
Start: 2024-07-01 | End: 2024-07-01 | Stop reason: SDUPTHER

## 2024-07-01 RX ORDER — LIDOCAINE HYDROCHLORIDE 40 MG/ML
SOLUTION TOPICAL PRN
Status: DISCONTINUED | OUTPATIENT
Start: 2024-07-01 | End: 2024-07-01 | Stop reason: SDUPTHER

## 2024-07-01 RX ORDER — PHENYLEPHRINE HCL IN 0.9% NACL 1 MG/10 ML
SYRINGE (ML) INTRAVENOUS PRN
Status: DISCONTINUED | OUTPATIENT
Start: 2024-07-01 | End: 2024-07-01 | Stop reason: SDUPTHER

## 2024-07-01 RX ORDER — SODIUM CHLORIDE 9 MG/ML
INJECTION, SOLUTION INTRAVENOUS PRN
Status: DISCONTINUED | OUTPATIENT
Start: 2024-07-01 | End: 2024-07-01 | Stop reason: HOSPADM

## 2024-07-01 RX ORDER — SODIUM CHLORIDE 0.9 % (FLUSH) 0.9 %
5-40 SYRINGE (ML) INJECTION PRN
Status: DISCONTINUED | OUTPATIENT
Start: 2024-07-01 | End: 2024-07-01 | Stop reason: SDUPTHER

## 2024-07-01 RX ORDER — SODIUM CHLORIDE 9 MG/ML
INJECTION, SOLUTION INTRAVENOUS PRN
Status: DISCONTINUED | OUTPATIENT
Start: 2024-07-01 | End: 2024-07-01 | Stop reason: SDUPTHER

## 2024-07-01 RX ORDER — BUPIVACAINE HYDROCHLORIDE 5 MG/ML
INJECTION, SOLUTION EPIDURAL; INTRACAUDAL
Status: COMPLETED | OUTPATIENT
Start: 2024-07-01 | End: 2024-07-01

## 2024-07-01 RX ORDER — DOCUSATE SODIUM 100 MG/1
100 CAPSULE, LIQUID FILLED ORAL 2 TIMES DAILY PRN
Qty: 30 CAPSULE | Refills: 1 | Status: SHIPPED | OUTPATIENT
Start: 2024-07-01

## 2024-07-01 RX ORDER — FENTANYL CITRATE 50 UG/ML
INJECTION, SOLUTION INTRAMUSCULAR; INTRAVENOUS PRN
Status: DISCONTINUED | OUTPATIENT
Start: 2024-07-01 | End: 2024-07-01 | Stop reason: SDUPTHER

## 2024-07-01 RX ORDER — ONDANSETRON 2 MG/ML
4 INJECTION INTRAMUSCULAR; INTRAVENOUS
Status: DISCONTINUED | OUTPATIENT
Start: 2024-07-01 | End: 2024-07-01 | Stop reason: HOSPADM

## 2024-07-01 RX ADMIN — FENTANYL CITRATE 25 MCG: 50 INJECTION INTRAMUSCULAR; INTRAVENOUS at 10:20

## 2024-07-01 RX ADMIN — PROPOFOL 130 MG: 10 INJECTION, EMULSION INTRAVENOUS at 08:40

## 2024-07-01 RX ADMIN — Medication 100 MCG: at 08:50

## 2024-07-01 RX ADMIN — EPHEDRINE SULFATE 5 MG: 5 INJECTION INTRAVENOUS at 09:13

## 2024-07-01 RX ADMIN — LIDOCAINE HYDROCHLORIDE 4 ML: 40 SOLUTION TOPICAL at 08:41

## 2024-07-01 RX ADMIN — Medication 200 MCG: at 09:04

## 2024-07-01 RX ADMIN — SUGAMMADEX 200 MG: 100 INJECTION, SOLUTION INTRAVENOUS at 10:44

## 2024-07-01 RX ADMIN — Medication 200 MCG: at 09:31

## 2024-07-01 RX ADMIN — HYDROMORPHONE HYDROCHLORIDE 0.5 MG: 1 INJECTION, SOLUTION INTRAMUSCULAR; INTRAVENOUS; SUBCUTANEOUS at 11:15

## 2024-07-01 RX ADMIN — HYDROMORPHONE HYDROCHLORIDE 0.25 MG: 1 INJECTION, SOLUTION INTRAMUSCULAR; INTRAVENOUS; SUBCUTANEOUS at 10:43

## 2024-07-01 RX ADMIN — ROCURONIUM BROMIDE 5 MG: 10 SOLUTION INTRAVENOUS at 08:40

## 2024-07-01 RX ADMIN — Medication 100 MCG: at 09:25

## 2024-07-01 RX ADMIN — ROCURONIUM BROMIDE 35 MG: 10 SOLUTION INTRAVENOUS at 08:49

## 2024-07-01 RX ADMIN — EPHEDRINE SULFATE 5 MG: 5 INJECTION INTRAVENOUS at 09:05

## 2024-07-01 RX ADMIN — DEXAMETHASONE SODIUM PHOSPHATE 10 MG: 4 INJECTION, SOLUTION INTRAMUSCULAR; INTRAVENOUS at 08:49

## 2024-07-01 RX ADMIN — Medication 100 MCG: at 09:01

## 2024-07-01 RX ADMIN — LIDOCAINE HYDROCHLORIDE 100 MG: 20 INJECTION, SOLUTION EPIDURAL; INFILTRATION; INTRACAUDAL; PERINEURAL at 08:40

## 2024-07-01 RX ADMIN — Medication 200 MCG: at 09:48

## 2024-07-01 RX ADMIN — HYDROMORPHONE HYDROCHLORIDE 0.5 MG: 1 INJECTION, SOLUTION INTRAMUSCULAR; INTRAVENOUS; SUBCUTANEOUS at 11:20

## 2024-07-01 RX ADMIN — METHOCARBAMOL 500 MG: 100 INJECTION INTRAMUSCULAR; INTRAVENOUS at 08:56

## 2024-07-01 RX ADMIN — Medication 100 MCG: at 10:16

## 2024-07-01 RX ADMIN — Medication 100 MCG: at 08:56

## 2024-07-01 RX ADMIN — EPHEDRINE SULFATE 5 MG: 5 INJECTION INTRAVENOUS at 09:28

## 2024-07-01 RX ADMIN — FENTANYL CITRATE 50 MCG: 50 INJECTION INTRAMUSCULAR; INTRAVENOUS at 08:38

## 2024-07-01 RX ADMIN — FENTANYL CITRATE 25 MCG: 50 INJECTION INTRAMUSCULAR; INTRAVENOUS at 10:28

## 2024-07-01 RX ADMIN — Medication 100 MCG: at 09:13

## 2024-07-01 RX ADMIN — HYDROMORPHONE HYDROCHLORIDE 0.5 MG: 1 INJECTION, SOLUTION INTRAMUSCULAR; INTRAVENOUS; SUBCUTANEOUS at 11:05

## 2024-07-01 RX ADMIN — GLYCOPYRROLATE 0.2 MG: 0.2 INJECTION, SOLUTION INTRAMUSCULAR; INTRAVENOUS at 08:50

## 2024-07-01 RX ADMIN — Medication 120 MG: at 08:40

## 2024-07-01 RX ADMIN — HYDROMORPHONE HYDROCHLORIDE 0.25 MG: 1 INJECTION, SOLUTION INTRAMUSCULAR; INTRAVENOUS; SUBCUTANEOUS at 10:52

## 2024-07-01 RX ADMIN — EPHEDRINE SULFATE 5 MG: 5 INJECTION INTRAVENOUS at 09:31

## 2024-07-01 RX ADMIN — SODIUM CHLORIDE: 9 INJECTION, SOLUTION INTRAVENOUS at 06:33

## 2024-07-01 RX ADMIN — ONDANSETRON 4 MG: 2 INJECTION INTRAMUSCULAR; INTRAVENOUS at 08:49

## 2024-07-01 RX ADMIN — Medication 200 MCG: at 09:17

## 2024-07-01 RX ADMIN — SODIUM CHLORIDE 2000 MG: 900 INJECTION INTRAVENOUS at 08:49

## 2024-07-01 RX ADMIN — EPHEDRINE SULFATE 5 MG: 5 INJECTION INTRAVENOUS at 09:11

## 2024-07-01 ASSESSMENT — PAIN DESCRIPTION - DESCRIPTORS
DESCRIPTORS: ACHING

## 2024-07-01 ASSESSMENT — PAIN DESCRIPTION - ONSET: ONSET: ON-GOING

## 2024-07-01 ASSESSMENT — PAIN DESCRIPTION - PAIN TYPE: TYPE: SURGICAL PAIN

## 2024-07-01 ASSESSMENT — PAIN DESCRIPTION - ORIENTATION: ORIENTATION: LOWER

## 2024-07-01 ASSESSMENT — PAIN SCALES - GENERAL
PAINLEVEL_OUTOF10: 2
PAINLEVEL_OUTOF10: 9
PAINLEVEL_OUTOF10: 9

## 2024-07-01 ASSESSMENT — PAIN DESCRIPTION - LOCATION
LOCATION: BACK
LOCATION: BACK

## 2024-07-01 ASSESSMENT — PAIN DESCRIPTION - FREQUENCY: FREQUENCY: CONTINUOUS

## 2024-07-01 ASSESSMENT — PAIN - FUNCTIONAL ASSESSMENT
PAIN_FUNCTIONAL_ASSESSMENT: 0-10
PAIN_FUNCTIONAL_ASSESSMENT: PREVENTS OR INTERFERES SOME ACTIVE ACTIVITIES AND ADLS
PAIN_FUNCTIONAL_ASSESSMENT: 0-10

## 2024-07-01 ASSESSMENT — ENCOUNTER SYMPTOMS: SHORTNESS OF BREATH: 0

## 2024-07-01 ASSESSMENT — LIFESTYLE VARIABLES: SMOKING_STATUS: 0

## 2024-07-01 NOTE — PROGRESS NOTES
Assessment unchanged. Pt states ready to go home. Pt discharged in stable condition. Pain 6/10 and tolerable - denied need for pain med. No nausea or other c/o. Pt has meds [employer]. Instructed on cdb/ap and use ice pack -verbalized understanding. Pt had ice water and crackers. Circ cks remain positive and no deficits noted - before surgery pt states only had pain and no deficits.

## 2024-07-01 NOTE — DISCHARGE INSTRUCTIONS
[facility]    Dr. Jorge Smith    Post Operative Lumbar Spine Surgery Instructions    Patients with leg pain before surgery generally note marked improvement in their leg symptoms soon after surgery. However it may take weeks for complete relief of your leg pain. Many patients note an increase in their leg symptoms 2 to 4 days after surgery. Such pain is usually related to inflammation and improves with time. Your leg numbness may be a little worse following surgery and should improve with time.  Please call our office even at night if you began to experience pain, numbness or weakness in your previously asymptomatic leg, numbness in your groin or saddle area, difficulty controlling your bladder or bowels, or severe pain, increasing numbness or increasing weakness of your previously symptomatic leg.      Incision/Showering:  You may remove your dressing 3 to 4 days after your surgery. The small pieces of tape over your incision will fall off several days after surgery, please do not remove them. You may shower with the dressing on the first 3 to 4 days after surgery and without a dressing thereafter. Keep your incision clean and dry, other than when you are showering. Please avoid bathing in a tub until discussed with the doctor.  Look at your wound daily. Call the office if you notice a foul odor, drainage, or the skin is red around your incision. Call for fevers over 101.0 degrees or go to the emergency room for evaluation. While it is rare, you may be developing an infection.    Activity/Driving:  Walking is encouraged as a daily routine. Walk as far as you like.  This builds and maintains muscles. Avoid twisting, bending and most importantly, lifting over 10 pounds.  Steps are permissible.  You may begin driving when you feel you can safely do so. Remember that while you are taking narcotic medications you cannot react or move as quickly and it may be unsafe to drive.    Diet/Bowel routine:  Eat three healthy

## 2024-07-01 NOTE — ANESTHESIA POSTPROCEDURE EVALUATION
Department of Anesthesiology  Postprocedure Note    Patient: Jillian Stinson  MRN: [id number]  YOB: 1950  Date of evaluation: 7/1/2024    Procedure Summary       Date: 07/01/24 Room / Location: [address]    Anesthesia Start: 0835 Anesthesia Stop: 1059    Procedure: MICROLUMBAR LAMINECTOMY LUMBAR TWO AND LUMBAR THREE, LUMBAR THREE AND LUMBAR FOUR BILATERAL (Bilateral) Diagnosis:       Lumbar stenosis with neurogenic claudication      (Lumbar stenosis with neurogenic claudication [M48.062])    Surgeons: Jorge Smith MD Responsible Provider: Tigist Bonds MD    Anesthesia Type: General ASA Status: 3            Anesthesia Type: General    Kristie Phase I: Kristie Score: 9    Kristie Phase II: Kristie Score: 10    Anesthesia Post Evaluation    Patient location during evaluation: PACU  Level of consciousness: awake and alert  Airway patency: patent  Nausea & Vomiting: no nausea and no vomiting  Cardiovascular status: blood pressure returned to baseline  Respiratory status: acceptable  Hydration status: euvolemic  Comments: Postoperative Anesthesia Note    Name:    Jillian Stinson  MRN:      [id number]    Patient Vitals in the past 12 hrs:  07/01/24 1300, BP:(!) 122/53, Temp:97.2 °F (36.2 °C), Temp src:Temporal, Pulse:84, Resp:20, SpO2:97 %  07/01/24 1150, BP:(!) 140/69, Temp:97.6 °F (36.4 °C), Temp src:Oral, Pulse:80, Resp:20, SpO2:93 %  07/01/24 1138, Pulse:80  07/01/24 1130, BP:133/70, Temp:97 °F (36.1 °C), Temp src:Temporal, Pulse:75, Resp:15, SpO2:93 %  07/01/24 1115, BP:118/69, Pulse:81, Resp:12, SpO2:95 %  07/01/24 1112, Pulse:81  07/01/24 1110, BP:134/65, Pulse:81, Resp:21, SpO2:99 %  07/01/24 1105, BP:110/66, Pulse:82, Resp:10, SpO2:97 %  07/01/24 1100, BP:97/81, Pulse:75, Resp:18, SpO2:97 %  07/01/24 1057, BP:(!) 110/51, Pulse:75, Resp:17, SpO2:97 %  07/01/24 0617, BP:(!) 148/72, Temp:97 °F (36.1 °C), Temp src:Temporal, Pulse:80, Resp:15, SpO2:97 %, Height:1.6 m

## 2024-07-01 NOTE — PROGRESS NOTES
Patient eating ice chips. She states, her pain is down to 2/10 after 3 does of Dilaudid in the PACU.     Patient moved to Phase 2 room 8

## 2024-07-01 NOTE — ANESTHESIA PRE PROCEDURE
with neurogenic claudication    Lumbar stenosis with neurogenic claudication     Past Medical History:   Diagnosis Date    Asthma     Colon polyp     Depression     DVT (deep venous thrombosis) (HCC)     as a teenager    Esophageal reflux     GERD with stricture     Hx of blood clots     Hyperlipidemia     Hypertension     Insomnia     Migraine 12/29/1995    Osteoarthritis     Protein deficiency (HCC)     protein S defic.    Pulmonary embolism (HCC)     x`s 2 post-op after Hysterectomy & Gall Bladder    Thyroid adenoma     NO MEDS    Type II or unspecified type diabetes mellitus without mention of complication, not stated as uncontrolled      Past Surgical History:   Procedure Laterality Date    APPENDECTOMY      BACK SURGERY      LUMBAR BULGING DISC    CERVICAL FUSION  1993    CERVICAL FUSION N/A 11/4/2019    ANTERIOR CERVICAL DISCECTOMY FUSION C4-5 AND C6-7 WITH MEDTRONIC PLATE AND SCREWS, AND DONOR BONE WITH C-ARM performed by Jorge Smith MD at [facility] OR    COLONOSCOPY      COLONOSCOPY N/A 7/9/2020    COLONOSCOPY POLYPECTOMY SNARE/COLD BIOPSY performed by Nain Avila Jr., DO at [facility] ENDOSCOPY    ENDOSCOPY, COLON, DIAGNOSTIC      HIATAL HERNIA REPAIR      HYSTERECTOMY (CERVIX STATUS UNKNOWN)      LUMBAR SPINE SURGERY N/A 1/16/2023    MICROLUMBAR LAMINECTOMY L3-4, L4-5 performed by Jorge Smith MD at [facility] OR    NERVE BLOCK Bilateral 12/26/2023    BILATERAL LUMBAR THREE LUMBAR FOUR LUMBAR FIVE MEDIAL BRANCH BLOCK SITE CONFIRMED BY FLUOROSCOPY performed by Jorge Smith MD at [facility] EG OR    OVARIAN CYST SURGERY      PAIN MANAGEMENT PROCEDURE Right 10/18/2023    RIGHT LUMBAR THREE FOUR LUMBAR FOUR FIVE EPIDURAL STEROID INJECTION SITE CONFIRMED BY FLUOROSCOPY performed by Jorge Smith MD at [facility] EG OR    SHOULDER ARTHROSCOPY Right 12/28/2016    SAD, labral debridement    SHOULDER SURGERY Right 6/13/2022    RIGHT REVERSE TOTAL SHOULDER REPLACEMENT performed by Yohan Capellan MD at [facility] OR

## 2024-07-01 NOTE — H&P
I have reviewed the history and physical and examined the patient and find no relevant changes.    I have reviewed with the patient and/or family the risks, benefits, and alternatives to the procedure.    The patient was counseled at length about the risks of calli Covid-19 during their perioperative period and any recovery window from their procedure.  The patient was made aware that calli Covid-19  may worsen their prognosis for recovering from their procedure  and lend to a higher morbidity and/or mortality risk.  All material risks, benefits, and reasonable alternatives including postponing the procedure were discussed. The patient does wish to proceed with the procedure at this time.       LAURIE ACOSTA MD  7/1/2024 No intake/output data recorded.

## 2024-07-01 NOTE — DISCHARGE INSTR - DIET
Good nutrition is important when healing from an illness, injury, or surgery.  Follow any nutrition recommendations given to you during your hospital stay.   If you were given an oral nutrition supplement while in the hospital, continue to take this supplement at home.  You can take it with meals, in-between meals, and/or before bedtime. These supplements can be purchased at most local grocery stores, pharmacies, and chain [employer]-stores.   If you have any questions about your diet or nutrition, call the hospital and ask for the dietitian.  Advance to your regular diet as tolerated

## 2024-07-01 NOTE — OP NOTE
Operative Note      Patient: Jillian Stinson  YOB: 1950  MRN: [id number]    Date of Procedure: 7/1/2024    Pre-Op Diagnosis Codes:     * Lumbar stenosis with neurogenic claudication [M48.062]    Post-Op Diagnosis: Same       Procedure(s):  MICROLUMBAR LAMINECTOMY LUMBAR TWO AND LUMBAR THREE, LUMBAR THREE AND LUMBAR FOUR BILATERAL    Surgeon(s):  Jorge Smith MD    Assistant:   Surgical Assistant: Isamar Up    Anesthesia: General    Estimated Blood Loss (mL): less than 100     Complications: None    Specimens:   * No specimens in log *    Implants:  * No implants in log *      Drains: * No LDAs found *    Findings:  Infection Present At Time Of Surgery (PATOS) (choose all levels that have infection present):  No infection present  Other Findings: stenosis    Detailed Description of Procedure:     INDICATIONS FOR SURGERY: Jillian Stinson is a pleasant 74-year-old female with low back and bilateral leg pain.  She is status post lumbar laminectomy x 2.. The symptoms failed to respond to conservative intervention. An MRI scan was performed and this showed evidence of severe spinal stenosis L3/L4 and moderate to severe spinal stenosis L2-L3. Having failed conservative management and experiencing persistent symptoms, the patient elected to proceed ahead with the surgical option listed above.     DETAILS OF PROCEDURE: The patient was brought to the operating room and placed under general anesthesia. The patient was then placed prone on a Reyes table. All bony prominences were inspected and padded prior to sterile draping. Using a #10 blade knife, the skin was incised through her midline scar and monopolar cautery was used to dissect through the subcutaneous tissue to open the fascia and reflect the paraspinal muscles laterally exposing the L3/L4 interspace on the right side. The microscope was then brought into the field and used to assist with performing a microsurgical hemilaminotomy, partial

## 2024-07-02 ENCOUNTER — TELEPHONE (OUTPATIENT)
Dept: ORTHOPEDIC SURGERY | Age: 74
End: 2024-07-02

## 2024-07-02 ENCOUNTER — APPOINTMENT (OUTPATIENT)
Dept: MRI IMAGING | Age: 74
DRG: 908 | End: 2024-07-02
Payer: MEDICARE

## 2024-07-02 ENCOUNTER — HOSPITAL ENCOUNTER (INPATIENT)
Age: 74
LOS: 2 days | Discharge: HOME | DRG: 908 | End: 2024-07-05
Attending: EMERGENCY MEDICINE | Admitting: INTERNAL MEDICINE
Payer: MEDICARE

## 2024-07-02 DIAGNOSIS — M48.062 SPINAL STENOSIS, LUMBAR REGION WITH NEUROGENIC CLAUDICATION: ICD-10-CM

## 2024-07-02 DIAGNOSIS — G89.18 POSTOPERATIVE PAIN: ICD-10-CM

## 2024-07-02 DIAGNOSIS — M54.50 ACUTE MIDLINE LOW BACK PAIN, UNSPECIFIED WHETHER SCIATICA PRESENT: Primary | ICD-10-CM

## 2024-07-02 DIAGNOSIS — R33.9 URINARY RETENTION: ICD-10-CM

## 2024-07-02 DIAGNOSIS — M54.9 INTRACTABLE BACK PAIN: ICD-10-CM

## 2024-07-02 DIAGNOSIS — R93.89 ABNORMAL MRI: ICD-10-CM

## 2024-07-02 LAB
ABO + RH BLD: NORMAL
ALBUMIN SERPL-MCNC: 4.5 G/DL (ref 3.4–5)
ALBUMIN/GLOB SERPL: 1.7 {RATIO} (ref 1.1–2.2)
ALP SERPL-CCNC: 98 U/L (ref 40–129)
ALT SERPL-CCNC: 12 U/L (ref 10–40)
ANION GAP SERPL CALCULATED.3IONS-SCNC: 13 MMOL/L (ref 3–16)
APTT BLD: 23 SEC (ref 22.1–36.4)
AST SERPL-CCNC: 22 U/L (ref 15–37)
BACTERIA URNS QL MICRO: ABNORMAL /HPF
BASOPHILS # BLD: 0.1 K/UL (ref 0–0.2)
BASOPHILS NFR BLD: 0.8 %
BILIRUB SERPL-MCNC: 0.5 MG/DL (ref 0–1)
BILIRUB UR QL STRIP.AUTO: NEGATIVE
BLD GP AB SCN SERPL QL: NORMAL
BUN SERPL-MCNC: 14 MG/DL (ref 7–20)
CALCIUM SERPL-MCNC: 9.5 MG/DL (ref 8.3–10.6)
CHARACTER UR: ABNORMAL
CHLORIDE SERPL-SCNC: 102 MMOL/L (ref 99–110)
CLARITY UR: ABNORMAL
CO2 SERPL-SCNC: 24 MMOL/L (ref 21–32)
COLOR UR: YELLOW
CREAT SERPL-MCNC: <0.5 MG/DL (ref 0.6–1.2)
DEPRECATED RDW RBC AUTO: 14.2 % (ref 12.4–15.4)
EOSINOPHIL # BLD: 0.1 K/UL (ref 0–0.6)
EOSINOPHIL NFR BLD: 1 %
EPI CELLS #/AREA URNS AUTO: 36 /HPF (ref 0–5)
GFR SERPLBLD CREATININE-BSD FMLA CKD-EPI: >90 ML/MIN/{1.73_M2}
GLUCOSE SERPL-MCNC: 101 MG/DL (ref 70–99)
GLUCOSE UR STRIP.AUTO-MCNC: NEGATIVE MG/DL
HCT VFR BLD AUTO: 37.6 % (ref 36–48)
HGB BLD-MCNC: 13 G/DL (ref 12–16)
HGB UR QL STRIP.AUTO: NEGATIVE
HYALINE CASTS #/AREA URNS AUTO: 2 /LPF (ref 0–8)
INR PPP: 1.03 (ref 0.85–1.15)
KETONES UR STRIP.AUTO-MCNC: NEGATIVE MG/DL
LEUKOCYTE ESTERASE UR QL STRIP.AUTO: ABNORMAL
LYMPHOCYTES # BLD: 2.2 K/UL (ref 1–5.1)
LYMPHOCYTES NFR BLD: 22.7 %
MCH RBC QN AUTO: 30.6 PG (ref 26–34)
MCHC RBC AUTO-ENTMCNC: 34.6 G/DL (ref 31–36)
MCV RBC AUTO: 88.4 FL (ref 80–100)
MONOCYTES # BLD: 0.8 K/UL (ref 0–1.3)
MONOCYTES NFR BLD: 8.2 %
NEUTROPHILS # BLD: 6.7 K/UL (ref 1.7–7.7)
NEUTROPHILS NFR BLD: 67.3 %
NITRITE UR QL STRIP.AUTO: NEGATIVE
PH UR STRIP.AUTO: 6.5 [PH] (ref 5–8)
PLATELET # BLD AUTO: 246 K/UL (ref 135–450)
PMV BLD AUTO: 7.6 FL (ref 5–10.5)
POTASSIUM SERPL-SCNC: 4.4 MMOL/L (ref 3.5–5.1)
PROT SERPL-MCNC: 7.2 G/DL (ref 6.4–8.2)
PROT UR STRIP.AUTO-MCNC: NEGATIVE MG/DL
PROTHROMBIN TIME: 13.7 SEC (ref 11.9–14.9)
RBC # BLD AUTO: 4.26 M/UL (ref 4–5.2)
RBC CLUMPS #/AREA URNS AUTO: 0 /HPF (ref 0–4)
RENAL EPI CELLS #/AREA UR COMP ASSIST: ABNORMAL /HPF (ref 0–1)
SODIUM SERPL-SCNC: 139 MMOL/L (ref 136–145)
SP GR UR STRIP.AUTO: 1.01 (ref 1–1.03)
UA COMPLETE W REFLEX CULTURE PNL UR: YES
UA DIPSTICK W REFLEX MICRO PNL UR: YES
URN SPEC COLLECT METH UR: ABNORMAL
UROBILINOGEN UR STRIP-ACNC: 1 E.U./DL
WBC # BLD AUTO: 9.9 K/UL (ref 4–11)
WBC #/AREA URNS AUTO: 26 /HPF (ref 0–5)

## 2024-07-02 PROCEDURE — 72158 MRI LUMBAR SPINE W/O & W/DYE: CPT

## 2024-07-02 PROCEDURE — 96374 THER/PROPH/DIAG INJ IV PUSH: CPT

## 2024-07-02 PROCEDURE — A9577 INJ MULTIHANCE: HCPCS | Performed by: EMERGENCY MEDICINE

## 2024-07-02 PROCEDURE — 6360000004 HC RX CONTRAST MEDICATION: Performed by: EMERGENCY MEDICINE

## 2024-07-02 PROCEDURE — 85610 PROTHROMBIN TIME: CPT

## 2024-07-02 PROCEDURE — 99285 EMERGENCY DEPT VISIT HI MDM: CPT

## 2024-07-02 PROCEDURE — 81001 URINALYSIS AUTO W/SCOPE: CPT

## 2024-07-02 PROCEDURE — 51702 INSERT TEMP BLADDER CATH: CPT

## 2024-07-02 PROCEDURE — 87086 URINE CULTURE/COLONY COUNT: CPT

## 2024-07-02 PROCEDURE — 96372 THER/PROPH/DIAG INJ SC/IM: CPT

## 2024-07-02 PROCEDURE — 6360000002 HC RX W HCPCS: Performed by: EMERGENCY MEDICINE

## 2024-07-02 PROCEDURE — 80053 COMPREHEN METABOLIC PANEL: CPT

## 2024-07-02 PROCEDURE — 86850 RBC ANTIBODY SCREEN: CPT

## 2024-07-02 PROCEDURE — 6370000000 HC RX 637 (ALT 250 FOR IP): Performed by: PHYSICIAN ASSISTANT

## 2024-07-02 PROCEDURE — 85025 COMPLETE CBC W/AUTO DIFF WBC: CPT

## 2024-07-02 PROCEDURE — 85730 THROMBOPLASTIN TIME PARTIAL: CPT

## 2024-07-02 PROCEDURE — 96375 TX/PRO/DX INJ NEW DRUG ADDON: CPT

## 2024-07-02 PROCEDURE — 86901 BLOOD TYPING SEROLOGIC RH(D): CPT

## 2024-07-02 PROCEDURE — 6360000002 HC RX W HCPCS: Performed by: PHYSICIAN ASSISTANT

## 2024-07-02 PROCEDURE — 86900 BLOOD TYPING SEROLOGIC ABO: CPT

## 2024-07-02 PROCEDURE — 96376 TX/PRO/DX INJ SAME DRUG ADON: CPT

## 2024-07-02 PROCEDURE — 6370000000 HC RX 637 (ALT 250 FOR IP): Performed by: EMERGENCY MEDICINE

## 2024-07-02 RX ORDER — ONDANSETRON 4 MG/1
4 TABLET, ORALLY DISINTEGRATING ORAL ONCE
Status: COMPLETED | OUTPATIENT
Start: 2024-07-02 | End: 2024-07-02

## 2024-07-02 RX ORDER — METHOCARBAMOL 100 MG/ML
1000 INJECTION, SOLUTION INTRAMUSCULAR; INTRAVENOUS ONCE
Status: COMPLETED | OUTPATIENT
Start: 2024-07-02 | End: 2024-07-02

## 2024-07-02 RX ORDER — OXYCODONE HYDROCHLORIDE 5 MG/1
5 TABLET ORAL EVERY 6 HOURS PRN
Status: DISCONTINUED | OUTPATIENT
Start: 2024-07-02 | End: 2024-07-04

## 2024-07-02 RX ORDER — MIDAZOLAM HYDROCHLORIDE 1 MG/ML
1 INJECTION INTRAMUSCULAR; INTRAVENOUS
Status: DISCONTINUED | OUTPATIENT
Start: 2024-07-02 | End: 2024-07-03

## 2024-07-02 RX ORDER — ONDANSETRON 2 MG/ML
4 INJECTION INTRAMUSCULAR; INTRAVENOUS ONCE
Status: DISCONTINUED | OUTPATIENT
Start: 2024-07-02 | End: 2024-07-02

## 2024-07-02 RX ORDER — METHOCARBAMOL 500 MG/1
500 TABLET, FILM COATED ORAL ONCE
Status: COMPLETED | OUTPATIENT
Start: 2024-07-02 | End: 2024-07-02

## 2024-07-02 RX ORDER — ACETAMINOPHEN 500 MG
1000 TABLET ORAL 4 TIMES DAILY
Status: DISCONTINUED | OUTPATIENT
Start: 2024-07-02 | End: 2024-07-05 | Stop reason: HOSPADM

## 2024-07-02 RX ORDER — ONDANSETRON 2 MG/ML
4 INJECTION INTRAMUSCULAR; INTRAVENOUS ONCE
Status: COMPLETED | OUTPATIENT
Start: 2024-07-02 | End: 2024-07-02

## 2024-07-02 RX ADMIN — METHOCARBAMOL 1000 MG: 100 INJECTION INTRAMUSCULAR; INTRAVENOUS at 18:26

## 2024-07-02 RX ADMIN — HYDROMORPHONE HYDROCHLORIDE 1 MG: 1 INJECTION, SOLUTION INTRAMUSCULAR; INTRAVENOUS; SUBCUTANEOUS at 21:52

## 2024-07-02 RX ADMIN — METHOCARBAMOL 500 MG: 500 TABLET ORAL at 21:16

## 2024-07-02 RX ADMIN — HYDROMORPHONE HYDROCHLORIDE 1 MG: 1 INJECTION, SOLUTION INTRAMUSCULAR; INTRAVENOUS; SUBCUTANEOUS at 23:44

## 2024-07-02 RX ADMIN — ACETAMINOPHEN 1000 MG: 500 TABLET ORAL at 20:37

## 2024-07-02 RX ADMIN — HYDROMORPHONE HYDROCHLORIDE 0.5 MG: 1 INJECTION, SOLUTION INTRAMUSCULAR; INTRAVENOUS; SUBCUTANEOUS at 18:25

## 2024-07-02 RX ADMIN — ONDANSETRON 4 MG: 2 INJECTION INTRAMUSCULAR; INTRAVENOUS at 18:24

## 2024-07-02 RX ADMIN — GADOBENATE DIMEGLUMINE 16 ML: 529 INJECTION, SOLUTION INTRAVENOUS at 22:27

## 2024-07-02 RX ADMIN — HYDROMORPHONE HYDROCHLORIDE 0.5 MG: 1 INJECTION, SOLUTION INTRAMUSCULAR; INTRAVENOUS; SUBCUTANEOUS at 16:07

## 2024-07-02 RX ADMIN — ONDANSETRON 4 MG: 4 TABLET, ORALLY DISINTEGRATING ORAL at 16:06

## 2024-07-02 ASSESSMENT — ENCOUNTER SYMPTOMS
NAUSEA: 0
CHEST TIGHTNESS: 0
DIARRHEA: 0
COUGH: 0
RESPIRATORY NEGATIVE: 1
VOMITING: 0
ABDOMINAL PAIN: 0
BACK PAIN: 1
CONSTIPATION: 0
SHORTNESS OF BREATH: 0
COLOR CHANGE: 0

## 2024-07-02 ASSESSMENT — PAIN DESCRIPTION - DESCRIPTORS
DESCRIPTORS: DISCOMFORT
DESCRIPTORS: BURNING
DESCRIPTORS: DISCOMFORT
DESCRIPTORS: BURNING
DESCRIPTORS: DISCOMFORT
DESCRIPTORS: BURNING

## 2024-07-02 ASSESSMENT — PAIN SCALES - GENERAL
PAINLEVEL_OUTOF10: 9
PAINLEVEL_OUTOF10: 10
PAINLEVEL_OUTOF10: 4
PAINLEVEL_OUTOF10: 5
PAINLEVEL_OUTOF10: 7
PAINLEVEL_OUTOF10: 10
PAINLEVEL_OUTOF10: 8
PAINLEVEL_OUTOF10: 10
PAINLEVEL_OUTOF10: 9
PAINLEVEL_OUTOF10: 9

## 2024-07-02 ASSESSMENT — PAIN DESCRIPTION - PAIN TYPE
TYPE: ACUTE PAIN;CHRONIC PAIN
TYPE: ACUTE PAIN
TYPE: ACUTE PAIN

## 2024-07-02 ASSESSMENT — PAIN DESCRIPTION - LOCATION
LOCATION: LEG;BACK
LOCATION: BACK
LOCATION: LEG;BACK
LOCATION: BACK
LOCATION: BACK;LEG
LOCATION: BACK
LOCATION: BACK

## 2024-07-02 ASSESSMENT — PAIN DESCRIPTION - ORIENTATION
ORIENTATION: RIGHT;LEFT;LOWER
ORIENTATION: RIGHT;LEFT
ORIENTATION: LOWER
ORIENTATION: MID
ORIENTATION: RIGHT;LEFT

## 2024-07-02 ASSESSMENT — PAIN DESCRIPTION - FREQUENCY
FREQUENCY: CONTINUOUS
FREQUENCY: CONTINUOUS

## 2024-07-02 NOTE — TELEPHONE ENCOUNTER
General Question     Subject: RX QUESTION   Patient and /or Facility Request: Jillian Stinson  Contact Number: [phone]     PATIENT CALLING REGARDING HER  RX , PATIENT HAD TO STOP TAKING HER BLOOD THINNER DUE TO HAVING JIGNESH , PATIENT WOULD LIKE TO KNOW WHEN WILL SHE BE ABLE TO START TAKING THE RX AGAIN AND HOW MUCH     PATIENT HAD JIGNESH ON 07/1/24    PLEASE CALL PATIENT AT THE ABOVE NUMBER

## 2024-07-02 NOTE — ED TRIAGE NOTES
Patient to ED via squad from home. Reports back surgery yesterday. Oxycodone not helping. Patient reached out to surgeon today regarding blood thinner but did not mention pain. Pain worsened last night. Patient ambulated independently at home to get on stretcher. Denies new injury.

## 2024-07-03 PROBLEM — M54.9 INTRACTABLE BACK PAIN: Status: ACTIVE | Noted: 2024-07-03

## 2024-07-03 LAB
BACTERIA UR CULT: NORMAL
BASOPHILS # BLD: 0 K/UL (ref 0–0.2)
BASOPHILS NFR BLD: 0.2 %
DEPRECATED RDW RBC AUTO: 13.9 % (ref 12.4–15.4)
EOSINOPHIL # BLD: 0 K/UL (ref 0–0.6)
EOSINOPHIL NFR BLD: 0.1 %
GLUCOSE BLD-MCNC: 111 MG/DL (ref 70–99)
GLUCOSE BLD-MCNC: 227 MG/DL (ref 70–99)
GLUCOSE BLD-MCNC: 233 MG/DL (ref 70–99)
GLUCOSE BLD-MCNC: 339 MG/DL (ref 70–99)
HCT VFR BLD AUTO: 38.9 % (ref 36–48)
HGB BLD-MCNC: 13.5 G/DL (ref 12–16)
LYMPHOCYTES # BLD: 0.8 K/UL (ref 1–5.1)
LYMPHOCYTES NFR BLD: 9.9 %
MCH RBC QN AUTO: 30.5 PG (ref 26–34)
MCHC RBC AUTO-ENTMCNC: 34.7 G/DL (ref 31–36)
MCV RBC AUTO: 88.1 FL (ref 80–100)
MONOCYTES # BLD: 0.2 K/UL (ref 0–1.3)
MONOCYTES NFR BLD: 2.1 %
NEUTROPHILS # BLD: 7.4 K/UL (ref 1.7–7.7)
NEUTROPHILS NFR BLD: 87.7 %
PERFORMED ON: ABNORMAL
PLATELET # BLD AUTO: 245 K/UL (ref 135–450)
PMV BLD AUTO: 7.4 FL (ref 5–10.5)
RBC # BLD AUTO: 4.42 M/UL (ref 4–5.2)
WBC # BLD AUTO: 8.4 K/UL (ref 4–11)

## 2024-07-03 PROCEDURE — 2700000000 HC OXYGEN THERAPY PER DAY

## 2024-07-03 PROCEDURE — 6360000002 HC RX W HCPCS: Performed by: PHYSICIAN ASSISTANT

## 2024-07-03 PROCEDURE — 2580000003 HC RX 258: Performed by: NURSE PRACTITIONER

## 2024-07-03 PROCEDURE — 85025 COMPLETE CBC W/AUTO DIFF WBC: CPT

## 2024-07-03 PROCEDURE — 9990000010 HC NO CHARGE VISIT

## 2024-07-03 PROCEDURE — 6360000002 HC RX W HCPCS: Performed by: NURSE PRACTITIONER

## 2024-07-03 PROCEDURE — 1200000000 HC SEMI PRIVATE

## 2024-07-03 PROCEDURE — 94760 N-INVAS EAR/PLS OXIMETRY 1: CPT

## 2024-07-03 PROCEDURE — 6370000000 HC RX 637 (ALT 250 FOR IP): Performed by: HOSPITALIST

## 2024-07-03 PROCEDURE — 96375 TX/PRO/DX INJ NEW DRUG ADDON: CPT

## 2024-07-03 PROCEDURE — 6370000000 HC RX 637 (ALT 250 FOR IP): Performed by: NURSE PRACTITIONER

## 2024-07-03 PROCEDURE — 36415 COLL VENOUS BLD VENIPUNCTURE: CPT

## 2024-07-03 PROCEDURE — 6370000000 HC RX 637 (ALT 250 FOR IP): Performed by: EMERGENCY MEDICINE

## 2024-07-03 RX ORDER — ACETAMINOPHEN 650 MG/1
650 SUPPOSITORY RECTAL EVERY 6 HOURS PRN
Status: DISCONTINUED | OUTPATIENT
Start: 2024-07-03 | End: 2024-07-04

## 2024-07-03 RX ORDER — ONDANSETRON 2 MG/ML
4 INJECTION INTRAMUSCULAR; INTRAVENOUS EVERY 6 HOURS PRN
Status: DISCONTINUED | OUTPATIENT
Start: 2024-07-03 | End: 2024-07-05 | Stop reason: HOSPADM

## 2024-07-03 RX ORDER — FENTANYL CITRATE 50 UG/ML
25 INJECTION, SOLUTION INTRAMUSCULAR; INTRAVENOUS ONCE
Status: COMPLETED | OUTPATIENT
Start: 2024-07-03 | End: 2024-07-03

## 2024-07-03 RX ORDER — ASCORBIC ACID 500 MG
1000 TABLET ORAL DAILY
Status: DISCONTINUED | OUTPATIENT
Start: 2024-07-03 | End: 2024-07-05 | Stop reason: HOSPADM

## 2024-07-03 RX ORDER — MONTELUKAST SODIUM 10 MG/1
10 TABLET ORAL NIGHTLY
Status: DISCONTINUED | OUTPATIENT
Start: 2024-07-03 | End: 2024-07-05 | Stop reason: HOSPADM

## 2024-07-03 RX ORDER — ALBUTEROL SULFATE 90 UG/1
2 AEROSOL, METERED RESPIRATORY (INHALATION) EVERY 6 HOURS PRN
Status: DISCONTINUED | OUTPATIENT
Start: 2024-07-03 | End: 2024-07-05 | Stop reason: HOSPADM

## 2024-07-03 RX ORDER — INSULIN LISPRO 100 [IU]/ML
0-4 INJECTION, SOLUTION INTRAVENOUS; SUBCUTANEOUS NIGHTLY
Status: DISCONTINUED | OUTPATIENT
Start: 2024-07-03 | End: 2024-07-05 | Stop reason: HOSPADM

## 2024-07-03 RX ORDER — ACETAMINOPHEN 325 MG/1
650 TABLET ORAL EVERY 6 HOURS PRN
Status: DISCONTINUED | OUTPATIENT
Start: 2024-07-03 | End: 2024-07-04

## 2024-07-03 RX ORDER — GLUCAGON 1 MG/ML
1 KIT INJECTION PRN
Status: DISCONTINUED | OUTPATIENT
Start: 2024-07-03 | End: 2024-07-05 | Stop reason: HOSPADM

## 2024-07-03 RX ORDER — SODIUM CHLORIDE 9 MG/ML
INJECTION, SOLUTION INTRAVENOUS PRN
Status: DISCONTINUED | OUTPATIENT
Start: 2024-07-03 | End: 2024-07-05 | Stop reason: HOSPADM

## 2024-07-03 RX ORDER — INSULIN LISPRO 100 [IU]/ML
0-8 INJECTION, SOLUTION INTRAVENOUS; SUBCUTANEOUS
Status: DISCONTINUED | OUTPATIENT
Start: 2024-07-03 | End: 2024-07-05 | Stop reason: HOSPADM

## 2024-07-03 RX ORDER — SERTRALINE HYDROCHLORIDE 100 MG/1
100 TABLET, FILM COATED ORAL DAILY
Status: DISCONTINUED | OUTPATIENT
Start: 2024-07-03 | End: 2024-07-05 | Stop reason: HOSPADM

## 2024-07-03 RX ORDER — DIAZEPAM 2 MG/1
2 TABLET ORAL NIGHTLY PRN
Status: DISCONTINUED | OUTPATIENT
Start: 2024-07-03 | End: 2024-07-05 | Stop reason: HOSPADM

## 2024-07-03 RX ORDER — SODIUM CHLORIDE 0.9 % (FLUSH) 0.9 %
5-40 SYRINGE (ML) INJECTION EVERY 12 HOURS SCHEDULED
Status: DISCONTINUED | OUTPATIENT
Start: 2024-07-03 | End: 2024-07-05 | Stop reason: HOSPADM

## 2024-07-03 RX ORDER — METHOCARBAMOL 750 MG/1
750 TABLET, FILM COATED ORAL 4 TIMES DAILY
Status: DISCONTINUED | OUTPATIENT
Start: 2024-07-03 | End: 2024-07-05 | Stop reason: HOSPADM

## 2024-07-03 RX ORDER — CALCIUM CARBONATE 500(1250)
500 TABLET ORAL DAILY
Status: DISCONTINUED | OUTPATIENT
Start: 2024-07-03 | End: 2024-07-05 | Stop reason: HOSPADM

## 2024-07-03 RX ORDER — AMITRIPTYLINE HYDROCHLORIDE 50 MG/1
50 TABLET, FILM COATED ORAL NIGHTLY
Status: DISCONTINUED | OUTPATIENT
Start: 2024-07-03 | End: 2024-07-05 | Stop reason: HOSPADM

## 2024-07-03 RX ORDER — FLUTICASONE PROPIONATE 50 MCG
1 SPRAY, SUSPENSION (ML) NASAL NIGHTLY
Status: DISCONTINUED | OUTPATIENT
Start: 2024-07-03 | End: 2024-07-05 | Stop reason: HOSPADM

## 2024-07-03 RX ORDER — DOCUSATE SODIUM 100 MG/1
100 CAPSULE, LIQUID FILLED ORAL 2 TIMES DAILY PRN
Status: DISCONTINUED | OUTPATIENT
Start: 2024-07-03 | End: 2024-07-05 | Stop reason: HOSPADM

## 2024-07-03 RX ORDER — METHYLPREDNISOLONE 4 MG/1
4 TABLET ORAL
Status: DISCONTINUED | OUTPATIENT
Start: 2024-07-04 | End: 2024-07-04

## 2024-07-03 RX ORDER — ONDANSETRON 4 MG/1
4 TABLET, ORALLY DISINTEGRATING ORAL EVERY 8 HOURS PRN
Status: DISCONTINUED | OUTPATIENT
Start: 2024-07-03 | End: 2024-07-05 | Stop reason: HOSPADM

## 2024-07-03 RX ORDER — POTASSIUM CHLORIDE 7.45 MG/ML
10 INJECTION INTRAVENOUS PRN
Status: DISCONTINUED | OUTPATIENT
Start: 2024-07-03 | End: 2024-07-05 | Stop reason: HOSPADM

## 2024-07-03 RX ORDER — M-VIT,TX,IRON,MINS/CALC/FOLIC 27MG-0.4MG
1 TABLET ORAL DAILY
Status: DISCONTINUED | OUTPATIENT
Start: 2024-07-03 | End: 2024-07-05 | Stop reason: HOSPADM

## 2024-07-03 RX ORDER — GEMFIBROZIL 600 MG/1
600 TABLET, FILM COATED ORAL
Status: DISCONTINUED | OUTPATIENT
Start: 2024-07-03 | End: 2024-07-05 | Stop reason: HOSPADM

## 2024-07-03 RX ORDER — SODIUM CHLORIDE 0.9 % (FLUSH) 0.9 %
5-40 SYRINGE (ML) INJECTION PRN
Status: DISCONTINUED | OUTPATIENT
Start: 2024-07-03 | End: 2024-07-05 | Stop reason: HOSPADM

## 2024-07-03 RX ORDER — INSULIN GLARGINE 100 [IU]/ML
50 INJECTION, SOLUTION SUBCUTANEOUS NIGHTLY
Status: DISCONTINUED | OUTPATIENT
Start: 2024-07-03 | End: 2024-07-05 | Stop reason: HOSPADM

## 2024-07-03 RX ORDER — ATORVASTATIN CALCIUM 40 MG/1
40 TABLET, FILM COATED ORAL
Status: DISCONTINUED | OUTPATIENT
Start: 2024-07-03 | End: 2024-07-05 | Stop reason: HOSPADM

## 2024-07-03 RX ORDER — METHYLPREDNISOLONE 4 MG/1
4 TABLET ORAL NIGHTLY
Status: DISCONTINUED | OUTPATIENT
Start: 2024-07-05 | End: 2024-07-04

## 2024-07-03 RX ORDER — MAGNESIUM SULFATE IN WATER 40 MG/ML
2000 INJECTION, SOLUTION INTRAVENOUS PRN
Status: DISCONTINUED | OUTPATIENT
Start: 2024-07-03 | End: 2024-07-05 | Stop reason: HOSPADM

## 2024-07-03 RX ORDER — DEXAMETHASONE SODIUM PHOSPHATE 10 MG/ML
6 INJECTION INTRAMUSCULAR; INTRAVENOUS ONCE
Status: COMPLETED | OUTPATIENT
Start: 2024-07-03 | End: 2024-07-03

## 2024-07-03 RX ORDER — SODIUM CHLORIDE 9 MG/ML
INJECTION, SOLUTION INTRAVENOUS CONTINUOUS
Status: ACTIVE | OUTPATIENT
Start: 2024-07-03 | End: 2024-07-03

## 2024-07-03 RX ORDER — TOPIRAMATE 25 MG/1
25 TABLET ORAL 2 TIMES DAILY
Status: DISCONTINUED | OUTPATIENT
Start: 2024-07-03 | End: 2024-07-05 | Stop reason: HOSPADM

## 2024-07-03 RX ORDER — BUPROPION HYDROCHLORIDE 150 MG/1
150 TABLET ORAL EVERY MORNING
Status: DISCONTINUED | OUTPATIENT
Start: 2024-07-03 | End: 2024-07-05 | Stop reason: HOSPADM

## 2024-07-03 RX ORDER — METHYLPREDNISOLONE 4 MG/1
8 TABLET ORAL NIGHTLY
Status: DISCONTINUED | OUTPATIENT
Start: 2024-07-04 | End: 2024-07-04

## 2024-07-03 RX ORDER — NALOXONE HYDROCHLORIDE 0.4 MG/ML
0.4 INJECTION, SOLUTION INTRAMUSCULAR; INTRAVENOUS; SUBCUTANEOUS PRN
Status: DISCONTINUED | OUTPATIENT
Start: 2024-07-03 | End: 2024-07-05 | Stop reason: HOSPADM

## 2024-07-03 RX ORDER — DIAZEPAM 5 MG/1
5 TABLET ORAL ONCE
Status: COMPLETED | OUTPATIENT
Start: 2024-07-03 | End: 2024-07-03

## 2024-07-03 RX ORDER — DEXTROSE MONOHYDRATE 100 MG/ML
INJECTION, SOLUTION INTRAVENOUS CONTINUOUS PRN
Status: DISCONTINUED | OUTPATIENT
Start: 2024-07-03 | End: 2024-07-05 | Stop reason: HOSPADM

## 2024-07-03 RX ORDER — HEPARIN SODIUM 5000 [USP'U]/ML
5000 INJECTION, SOLUTION INTRAVENOUS; SUBCUTANEOUS EVERY 8 HOURS SCHEDULED
Status: DISCONTINUED | OUTPATIENT
Start: 2024-07-03 | End: 2024-07-04

## 2024-07-03 RX ORDER — PANTOPRAZOLE SODIUM 40 MG/1
40 TABLET, DELAYED RELEASE ORAL
Status: DISCONTINUED | OUTPATIENT
Start: 2024-07-03 | End: 2024-07-05 | Stop reason: HOSPADM

## 2024-07-03 RX ORDER — POLYETHYLENE GLYCOL 3350 17 G/17G
17 POWDER, FOR SOLUTION ORAL DAILY PRN
Status: DISCONTINUED | OUTPATIENT
Start: 2024-07-03 | End: 2024-07-05 | Stop reason: HOSPADM

## 2024-07-03 RX ORDER — GABAPENTIN 300 MG/1
300 CAPSULE ORAL 3 TIMES DAILY
Status: DISCONTINUED | OUTPATIENT
Start: 2024-07-03 | End: 2024-07-05 | Stop reason: HOSPADM

## 2024-07-03 RX ORDER — WARFARIN SODIUM 5 MG/1
5 TABLET ORAL
Status: COMPLETED | OUTPATIENT
Start: 2024-07-03 | End: 2024-07-03

## 2024-07-03 RX ORDER — LOSARTAN POTASSIUM 25 MG/1
25 TABLET ORAL EVERY EVENING
Status: DISCONTINUED | OUTPATIENT
Start: 2024-07-03 | End: 2024-07-05 | Stop reason: HOSPADM

## 2024-07-03 RX ORDER — POTASSIUM CHLORIDE 20 MEQ/1
40 TABLET, EXTENDED RELEASE ORAL PRN
Status: DISCONTINUED | OUTPATIENT
Start: 2024-07-03 | End: 2024-07-05 | Stop reason: HOSPADM

## 2024-07-03 RX ADMIN — HEPARIN SODIUM 5000 UNITS: 5000 INJECTION INTRAVENOUS; SUBCUTANEOUS at 14:04

## 2024-07-03 RX ADMIN — METHOCARBAMOL TABLETS 750 MG: 750 TABLET, COATED ORAL at 16:44

## 2024-07-03 RX ADMIN — MONTELUKAST 10 MG: 10 TABLET, FILM COATED ORAL at 21:21

## 2024-07-03 RX ADMIN — INSULIN LISPRO 4 UNITS: 100 INJECTION, SOLUTION INTRAVENOUS; SUBCUTANEOUS at 21:22

## 2024-07-03 RX ADMIN — HYDROMORPHONE HYDROCHLORIDE 1 MG: 1 INJECTION, SOLUTION INTRAMUSCULAR; INTRAVENOUS; SUBCUTANEOUS at 02:07

## 2024-07-03 RX ADMIN — OXYCODONE 5 MG: 5 TABLET ORAL at 15:01

## 2024-07-03 RX ADMIN — AMITRIPTYLINE HYDROCHLORIDE 50 MG: 50 TABLET, FILM COATED ORAL at 21:21

## 2024-07-03 RX ADMIN — FLUTICASONE PROPIONATE 1 SPRAY: 50 SPRAY, METERED NASAL at 21:28

## 2024-07-03 RX ADMIN — INSULIN LISPRO 2 UNITS: 100 INJECTION, SOLUTION INTRAVENOUS; SUBCUTANEOUS at 12:05

## 2024-07-03 RX ADMIN — INSULIN LISPRO 2 UNITS: 100 INJECTION, SOLUTION INTRAVENOUS; SUBCUTANEOUS at 16:44

## 2024-07-03 RX ADMIN — METHOCARBAMOL TABLETS 750 MG: 750 TABLET, COATED ORAL at 13:08

## 2024-07-03 RX ADMIN — METHOCARBAMOL TABLETS 750 MG: 750 TABLET, COATED ORAL at 21:21

## 2024-07-03 RX ADMIN — HYDROMORPHONE HYDROCHLORIDE 1 MG: 1 INJECTION, SOLUTION INTRAMUSCULAR; INTRAVENOUS; SUBCUTANEOUS at 23:25

## 2024-07-03 RX ADMIN — ACETAMINOPHEN 1000 MG: 500 TABLET ORAL at 16:44

## 2024-07-03 RX ADMIN — HYDROMORPHONE HYDROCHLORIDE 1 MG: 1 INJECTION, SOLUTION INTRAMUSCULAR; INTRAVENOUS; SUBCUTANEOUS at 16:45

## 2024-07-03 RX ADMIN — LOSARTAN POTASSIUM 25 MG: 25 TABLET, FILM COATED ORAL at 18:54

## 2024-07-03 RX ADMIN — BUPROPION HYDROCHLORIDE 150 MG: 150 TABLET, EXTENDED RELEASE ORAL at 08:39

## 2024-07-03 RX ADMIN — METHYLPREDNISOLONE 24 MG: 16 TABLET ORAL at 03:13

## 2024-07-03 RX ADMIN — HYDROMORPHONE HYDROCHLORIDE 1 MG: 1 INJECTION, SOLUTION INTRAMUSCULAR; INTRAVENOUS; SUBCUTANEOUS at 10:24

## 2024-07-03 RX ADMIN — CALCIUM 500 MG: 500 TABLET ORAL at 08:38

## 2024-07-03 RX ADMIN — ACETAMINOPHEN 1000 MG: 500 TABLET ORAL at 13:09

## 2024-07-03 RX ADMIN — DOCUSATE SODIUM 100 MG: 100 CAPSULE, LIQUID FILLED ORAL at 18:55

## 2024-07-03 RX ADMIN — GABAPENTIN 300 MG: 300 CAPSULE ORAL at 14:04

## 2024-07-03 RX ADMIN — SERTRALINE 100 MG: 100 TABLET, FILM COATED ORAL at 08:39

## 2024-07-03 RX ADMIN — GABAPENTIN 300 MG: 300 CAPSULE ORAL at 08:37

## 2024-07-03 RX ADMIN — SODIUM CHLORIDE, PRESERVATIVE FREE 10 ML: 5 INJECTION INTRAVENOUS at 21:27

## 2024-07-03 RX ADMIN — INSULIN GLARGINE 50 UNITS: 100 INJECTION, SOLUTION SUBCUTANEOUS at 21:22

## 2024-07-03 RX ADMIN — ACETAMINOPHEN 1000 MG: 500 TABLET ORAL at 08:38

## 2024-07-03 RX ADMIN — DEXAMETHASONE SODIUM PHOSPHATE 6 MG: 10 INJECTION INTRAMUSCULAR; INTRAVENOUS at 09:06

## 2024-07-03 RX ADMIN — SODIUM CHLORIDE: 9 INJECTION, SOLUTION INTRAVENOUS at 02:51

## 2024-07-03 RX ADMIN — HEPARIN SODIUM 5000 UNITS: 5000 INJECTION INTRAVENOUS; SUBCUTANEOUS at 21:21

## 2024-07-03 RX ADMIN — ACETAMINOPHEN 1000 MG: 500 TABLET ORAL at 21:21

## 2024-07-03 RX ADMIN — MULTIPLE VITAMINS W/ MINERALS TAB 1 TABLET: TAB at 08:49

## 2024-07-03 RX ADMIN — POLYETHYLENE GLYCOL 3350 17 G: 17 POWDER, FOR SOLUTION ORAL at 18:54

## 2024-07-03 RX ADMIN — GEMFIBROZIL 600 MG: 600 TABLET ORAL at 16:44

## 2024-07-03 RX ADMIN — FENTANYL CITRATE 25 MCG: 50 INJECTION INTRAMUSCULAR; INTRAVENOUS at 00:26

## 2024-07-03 RX ADMIN — OXYCODONE 5 MG: 5 TABLET ORAL at 09:09

## 2024-07-03 RX ADMIN — GABAPENTIN 300 MG: 300 CAPSULE ORAL at 21:21

## 2024-07-03 RX ADMIN — HYDROMORPHONE HYDROCHLORIDE 1 MG: 1 INJECTION, SOLUTION INTRAMUSCULAR; INTRAVENOUS; SUBCUTANEOUS at 13:42

## 2024-07-03 RX ADMIN — OXYCODONE 5 MG: 5 TABLET ORAL at 03:12

## 2024-07-03 RX ADMIN — ONDANSETRON 4 MG: 2 INJECTION INTRAMUSCULAR; INTRAVENOUS at 03:11

## 2024-07-03 RX ADMIN — DIAZEPAM 5 MG: 5 TABLET ORAL at 04:21

## 2024-07-03 RX ADMIN — METHOCARBAMOL TABLETS 750 MG: 750 TABLET, COATED ORAL at 08:39

## 2024-07-03 RX ADMIN — TOPIRAMATE 25 MG: 25 TABLET, FILM COATED ORAL at 21:21

## 2024-07-03 RX ADMIN — HYDROMORPHONE HYDROCHLORIDE 1 MG: 1 INJECTION, SOLUTION INTRAMUSCULAR; INTRAVENOUS; SUBCUTANEOUS at 07:18

## 2024-07-03 RX ADMIN — HYDROMORPHONE HYDROCHLORIDE 1 MG: 1 INJECTION, SOLUTION INTRAMUSCULAR; INTRAVENOUS; SUBCUTANEOUS at 04:20

## 2024-07-03 RX ADMIN — WATER 1000 MG: 1 INJECTION INTRAMUSCULAR; INTRAVENOUS; SUBCUTANEOUS at 03:14

## 2024-07-03 RX ADMIN — TOPIRAMATE 25 MG: 25 TABLET, FILM COATED ORAL at 08:39

## 2024-07-03 RX ADMIN — OXYCODONE HYDROCHLORIDE AND ACETAMINOPHEN 1000 MG: 500 TABLET ORAL at 08:38

## 2024-07-03 RX ADMIN — WARFARIN SODIUM 5 MG: 5 TABLET ORAL at 18:54

## 2024-07-03 RX ADMIN — ATORVASTATIN CALCIUM 40 MG: 40 TABLET, FILM COATED ORAL at 16:44

## 2024-07-03 ASSESSMENT — PAIN DESCRIPTION - LOCATION
LOCATION: BACK
LOCATION: LEG;BACK
LOCATION: BACK

## 2024-07-03 ASSESSMENT — PAIN SCALES - WONG BAKER
WONGBAKER_NUMERICALRESPONSE: HURTS A LITTLE BIT
WONGBAKER_NUMERICALRESPONSE: HURTS LITTLE MORE
WONGBAKER_NUMERICALRESPONSE: HURTS EVEN MORE
WONGBAKER_NUMERICALRESPONSE: HURTS LITTLE MORE
WONGBAKER_NUMERICALRESPONSE: HURTS EVEN MORE
WONGBAKER_NUMERICALRESPONSE: HURTS EVEN MORE
WONGBAKER_NUMERICALRESPONSE: HURTS WORST
WONGBAKER_NUMERICALRESPONSE: NO HURT
WONGBAKER_NUMERICALRESPONSE: HURTS LITTLE MORE
WONGBAKER_NUMERICALRESPONSE: HURTS A LITTLE BIT
WONGBAKER_NUMERICALRESPONSE: NO HURT
WONGBAKER_NUMERICALRESPONSE: HURTS A LITTLE BIT

## 2024-07-03 ASSESSMENT — PAIN DESCRIPTION - PAIN TYPE
TYPE: ACUTE PAIN
TYPE: ACUTE PAIN;SURGICAL PAIN
TYPE: ACUTE PAIN
TYPE: ACUTE PAIN;SURGICAL PAIN
TYPE: ACUTE PAIN
TYPE: ACUTE PAIN;SURGICAL PAIN
TYPE: ACUTE PAIN

## 2024-07-03 ASSESSMENT — PAIN SCALES - GENERAL
PAINLEVEL_OUTOF10: 10
PAINLEVEL_OUTOF10: 10
PAINLEVEL_OUTOF10: 6
PAINLEVEL_OUTOF10: 10
PAINLEVEL_OUTOF10: 4
PAINLEVEL_OUTOF10: 7
PAINLEVEL_OUTOF10: 10
PAINLEVEL_OUTOF10: 7
PAINLEVEL_OUTOF10: 10
PAINLEVEL_OUTOF10: 3
PAINLEVEL_OUTOF10: 7
PAINLEVEL_OUTOF10: 10
PAINLEVEL_OUTOF10: 0
PAINLEVEL_OUTOF10: 10
PAINLEVEL_OUTOF10: 10
PAINLEVEL_OUTOF10: 4

## 2024-07-03 ASSESSMENT — PAIN DESCRIPTION - FREQUENCY
FREQUENCY: CONTINUOUS

## 2024-07-03 ASSESSMENT — PAIN - FUNCTIONAL ASSESSMENT
PAIN_FUNCTIONAL_ASSESSMENT: PREVENTS OR INTERFERES WITH ALL ACTIVE AND SOME PASSIVE ACTIVITIES
PAIN_FUNCTIONAL_ASSESSMENT: PREVENTS OR INTERFERES SOME ACTIVE ACTIVITIES AND ADLS
PAIN_FUNCTIONAL_ASSESSMENT: PREVENTS OR INTERFERES WITH ALL ACTIVE AND SOME PASSIVE ACTIVITIES
PAIN_FUNCTIONAL_ASSESSMENT: PREVENTS OR INTERFERES WITH ALL ACTIVE AND SOME PASSIVE ACTIVITIES
PAIN_FUNCTIONAL_ASSESSMENT: PREVENTS OR INTERFERES SOME ACTIVE ACTIVITIES AND ADLS
PAIN_FUNCTIONAL_ASSESSMENT: PREVENTS OR INTERFERES WITH MANY ACTIVE NOT PASSIVE ACTIVITIES
PAIN_FUNCTIONAL_ASSESSMENT: PREVENTS OR INTERFERES WITH ALL ACTIVE AND SOME PASSIVE ACTIVITIES
PAIN_FUNCTIONAL_ASSESSMENT: PREVENTS OR INTERFERES SOME ACTIVE ACTIVITIES AND ADLS
PAIN_FUNCTIONAL_ASSESSMENT: PREVENTS OR INTERFERES SOME ACTIVE ACTIVITIES AND ADLS
PAIN_FUNCTIONAL_ASSESSMENT: PREVENTS OR INTERFERES WITH ALL ACTIVE AND SOME PASSIVE ACTIVITIES

## 2024-07-03 ASSESSMENT — PAIN DESCRIPTION - ONSET
ONSET: ON-GOING

## 2024-07-03 ASSESSMENT — PAIN DESCRIPTION - DIRECTION
RADIATING_TOWARDS: DOWN BIL LEGS

## 2024-07-03 ASSESSMENT — PAIN DESCRIPTION - ORIENTATION
ORIENTATION: LOWER
ORIENTATION: RIGHT
ORIENTATION: LOWER

## 2024-07-03 ASSESSMENT — PAIN DESCRIPTION - DESCRIPTORS
DESCRIPTORS: ACHING;DISCOMFORT
DESCRIPTORS: STABBING
DESCRIPTORS: ACHING;DISCOMFORT;SORE;SHOOTING
DESCRIPTORS: ACHING;DISCOMFORT
DESCRIPTORS: ACHING;DISCOMFORT
DESCRIPTORS: PATIENT UNABLE TO DESCRIBE
DESCRIPTORS: ACHING
DESCRIPTORS: PATIENT UNABLE TO DESCRIBE
DESCRIPTORS: PATIENT UNABLE TO DESCRIBE
DESCRIPTORS: DISCOMFORT
DESCRIPTORS: ACHING;DISCOMFORT;SORE
DESCRIPTORS: OTHER (COMMENT)
DESCRIPTORS: ACHING;DISCOMFORT
DESCRIPTORS: DISCOMFORT

## 2024-07-03 NOTE — CONSULTS
Clinical Pharmacy Note  Warfarin Consult    Jillian Stinson is a 74 y.o. female receiving warfarin managed by pharmacy.  Patient being bridged with subQ heparin.    Warfarin Indication: hx of PE  Target INR range: 2-3   Dose prior to admission: 3 mg daily    Current warfarin drug-drug interactions: steroids, gemfibrozil (home med)    Recent Labs     07/01/24  0725 07/02/24  1446 07/02/24  1938 07/03/24  1143   HGB 13.9 13.0  --  13.5   HCT 40.0 37.6  --  38.9   INR 0.97  --  1.03  --        Assessment/Plan:    Warfarin 5 mg tonight. Daily PT/INR until stable within therapeutic range.     Thank you for the consult.  Will continue to follow.    Ashley Abbasi PharmD  7/3/2024 3:10 PM

## 2024-07-03 NOTE — PROGRESS NOTES
V2.0  [facility] [place] Note      Name:  Jillian Stinson /Age/Sex: 1950  (74 y.o. female)   MRN & CSN:  [id number] & [id number] Encounter Date/Time: 7/3/2024 11:15 AM EDT    Location:  [id number] PCP: Rylee Scott, JACK - NP       Hospital Day: 2    Subjective:     Chief Complaint: Back Pain (Patient to ED via squad from home. Reports back surgery yesterday. Oxycodone not helping. Patient reached out to surgeon today regarding blood thinner but did not mention pain. Pain worsened last night. Patient ambulated independently at home to get on stretcher. Denies new injury. )     Patient denies any new complaints, no issues with bowel control, she has a Morris catheter in place, no saddle anesthesia, range of motion at the hip joint is intact.  Does report significant amount of pain with any movement though, seen by orthopedics already, no interventions planned     Review of Systems:    Review of Systems    10 point ROS negative except as stated above in \"subjective\" section    Objective:     Intake/Output Summary (Last 24 hours) at 7/3/2024 1115  Last data filed at 7/3/2024 0616  Gross per 24 hour   Intake 341.46 ml   Output 1325 ml   Net -983.54 ml        Vitals:   Vitals:    24 0710   BP: (!) 168/88   Pulse: 85   Resp: 17   Temp: 98.4 °F (36.9 °C)   SpO2: 90%       Physical Exam:     General: NAD  Eyes: EOMI  ENT: neck supple  Cardiovascular: Regular rate.  Respiratory: Clear to auscultation  Gastrointestinal: Soft, non tender  Genitourinary: no suprapubic tenderness  Musculoskeletal: No edema  Skin: warm, dry  Neuro: Alert.  Psych: Mood appropriate.     Medications:   Medications:    methocarbamol  750 mg Oral 4x Daily    amitriptyline  50 mg Oral Nightly    atorvastatin  40 mg Oral Dinner    buPROPion  150 mg Oral QAM    calcium elemental  500 mg Oral Daily    fluticasone  1 spray Nasal Nightly    gabapentin  300 mg Oral TID    gemfibrozil  600 mg Oral BID AC    losartan  25  vertebral body heights are maintained. The bone marrow signal appears unremarkable.  No evidence of acute fracture.  There is multilevel disc desiccation and disc height loss throughout the lumbar spine with endplate degenerative marrow signal changes greatest at L4-5. SPINAL CORD:  The conus terminates normally.  There is some clumping of the cauda equina nerve roots greatest within the lower lumbar spinal canal. SOFT TISSUES: Right para median posterior paraspinal fluid collection extending into the right hemilaminectomy site at L2-3 as detailed above. Mild adjacent postoperative posterior paraspinal soft tissue enhancement. T12-L1: Posterior disc bulge. Mild bilateral ligamentum flavum thickening. Mild spinal canal stenosis. Mild bilateral neural foraminal narrowing. L1-L2: There is no significant disc protrusion, spinal canal stenosis or neural foraminal narrowing. L2-L3: Status post right hemilaminectomy with nonenhancing fluid collection in the hemilaminectomy site extending into the right paramedian posterior paraspinal soft tissues measuring up to 3.2 x 1.1 cm in axial dimension, extending 2.6 cm craniocaudal as well as paraspinal soft tissue edema.  In combination with posterior disc bulge and superimposed central disc protrusion, there is severe spinal canal stenosis at this level the cauda equina nerve roots.  Compressing mild to moderate bilateral neural foraminal narrowing. L3-L4: Posterior disc bulge.  Bilateral facet arthropathy and ligamentum flavum thickening.  Moderate to severe spinal canal stenosis.  Severe left and moderate right neural foraminal narrowing, similar to prior. L4-L5: Posterior disc bulge.  Bilateral ligamentum flavum thickening. Minimal spinal canal stenosis.  Moderate to severe bilateral neural foraminal narrowing.  Findings are similar compared to prior. L5-S1: Posterior disc bulge.  Bilateral facet arthropathy and ligamentum flavum thickening.  Mild-to-moderate spinal canal

## 2024-07-03 NOTE — PROGRESS NOTES
Occupational Therapy  Jillian Stinson  [id number]  [id number]    OT orders received and pt chart reviewed. Per chart and discussion with ortho NP, Roula Atwood, pt to be on bedrest today with exception of bed <> BSC txs. Will cont to follow and plan to see for therapy evaluation tomorrow 7-4-24 as pt able to tolerate.     Mercedes Cao, OTR/L [id number]

## 2024-07-03 NOTE — PROGRESS NOTES
Physical Therapy  Hold Note  Jillian DENIS Stinson  [id number]    PT orders received for this pt but per NP, the pt is on bedrest today. Will plan to eval the pt on 7-4-2024.    Electronically signed by Juliana Khan, PT [id number] on 7/3/2024 at 12:52 PM

## 2024-07-03 NOTE — CONSULTS
SCREWS, AND DONOR BONE WITH C-ARM performed by Jorge Smith MD at [facility] OR    COLONOSCOPY      COLONOSCOPY N/A 7/9/2020    COLONOSCOPY POLYPECTOMY SNARE/COLD BIOPSY performed by Nain Avila Jr., DO at [facility] ENDOSCOPY    ENDOSCOPY, COLON, DIAGNOSTIC      HIATAL HERNIA REPAIR      HYSTERECTOMY (CERVIX STATUS UNKNOWN)      LUMBAR SPINE SURGERY N/A 1/16/2023    MICROLUMBAR LAMINECTOMY L3-4, L4-5 performed by Jorge Smith MD at [facility] OR    LUMBAR SPINE SURGERY Bilateral 7/1/2024    MICROLUMBAR LAMINECTOMY LUMBAR TWO AND LUMBAR THREE, LUMBAR THREE AND LUMBAR FOUR BILATERAL performed by Jorge Smith MD at [facility] OR    NERVE BLOCK Bilateral 12/26/2023    BILATERAL LUMBAR THREE LUMBAR FOUR LUMBAR FIVE MEDIAL BRANCH BLOCK SITE CONFIRMED BY FLUOROSCOPY performed by Jorge Smith MD at [facility] OR    OVARIAN CYST SURGERY      PAIN MANAGEMENT PROCEDURE Right 10/18/2023    RIGHT LUMBAR THREE FOUR LUMBAR FOUR FIVE EPIDURAL STEROID INJECTION SITE CONFIRMED BY FLUOROSCOPY performed by Jorge Smith MD at [facility] OR    SHOULDER ARTHROSCOPY Right 12/28/2016    SAD, labral debridement    SHOULDER SURGERY Right 6/13/2022    RIGHT REVERSE TOTAL SHOULDER REPLACEMENT performed by Yohan Capellan MD at [facility] OR    THYROIDECTOMY, PARTIAL         Social History     Tobacco Use    Smoking status: Never    Smokeless tobacco: Never   Substance Use Topics    Alcohol use: No       Family History   Problem Relation Age of Onset    Clotting Disorder Mother     Heart Attack Father     Leukemia Brother     Diabetes Maternal Grandmother            Current Medications:   Current Facility-Administered Medications: methocarbamol (ROBAXIN) tablet 750 mg, 750 mg, Oral, 4x Daily  albuterol sulfate HFA (PROVENTIL;VENTOLIN;PROAIR) 108 (90 Base) MCG/ACT inhaler 2 puff, 2 puff, Inhalation, Q6H PRN  amitriptyline (ELAVIL) tablet 50 mg, 50 mg, Oral, Nightly  atorvastatin (LIPITOR) tablet 40 mg, 40 mg, Oral, Dinner  buPROPion (WELLBUTRIN XL)  to prior.     L5-S1: Posterior disc bulge.  Bilateral facet arthropathy and ligamentum  flavum thickening.  Mild-to-moderate spinal canal stenosis.  Moderate  bilateral neural foraminal narrowing.  Findings are similar to prior.     IMPRESSION:  1. Status post right hemilaminectomy at L2-3 with enhancing fluid collection  in the hemilaminectomy site extending into the right paramedian posterior  paraspinal soft tissues measuring up to 3.2 x 1.1 x 2.6 cm. This may  represent a postoperative seroma or hematoma.  In combination with posterior  disc bulge and superimposed central disc protrusion, there is severe spinal  canal stenosis at this level compressing the cauda equina nerve roots.  2. Multilevel degenerative changes in the lumbar spine as described above,  similar to prior.  3. 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                       IMPRESSION/RECOMMENDATIONS:    Post L2-4 bilateral laminectomies 7/1/24 per Dr Smith. Stable exam  Intractable back pain. On Medrol pack since last evening. Muscle relaxers and pain meds ordered prn.   Right lumbar fluid collection per MRI, Discussed with DR mSith. Agree seroma or hematoma but stable based on spinal exam. NO indication for surgical intervention at this time  Bedrest today then out of bed tomorrow with therapies  Added Decadron IV x1 today and Valium prn at night for muscle spasm  Above plan of care per Dr Smith discussed by phone. . Pt agreeable  Advise remove bailey cath on 7/4/24 AM.     Faith Atwood, APRN - CNP  7/3/2024  8:40 AM

## 2024-07-03 NOTE — ED PROVIDER NOTES
[facility] EMERGENCY DEPARTMENT  EMERGENCY DEPARTMENT ENCOUNTER        Pt Name: Jillian Stinson  MRN: [id number]  Birthdate 1950  Date of evaluation: 7/2/2024  Provider: ABDULLAHI Loya  PCP: Rylee Scott APRN - NP  Note Started: 11:33 PM EDT 7/2/24       I have seen and evaluated this patient with my supervising physician Herbert Antonio MD.      CHIEF COMPLAINT       Chief Complaint   Patient presents with    Back Pain     Patient to ED via squad from home. Reports back surgery yesterday. Oxycodone not helping. Patient reached out to surgeon today regarding blood thinner but did not mention pain. Pain worsened last night. Patient ambulated independently at home to get on stretcher. Denies new injury.        HISTORY OF PRESENT ILLNESS: 1 or more Elements     History From: Patient  Limitations to history : None    Jillian Stinson is a 74 y.o. female with past medical history of migraines, asthma, depression, diabetes, GERD, hyperlipidemia and hypertension who presents ED with complaint of back pain.  Patient complain of pain to the low back.  Patient reports had surgery to her low back by orthopedic spine surgeon, Dr. Smith, yesterday.  Patient reports lumbar laminectomy at L2-L3 and L3-L4 bilaterally.  She reports since last night she has had increasing pain to her low back.  She cannot lie on her back secondary to increased pain.  Denies bleeding or drainage from the surgical site but did not remove dressing.  She denies any radiation of pain down her legs.  Reports pain is worsened with ambulation and movement.  She denies fever or chills.  Denies inability to urinate, bowel/bladder incontinence or saddle anesthesia.  Denies any rashes or lesions.  Denies abdominal pain, nausea/vomiting, urinary symptoms or changes in bowel movements.  Despite home pain medication she reports worsening pain so she came to the ED for further evaluation and treatment.  Denies any

## 2024-07-03 NOTE — PROGRESS NOTES
Pt with uncontrolled pain.  She is moaning loudly and tossing and turning in bed.  States she has never been in pain this bad in her life.  Nursing supervisor Emely called GEORGE Mukherjee  NP to evaluate the pt.  She is not able to get anything for pain again until 0600.      Conchis Mukherjee NP at bedside and verbal orders obtained for 1 time doses of Dilaudid 1 mg and Valium 5 mg and PRN Dilaudid frequency changed to q 3 hrs.  Have also tried repositioning and ice pack to back which are also not helping at all.  Will monitor.

## 2024-07-03 NOTE — H&P
V2.0  History and Physical      Name:  Jillian Stinson /Age/Sex: 1950  (74 y.o. female)   MRN & CSN:  [id number] & [id number] Encounter Date/Time: 7/3/2024 2:21 AM EDT   Location:  [address] [place] [address] PCP: Rylee Scott APRN - NP       Hospital Day: 2    Assessment and Plan:   Jillian Stinson is a 74 y.o. female with a pmh of spinal lumbar stenosis status post L2-L3 and L3-L4 lumbar laminectomy by Dr. Smith on , hypertension, DM2, depression, hyperlipidemia who presents with Intractable back pain post op.  She is requiring multiple rounds of pain medications.  MRI lumbar obtained and showed status post right hemilaminectomy at L2-L3 with enhancing fluid collection extending into the right paramedian and paraspinal soft tissues which could be postoperative seroma or hematoma, posterior disc bulge and superimposed central disc protrusion.  Also reported is severe spinal canal stenosis at that level compressing the cauda equina nerve roots.  Ortho on-call was contacted and recommended admission for pain control, steroids pending GI evaluation in AM.    Hospital Problems             Last Modified POA    * (Principal) Intractable back pain 7/3/2024 Yes       Plan:  # Intractable back pain following lumbar laminectomy on  by Dr. Smith  # Lumbar spinal stenosis S/P L2-3 and L3-4 bilateral lumbar laminectomy  # Cauda equina compression-per imaging. BLE neuroexam intact.  -MRI lumbar spine:status post right hemilaminectomy at L2-L3 with enhancing fluid collection extending into the right paramedian and paraspinal soft tissues which could be postoperative seroma or hematoma, posterior disc bulge and superimposed central disc protrusion.  -Orthopedic surgery consulted by ED.  -Admit to MedSurg telemetry.  -Pain control.  -Continue Medrol Dosepak.  -Muscle relaxer.  -Spinal precautions.      # Postop urine retention.  Morris catheter inserted at the ED.    # Urinary tract infection.  Start Rocephin pending

## 2024-07-03 NOTE — PROGRESS NOTES
4 Eyes Skin Assessment     NAME:  Jillian Stinson  YOB: 1950  MEDICAL RECORD NUMBER:  [id number]    The patient is being assessed for  Admission    I agree that at least one RN has performed a thorough Head to Toe Skin Assessment on the patient. ALL assessment sites listed below have been assessed.      Areas assessed by both nurses:    Head, Face, Ears, Shoulders, Back, Chest, Arms, Elbows, Hands, Sacrum. Buttock, Coccyx, Ischium, Legs. Feet and Heels, and Under Medical Devices         Does the Patient have a Wound? No noted wound(s)       Fazal Prevention initiated by RN: No  Wound Care Orders initiated by RN: No    Pressure Injury (Stage 3,4, Unstageable, DTI, NWPT, and Complex wounds) if present, place Wound referral order by RN under : No    New Ostomies, if present place, Ostomy referral order under : No     Nurse 1 eSignature: Electronically signed by Candida Palacios RN on 7/3/24 at 4:43 AM EDT    **SHARE this note so that the co-signing nurse can place an eSignature**    Nurse 2 eSignature: Electronically signed by Silvia Leon RN on 7/3/24 at 4:44 AM EDT

## 2024-07-03 NOTE — PROGRESS NOTES
Patient moved to room 3109 due to call light issues.     Upon assessment this am, patient A/Ox4 and very painful to back and down legs, PRN and scheduled medications given. Patient repositioned in bed, turns well on her own, bailey catheter draining. Tele on and NSR, IVF infusing, patient ordering lunch.

## 2024-07-03 NOTE — PROGRESS NOTES
Oxycodone 5mg pulled by this RN and dropped on floor in patients room. New oxycodone pulled from [employer]. Dropped Oxycodone was wasted by this RN and witnessed by charge AGAPITO Weldon.     Electronically signed by Antonio Grissom RN on 7/3/2024 at 9:16 AM   Electronically signed by Fatemeh Garcia RN on 7/3/2024 at 9:20 AM

## 2024-07-03 NOTE — PROGRESS NOTES
Patient states pain has improved since regimen started this morning, bed bath provided. Sitting up on side of bed at times for comfort, asked Roula, NP if patient is able to get up to use bathroom if needed, states bedside commode with SBA pivot is appropriate for today.     Gauze dressing replaced to surgical incision to back.

## 2024-07-03 NOTE — CARE COORDINATION
Case Management Assessment  Initial Evaluation    Date/Time of Evaluation: 7/3/2024 10:15 AM  Assessment Completed by: MARCELLUS Joiner, EDUARW    If patient is discharged prior to next notation, then this note serves as note for discharge by case management.    Patient Name: Jillian Osborne                   YOB: 1950  Diagnosis: Urinary retention [R33.9]  Postoperative pain [G89.18]  Abnormal MRI [R93.89]  Intractable back pain [M54.9]  Acute midline low back pain, unspecified whether sciatica present [M54.50]                   Date / Time: 7/2/2024  1:16 PM    Patient Admission Status: Inpatient   Readmission Risk (Low < 19, Mod (19-27), High > 27): Readmission Risk Score: 8.3    Current PCP: Rylee Scott APRN - NP  PCP verified by CM? Yes    Chart Reviewed: Yes      History Provided by: Patient  Patient Orientation: Alert and Oriented    Patient Cognition: Alert    Hospitalization in the last 30 days (Readmission):  No    If yes, Readmission Assessment in CM Navigator will be completed.    Advance Directives:      Code Status: Full Code   Patient's Primary Decision Maker is: Named in Scanned ACP Document    Primary Decision Maker: Harley Osborne - Spouse - [phone]    Secondary Decision Maker: Glenn Osborne - Child - [phone]    Supplemental (Other) Decision Maker: nancy osborne - Child - [phone]    Discharge Planning:    Patient lives with: Spouse/Significant Other, Children, Other (Comment) (pets, has one cat and three dogs.) Type of Home: House  Primary Care Giver: Self  Patient Support Systems include: Spouse/Significant Other, Children, Family Members   Current Financial resources: Medicare  Current community resources: None  Current services prior to admission: Durable Medical Equipment            Current DME: Shower Chair            Type of Home Care services:  None    ADLS  Prior functional level: Independent in ADLs/IADLs  Current functional level: Independent in  ADLs/IADLs    PT AM-PAC:   /24  OT AM-PAC:   /24    Family can provide assistance at DC: Yes  Would you like Case Management to discuss the discharge plan with any other family members/significant others, and if so, who? Yes ( first then daughter Stephanie.)  Plans to Return to Present Housing: Yes  Other Identified Issues/Barriers to RETURNING to current housing: None noted at this time.   Potential Assistance needed at discharge: Home Care            Potential DME:  TBD  Patient expects to discharge to: House  Plan for transportation at discharge: Family    Financial  Payor: [employer] MEDICARE / Plan: [employer] MEDICARE-ADVANTAGE PPO / Product Type: Medicare /     Does insurance require precert for SNF: Yes    Potential assistance Purchasing Medications: No  Meds-to-Beds request: Yes      [id number] - [place], [place] - [address] - P [phone] - F [phone] [address] [facility] [address]  Phone: [phone] Fax: [phone]      Notes:    Factors facilitating achievement of predicted outcomes: Family support, Motivated, Cooperative, Pleasant, Good insight into deficits, Has needed Durable Medical Equipment at home, and Knowledge about rehab    Barriers to discharge: None noted at this time.     Additional Case Management Notes:   1) Waiting on ortho clearance and IVAB recommendations.   2) Monitor home oxygen and bailey. Possible home care needs.   3) Discharge to home with family.     The Plan for Transition of Care is related to the following treatment goals of Urinary retention [R33.9]  Postoperative pain [G89.18]  Abnormal MRI [R93.89]  Intractable back pain [M54.9]  Acute midline low back pain, unspecified whether sciatica present [M54.50]    The Patient and/or Patient Representative Agree with the Discharge Plan?  Yes.     Respectfully submitted,    JIMMIE Rascon  [facility]   [phone]    Electronically signed by MARCELLUS Joiner, EDUARW on

## 2024-07-03 NOTE — ED NOTES
Report given to Taisha JEROME RN. She verbalized understanding of patient condition and has no further questions.

## 2024-07-03 NOTE — PLAN OF CARE
Problem: Discharge Planning  Goal: Discharge to home or other facility with appropriate resources  7/3/2024 1040 by Evelin Alvarez RN  Outcome: Progressing  Flowsheets (Taken 7/3/2024 1040)  Discharge to home or other facility with appropriate resources: Identify barriers to discharge with patient and caregiver  7/3/2024 0447 by Candida Palacios RN  Outcome: Progressing  Flowsheets  Taken 7/3/2024 0307  Discharge to home or other facility with appropriate resources:   Identify barriers to discharge with patient and caregiver   Arrange for needed discharge resources and transportation as appropriate  Taken 7/3/2024 0230  Discharge to home or other facility with appropriate resources:   Identify barriers to discharge with patient and caregiver   Arrange for needed discharge resources and transportation as appropriate     Problem: Pain  Goal: Verbalizes/displays adequate comfort level or baseline comfort level  7/3/2024 1040 by Evelin Alvarez RN  Outcome: Progressing  Flowsheets (Taken 7/3/2024 1040)  Verbalizes/displays adequate comfort level or baseline comfort level: Encourage patient to monitor pain and request assistance  7/3/2024 0447 by Candida Palacios RN  Outcome: Progressing  Flowsheets (Taken 7/3/2024 0230)  Verbalizes/displays adequate comfort level or baseline comfort level:   Encourage patient to monitor pain and request assistance   Assess pain using appropriate pain scale   Administer analgesics based on type and severity of pain and evaluate response   Implement non-pharmacological measures as appropriate and evaluate response     Problem: Safety - Adult  Goal: Free from fall injury  7/3/2024 1040 by Evelin Alvarez RN  Outcome: Progressing  Flowsheets (Taken 7/3/2024 1040)  Free From Fall Injury: Instruct family/caregiver on patient safety  7/3/2024 0447 by Candida Palacios RN  Outcome: Progressing     Problem: ABCDS Injury Assessment  Goal: Absence of physical

## 2024-07-03 NOTE — PROGRESS NOTES
Pharmacy Medication Reconciliation Note     List of medications patient is currently taking is complete.    Source of information:   1. Conversation with patient   2. EMR    Notes regarding home medications:   Patient reports her last warfarin dose before holding for surgery was 3 mg daily.       Ashley Abbasi PharmD  7/3/2024 3:06 PM

## 2024-07-03 NOTE — PROGRESS NOTES
Patient admitted to room 3129 from ER via stretcher.  Oriented to room, call light, and floor policies.  Plan of care reviewed with patient.  Assessment completed; VSS. Pt rates a high fall risk; bed alarm on. Safety precautions in place; call light and bedside table within reach.  Pt encouraged to call for needs or ambulation.  Pt VU.  Will continue to monitor.

## 2024-07-03 NOTE — PLAN OF CARE
Problem: Discharge Planning  Goal: Discharge to home or other facility with appropriate resources  Outcome: Progressing  Flowsheets  Taken 7/3/2024 0307  Discharge to home or other facility with appropriate resources:   Identify barriers to discharge with patient and caregiver   Arrange for needed discharge resources and transportation as appropriate  Problem: Pain  Goal: Verbalizes/displays adequate comfort level or baseline comfort level  Outcome: Progressing  Flowsheets (Taken 7/3/2024 0230)  Verbalizes/displays adequate comfort level or baseline comfort level:   Encourage patient to monitor pain and request assistance   Assess pain using appropriate pain scale   Administer analgesics based on type and severity of pain and evaluate response   Implement non-pharmacological measures as appropriate and evaluate response     Problem: Safety - Adult  Goal: Free from fall injury  Outcome: Progressing     Problem: ABCDS Injury Assessment  Goal: Absence of physical injury  Outcome: Progressing

## 2024-07-03 NOTE — ACP (ADVANCE CARE PLANNING)
Advance Care Planning     Advance Care Planning Activator (Inpatient)  Conversation Note      Date of ACP Conversation: 7/3/2024     Conversation Conducted with: Patient with Decision Making Capacity    ACP Activator: MARCELLUS Joiner, W    Health Care Decision Maker:     Current Designated Health Care Decision Maker:     Primary Decision Maker: Harley Osborne - Spouse - [phone]    Secondary Decision Maker: Glenn Osborne - Child - [phone]    Supplemental (Other) Decision Maker: nancy osborne - Child - [phone]    Care Preferences    Ventilation:  \"If you were in your present state of health and suddenly became very ill and were unable to breathe on your own, what would your preference be about the use of a ventilator (breathing machine) if it were available to you?\"      Would the patient desire the use of ventilator (breathing machine)?: yes    \"If your health worsens and it becomes clear that your chance of recovery is unlikely, what would your preference be about the use of a ventilator (breathing machine) if it were available to you?\"     Would the patient desire the use of ventilator (breathing machine)?: No      Resuscitation  \"CPR works best to restart the heart when there is a sudden event, like a heart attack, in someone who is otherwise healthy. Unfortunately, CPR does not typically restart the heart for people who have serious health conditions or who are very sick.\"    \"In the event your heart stopped as a result of an underlying serious health condition, would you want attempts to be made to restart your heart (answer \"yes\" for attempt to resuscitate) or would you prefer a natural death (answer \"no\" for do not attempt to resuscitate)?\" yes       [] Yes   [] No   Educated Patient / Decision Maker regarding differences between Advance Directives and portable DNR orders.    Length of ACP Conversation in minutes: 3     Conversation Outcomes:  ACP discussion completed    Follow-up plan:    []

## 2024-07-04 ENCOUNTER — ANESTHESIA (OUTPATIENT)
Dept: OPERATING ROOM | Age: 74
End: 2024-07-04
Payer: MEDICARE

## 2024-07-04 ENCOUNTER — ANESTHESIA EVENT (OUTPATIENT)
Dept: OPERATING ROOM | Age: 74
End: 2024-07-04
Payer: MEDICARE

## 2024-07-04 PROBLEM — M48.062 SPINAL STENOSIS, LUMBAR REGION WITH NEUROGENIC CLAUDICATION: Status: ACTIVE | Noted: 2024-07-02

## 2024-07-04 LAB
ANION GAP SERPL CALCULATED.3IONS-SCNC: 12 MMOL/L (ref 3–16)
APTT BLD: 26 SEC (ref 22.1–36.4)
BUN SERPL-MCNC: 15 MG/DL (ref 7–20)
CALCIUM SERPL-MCNC: 8.8 MG/DL (ref 8.3–10.6)
CHLORIDE SERPL-SCNC: 102 MMOL/L (ref 99–110)
CO2 SERPL-SCNC: 22 MMOL/L (ref 21–32)
CREAT SERPL-MCNC: <0.5 MG/DL (ref 0.6–1.2)
GFR SERPLBLD CREATININE-BSD FMLA CKD-EPI: >90 ML/MIN/{1.73_M2}
GLUCOSE BLD-MCNC: 142 MG/DL (ref 70–99)
GLUCOSE BLD-MCNC: 154 MG/DL (ref 70–99)
GLUCOSE BLD-MCNC: 182 MG/DL (ref 70–99)
GLUCOSE BLD-MCNC: 184 MG/DL (ref 70–99)
GLUCOSE BLD-MCNC: 208 MG/DL (ref 70–99)
GLUCOSE SERPL-MCNC: 134 MG/DL (ref 70–99)
INR PPP: 1.1 (ref 0.85–1.15)
INR PPP: 1.16 (ref 0.85–1.15)
PERFORMED ON: ABNORMAL
POTASSIUM SERPL-SCNC: 3.8 MMOL/L (ref 3.5–5.1)
PROTHROMBIN TIME: 14.4 SEC (ref 11.9–14.9)
PROTHROMBIN TIME: 15 SEC (ref 11.9–14.9)
SODIUM SERPL-SCNC: 136 MMOL/L (ref 136–145)

## 2024-07-04 PROCEDURE — 3600000012 HC SURGERY LEVEL 2 ADDTL 15MIN: Performed by: ORTHOPAEDIC SURGERY

## 2024-07-04 PROCEDURE — 6360000002 HC RX W HCPCS: Performed by: NURSE PRACTITIONER

## 2024-07-04 PROCEDURE — 36415 COLL VENOUS BLD VENIPUNCTURE: CPT

## 2024-07-04 PROCEDURE — 6360000002 HC RX W HCPCS: Performed by: INTERNAL MEDICINE

## 2024-07-04 PROCEDURE — 85610 PROTHROMBIN TIME: CPT

## 2024-07-04 PROCEDURE — 3600000002 HC SURGERY LEVEL 2 BASE: Performed by: ORTHOPAEDIC SURGERY

## 2024-07-04 PROCEDURE — 6360000002 HC RX W HCPCS: Performed by: ANESTHESIOLOGY

## 2024-07-04 PROCEDURE — 2580000003 HC RX 258: Performed by: ORTHOPAEDIC SURGERY

## 2024-07-04 PROCEDURE — 3700000001 HC ADD 15 MINUTES (ANESTHESIA): Performed by: ORTHOPAEDIC SURGERY

## 2024-07-04 PROCEDURE — 6370000000 HC RX 637 (ALT 250 FOR IP): Performed by: ORTHOPAEDIC SURGERY

## 2024-07-04 PROCEDURE — 0QC00ZZ EXTIRPATION OF MATTER FROM LUMBAR VERTEBRA, OPEN APPROACH: ICD-10-PCS | Performed by: ORTHOPAEDIC SURGERY

## 2024-07-04 PROCEDURE — 3700000000 HC ANESTHESIA ATTENDED CARE: Performed by: ORTHOPAEDIC SURGERY

## 2024-07-04 PROCEDURE — 2500000003 HC RX 250 WO HCPCS: Performed by: ANESTHESIOLOGY

## 2024-07-04 PROCEDURE — 2580000003 HC RX 258: Performed by: NURSE PRACTITIONER

## 2024-07-04 PROCEDURE — 1200000000 HC SEMI PRIVATE

## 2024-07-04 PROCEDURE — 2720000010 HC SURG SUPPLY STERILE: Performed by: ORTHOPAEDIC SURGERY

## 2024-07-04 PROCEDURE — 9990000010 HC NO CHARGE VISIT

## 2024-07-04 PROCEDURE — 2709999900 HC NON-CHARGEABLE SUPPLY: Performed by: ORTHOPAEDIC SURGERY

## 2024-07-04 PROCEDURE — 6370000000 HC RX 637 (ALT 250 FOR IP): Performed by: EMERGENCY MEDICINE

## 2024-07-04 PROCEDURE — 85730 THROMBOPLASTIN TIME PARTIAL: CPT

## 2024-07-04 PROCEDURE — 7100000001 HC PACU RECOVERY - ADDTL 15 MIN: Performed by: ORTHOPAEDIC SURGERY

## 2024-07-04 PROCEDURE — 6360000002 HC RX W HCPCS: Performed by: ORTHOPAEDIC SURGERY

## 2024-07-04 PROCEDURE — 2580000003 HC RX 258: Performed by: ANESTHESIOLOGY

## 2024-07-04 PROCEDURE — 80048 BASIC METABOLIC PNL TOTAL CA: CPT

## 2024-07-04 PROCEDURE — 6370000000 HC RX 637 (ALT 250 FOR IP): Performed by: NURSE PRACTITIONER

## 2024-07-04 PROCEDURE — A4217 STERILE WATER/SALINE, 500 ML: HCPCS | Performed by: ORTHOPAEDIC SURGERY

## 2024-07-04 PROCEDURE — 7100000000 HC PACU RECOVERY - FIRST 15 MIN: Performed by: ORTHOPAEDIC SURGERY

## 2024-07-04 RX ORDER — SODIUM CHLORIDE 9 MG/ML
INJECTION, SOLUTION INTRAVENOUS PRN
Status: DISCONTINUED | OUTPATIENT
Start: 2024-07-04 | End: 2024-07-04 | Stop reason: HOSPADM

## 2024-07-04 RX ORDER — PHENYLEPHRINE HCL IN 0.9% NACL 1 MG/10 ML
SYRINGE (ML) INTRAVENOUS PRN
Status: DISCONTINUED | OUTPATIENT
Start: 2024-07-04 | End: 2024-07-04 | Stop reason: SDUPTHER

## 2024-07-04 RX ORDER — ONDANSETRON 2 MG/ML
4 INJECTION INTRAMUSCULAR; INTRAVENOUS
Status: DISCONTINUED | OUTPATIENT
Start: 2024-07-04 | End: 2024-07-04 | Stop reason: HOSPADM

## 2024-07-04 RX ORDER — ROCURONIUM BROMIDE 10 MG/ML
INJECTION, SOLUTION INTRAVENOUS PRN
Status: DISCONTINUED | OUTPATIENT
Start: 2024-07-04 | End: 2024-07-04 | Stop reason: SDUPTHER

## 2024-07-04 RX ORDER — SODIUM CHLORIDE 0.9 % (FLUSH) 0.9 %
5-40 SYRINGE (ML) INJECTION PRN
Status: DISCONTINUED | OUTPATIENT
Start: 2024-07-04 | End: 2024-07-04 | Stop reason: HOSPADM

## 2024-07-04 RX ORDER — FENTANYL CITRATE 0.05 MG/ML
50 INJECTION, SOLUTION INTRAMUSCULAR; INTRAVENOUS EVERY 5 MIN PRN
Status: DISCONTINUED | OUTPATIENT
Start: 2024-07-04 | End: 2024-07-04 | Stop reason: HOSPADM

## 2024-07-04 RX ORDER — SUCCINYLCHOLINE/SOD CL,ISO/PF 200MG/10ML
SYRINGE (ML) INTRAVENOUS PRN
Status: DISCONTINUED | OUTPATIENT
Start: 2024-07-04 | End: 2024-07-04 | Stop reason: SDUPTHER

## 2024-07-04 RX ORDER — ONDANSETRON 2 MG/ML
INJECTION INTRAMUSCULAR; INTRAVENOUS PRN
Status: DISCONTINUED | OUTPATIENT
Start: 2024-07-04 | End: 2024-07-04 | Stop reason: SDUPTHER

## 2024-07-04 RX ORDER — OXYCODONE HYDROCHLORIDE 10 MG/1
10 TABLET ORAL EVERY 4 HOURS PRN
Status: DISCONTINUED | OUTPATIENT
Start: 2024-07-04 | End: 2024-07-05 | Stop reason: HOSPADM

## 2024-07-04 RX ORDER — FENTANYL CITRATE 0.05 MG/ML
25 INJECTION, SOLUTION INTRAMUSCULAR; INTRAVENOUS EVERY 5 MIN PRN
Status: DISCONTINUED | OUTPATIENT
Start: 2024-07-04 | End: 2024-07-04 | Stop reason: HOSPADM

## 2024-07-04 RX ORDER — OXYCODONE HYDROCHLORIDE 5 MG/1
5 TABLET ORAL EVERY 4 HOURS PRN
Status: DISCONTINUED | OUTPATIENT
Start: 2024-07-04 | End: 2024-07-05 | Stop reason: HOSPADM

## 2024-07-04 RX ORDER — PROPOFOL 10 MG/ML
INJECTION, EMULSION INTRAVENOUS PRN
Status: DISCONTINUED | OUTPATIENT
Start: 2024-07-04 | End: 2024-07-04 | Stop reason: SDUPTHER

## 2024-07-04 RX ORDER — MEPERIDINE HYDROCHLORIDE 25 MG/ML
12.5 INJECTION INTRAMUSCULAR; INTRAVENOUS; SUBCUTANEOUS
Status: DISCONTINUED | OUTPATIENT
Start: 2024-07-04 | End: 2024-07-04 | Stop reason: HOSPADM

## 2024-07-04 RX ORDER — WARFARIN SODIUM 5 MG/1
5 TABLET ORAL
Status: DISCONTINUED | OUTPATIENT
Start: 2024-07-04 | End: 2024-07-04

## 2024-07-04 RX ORDER — NALOXONE HYDROCHLORIDE 0.4 MG/ML
INJECTION, SOLUTION INTRAMUSCULAR; INTRAVENOUS; SUBCUTANEOUS PRN
Status: DISCONTINUED | OUTPATIENT
Start: 2024-07-04 | End: 2024-07-04 | Stop reason: HOSPADM

## 2024-07-04 RX ORDER — FENTANYL CITRATE 50 UG/ML
INJECTION, SOLUTION INTRAMUSCULAR; INTRAVENOUS PRN
Status: DISCONTINUED | OUTPATIENT
Start: 2024-07-04 | End: 2024-07-04 | Stop reason: SDUPTHER

## 2024-07-04 RX ORDER — SODIUM CHLORIDE 0.9 % (FLUSH) 0.9 %
5-40 SYRINGE (ML) INJECTION EVERY 12 HOURS SCHEDULED
Status: DISCONTINUED | OUTPATIENT
Start: 2024-07-04 | End: 2024-07-04 | Stop reason: HOSPADM

## 2024-07-04 RX ORDER — SODIUM CHLORIDE 9 MG/ML
INJECTION, SOLUTION INTRAVENOUS CONTINUOUS PRN
Status: DISCONTINUED | OUTPATIENT
Start: 2024-07-04 | End: 2024-07-04 | Stop reason: SDUPTHER

## 2024-07-04 RX ADMIN — GEMFIBROZIL 600 MG: 600 TABLET ORAL at 05:28

## 2024-07-04 RX ADMIN — ACETAMINOPHEN 1000 MG: 500 TABLET ORAL at 10:37

## 2024-07-04 RX ADMIN — HYDROMORPHONE HYDROCHLORIDE 1 MG: 1 INJECTION, SOLUTION INTRAMUSCULAR; INTRAVENOUS; SUBCUTANEOUS at 04:18

## 2024-07-04 RX ADMIN — METHOCARBAMOL TABLETS 750 MG: 750 TABLET, COATED ORAL at 10:36

## 2024-07-04 RX ADMIN — TOPIRAMATE 25 MG: 25 TABLET, FILM COATED ORAL at 21:27

## 2024-07-04 RX ADMIN — SODIUM CHLORIDE 2000 MG: 900 INJECTION INTRAVENOUS at 12:33

## 2024-07-04 RX ADMIN — PANTOPRAZOLE SODIUM 40 MG: 40 TABLET, DELAYED RELEASE ORAL at 05:28

## 2024-07-04 RX ADMIN — SODIUM CHLORIDE: 9 INJECTION, SOLUTION INTRAVENOUS at 12:22

## 2024-07-04 RX ADMIN — SODIUM CHLORIDE, PRESERVATIVE FREE 10 ML: 5 INJECTION INTRAVENOUS at 21:28

## 2024-07-04 RX ADMIN — OXYCODONE HYDROCHLORIDE 10 MG: 10 TABLET ORAL at 17:49

## 2024-07-04 RX ADMIN — GEMFIBROZIL 600 MG: 600 TABLET ORAL at 15:54

## 2024-07-04 RX ADMIN — DEXAMETHASONE INTENSOL 10 MG: 1 SOLUTION, CONCENTRATE ORAL at 21:28

## 2024-07-04 RX ADMIN — FENTANYL CITRATE 50 MCG: 0.05 INJECTION, SOLUTION INTRAMUSCULAR; INTRAVENOUS at 13:55

## 2024-07-04 RX ADMIN — ACETAMINOPHEN 1000 MG: 500 TABLET ORAL at 21:27

## 2024-07-04 RX ADMIN — ATORVASTATIN CALCIUM 40 MG: 40 TABLET, FILM COATED ORAL at 15:54

## 2024-07-04 RX ADMIN — TOPIRAMATE 25 MG: 25 TABLET, FILM COATED ORAL at 10:37

## 2024-07-04 RX ADMIN — HEPARIN SODIUM 5000 UNITS: 5000 INJECTION INTRAVENOUS; SUBCUTANEOUS at 05:28

## 2024-07-04 RX ADMIN — INSULIN GLARGINE 50 UNITS: 100 INJECTION, SOLUTION SUBCUTANEOUS at 21:29

## 2024-07-04 RX ADMIN — GABAPENTIN 300 MG: 300 CAPSULE ORAL at 10:37

## 2024-07-04 RX ADMIN — MONTELUKAST 10 MG: 10 TABLET, FILM COATED ORAL at 21:27

## 2024-07-04 RX ADMIN — METHOCARBAMOL TABLETS 750 MG: 750 TABLET, COATED ORAL at 21:28

## 2024-07-04 RX ADMIN — WATER 1000 MG: 1 INJECTION INTRAMUSCULAR; INTRAVENOUS; SUBCUTANEOUS at 02:30

## 2024-07-04 RX ADMIN — LOSARTAN POTASSIUM 25 MG: 25 TABLET, FILM COATED ORAL at 17:49

## 2024-07-04 RX ADMIN — ACETAMINOPHEN 1000 MG: 500 TABLET ORAL at 15:54

## 2024-07-04 RX ADMIN — OXYCODONE 5 MG: 5 TABLET ORAL at 03:39

## 2024-07-04 RX ADMIN — SUGAMMADEX 200 MG: 100 INJECTION, SOLUTION INTRAVENOUS at 13:23

## 2024-07-04 RX ADMIN — HYDROMORPHONE HYDROCHLORIDE 0.5 MG: 1 INJECTION, SOLUTION INTRAMUSCULAR; INTRAVENOUS; SUBCUTANEOUS at 05:29

## 2024-07-04 RX ADMIN — AMITRIPTYLINE HYDROCHLORIDE 50 MG: 50 TABLET, FILM COATED ORAL at 21:27

## 2024-07-04 RX ADMIN — GABAPENTIN 300 MG: 300 CAPSULE ORAL at 15:54

## 2024-07-04 RX ADMIN — HYDROMORPHONE HYDROCHLORIDE 1 MG: 1 INJECTION, SOLUTION INTRAMUSCULAR; INTRAVENOUS; SUBCUTANEOUS at 07:52

## 2024-07-04 RX ADMIN — DOCUSATE SODIUM 100 MG: 100 CAPSULE, LIQUID FILLED ORAL at 07:52

## 2024-07-04 RX ADMIN — SERTRALINE 100 MG: 100 TABLET, FILM COATED ORAL at 10:36

## 2024-07-04 RX ADMIN — GABAPENTIN 300 MG: 300 CAPSULE ORAL at 21:27

## 2024-07-04 RX ADMIN — PROPOFOL 180 MG: 10 INJECTION, EMULSION INTRAVENOUS at 12:26

## 2024-07-04 RX ADMIN — FENTANYL CITRATE 50 MCG: 0.05 INJECTION, SOLUTION INTRAMUSCULAR; INTRAVENOUS at 13:44

## 2024-07-04 RX ADMIN — Medication 200 MCG: at 12:51

## 2024-07-04 RX ADMIN — Medication 180 MG: at 12:26

## 2024-07-04 RX ADMIN — FLUTICASONE PROPIONATE 1 SPRAY: 50 SPRAY, METERED NASAL at 21:28

## 2024-07-04 RX ADMIN — SODIUM CHLORIDE, PRESERVATIVE FREE 10 ML: 5 INJECTION INTRAVENOUS at 10:42

## 2024-07-04 RX ADMIN — METHOCARBAMOL TABLETS 750 MG: 750 TABLET, COATED ORAL at 15:54

## 2024-07-04 RX ADMIN — OXYCODONE 5 MG: 5 TABLET ORAL at 10:36

## 2024-07-04 RX ADMIN — FENTANYL CITRATE 100 MCG: 50 INJECTION INTRAMUSCULAR; INTRAVENOUS at 12:26

## 2024-07-04 RX ADMIN — BUPROPION HYDROCHLORIDE 150 MG: 150 TABLET, EXTENDED RELEASE ORAL at 10:36

## 2024-07-04 RX ADMIN — METHYLPREDNISOLONE 4 MG: 4 TABLET ORAL at 05:27

## 2024-07-04 RX ADMIN — ONDANSETRON 4 MG: 2 INJECTION INTRAMUSCULAR; INTRAVENOUS at 12:40

## 2024-07-04 RX ADMIN — ROCURONIUM BROMIDE 10 MG: 10 INJECTION, SOLUTION INTRAVENOUS at 12:26

## 2024-07-04 ASSESSMENT — PAIN DESCRIPTION - LOCATION
LOCATION: BACK;LEG
LOCATION: LEG;BACK
LOCATION: BACK
LOCATION: LEG
LOCATION: BACK

## 2024-07-04 ASSESSMENT — PAIN DESCRIPTION - PAIN TYPE
TYPE: ACUTE PAIN
TYPE: SURGICAL PAIN
TYPE: SURGICAL PAIN
TYPE: ACUTE PAIN;SURGICAL PAIN
TYPE: SURGICAL PAIN
TYPE: ACUTE PAIN
TYPE: SURGICAL PAIN

## 2024-07-04 ASSESSMENT — PAIN DESCRIPTION - ONSET
ONSET: ON-GOING

## 2024-07-04 ASSESSMENT — PAIN - FUNCTIONAL ASSESSMENT
PAIN_FUNCTIONAL_ASSESSMENT: PREVENTS OR INTERFERES SOME ACTIVE ACTIVITIES AND ADLS
PAIN_FUNCTIONAL_ASSESSMENT: PREVENTS OR INTERFERES SOME ACTIVE ACTIVITIES AND ADLS
PAIN_FUNCTIONAL_ASSESSMENT: INTOLERABLE, UNABLE TO DO ANY ACTIVE OR PASSIVE ACTIVITIES
PAIN_FUNCTIONAL_ASSESSMENT: PREVENTS OR INTERFERES SOME ACTIVE ACTIVITIES AND ADLS
PAIN_FUNCTIONAL_ASSESSMENT: 0-10

## 2024-07-04 ASSESSMENT — PAIN DESCRIPTION - DESCRIPTORS
DESCRIPTORS: STABBING
DESCRIPTORS: ACHING
DESCRIPTORS: STABBING
DESCRIPTORS: PRESSURE
DESCRIPTORS: ACHING
DESCRIPTORS: STABBING
DESCRIPTORS: ACHING
DESCRIPTORS: SPASM
DESCRIPTORS: ACHING
DESCRIPTORS: SPASM

## 2024-07-04 ASSESSMENT — PAIN DESCRIPTION - ORIENTATION
ORIENTATION: LOWER;MID
ORIENTATION: LOWER
ORIENTATION: RIGHT;LEFT
ORIENTATION: LOWER
ORIENTATION: LOWER
ORIENTATION: LOWER;MID
ORIENTATION: RIGHT;LEFT
ORIENTATION: LOWER
ORIENTATION: RIGHT;LEFT

## 2024-07-04 ASSESSMENT — PAIN DESCRIPTION - FREQUENCY
FREQUENCY: CONTINUOUS

## 2024-07-04 ASSESSMENT — PAIN SCALES - WONG BAKER
WONGBAKER_NUMERICALRESPONSE: HURTS WORST
WONGBAKER_NUMERICALRESPONSE: HURTS WHOLE LOT

## 2024-07-04 ASSESSMENT — PAIN SCALES - GENERAL
PAINLEVEL_OUTOF10: 10
PAINLEVEL_OUTOF10: 8
PAINLEVEL_OUTOF10: 8
PAINLEVEL_OUTOF10: 6
PAINLEVEL_OUTOF10: 10
PAINLEVEL_OUTOF10: 8
PAINLEVEL_OUTOF10: 8
PAINLEVEL_OUTOF10: 10
PAINLEVEL_OUTOF10: 8
PAINLEVEL_OUTOF10: 10
PAINLEVEL_OUTOF10: 8

## 2024-07-04 ASSESSMENT — LIFESTYLE VARIABLES: SMOKING_STATUS: 0

## 2024-07-04 ASSESSMENT — ENCOUNTER SYMPTOMS: SHORTNESS OF BREATH: 0

## 2024-07-04 NOTE — PROGRESS NOTES
Occupational Therapy  Jillian Stinson  [id number]  OR/NONE    OT orders received and pt chart reviewed. Pt is 3 days status post lumbar laminectomy L2-L3 and L3-L4, presented to ED on post-op day 1 with severe back pain. Lumbar MRI showed a large hematoma at the L2-L3 level with severe central stenosis. Pt currently off floor for I&D of lumbar hematoma. Will need new orders following procedure when appropriate to re-initiate therapy. Thank you.     Mercedes Cao, OTR/L [id number]

## 2024-07-04 NOTE — ANESTHESIA POSTPROCEDURE EVALUATION
Department of Anesthesiology  Postprocedure Note    Patient: Jillian Stinson  MRN: [id number]  YOB: 1950  Date of evaluation: 7/4/2024    Procedure Summary       Date: 07/04/24 Room / Location: [facility]    Anesthesia Start: 1222 Anesthesia Stop: 1340    Procedure: INCISION AND DRAINAGE OF LUMBAR HEMATOMA (Back) Diagnosis:       Spinal stenosis, lumbar region with neurogenic claudication      (Spinal stenosis, lumbar region with neurogenic claudication [M48.062])    Surgeons: Jorge Smith MD Responsible Provider: Arcenio Shanks MD    Anesthesia Type: general ASA Status: 3 - Emergent            Anesthesia Type: No value filed.    Kristie Phase I: Kristie Score: 10    Kristie Phase II:      Anesthesia Post Evaluation    Patient location during evaluation: PACU  Patient participation: complete - patient participated  Level of consciousness: awake and alert  Pain score: 1  Airway patency: patent  Nausea & Vomiting: no nausea and no vomiting  Cardiovascular status: blood pressure returned to baseline  Respiratory status: acceptable  Hydration status: euvolemic  Pain management: adequate    No notable events documented.

## 2024-07-04 NOTE — DISCHARGE INSTRUCTIONS
meals a day to assist your body to heal well.  Constipation is common after surgery and especially while on pain medications. Apple or prune juice may help with this problem. Over the counter stool softeners are often helpful. You may also try any over the counter laxative you feel may be helpful.  Drinking more water is another way to keep your bowels moving.    Braces:  You may have been given a back brace at the hospital.  If you have received one,  then you should be wearing it at all times when in an upright position. It can be removed for bathing or sleep.      Medications:  You can resume your usual home medicines after you are discharged from the hospital.  Additionally, pain medications or anti-inflammatory medications may have been prescribed. Please take the anti-inflammatory medication even if you do not take the pain medication. The anti-inflammatory and pain medications may be taken together. Please do not take the anti-inflammatory medication if you have a history of ulcers or gastritis. Please stop the anti-inflammatory medication if it upsets your stomach or you notice blood in you stools or black stools.  These medicines have been given to you to assist in relief of pain related to your surgery.  It will be helpful to you to keep your pain under control in order to assist you in completing the recommended daily walks.    Returning to work:  Some patients can return to a sedentary job in 1-3 days. Heavy  lifting jobs require more time off of work. Please discuss this with the doctor on your first postoperative office visit.  Healing may take up to 3 or more  months after a major spine surgery.    Diabetics:  Sometimes blood sugars are elevated after a surgery and may take up to a week to return to your usual levels.  As well, if you have been prescribed a Medrol Steroid Pack, your sugars are likely to become somewhat elevated for a short period of time.  Please monitor your blood sugars daily and  call your primary doctor if the levels become higher than 200.    Follow-up Appointment:  You should contact the office for an appointment with the doctor or his assistant 2-3 weeks after surgery.      Dr. Jorge Smith       [phone]

## 2024-07-04 NOTE — PLAN OF CARE
vascular access sites hourly  3.  Every 4-6 hours minimum:  Change oxygen saturation probe site  4.  Every 4-6 hours:  If on nasal continuous positive airway pressure, respiratory therapy assess nares and determine need for appliance change or resting period.  7/4/2024 1949 by Almaz Jacome, RN  Outcome: Progressing     Problem: Chronic Conditions and Co-morbidities  Goal: Patient's chronic conditions and co-morbidity symptoms are monitored and maintained or improved  7/4/2024 1949 by Almaz Jacome, RN  Outcome: Progressing  Flowsheets (Taken 7/4/2024 1949)  Care Plan - Patient's Chronic Conditions and Co-Morbidity Symptoms are Monitored and Maintained or Improved:   Update acute care plan with appropriate goals if chronic or comorbid symptoms are exacerbated and prevent overall improvement and discharge   Collaborate with multidisciplinary team to address chronic and comorbid conditions and prevent exacerbation or deterioration   Monitor and assess patient's chronic conditions and comorbid symptoms for stability, deterioration, or improvement

## 2024-07-04 NOTE — PROGRESS NOTES
Clinical Pharmacy Note  Warfarin Consult    Jillian Stinson is a 74 y.o. female receiving warfarin managed by pharmacy.  Patient being bridged with subQ heparin.    Warfarin Indication: hx of PE  Target INR range: 2-3   Dose prior to admission: 3 mg daily    Current warfarin drug-drug interactions: steroids, gemfibrozil (home med),Ceftriaxone    Recent Labs     07/01/24  0725 07/02/24  1446 07/02/24  1938 07/03/24  1143 07/04/24  0505   HGB 13.9 13.0  --  13.5  --    HCT 40.0 37.6  --  38.9  --    INR 0.97  --  1.03  --  1.10         Assessment/Plan:    Repeat Warfarin 5 mg tonight. Daily PT/INR until stable within therapeutic range.   Managed as OP by Dr. Preciado's office in [place]  Thank you for the consult.  Will continue to follow.    Nicho Momin [facility], 7/4/2024 7:05 AM

## 2024-07-04 NOTE — PROGRESS NOTES
Physical Therapy      Jillian Stinson  7/4/2024    -chart reviewed  -patient to OR for I & D of lumbar hematoma   -will await new orders post op...    Electronically signed by LUZ MARIA SHANNON, PT on 7/4/2024 at 12:49 PM

## 2024-07-04 NOTE — PROGRESS NOTES
This morning patient complains of pain around 0339 am then tab oxycodone given after that she explain her pain is getting wrost so, inj dilaudid given as per order. After one She still complains of pain so I perfectserve dr Carlos A Marshall MD and got the order of inj dilaudid 0.5 mg as one time dose. Continue to monitor.    Electronically signed by Almaz Jacome RN on 7/4/2024 at 5:53 AM

## 2024-07-04 NOTE — OP NOTE
Operative Note      Patient: Jillian Stinson  YOB: 1950  MRN: [id number]    Date of Procedure: 7/4/2024    Pre-Op Diagnosis Codes:     * Spinal stenosis, lumbar region with neurogenic claudication [M48.062]  Lumbar hematoma    Post-Op Diagnosis: Same       Procedure(s):  INCISION AND DRAINAGE OF LUMBAR HEMATOMA    Surgeon(s):  Jorge Smith MD    Assistant:   Surgical Assistant: Дмитрий Ferrara    Anesthesia: General    Estimated Blood Loss (mL): less than 100     Complications: None    Specimens:   * No specimens in log *    Implants:  * No implants in log *      Drains:   Closed/Suction Drain Inferior Back (Active)       Urinary Catheter 07/02/24 [place] (Active)   $ Urethral catheter insertion $ Not inserted for procedure 07/02/24 1938   Catheter Indications Urinary retention (acute or chronic), continuous bladder irrigation or bladder outlet obstruction 07/04/24 0405   Site Assessment Pink;Moist 07/04/24 0405   Urine Color Yellow 07/04/24 1127   Urine Appearance Clear 07/04/24 1127   Urine Odor Malodorous 07/04/24 1127   Collection Container Standard 07/04/24 1127   Securement Method Securing device (Describe) 07/04/24 1127   Catheter Care  Perineal wipes 07/04/24 1127   Catheter Best Practices  Drainage tube clipped to bed;Catheter secured to thigh;Tamper seal intact;Bag below bladder;Bag not on floor;Lack of dependent loop in tubing;Drainage bag less than half full 07/04/24 1127   Status Patent;Draining 07/04/24 1127   Output (mL) 450 mL 07/04/24 1127       Findings:  Infection Present At Time Of Surgery (PATOS) (choose all levels that have infection present):  No infection present  Other Findings: Hematoma    Detailed Description of Procedure:     INDICATIONS FOR SURGERY: Jillian Stinson is 3 days status post lumbar laminectomy for spinal stenosis at the L2/3 and L3-L4 levels.  She was admitted postop day 1 for severe low back pain.  An MRI scan was performed and that showed evidence of an epidural  hematoma L2-L3 with severe spinal stenosis. Having failed conservative management and experiencing persistent symptoms, the patient elected to proceed ahead with the surgical option listed above.     DETAILS OF PROCEDURE: The patient was brought to the operating room and placed under general anesthesia. The patient was then placed prone on a Reyes table. All bony prominences were inspected and padded prior to sterile draping. Using a #10 blade knife, the skin was incised in the midline through her midline incision and monopolar cautery was used to dissect through the subcutaneous tissue to open the fascia and reflect the paraspinal muscles laterally exposing the L2-3 interspace on the right side. The microscope was then brought into the field and used to assist with performing a microsurgical hemilaminotomy, partial facetectomy and foraminotomy. The underlying was evacuated.  I exposed the lateral dural tube and takeoff of the L 3 nerve root. I carefully dissected the hematoma flavum from the dura using a micronerve hook and removed it. I then performed a foraminotomy with a 3 mm punch.The L 3 nerve root and the thecal sac were identified and noted to be without dura tears.  I then evacuated a small hematoma at the L3/L4 level using the previously described method. The wound was copiously irrigated with antibiotic solution.  I applied Surgiflo deep into the wound and then removed excess of Surgiflo 3 minutes after that.  I then inserted the tip of the drain deep to the fascia. The fascia was then reapproximated using interrupted 0 Vicryl sutures and interrupted 3-0 Vicryl sutures followed by a 4-0 Vicryl subcuticular suture were used to reapproximate the subcuticular layer. A sterile dressing was then applied. The patient was extubated in the operating room and transferred to the recovery room in stable condition. There were no complications.   Jorge Acosta M.D.      Electronically signed by JORGE ACOSTA MD

## 2024-07-04 NOTE — ANESTHESIA PRE PROCEDURE
history).  No interval changes to history and physical examination.  Anesthetic plan, risks, benefits, alternatives, and personnel involved discussed with patient.  Patient verbalized an understanding and agrees to proceed.      Arcenio Shanks MD  July 4, 2024  11:34 AM

## 2024-07-04 NOTE — PROGRESS NOTES
V2.0  [facility] Hospitalist Progress Note      Name:  Jillian Stinson /Age/Sex: 1950  (74 y.o. female)   MRN & CSN:  [id number] & [id number] Encounter Date/Time: 2024 11:30 AM EDT    Location:  [id number] PCP: Rylee Scott, JACK - NP       Hospital Day: 3    Subjective:     Chief Complaint: Back Pain (Patient to ED via squad from home. Reports back surgery yesterday. Oxycodone not helping. Patient reached out to surgeon today regarding blood thinner but did not mention pain. Pain worsened last night. Patient ambulated independently at home to get on stretcher. Denies new injury. )     Seen and evaluated at the bedside, no fevers or chills, on room air, unfortunately patient's back pain is worse today compared to yesterday, yesterday she had a good day.  No new neurologic symptoms     Review of Systems:    Review of Systems    10 point ROS negative except as stated above in \"subjective\" section    Objective:     Intake/Output Summary (Last 24 hours) at 2024 1130  Last data filed at 2024 1127  Gross per 24 hour   Intake 2900 ml   Output 1950 ml   Net 950 ml        Vitals:   Vitals:    24 1106   BP: (!) 169/80   Pulse: 69   Resp: 17   Temp: 97.7 °F (36.5 °C)   SpO2: 98%       Physical Exam:     General: NAD  Eyes: EOMI  ENT: neck supple  Cardiovascular: Regular rate.  Respiratory: Clear to auscultation  Gastrointestinal: Soft, non tender  Genitourinary: no suprapubic tenderness  Musculoskeletal: No edema  Skin: warm, dry  Neuro: Alert.  Psych: Mood appropriate.     Medications:   Medications:    warfarin  5 mg Oral Once    methocarbamol  750 mg Oral 4x Daily    amitriptyline  50 mg Oral Nightly    atorvastatin  40 mg Oral Dinner    buPROPion  150 mg Oral QAM    calcium elemental  500 mg Oral Daily    fluticasone  1 spray Nasal Nightly    gabapentin  300 mg Oral TID    gemfibrozil  600 mg Oral BID AC    losartan  25 mg Oral QPM    montelukast  10 mg Oral Nightly    therapeutic  13.5 12.0 - 16.0 g/dL    Hematocrit 38.9 36.0 - 48.0 %    MCV 88.1 80.0 - 100.0 fL    MCH 30.5 26.0 - 34.0 pg    MCHC 34.7 31.0 - 36.0 g/dL    RDW 13.9 12.4 - 15.4 %    Platelets 245 135 - 450 K/uL    MPV 7.4 5.0 - 10.5 fL    Neutrophils % 87.7 %    Lymphocytes % 9.9 %    Monocytes % 2.1 %    Eosinophils % 0.1 %    Basophils % 0.2 %    Neutrophils Absolute 7.4 1.7 - 7.7 K/uL    Lymphocytes Absolute 0.8 (L) 1.0 - 5.1 K/uL    Monocytes Absolute 0.2 0.0 - 1.3 K/uL    Eosinophils Absolute 0.0 0.0 - 0.6 K/uL    Basophils Absolute 0.0 0.0 - 0.2 K/uL   POCT Glucose    Collection Time: 07/03/24  3:55 PM   Result Value Ref Range    POC Glucose 227 (H) 70 - 99 mg/dl    Performed on ACCU-CHEK    POCT Glucose    Collection Time: 07/03/24  8:10 PM   Result Value Ref Range    POC Glucose 339 (H) 70 - 99 mg/dl    Performed on ACCU-CHEK    POCT Glucose    Collection Time: 07/04/24  2:30 AM   Result Value Ref Range    POC Glucose 182 (H) 70 - 99 mg/dl    Performed on ACCU-CHEK    Basic Metabolic Panel w/ Reflex to MG    Collection Time: 07/04/24  5:05 AM   Result Value Ref Range    Sodium 136 136 - 145 mmol/L    Potassium reflex Magnesium 3.8 3.5 - 5.1 mmol/L    Chloride 102 99 - 110 mmol/L    CO2 22 21 - 32 mmol/L    Anion Gap 12 3 - 16    Glucose 134 (H) 70 - 99 mg/dL    BUN 15 7 - 20 mg/dL    Creatinine <0.5 (L) 0.6 - 1.2 mg/dL    Est, Glom Filt Rate >90 >60    Calcium 8.8 8.3 - 10.6 mg/dL   Protime-INR    Collection Time: 07/04/24  5:05 AM   Result Value Ref Range    Protime 14.4 11.9 - 14.9 sec    INR 1.10 0.85 - 1.15   APTT    Collection Time: 07/04/24 10:33 AM   Result Value Ref Range    aPTT 26.0 22.1 - 36.4 sec   Protime-INR    Collection Time: 07/04/24 10:33 AM   Result Value Ref Range    Protime 15.0 (H) 11.9 - 14.9 sec    INR 1.16 (H) 0.85 - 1.15   POCT Glucose    Collection Time: 07/04/24 11:19 AM   Result Value Ref Range    POC Glucose 184 (H) 70 - 99 mg/dl    Performed on [employer]U-[employer]         Imaging/Diagnostics Last 24

## 2024-07-04 NOTE — PROGRESS NOTES
Patient admitted to PACU # 5 from OR at 1337 post INCISION AND DRAINAGE OF LUMBAR HEMATOMA  per Dr Smith.  Attached to PACU monitoring system and report received from anesthesia provider.  Patient was reported to be hemodynamically stable during procedure.  Patient drowsy on admission and denied pain.

## 2024-07-04 NOTE — H&P
I have reviewed the history and physical and examined the patient and find no relevant changes.    I have reviewed with the patient and/or family the risks, benefits, and alternatives to the procedure.    Ms. Stinson is 3 days status post lumbar laminectomy L2-L3 and L3-L4.  She was admitted from the emergency room on postop day 1 with severe back pain.  She denied numbness tingling weakness of her lower extremities and saddle anesthesia at that time.  A Morris was inserted as she felt she was unable to empty her bladder when she was sitting on the toilet for a short time.  She stated her pain was too bad to sit on the toilet and that in her opinion was the reason she had difficulty emptying her bladder completely.  Ms. Stinson stated she had normal sensation during the prep for Morris insertion and Morris insertion.  The room emergency room physician detected no signs of lower extremity anesthesia or weakness.  Her lumbar MRI showed a large hematoma at the L2-L3 level with severe central stenosis.  She was examined by Roula Atwood CNP yesterday.  Roula noted Ms. Stinson had good strength sensation in her lower extremities, no saddle anesthesia and good control of her anal sphincter.  I spoke to Ms. Stinson in the early evening yesterday.  At that time she noted significant improvement of her back pain since her admission.  I spoke to Ms. Stinson again this morning and she noted increased back pain.  She denied numbness and tingling in her lower extremities and saddle anesthesia.  She noted movement of her lower extremities increased her low back pain.     I discussed treatment options with the spine at that time including additional nonoperative care and exploration of her wound and drainage of her hematoma. We discussed the risks, benefits, and alternatives to surgery including the risks of nerve or vessel injury, paralysis, spinal blood clot, spinal fluid leak, death, infection, need for additional spinal surgery,

## 2024-07-04 NOTE — PROGRESS NOTES
Report received at bedside for this pt. Pt A&OX4, and awake in bed during bedside report. BP elevated, vital signs otherwise stable. Pt in 10/10 pain during shift change- RN administered prn dilaudid.  RN introduced self and plan for the morning, pt verbalized understanding. Bed pad changed and pt repositioned for comfort. White board has been updated for the day, call light within reach and fall precautions in place. Pt has no additional needs at this time. Plan of care ongoing. Electronically signed by Kimmie Cadena RN on 7/4/2024 at 9:30 AM

## 2024-07-04 NOTE — PLAN OF CARE
Problem: Discharge Planning  Goal: Discharge to home or other facility with appropriate resources  7/3/2024 2331 by Almaz Jacome RN  Outcome: Progressing  Flowsheets (Taken 7/3/2024 2331)  Discharge to home or other facility with appropriate resources:   Refer to discharge planning if patient needs post-hospital services based on physician order or complex needs related to functional status, cognitive ability or social support system   Identify discharge learning needs (meds, wound care, etc)   Identify barriers to discharge with patient and caregiver   Arrange for needed discharge resources and transportation as appropriate     Problem: Pain  Goal: Verbalizes/displays adequate comfort level or baseline comfort level  7/3/2024 2331 by Almaz Jacome RN  Outcome: Progressing  Flowsheets (Taken 7/3/2024 2331)  Verbalizes/displays adequate comfort level or baseline comfort level:   Consider cultural and social influences on pain and pain management   Administer analgesics based on type and severity of pain and evaluate response   Encourage patient to monitor pain and request assistance   Assess pain using appropriate pain scale   Implement non-pharmacological measures as appropriate and evaluate response   Notify Licensed Independent Practitioner if interventions unsuccessful or patient reports new pain     Problem: Safety - Adult  Goal: Free from fall injury  7/3/2024 2331 by Almaz Jacome RN  Outcome: Progressing  Flowsheets (Taken 7/3/2024 2331)  Free From Fall Injury: Instruct family/caregiver on patient safety     Problem: ABCDS Injury Assessment  Goal: Absence of physical injury  7/3/2024 2331 by Almaz Jacome RN  Outcome: Progressing  Flowsheets (Taken 7/3/2024 2331)  Absence of Physical Injury: Implement safety measures based on patient assessment     Problem: Skin/Tissue Integrity  Goal: Absence of new skin breakdown  Description: 1.  Monitor for areas of redness and/or skin breakdown  2.  Assess  vascular access sites hourly  3.  Every 4-6 hours minimum:  Change oxygen saturation probe site  4.  Every 4-6 hours:  If on nasal continuous positive airway pressure, respiratory therapy assess nares and determine need for appliance change or resting period.  7/3/2024 2331 by Almaz Jacome, RN  Outcome: Progressing

## 2024-07-04 NOTE — PROGRESS NOTES
Report called to heena on 3 west. Vitals stable. Pain medication administered per eMAR. Pt sleeping but easily awakens. On 2 Liters NC. Will monitor. Drain in place. Morris draining no issues.will monitor.

## 2024-07-04 NOTE — PROGRESS NOTES
Patient is alert and oriented x 4 in bed. Vital, assessment and daily care given. Education and care plan updated. Medication given as per order. Fall precaution initiated, call light within reach, bed in low position and wheels locked. Addressed current patient's need. Patient is in bedrest as per order. Pain medication given as per order. Continue to monitor.    Electronically signed by Almaz Jacome RN on 7/3/2024 at 11:54 PM

## 2024-07-05 VITALS
DIASTOLIC BLOOD PRESSURE: 75 MMHG | OXYGEN SATURATION: 96 % | HEART RATE: 69 BPM | BODY MASS INDEX: 33.36 KG/M2 | TEMPERATURE: 98.1 F | SYSTOLIC BLOOD PRESSURE: 155 MMHG | WEIGHT: 188.27 LBS | HEIGHT: 63 IN | RESPIRATION RATE: 17 BRPM

## 2024-07-05 LAB
ANION GAP SERPL CALCULATED.3IONS-SCNC: 11 MMOL/L (ref 3–16)
BASOPHILS # BLD: 0 K/UL (ref 0–0.2)
BASOPHILS NFR BLD: 0.2 %
BUN SERPL-MCNC: 11 MG/DL (ref 7–20)
CALCIUM SERPL-MCNC: 8.6 MG/DL (ref 8.3–10.6)
CHLORIDE SERPL-SCNC: 105 MMOL/L (ref 99–110)
CO2 SERPL-SCNC: 22 MMOL/L (ref 21–32)
CREAT SERPL-MCNC: <0.5 MG/DL (ref 0.6–1.2)
DEPRECATED RDW RBC AUTO: 13.9 % (ref 12.4–15.4)
EOSINOPHIL # BLD: 0 K/UL (ref 0–0.6)
EOSINOPHIL NFR BLD: 0.1 %
GFR SERPLBLD CREATININE-BSD FMLA CKD-EPI: >90 ML/MIN/{1.73_M2}
GLUCOSE BLD-MCNC: 201 MG/DL (ref 70–99)
GLUCOSE BLD-MCNC: 212 MG/DL (ref 70–99)
GLUCOSE BLD-MCNC: 217 MG/DL (ref 70–99)
GLUCOSE BLD-MCNC: 234 MG/DL (ref 70–99)
GLUCOSE BLD-MCNC: 299 MG/DL (ref 70–99)
GLUCOSE SERPL-MCNC: 234 MG/DL (ref 70–99)
HCT VFR BLD AUTO: 36.8 % (ref 36–48)
HGB BLD-MCNC: 12.4 G/DL (ref 12–16)
INR PPP: 1.3 (ref 0.85–1.15)
LYMPHOCYTES # BLD: 0.8 K/UL (ref 1–5.1)
LYMPHOCYTES NFR BLD: 11.8 %
MCH RBC QN AUTO: 29.5 PG (ref 26–34)
MCHC RBC AUTO-ENTMCNC: 33.6 G/DL (ref 31–36)
MCV RBC AUTO: 87.8 FL (ref 80–100)
MONOCYTES # BLD: 0.2 K/UL (ref 0–1.3)
MONOCYTES NFR BLD: 2.6 %
NEUTROPHILS # BLD: 5.6 K/UL (ref 1.7–7.7)
NEUTROPHILS NFR BLD: 85.3 %
PERFORMED ON: ABNORMAL
PLATELET # BLD AUTO: 247 K/UL (ref 135–450)
PMV BLD AUTO: 7.5 FL (ref 5–10.5)
POTASSIUM SERPL-SCNC: 4.6 MMOL/L (ref 3.5–5.1)
PROTHROMBIN TIME: 16.4 SEC (ref 11.9–14.9)
RBC # BLD AUTO: 4.19 M/UL (ref 4–5.2)
SODIUM SERPL-SCNC: 138 MMOL/L (ref 136–145)
WBC # BLD AUTO: 6.5 K/UL (ref 4–11)

## 2024-07-05 PROCEDURE — 6360000002 HC RX W HCPCS: Performed by: ORTHOPAEDIC SURGERY

## 2024-07-05 PROCEDURE — 6370000000 HC RX 637 (ALT 250 FOR IP): Performed by: ORTHOPAEDIC SURGERY

## 2024-07-05 PROCEDURE — 94761 N-INVAS EAR/PLS OXIMETRY MLT: CPT

## 2024-07-05 PROCEDURE — 97530 THERAPEUTIC ACTIVITIES: CPT

## 2024-07-05 PROCEDURE — 36415 COLL VENOUS BLD VENIPUNCTURE: CPT

## 2024-07-05 PROCEDURE — 97162 PT EVAL MOD COMPLEX 30 MIN: CPT

## 2024-07-05 PROCEDURE — 97165 OT EVAL LOW COMPLEX 30 MIN: CPT

## 2024-07-05 PROCEDURE — 97535 SELF CARE MNGMENT TRAINING: CPT

## 2024-07-05 PROCEDURE — 85025 COMPLETE CBC W/AUTO DIFF WBC: CPT

## 2024-07-05 PROCEDURE — 85610 PROTHROMBIN TIME: CPT

## 2024-07-05 PROCEDURE — 97116 GAIT TRAINING THERAPY: CPT

## 2024-07-05 PROCEDURE — 80048 BASIC METABOLIC PNL TOTAL CA: CPT

## 2024-07-05 PROCEDURE — 2580000003 HC RX 258: Performed by: ORTHOPAEDIC SURGERY

## 2024-07-05 PROCEDURE — 2700000000 HC OXYGEN THERAPY PER DAY

## 2024-07-05 RX ORDER — METHYLPREDNISOLONE 4 MG/1
TABLET ORAL
Qty: 1 KIT | Refills: 0 | Status: SHIPPED | OUTPATIENT
Start: 2024-07-05

## 2024-07-05 RX ORDER — CHLORAL HYDRATE 500 MG
1000 CAPSULE ORAL DAILY
Qty: 90 CAPSULE | Refills: 3
Start: 2024-07-05

## 2024-07-05 RX ORDER — OXYCODONE HYDROCHLORIDE 5 MG/1
5 TABLET ORAL EVERY 4 HOURS PRN
Qty: 40 TABLET | Refills: 0 | Status: SHIPPED | OUTPATIENT
Start: 2024-07-05 | End: 2024-07-10

## 2024-07-05 RX ORDER — METHOCARBAMOL 750 MG/1
750 TABLET, FILM COATED ORAL 2 TIMES DAILY PRN
Qty: 20 TABLET | Refills: 0 | Status: SHIPPED | OUTPATIENT
Start: 2024-07-05 | End: 2024-07-15

## 2024-07-05 RX ORDER — WARFARIN SODIUM 2 MG/1
3 TABLET ORAL DAILY
Qty: 1 TABLET | Refills: 0
Start: 2024-07-05

## 2024-07-05 RX ORDER — MULTIVIT WITH MINERALS/LUTEIN
1000 TABLET ORAL DAILY
Qty: 30 CAPSULE | Refills: 3
Start: 2024-07-05

## 2024-07-05 RX ADMIN — ACETAMINOPHEN 1000 MG: 500 TABLET ORAL at 13:13

## 2024-07-05 RX ADMIN — WARFARIN SODIUM 3 MG: 2 TABLET ORAL at 13:13

## 2024-07-05 RX ADMIN — SODIUM CHLORIDE, PRESERVATIVE FREE 10 ML: 5 INJECTION INTRAVENOUS at 09:34

## 2024-07-05 RX ADMIN — GEMFIBROZIL 600 MG: 600 TABLET ORAL at 16:50

## 2024-07-05 RX ADMIN — CALCIUM 500 MG: 500 TABLET ORAL at 09:29

## 2024-07-05 RX ADMIN — MULTIPLE VITAMINS W/ MINERALS TAB 1 TABLET: TAB at 09:31

## 2024-07-05 RX ADMIN — INSULIN LISPRO 2 UNITS: 100 INJECTION, SOLUTION INTRAVENOUS; SUBCUTANEOUS at 09:30

## 2024-07-05 RX ADMIN — OXYCODONE HYDROCHLORIDE 10 MG: 10 TABLET ORAL at 03:07

## 2024-07-05 RX ADMIN — ACETAMINOPHEN 1000 MG: 500 TABLET ORAL at 16:50

## 2024-07-05 RX ADMIN — METHOCARBAMOL TABLETS 750 MG: 750 TABLET, COATED ORAL at 13:14

## 2024-07-05 RX ADMIN — TOPIRAMATE 25 MG: 25 TABLET, FILM COATED ORAL at 09:30

## 2024-07-05 RX ADMIN — WATER 1000 MG: 1 INJECTION INTRAMUSCULAR; INTRAVENOUS; SUBCUTANEOUS at 02:39

## 2024-07-05 RX ADMIN — ACETAMINOPHEN 1000 MG: 500 TABLET ORAL at 09:30

## 2024-07-05 RX ADMIN — LOSARTAN POTASSIUM 25 MG: 25 TABLET, FILM COATED ORAL at 16:51

## 2024-07-05 RX ADMIN — DOCUSATE SODIUM 100 MG: 100 CAPSULE, LIQUID FILLED ORAL at 09:29

## 2024-07-05 RX ADMIN — ATORVASTATIN CALCIUM 40 MG: 40 TABLET, FILM COATED ORAL at 16:50

## 2024-07-05 RX ADMIN — OXYCODONE HYDROCHLORIDE AND ACETAMINOPHEN 1000 MG: 500 TABLET ORAL at 09:30

## 2024-07-05 RX ADMIN — SERTRALINE 100 MG: 100 TABLET, FILM COATED ORAL at 09:29

## 2024-07-05 RX ADMIN — INSULIN LISPRO 4 UNITS: 100 INJECTION, SOLUTION INTRAVENOUS; SUBCUTANEOUS at 16:51

## 2024-07-05 RX ADMIN — PANTOPRAZOLE SODIUM 40 MG: 40 TABLET, DELAYED RELEASE ORAL at 05:20

## 2024-07-05 RX ADMIN — OXYCODONE HYDROCHLORIDE 10 MG: 10 TABLET ORAL at 09:30

## 2024-07-05 RX ADMIN — BUPROPION HYDROCHLORIDE 150 MG: 150 TABLET, EXTENDED RELEASE ORAL at 09:29

## 2024-07-05 RX ADMIN — METHOCARBAMOL TABLETS 750 MG: 750 TABLET, COATED ORAL at 09:29

## 2024-07-05 RX ADMIN — INSULIN LISPRO 2 UNITS: 100 INJECTION, SOLUTION INTRAVENOUS; SUBCUTANEOUS at 13:14

## 2024-07-05 RX ADMIN — GEMFIBROZIL 600 MG: 600 TABLET ORAL at 05:20

## 2024-07-05 RX ADMIN — GABAPENTIN 300 MG: 300 CAPSULE ORAL at 09:30

## 2024-07-05 RX ADMIN — DEXAMETHASONE INTENSOL 10 MG: 1 SOLUTION, CONCENTRATE ORAL at 09:30

## 2024-07-05 RX ADMIN — METHOCARBAMOL TABLETS 750 MG: 750 TABLET, COATED ORAL at 16:50

## 2024-07-05 RX ADMIN — GABAPENTIN 300 MG: 300 CAPSULE ORAL at 13:14

## 2024-07-05 ASSESSMENT — PAIN DESCRIPTION - LOCATION: LOCATION: BACK

## 2024-07-05 ASSESSMENT — PAIN DESCRIPTION - FREQUENCY: FREQUENCY: INTERMITTENT

## 2024-07-05 ASSESSMENT — PAIN SCALES - GENERAL
PAINLEVEL_OUTOF10: 7
PAINLEVEL_OUTOF10: 7
PAINLEVEL_OUTOF10: 3

## 2024-07-05 ASSESSMENT — PAIN SCALES - WONG BAKER
WONGBAKER_NUMERICALRESPONSE: HURTS A LITTLE BIT
WONGBAKER_NUMERICALRESPONSE: HURTS WHOLE LOT

## 2024-07-05 ASSESSMENT — PAIN DESCRIPTION - DESCRIPTORS: DESCRIPTORS: STABBING

## 2024-07-05 ASSESSMENT — PAIN DESCRIPTION - ONSET: ONSET: GRADUAL

## 2024-07-05 ASSESSMENT — PAIN DESCRIPTION - PAIN TYPE: TYPE: ACUTE PAIN;SURGICAL PAIN

## 2024-07-05 ASSESSMENT — PAIN - FUNCTIONAL ASSESSMENT: PAIN_FUNCTIONAL_ASSESSMENT: PREVENTS OR INTERFERES SOME ACTIVE ACTIVITIES AND ADLS

## 2024-07-05 ASSESSMENT — PAIN DESCRIPTION - ORIENTATION: ORIENTATION: MID

## 2024-07-05 NOTE — CARE COORDINATION
CASE MANAGEMENT DISCHARGE SUMMARY:    DISCHARGE DATE: 7/5/2024    DISCHARGED TO HOME               COMMENTS: patient will discharge to home. No ongoing therapy needs at this time per PT/OT note.    Electronically signed by MARCELLUS Sorto, CESARW, Case Management on 7/5/2024 at 3:38 PM  [place] [phone]

## 2024-07-05 NOTE — PROGRESS NOTES
Physical Therapy    Facility/Department: [address] ORTHOPEDICS    Physical Therapy Initial Assessment      Name: Jillian Stinson    : 1950    MRN: [id number]    Date of Service: 2024    Discharge Recommendations:    Home with assist PRN, 24 hour supervision or assist   PT Equipment Recommendations  Equipment Needed: No      Patient Diagnosis(es): The primary encounter diagnosis was Acute midline low back pain, unspecified whether sciatica present. Diagnoses of Postoperative pain, Urinary retention, Abnormal MRI, Spinal stenosis, lumbar region with neurogenic claudication, and Intractable back pain were also pertinent to this visit.  Past Medical History:  has a past medical history of Asthma, Colon polyp, Depression, DVT (deep venous thrombosis) (HCC), Esophageal reflux, GERD with stricture, Hx of blood clots, Hyperlipidemia, Hypertension, Insomnia, Migraine, Osteoarthritis, Protein deficiency (HCC), Pulmonary embolism (HCC), Thyroid adenoma, and Type II or unspecified type diabetes mellitus without mention of complication, not stated as uncontrolled.  Past Surgical History:  has a past surgical history that includes Thyroidectomy, partial; Appendectomy; Ovarian cyst surgery; hiatal hernia repair; Endoscopy, colon, diagnostic; Colonoscopy; Shoulder arthroscopy (Right, 2016); cervical fusion (); Hysterectomy; cervical fusion (N/A, 2019); Colonoscopy (N/A, 2020); back surgery; shoulder surgery (Right, 2022); Lumbar spine surgery (N/A, 2023); Pain management procedure (Right, 10/18/2023); Nerve Block (Bilateral, 2023); and Lumbar spine surgery (Bilateral, 2024).    Assessment   Body Structures, Functions, Activity Limitations Requiring Skilled Therapeutic Intervention: Decreased ROM;Decreased strength;Decreased endurance;Decreased balance;Decreased posture;Decreased functional mobility     Assessment:   Pt is a 74 y.o. female who presents with lower back pain. She is post  assist PRN\"  Vision/Hearing  Vision  Vision: Impaired  Vision Exceptions: Wears glasses for reading  Hearing  Hearing: Within functional limits    Cognition   Orientation  Overall Orientation Status: Within Functional Limits  Cognition  Overall Cognitive Status: WFL     Objective      Gross Assessment  AROM: Generally decreased, functional  Strength: Generally decreased, functional     Bed mobility  Supine to Sit: Unable to assess  Sit to Supine: Unable to assess  Bed Mobility Comments: Pt seated in recliner at start and end of session.  Transfers  Sit to Stand: Stand by assistance  Stand to Sit: Stand by assistance  Ambulation  WB Status: WBAT  Ambulation  Surface: Level tile  Device: Rolling Walker  Assistance: Stand by assistance;Contact guard assistance  Quality of Gait: Step-through pattern  Gait Deviations: Slow Layla;Decreased step length;Decreased step height  Distance: 20' and 20'  Comments: Amb to and from bathroom with RW  More Ambulation?: No  Stairs/Curb  Stairs?: No     Balance  Posture: Fair  Comments: Pt able to maintain midline in sitting in recliner and when ambulating       AM-PAC Basic Mobility - Inpatient   How much help is needed turning from your back to your side while in a flat bed without using bedrails?: A Little  How much help is needed moving from lying on your back to sitting on the side of a flat bed without using bedrails?: A Little  How much help is needed moving to and from a bed to a chair?: A Little  How much help is needed standing up from a chair using your arms?: A Little  How much help is needed walking in hospital room?: A Little  How much help is needed climbing 3-5 steps with a railing?: A Little  [facility] Inpatient Mobility Raw Score : 18  AM-PAC Inpatient T-Scale Score : 43.63  Mobility Inpatient CMS 0-100% Score: 46.58  Mobility Inpatient CMS G-Code Modifier : CK     Goals  Short Term Goals  Time Frame for Short Term Goals: 1-2 days  Short Term Goal 1: bed mobility

## 2024-07-05 NOTE — PROGRESS NOTES
Patient is alert and oriented x 4 in bed. Vital, assessment and daily care given. Education and care plan updated. Medication given as per order. Fall precaution initiated, call light within reach, bed in low position and wheels locked. Addressed current patient's need. Morris and drain in place. Patient is in 2 l/m of oxygen.Will continue to monitor.    Electronically signed by Almaz Jacome RN on 7/4/2024 at 10:20 PM

## 2024-07-05 NOTE — PROGRESS NOTES
Occupational Therapy  Facility/Department: [address] ORTHOPEDICS  Occupational Therapy Initial Assessment    Name: Jlilian Stinson  : 1950  MRN: [id number]  Date of Service: 2024    Discharge Recommendations:  24 hour supervision or assist  OT Equipment Recommendations  Equipment Needed: No     Jillian Stinson scored a 20/24 on the AM-PAC ADL Inpatient form.   Please see assessment section for further patient specific details.    If patient discharges prior to next session this note will serve as a discharge summary.  Please see below for the latest assessment towards goals.      Patient Diagnosis(es): The primary encounter diagnosis was Acute midline low back pain, unspecified whether sciatica present. Diagnoses of Postoperative pain, Urinary retention, Abnormal MRI, Spinal stenosis, lumbar region with neurogenic claudication, and Intractable back pain were also pertinent to this visit.  Past Medical History:  has a past medical history of Asthma, Colon polyp, Depression, DVT (deep venous thrombosis) (HCC), Esophageal reflux, GERD with stricture, Hx of blood clots, Hyperlipidemia, Hypertension, Insomnia, Migraine, Osteoarthritis, Protein deficiency (HCC), Pulmonary embolism (HCC), Thyroid adenoma, and Type II or unspecified type diabetes mellitus without mention of complication, not stated as uncontrolled.  Past Surgical History:  has a past surgical history that includes Thyroidectomy, partial; Appendectomy; Ovarian cyst surgery; hiatal hernia repair; Endoscopy, colon, diagnostic; Colonoscopy; Shoulder arthroscopy (Right, 2016); cervical fusion (); Hysterectomy; cervical fusion (N/A, 2019); Colonoscopy (N/A, 2020); back surgery; shoulder surgery (Right, 2022); Lumbar spine surgery (N/A, 2023); Pain management procedure (Right, 10/18/2023); Nerve Block (Bilateral, 2023); and Lumbar spine surgery (Bilateral, 2024).           Assessment   Performance deficits /      Therapy Time   Individual Concurrent Group Co-treatment   Time In 1325         Time Out 1405         Minutes 40         Timed Code Treatment Minutes: 25 Minutes +15min newton Cao, OTR/L [id number]

## 2024-07-05 NOTE — PROGRESS NOTES
Clinical Pharmacy Note  Warfarin Consult    Jillian Stinson is a 74 y.o. female receiving warfarin managed by pharmacy.      Warfarin Indication: hx of PE  Target INR range: 2-3   Dose prior to admission: 3 mg daily    Current warfarin drug-drug interactions: steroids, gemfibrozil (home med),    Recent Labs     07/02/24  1446 07/02/24  1938 07/03/24  1143 07/04/24  0505 07/04/24  1033 07/05/24  0406   HGB 13.0  --  13.5  --   --  12.4   HCT 37.6  --  38.9  --   --  36.8   INR  --    < >  --  1.10 1.16* 1.30*    < > = values in this interval not displayed.         Assessment/Plan:    OK to resume Warfarin today per Dr. Smith.  Patient received 5mg on 7-3 will give home dose of 3mg today.  Daily PT/INR until stable within therapeutic range.   Managed as OP by Dr. Preciado's office in [place]  Thank you for the consult.  Will continue to follow.    Nicho Momin [facility], 7/5/2024 9:01 AM

## 2024-07-05 NOTE — DISCHARGE SUMMARY
V2.0  Discharge Summary    Name:  Jillian Stinson /Age/Sex: 1950 (74 y.o. female)   Admit Date: 2024  Discharge Date: 24    MRN & CSN:  [id number] & [id number] Encounter Date and Time 24 2:58 PM EDT    Attending:  Eusebio Stafford MD Discharging Provider: Eusebio Stafford MD       Hospital Course:     Brief HPI: Jillian Stinson is a 74 y.o. female who presented with back pain    Brief Problem Based Course:   Patient is a pleasant 74-year-old female, had L2-4 bilateral laminectomy on 2024, was discharged home, she however returned to the hospital for concerns of intractable back pain.  Patient was admitted for further evaluation management, patient had imaging of the back which revealed a fluid collection concerning for either a seroma in the postoperative setting or hematoma.  Patient did not have any evidence of cauda equina/cord compression, she did have some urinary retention but no issues with bladder or bowel control or saddle anesthesia.  It was felt patient may have a UTI, was started on empiric antibiotics, urine culture was negative, antibiotics were discontinued. Patient was treated conservatively with pain meds and steroids.  Unfortunately patient pain Getting significantly worse as such patient was taken back to the OR again please refer to the operative note for details.  Patient is doing great today which is post operative day 1.  Patient worked with physical therapy and Occupational Therapy and has been cleared to go home, patient was seen by orthopedic surgery, they were okay with patient being discharged home, currently there are no other acute issues ongoing, patient to be discharged home in stable condition.  She does not need any home services.  Patient chronically on anticoagulation, that was temporarily held for surgery, has been resumed at discharge, she does not need any bridging.      The patient expressed appropriate understanding of, and agreement with the discharge

## 2024-07-05 NOTE — PROGRESS NOTES
[facility] Orthopedic Surgery   Progress Note      S/P :  SUBJECTIVE  IN bed. Alert and oriented. States pain is only in her back today. No leg pain Mild back spasms intermittently MOves well in bed. Does not appear in distress.    OBJECTIVE              Physical                      VITALS:  BP (!) 137/92   Pulse 62   Temp 97.9 °F (36.6 °C) (Axillary)   Resp 17   Ht 1.6 m (5' 3\")   Wt 85.4 kg (188 lb 4.4 oz)   SpO2 97%   BMI 33.35 kg/m²                     MUSCULOSKELETAL:  Bilateral lowe3r extremities have 5/5 motor function and sensation is intact. SLR test is negative bilaterally . Back with moderate swelling distal to wound. Wound on low back clean and dry with surgical glue. Hemovac with small amt serosang drainage. Drain removed by me at this time Pt tolerated well. Gauze and tegaderm applied over drain site.                      Data       CBC:   Lab Results   Component Value Date/Time    WBC 6.5 07/05/2024 04:06 AM    RBC 4.19 07/05/2024 04:06 AM    HGB 12.4 07/05/2024 04:06 AM    HCT 36.8 07/05/2024 04:06 AM    MCV 87.8 07/05/2024 04:06 AM    MCH 29.5 07/05/2024 04:06 AM    MCHC 33.6 07/05/2024 04:06 AM    RDW 13.9 07/05/2024 04:06 AM     07/05/2024 04:06 AM    MPV 7.5 07/05/2024 04:06 AM        WBC:    Lab Results   Component Value Date/Time    WBC 6.5 07/05/2024 04:06 AM        Hemoglobin/Hematocrit:    Lab Results   Component Value Date/Time    HGB 12.4 07/05/2024 04:06 AM    HCT 36.8 07/05/2024 04:06 AM        PT/INR:    Lab Results   Component Value Date/Time    PROTIME 16.4 07/05/2024 04:06 AM    PROTIME 1.5 12/16/2016 12:00 AM    INR 1.30 07/05/2024 04:06 AM              Current Inpatient Medications             Current Facility-Administered Medications: warfarin (COUMADIN) tablet 3 mg, 3 mg, Oral, Once  warfarin placeholder: dosing by pharmacy, , Other, RX Placeholder  oxyCODONE (ROXICODONE) immediate release tablet 5 mg, 5 mg, Oral, Q4H PRN **OR** oxyCODONE HCl (OXY-IR) immediate release

## 2024-07-05 NOTE — PROGRESS NOTES
Report received at bedside for this pt. Pt A&OX4, and awake in bed during bedside report, vital signs stable. Pt requested to have scd's removed at this time.  RN introduced self and plan for the morning, pt verbalized understanding. Pt reports much more tolerable pain levels since having I&d yesterday. Drain is in place- still has scant output. White board has been updated for the day, call light within reach and fall precautions in place. Pt has no additional needs at this time. Plan of care ongoing. Electronically signed by Kimmie Cadena RN on 7/5/2024 at 8:45 AM

## 2024-07-06 NOTE — PROGRESS NOTES
Physician Progress Note      PATIENT:               LAYNE PHILLIPS  [facility] #:                  [id number]  :                       1950  ADMIT DATE:       2024 1:16 PM  DISCH DATE:        2024 5:44 PM  RESPONDING  PROVIDER #:        Eusebio Zepeda MD          QUERY TEXT:    Internal Medicine,    Pt admitted  with  Intractable back pain following L2-4 bilateral   laminectomies on 24, noted documentation of lumbar hematoma.? If possible,   please document in progress notes and discharge summary the relationship, if   any between the laminectomy and the hematoma.  ?  The medical record reflects the following:  Risk Factors: laminectomy  Clinical Indicators:  documentation of lumbar hematoma, s/p bilateral L2-4   laminectomy  Treatment: labs, imaging, Ortho consult, evacuation of hematoma, medical   management, pain control    Thank you,  Anay Durbin RN [facility]  [phone]  Options provided:  -- Lumbar is due to the procedure  -- Lumbar hematoma is not due to the procedure, but is due to other incidental   risk factor, Please specify other incidental risk factor.  -- Other - I will add my own diagnosis  -- Disagree - Not applicable / Not valid  -- Disagree - Clinically unable to determine / Unknown  -- Refer to Clinical Documentation Reviewer    PROVIDER RESPONSE TEXT:    Patient has a lumbar hematoma due to the procedure.    Query created by: Anay Durbin on 2024 2:57 PM      Electronically signed by:  Eusebio Zepeda MD 2024 6:09 PM

## 2024-07-15 ENCOUNTER — OFFICE VISIT (OUTPATIENT)
Dept: ORTHOPEDIC SURGERY | Age: 74
End: 2024-07-15

## 2024-07-15 VITALS — WEIGHT: 188.27 LBS | HEIGHT: 63 IN | BODY MASS INDEX: 33.36 KG/M2

## 2024-07-15 DIAGNOSIS — M48.062 LUMBAR STENOSIS WITH NEUROGENIC CLAUDICATION: Primary | ICD-10-CM

## 2024-07-15 PROCEDURE — 99024 POSTOP FOLLOW-UP VISIT: CPT | Performed by: ORTHOPAEDIC SURGERY

## 2024-07-15 RX ORDER — OXYCODONE HYDROCHLORIDE AND ACETAMINOPHEN 5; 325 MG/1; MG/1
1 TABLET ORAL 2 TIMES DAILY PRN
Qty: 30 TABLET | Refills: 0 | Status: SHIPPED | OUTPATIENT
Start: 2024-07-15 | End: 2024-09-01

## 2024-07-15 RX ORDER — METHOCARBAMOL 750 MG/1
750 TABLET, FILM COATED ORAL 3 TIMES DAILY
Qty: 30 TABLET | Refills: 0 | Status: SHIPPED | OUTPATIENT
Start: 2024-07-15 | End: 2024-07-25

## 2024-07-15 NOTE — PROGRESS NOTES
Ms. Jillian Stinson returns today 2 weeks s/p MLL L2-3 and L3-L4. She reports marked improvement of her leg symptoms.     Her incisions show no signs of infection.  She has a normal gait and 5/5 strength of her ankle DFs/PFs, bilaterally.  Her sensation is intact from L3 to S1 bilaterally.    I cautioned her to avoid lifting more than 10 pounds, bending and impact type aerobic exercise for 8 week after surgery, then slowly increase her activity over the next month.

## 2024-08-20 RX ORDER — GABAPENTIN 300 MG/1
CAPSULE ORAL
Qty: 120 CAPSULE | Refills: 0 | Status: SHIPPED | OUTPATIENT
Start: 2024-08-20 | End: 2024-12-18

## 2024-12-27 ENCOUNTER — HOSPITAL ENCOUNTER (OUTPATIENT)
Dept: WOMENS IMAGING | Age: 74
Discharge: HOME | End: 2024-12-27
Payer: MEDICARE

## 2024-12-27 VITALS — BODY MASS INDEX: 31.89 KG/M2 | HEIGHT: 63 IN | WEIGHT: 180 LBS

## 2024-12-27 DIAGNOSIS — Z12.31 VISIT FOR SCREENING MAMMOGRAM: ICD-10-CM

## 2024-12-27 PROCEDURE — 77063 BREAST TOMOSYNTHESIS BI: CPT

## 2024-12-31 ENCOUNTER — OFFICE VISIT (OUTPATIENT)
Dept: ORTHOPEDIC SURGERY | Age: 74
End: 2024-12-31
Payer: MEDICARE

## 2024-12-31 ENCOUNTER — TELEPHONE (OUTPATIENT)
Dept: ORTHOPEDIC SURGERY | Age: 74
End: 2024-12-31

## 2024-12-31 VITALS — WEIGHT: 188 LBS | HEIGHT: 63 IN | BODY MASS INDEX: 33.31 KG/M2

## 2024-12-31 DIAGNOSIS — M48.062 LUMBAR STENOSIS WITH NEUROGENIC CLAUDICATION: Primary | ICD-10-CM

## 2024-12-31 PROCEDURE — 1123F ACP DISCUSS/DSCN MKR DOCD: CPT | Performed by: ORTHOPAEDIC SURGERY

## 2024-12-31 PROCEDURE — 1159F MED LIST DOCD IN RCRD: CPT | Performed by: ORTHOPAEDIC SURGERY

## 2024-12-31 PROCEDURE — 99213 OFFICE O/P EST LOW 20 MIN: CPT | Performed by: ORTHOPAEDIC SURGERY

## 2024-12-31 RX ORDER — ALPRAZOLAM 1 MG/1
1 TABLET ORAL 2 TIMES DAILY PRN
Qty: 2 TABLET | Refills: 0 | Status: SHIPPED | OUTPATIENT
Start: 2024-12-31 | End: 2025-02-01

## 2024-12-31 NOTE — PROGRESS NOTES
New Patient: LUMBAR SPINE    Referring Provider:  No ref. provider found    CHIEF COMPLAINT: Low back pain    HISTORY OF PRESENT ILLNESS:    Ms. Jillian Stinson is 6 months status post MLL L2-L3 and L3-L4 and 2 years status post M LL L3-4 and L4-L5 with severe bilateral leg pain.  She reports return of right greater than left pain over her sacroiliac joints with standing and ambulating.  She denies new saddle anesthesia and bowel or bladder abnormality.  She notes her symptoms have become intolerable    Current/Past Treatment:   Physical Therapy: prior to surgery  Chiropractic:  no   Injection: Medial branch block 12/26/2023, transforaminal injections right L3-4 and L4-L5 10/18/2020  Medications: Neurontin and oral steroids    Past Medical History:   Past Medical History:   Diagnosis Date    Asthma     Colon polyp     Depression     DVT (deep venous thrombosis) (HCC)     as a teenager    Esophageal reflux     GERD with stricture     Hx of blood clots     Hyperlipidemia     Hypertension     Insomnia     Migraine 12/29/1995    Osteoarthritis     Protein deficiency (HCC)     protein S defic.    Pulmonary embolism (HCC)     x`s 2 post-op after Hysterectomy & Gall Bladder    Thyroid adenoma     NO MEDS    Type II or unspecified type diabetes mellitus without mention of complication, not stated as uncontrolled       Past Surgical History:     Past Surgical History:   Procedure Laterality Date    APPENDECTOMY      BACK SURGERY      LUMBAR BULGING DISC    CERVICAL FUSION  1993    CERVICAL FUSION N/A 11/4/2019    ANTERIOR CERVICAL DISCECTOMY FUSION C4-5 AND C6-7 WITH MEDTRONIC PLATE AND SCREWS, AND DONOR BONE WITH C-ARM performed by Jorge Smith MD at [facility] OR    COLONOSCOPY      COLONOSCOPY N/A 7/9/2020    COLONOSCOPY POLYPECTOMY SNARE/COLD BIOPSY performed by Nain Avila Jr., DO at [facility] ENDOSCOPY    ENDOSCOPY, COLON, DIAGNOSTIC      HIATAL HERNIA REPAIR      HYSTERECTOMY (CERVIX STATUS UNKNOWN)      LAMINECTOMY N/A

## 2024-12-31 NOTE — TELEPHONE ENCOUNTER
Approval for MRI: of Lumbar   Approval Letter in Media   Approved Facility [facility]   Approval Dates: 12.31.24 to 06.29.25   Auth #:   [id number]     Lmom to notify pt of approval and scheduling of follow up appt. chavez

## 2025-01-07 ENCOUNTER — TELEPHONE (OUTPATIENT)
Dept: ORTHOPEDIC SURGERY | Age: 75
End: 2025-01-07

## 2025-01-15 DIAGNOSIS — M48.062 LUMBAR STENOSIS WITH NEUROGENIC CLAUDICATION: ICD-10-CM

## 2025-01-15 LAB
CREAT SERPL-MCNC: 0.6 MG/DL (ref 0.6–1.2)
GFR SERPLBLD CREATININE-BSD FMLA CKD-EPI: >90 ML/MIN/{1.73_M2}

## 2025-01-16 ENCOUNTER — HOSPITAL ENCOUNTER (OUTPATIENT)
Dept: MRI IMAGING | Age: 75
Discharge: HOME | End: 2025-01-16
Attending: ORTHOPAEDIC SURGERY
Payer: MEDICARE

## 2025-01-16 DIAGNOSIS — M48.062 LUMBAR STENOSIS WITH NEUROGENIC CLAUDICATION: ICD-10-CM

## 2025-01-16 PROCEDURE — 6360000004 HC RX CONTRAST MEDICATION: Performed by: ORTHOPAEDIC SURGERY

## 2025-01-16 PROCEDURE — A9579 GAD-BASE MR CONTRAST NOS,1ML: HCPCS | Performed by: ORTHOPAEDIC SURGERY

## 2025-01-16 PROCEDURE — 72158 MRI LUMBAR SPINE W/O & W/DYE: CPT

## 2025-01-16 RX ADMIN — GADOTERIDOL 16 ML: 279.3 INJECTION, SOLUTION INTRAVENOUS at 10:13

## 2025-02-11 ENCOUNTER — OFFICE VISIT (OUTPATIENT)
Dept: ORTHOPEDIC SURGERY | Age: 75
End: 2025-02-11
Payer: MEDICARE

## 2025-02-11 VITALS — WEIGHT: 188 LBS | HEIGHT: 63 IN | BODY MASS INDEX: 33.31 KG/M2

## 2025-02-11 DIAGNOSIS — M48.062 LUMBAR STENOSIS WITH NEUROGENIC CLAUDICATION: Primary | ICD-10-CM

## 2025-02-11 PROCEDURE — 1159F MED LIST DOCD IN RCRD: CPT | Performed by: ORTHOPAEDIC SURGERY

## 2025-02-11 PROCEDURE — 1123F ACP DISCUSS/DSCN MKR DOCD: CPT | Performed by: ORTHOPAEDIC SURGERY

## 2025-02-11 PROCEDURE — 99213 OFFICE O/P EST LOW 20 MIN: CPT | Performed by: ORTHOPAEDIC SURGERY

## 2025-02-11 NOTE — PROGRESS NOTES
Inject 5 Units into the skin 3 times daily (before meals) 5 units + sliding scale if BG > 180, Disp: , Rfl:     omeprazole (PRILOSEC) 40 MG capsule, Take 1 capsule by mouth 2 times daily (Patient taking differently: Take 1 capsule by mouth daily), Disp: 180 capsule, Rfl: 3    glucose blood VI test strips (ASCENSIA AUTODISC VI;ONE TOUCH ULTRA TEST VI) strip, 1 each by In Vitro route 3 times daily. As needed., Disp: 100 each, Rfl: 3    ONE TOUCH LANCETS MISC, 1 each by Does not apply route 3 times daily., Disp: 100 each, Rfl: 3    atorvastatin (LIPITOR) 40 MG tablet, Take 1 tablet by mouth daily. (Patient taking differently: Take 1 tablet by mouth Daily with supper), Disp: 30 tablet, Rfl: 3    fluticasone (FLONASE) 50 MCG/ACT nasal spray, 1 spray by Nasal route daily. (Patient taking differently: 1 spray by Nasal route nightly), Disp: 1 Bottle, Rfl: 3    ondansetron (ZOFRAN-ODT) 4 MG disintegrating tablet, DISSOLVE ONE TABLET BY MOUTH EVERY 6 HOURS AS NEEDED, Disp: 90 tablet, Rfl: 2  Allergies:  Morphine  Social History:    reports that she has never smoked. She has never used smokeless tobacco. She reports that she does not drink alcohol and does not use drugs.  Family History:   Family History   Problem Relation Age of Onset    Clotting Disorder Mother     Heart Attack Father     Leukemia Brother     Diabetes Maternal Grandmother        REVIEW OF SYSTEMS: Full ROS noted & scanned   CONSTITUTIONAL: Denies unexplained weight loss, fevers, chills or fatigue  NEUROLOGICAL: Denies unsteady gait or progressive weakness  MUSCULOSKELETAL: Denies joint swelling or redness  PSYCHOLOGICAL: Denies anxiety, depression   SKIN: Denies skin changes, delayed healing, rash, itching   HEMATOLOGIC: Denies easy bleeding or bruising  ENDOCRINE: Denies excessive thirst, urination, heat/cold  RESPIRATORY: Denies current dyspnea, cough  GI: Denies nausea, vomiting, diarrhea   : Denies bowel or bladder issues      PHYSICAL EXAM:    Vitals:

## 2025-03-06 ENCOUNTER — TELEPHONE (OUTPATIENT)
Dept: CARDIOLOGY CLINIC | Age: 75
End: 2025-03-06

## 2025-03-06 NOTE — TELEPHONE ENCOUNTER
Scheduling is okay however, she may get in sooner at our [facility] or [facility] offices. Also,  she has not seen her PCP and should be evaluated per [facility] in > 2 years in the meantime. Thanks.

## 2025-03-06 NOTE — TELEPHONE ENCOUNTER
**patient called to make appt with Dr. Delgadillo, next avail date, later explained reason for appt. Scheduled for 3/27 please advised based on symptoms below**    CHEST PAIN    Are you having active chest pain , chest discomfort or chest pressure?    Patient reports having chest pain and indigestion. Relayed that feeling is more irritating than painful. Does not report any other symptoms during episodes lasting 3-5 minutes. Not checking BP or HR during episodes as well.       On a scale of 1-10 how bad is your pain?   3/10    Where is the pain located?    N/A    Are you having this pain right now?  Is it constant or does it come and go?    States it comes and goes, lasting 3-5 minutes    When did your symptoms start?  How frequently are you having these episodes?   Started 1 month ago    What activity(s) are you doing when this occurs?   Patient relays she notices pain at rest, no activity reported    Does anything maker it better or worse?   Patient relays taking Tramadol for back pain and Omeprazole for indigestion, will occasionally chew on TUMS for aide.    Have you taken any Nitroglycerin tablets?      If so, how many and in what time frame?   None on hand    Are you having shortness of breath? no  Nausea? no  Vomiting? no  Dizziness? no     Do you have any recent medication changes?  No    Have you had any recent hospitalizations or outpatient procedures?  No

## 2025-03-06 NOTE — TELEPHONE ENCOUNTER
Patient last seen in office 10/10/23 hx of obesity, DM, PE, protein S deficient, lung nodules and HTN.   Next visit scheduled 3/27/25    Takes losartan 25 mg daily    Called spoke with patient. States chest pain will last about 5 minutes, pain located between breast.   States no chest pressure.   Shortness of breath only with exertion.  States very tired and wants to sleep a lot.  States no nausea, vomiting, cold sweats, dizziness or feeling faint.   States does not have any rapid heart rate or fluttering.    Please advise

## 2025-03-07 ENCOUNTER — OFFICE VISIT (OUTPATIENT)
Dept: CARDIOLOGY CLINIC | Age: 75
End: 2025-03-07
Payer: MEDICARE

## 2025-03-07 VITALS
SYSTOLIC BLOOD PRESSURE: 138 MMHG | WEIGHT: 186 LBS | OXYGEN SATURATION: 97 % | HEART RATE: 80 BPM | BODY MASS INDEX: 32.95 KG/M2 | DIASTOLIC BLOOD PRESSURE: 82 MMHG

## 2025-03-07 DIAGNOSIS — R07.9 CHEST PAIN, UNSPECIFIED TYPE: ICD-10-CM

## 2025-03-07 DIAGNOSIS — I25.10 CORONARY ARTERY CALCIFICATION SEEN ON CAT SCAN: Primary | ICD-10-CM

## 2025-03-07 DIAGNOSIS — R94.31 ABNORMAL ELECTROCARDIOGRAPHY: ICD-10-CM

## 2025-03-07 PROCEDURE — 93000 ELECTROCARDIOGRAM COMPLETE: CPT | Performed by: INTERNAL MEDICINE

## 2025-03-07 PROCEDURE — 1159F MED LIST DOCD IN RCRD: CPT | Performed by: INTERNAL MEDICINE

## 2025-03-07 PROCEDURE — G2211 COMPLEX E/M VISIT ADD ON: HCPCS | Performed by: INTERNAL MEDICINE

## 2025-03-07 PROCEDURE — 1123F ACP DISCUSS/DSCN MKR DOCD: CPT | Performed by: INTERNAL MEDICINE

## 2025-03-07 PROCEDURE — 99215 OFFICE O/P EST HI 40 MIN: CPT | Performed by: INTERNAL MEDICINE

## 2025-03-07 PROCEDURE — 3079F DIAST BP 80-89 MM HG: CPT | Performed by: INTERNAL MEDICINE

## 2025-03-07 PROCEDURE — 3075F SYST BP GE 130 - 139MM HG: CPT | Performed by: INTERNAL MEDICINE

## 2025-03-07 RX ORDER — WARFARIN SODIUM 1 MG/1
0.5 TABLET ORAL DAILY
COMMUNITY

## 2025-03-07 NOTE — PROGRESS NOTES
complaints.  Integumentary: No rash or pruritis.  Neurological: No headache, diplopia, change in muscle strength, numbness or tingling.   Psychiatric: No anxiety or depression.  Endocrine: No temperature intolerance. No excessive thirst, fluid intake, or urination. No tremor.  Hematologic/Lymphatic: No abnormal bruising or bleeding, blood clots or swollen lymph nodes.  Allergic/Immunologic: No nasal congestion or hives.    Physical Exam:   Vitals:    03/07/25 1352   BP: 138/82   Pulse: 80   SpO2: 97%   Weight: 84.4 kg (186 lb)       Wt Readings from Last 3 Encounters:   03/07/25 84.4 kg (186 lb)   02/11/25 85.3 kg (188 lb)   12/31/24 85.3 kg (188 lb)     Constitutional: She is oriented to person, place, and time. She appears well-developed and well-nourished. In no acute distress.   Head: Normocephalic and atraumatic.  Pupils equal and round.  Neck: Neck supple. No JVP or carotid bruit appreciated. No mass and no thyromegaly present. No lymphadenopathy present.  Cardiovascular: Normal rate. Normal heart sounds. Exam reveals no gallop and no friction rub.  2/6 SM heard at the base of the heart.  Pulmonary/Chest: Effort normal and breath sounds normal. No respiratory distress. She has no wheezes, rhonchi or rales.   Abdominal: Soft, non-tender. Bowel sounds are normal. She exhibits no organomegaly, mass or bruit.   Extremities: No edema, cyanosis, or clubbing. Pulses are 2+ radial/dorsalis pedis/posterior tibial/carotid bilaterally.  Neurological: No gross cranial nerve deficit. Coordination normal.   Skin: Skin is warm and dry. There is no rash or diaphoresis.   Psychiatric: She has a normal mood and affect. Her speech is normal and behavior is normal.     Lab Review:     Lab Results   Component Value Date/Time    TRIG 154 11/14/2016 11:24 PM    HDL 59 04/15/2022 09:41 AM    LDLDIRECT 192 04/15/2014 09:58 AM     Lab Results   Component Value Date/Time    BUN 11 07/05/2024 04:06 AM    CREATININE 0.6 01/15/2025 08:42

## 2025-03-14 ENCOUNTER — TELEPHONE (OUTPATIENT)
Dept: CARDIOLOGY CLINIC | Age: 75
End: 2025-03-14

## 2025-03-14 NOTE — TELEPHONE ENCOUNTER
Please let patient know that her labs were all wnl except low iron saturation and should discuss with PCP. We will await her echo and stress testing on 4/4/25. Thanks.     Given to Jenn to scan.

## 2025-04-04 ENCOUNTER — HOSPITAL ENCOUNTER (OUTPATIENT)
Age: 75
End: 2025-04-04
Attending: INTERNAL MEDICINE
Payer: MEDICARE

## 2025-04-04 ENCOUNTER — HOSPITAL ENCOUNTER (OUTPATIENT)
Dept: NUCLEAR MEDICINE | Age: 75
Discharge: HOME | End: 2025-04-04
Attending: INTERNAL MEDICINE
Payer: MEDICARE

## 2025-04-04 ENCOUNTER — RESULTS FOLLOW-UP (OUTPATIENT)
Dept: CARDIOLOGY CLINIC | Age: 75
End: 2025-04-04

## 2025-04-04 ENCOUNTER — HOSPITAL ENCOUNTER (OUTPATIENT)
Age: 75
Discharge: HOME | End: 2025-04-04
Attending: INTERNAL MEDICINE
Payer: MEDICARE

## 2025-04-04 VITALS — SYSTOLIC BLOOD PRESSURE: 147 MMHG | DIASTOLIC BLOOD PRESSURE: 69 MMHG | HEART RATE: 84 BPM | RESPIRATION RATE: 16 BRPM

## 2025-04-04 VITALS — WEIGHT: 189 LBS | HEIGHT: 64 IN | BODY MASS INDEX: 32.27 KG/M2

## 2025-04-04 DIAGNOSIS — R94.31 ABNORMAL ELECTROCARDIOGRAPHY: ICD-10-CM

## 2025-04-04 DIAGNOSIS — R07.9 CHEST PAIN, UNSPECIFIED TYPE: ICD-10-CM

## 2025-04-04 LAB
ECHO AO ASC DIAM: 3.6 CM
ECHO AO ASCENDING AORTA INDEX: 1.88 CM/M2
ECHO AO ROOT DIAM: 3.7 CM
ECHO AO ROOT INDEX: 1.94 CM/M2
ECHO AV AREA PEAK VELOCITY: 2.1 CM2
ECHO AV AREA VTI: 2.5 CM2
ECHO AV AREA/BSA PEAK VELOCITY: 1.1 CM2/M2
ECHO AV AREA/BSA VTI: 1.3 CM2/M2
ECHO AV MEAN GRADIENT: 5 MMHG
ECHO AV MEAN VELOCITY: 1 M/S
ECHO AV PEAK GRADIENT: 9 MMHG
ECHO AV PEAK VELOCITY: 1.5 M/S
ECHO AV VELOCITY RATIO: 0.73
ECHO AV VTI: 32.1 CM
ECHO BSA: 1.97 M2
ECHO BSA: 1.97 M2
ECHO LA AREA 2C: 25.6 CM2
ECHO LA AREA 4C: 23.7 CM2
ECHO LA MAJOR AXIS: 5.7 CM
ECHO LA MINOR AXIS: 6.2 CM
ECHO LA VOL BP: 85 ML (ref 22–52)
ECHO LA VOL MOD A2C: 86 ML (ref 22–52)
ECHO LA VOL MOD A4C: 79 ML (ref 22–52)
ECHO LA VOL/BSA BIPLANE: 45 ML/M2 (ref 16–34)
ECHO LA VOLUME INDEX MOD A2C: 45 ML/M2 (ref 16–34)
ECHO LA VOLUME INDEX MOD A4C: 41 ML/M2 (ref 16–34)
ECHO LV E' LATERAL VELOCITY: 9.99 CM/S
ECHO LV E' SEPTAL VELOCITY: 6.53 CM/S
ECHO LV EF PHYSICIAN: 65 %
ECHO LV FRACTIONAL SHORTENING: 56 % (ref 28–44)
ECHO LV INTERNAL DIMENSION DIASTOLE INDEX: 2.36 CM/M2
ECHO LV INTERNAL DIMENSION DIASTOLIC: 4.5 CM (ref 3.9–5.3)
ECHO LV INTERNAL DIMENSION SYSTOLIC INDEX: 1.05 CM/M2
ECHO LV INTERNAL DIMENSION SYSTOLIC: 2 CM
ECHO LV IVSD: 1.1 CM (ref 0.6–0.9)
ECHO LV MASS 2D: 186.4 G (ref 67–162)
ECHO LV MASS INDEX 2D: 97.6 G/M2 (ref 43–95)
ECHO LV POSTERIOR WALL DIASTOLIC: 1.2 CM (ref 0.6–0.9)
ECHO LV RELATIVE WALL THICKNESS RATIO: 0.53
ECHO LVOT AREA: 2.8 CM2
ECHO LVOT AV VTI INDEX: 0.85
ECHO LVOT DIAM: 1.9 CM
ECHO LVOT MEAN GRADIENT: 3 MMHG
ECHO LVOT PEAK GRADIENT: 5 MMHG
ECHO LVOT PEAK VELOCITY: 1.1 M/S
ECHO LVOT STROKE VOLUME INDEX: 40.4 ML/M2
ECHO LVOT SV: 77.1 ML
ECHO LVOT VTI: 27.2 CM
ECHO MV A VELOCITY: 0.97 M/S
ECHO MV AREA VTI: 2.3 CM2
ECHO MV E DECELERATION TIME (DT): 246 MS
ECHO MV E VELOCITY: 1.12 M/S
ECHO MV E/A RATIO: 1.15
ECHO MV E/E' LATERAL: 11.21
ECHO MV E/E' RATIO (AVERAGED): 14.18
ECHO MV E/E' SEPTAL: 17.15
ECHO MV LVOT VTI INDEX: 1.24
ECHO MV MAX VELOCITY: 1.3 M/S
ECHO MV MEAN GRADIENT: 3 MMHG
ECHO MV MEAN VELOCITY: 0.9 M/S
ECHO MV PEAK GRADIENT: 7 MMHG
ECHO MV VTI: 33.6 CM
ECHO PV ACCELERATION TIME (AT): 50 MS
ECHO PV MAX VELOCITY: 1 M/S
ECHO PV PEAK GRADIENT: 4 MMHG
ECHO RA AREA 4C: 23.7 CM2
ECHO RA END SYSTOLIC VOLUME APICAL 4 CHAMBER INDEX BSA: 41 ML/M2
ECHO RA VOLUME: 78 ML
ECHO RV FREE WALL PEAK S': 14.1 CM/S
ECHO RV TAPSE: 2.5 CM (ref 1.7–?)
NUC REST DIASTOLIC VOLUME INDEX: 81 ML/M2
NUC REST EJECTION FRACTION: 68 %
NUC REST SYSTOLIC VOLUME INDEX: 26 ML/M2
STRESS BASELINE DIAS BP: 74 MMHG
STRESS BASELINE HR: 76 BPM
STRESS BASELINE SYS BP: 141 MMHG
STRESS ESTIMATED WORKLOAD: 1 METS
STRESS EXERCISE DUR MIN: 1 MIN
STRESS EXERCISE DUR SEC: 40 SEC
STRESS PEAK DIAS BP: 69 MMHG
STRESS PEAK SYS BP: 147 MMHG
STRESS PERCENT HR ACHIEVED: 85 %
STRESS POST PEAK HR: 121 BPM
STRESS RATE PRESSURE PRODUCT: NORMAL BPM*MMHG
STRESS ST DEPRESSION: 0 MM
STRESS TARGET HR: 143 BPM

## 2025-04-04 PROCEDURE — 3430000000 HC RX DIAGNOSTIC RADIOPHARMACEUTICAL: Performed by: INTERNAL MEDICINE

## 2025-04-04 PROCEDURE — 78452 HT MUSCLE IMAGE SPECT MULT: CPT

## 2025-04-04 PROCEDURE — 93017 CV STRESS TEST TRACING ONLY: CPT

## 2025-04-04 PROCEDURE — A9502 TC99M TETROFOSMIN: HCPCS | Performed by: INTERNAL MEDICINE

## 2025-04-04 PROCEDURE — 93306 TTE W/DOPPLER COMPLETE: CPT

## 2025-04-04 PROCEDURE — 6360000002 HC RX W HCPCS: Performed by: INTERNAL MEDICINE

## 2025-04-04 PROCEDURE — 93306 TTE W/DOPPLER COMPLETE: CPT | Performed by: INTERNAL MEDICINE

## 2025-04-04 RX ORDER — REGADENOSON 0.08 MG/ML
0.4 INJECTION, SOLUTION INTRAVENOUS
Status: COMPLETED | OUTPATIENT
Start: 2025-04-04 | End: 2025-04-04

## 2025-04-04 RX ADMIN — REGADENOSON 0.4 MG: 0.08 INJECTION, SOLUTION INTRAVENOUS at 10:31

## 2025-04-04 RX ADMIN — TETROFOSMIN 32.8 MILLICURIE: 1.38 INJECTION, POWDER, LYOPHILIZED, FOR SOLUTION INTRAVENOUS at 10:34

## 2025-04-04 RX ADMIN — TETROFOSMIN 12.2 MILLICURIE: 1.38 INJECTION, POWDER, LYOPHILIZED, FOR SOLUTION INTRAVENOUS at 09:01

## 2025-05-12 ENCOUNTER — HOSPITAL ENCOUNTER (OUTPATIENT)
Dept: PHYSICAL THERAPY | Age: 75
Setting detail: THERAPIES SERIES
Discharge: HOME | End: 2025-05-12
Payer: MEDICARE

## 2025-05-12 ENCOUNTER — TRANSCRIBE ORDERS (OUTPATIENT)
Dept: ADMINISTRATIVE | Age: 75
End: 2025-05-12

## 2025-05-12 DIAGNOSIS — R26.9 GAIT DIFFICULTY: ICD-10-CM

## 2025-05-12 DIAGNOSIS — M81.8 OTHER OSTEOPOROSIS, UNSPECIFIED PATHOLOGICAL FRACTURE PRESENCE: ICD-10-CM

## 2025-05-12 DIAGNOSIS — M54.50 CHRONIC BILATERAL LOW BACK PAIN WITHOUT SCIATICA: Primary | ICD-10-CM

## 2025-05-12 DIAGNOSIS — Z74.09 IMPAIRED MOBILITY AND ADLS: ICD-10-CM

## 2025-05-12 DIAGNOSIS — M25.60 JOINT STIFFNESS: ICD-10-CM

## 2025-05-12 DIAGNOSIS — R29.898 WEAKNESS OF BOTH LOWER EXTREMITIES: ICD-10-CM

## 2025-05-12 DIAGNOSIS — M48.062 LUMBAR STENOSIS WITH NEUROGENIC CLAUDICATION: ICD-10-CM

## 2025-05-12 DIAGNOSIS — G89.29 CHRONIC BILATERAL LOW BACK PAIN WITHOUT SCIATICA: Primary | ICD-10-CM

## 2025-05-12 DIAGNOSIS — Z78.9 IMPAIRED MOBILITY AND ADLS: ICD-10-CM

## 2025-05-12 DIAGNOSIS — M54.14 THORACIC RADICULOPATHY: ICD-10-CM

## 2025-05-12 DIAGNOSIS — M43.16 SPONDYLOLISTHESIS OF LUMBAR REGION: Primary | ICD-10-CM

## 2025-05-12 PROCEDURE — 97110 THERAPEUTIC EXERCISES: CPT

## 2025-05-12 PROCEDURE — 97161 PT EVAL LOW COMPLEX 20 MIN: CPT

## 2025-05-12 PROCEDURE — 97530 THERAPEUTIC ACTIVITIES: CPT

## 2025-05-12 NOTE — PLAN OF CARE
[facility] - Outpatient Rehabilitation and Therapy: [address] [place] [address] office: [phone] fax: [phone]     Physical Therapy Initial Evaluation Certification      Dear Trey Gaytan MD ,    We had the pleasure of evaluating the following patient for physical therapy services at [facility] Outpatient Physical Therapy.  A summary of our findings can be found in the initial assessment below.  This includes our plan of care.  If you have any questions or concerns regarding these findings, please do not hesitate to contact me at the office phone number listed above.  Thank you for the referral.     Physician Signature:_______________________________Date:__________________  By signing above (or electronic signature), therapist’s plan is approved by physician       Physical Therapy: TREATMENT/PROGRESS NOTE   Patient: Jillian Stinson (75 y.o. female)   Examination Date: 2025   :  1950 MRN: [id number]   Visit #: 1   Insurance Allowable Auth Needed   bomn []Yes    [x]No    Insurance: Payor: AETNA MEDICARE / Plan: AETNA MEDICARE-ADVANTAGE PPO / Product Type: Medicare /   Insurance ID: [id number] - (Medicare Managed)  Secondary Insurance (if applicable):    Treatment Diagnosis:     ICD-10-CM    1. Chronic bilateral low back pain without sciatica  M54.50     G89.29       2. Impaired mobility and ADLs  Z74.09     Z78.9       3. Joint stiffness  M25.60       4. Gait difficulty  R26.9       5. Weakness of both lower extremities  R29.898          Medical Diagnosis:  Spondylolisthesis, lumbar region [M43.16]   Referring Physician: Trey Gaytan MD  PCP: Rylee Scott APRN - NP     Plan of care signed (Y/N): faxed     Date of Patient follow up with Physician:      Plan of Care Report: EVAL today  POC update due: (10 visits /OR AUTH LIMITS, whichever is less)  2025                                             Medical History:  Comorbidities:  Diabetes (Type I or

## 2025-05-14 ENCOUNTER — HOSPITAL ENCOUNTER (OUTPATIENT)
Dept: PHYSICAL THERAPY | Age: 75
Setting detail: THERAPIES SERIES
Discharge: HOME | End: 2025-05-14
Payer: MEDICARE

## 2025-05-14 PROCEDURE — 97110 THERAPEUTIC EXERCISES: CPT

## 2025-05-14 PROCEDURE — 97140 MANUAL THERAPY 1/> REGIONS: CPT

## 2025-05-14 NOTE — FLOWSHEET NOTE
[facility] - Outpatient Rehabilitation and Therapy: [address] [place] [address] office: [phone] fax: [phone]       Physical Therapy: TREATMENT/PROGRESS NOTE   Patient: Jillian Stinson (75 y.o. female)   Examination Date: 2025   :  1950 MRN: [id number]   Visit #: 2   Insurance Allowable Auth Needed   bomn []Yes    [x]No    Insurance: Payor: AETNA MEDICARE / Plan: [employer] MEDICARE-ADVANTAGE PPO / Product Type: Medicare /   Insurance ID: [id number] - (Medicare Managed)  Secondary Insurance (if applicable):    Treatment Diagnosis:     ICD-10-CM    1. Chronic bilateral low back pain without sciatica  M54.50     G89.29       2. Impaired mobility and ADLs  Z74.09     Z78.9       3. Joint stiffness  M25.60       4. Gait difficulty  R26.9       5. Weakness of both lower extremities  R29.898          Medical Diagnosis:  Spondylolisthesis, lumbar region [M43.16]   Referring Physician: Trey Gaytan MD  PCP: Rylee Scott APRN - NP     Plan of care signed (Y/N): faxed     Date of Patient follow up with Physician:      Plan of Care Report: NO  POC update due: (10 visits /OR AUTH LIMITS, whichever is less)  2025                                             Medical History:  Comorbidities:  Diabetes (Type I or II)  Hypertension  Osteoarthritis  Depression  Other Cardio/Pulmonary Conditions: asthma, H/O blood clots and PE  Prior Surgeries: Cervical Fusion C4-5, C6-7; Lumbar laminectomy L3-4, L4-5 2023; lumbar laminectomy L2-3, L3-4 2024 ; R RTSR                                         Precautions/ Contra-indications:           Latex allergy:  NO  Pacemaker:    NO  Contraindications for Manipulation: None  Date of Surgery: NA  Other:    Red Flags:  None    Suicide Screening:   The patient did not verbalize a primary behavioral concern, suicidal ideation, suicidal intent, or demonstrate suicidal behaviors.    Preferred Language for Healthcare:   [x] English       []

## 2025-05-19 ENCOUNTER — HOSPITAL ENCOUNTER (OUTPATIENT)
Dept: PHYSICAL THERAPY | Age: 75
Setting detail: THERAPIES SERIES
Discharge: HOME | End: 2025-05-19
Payer: MEDICARE

## 2025-05-19 PROCEDURE — 97110 THERAPEUTIC EXERCISES: CPT

## 2025-05-19 PROCEDURE — 97140 MANUAL THERAPY 1/> REGIONS: CPT

## 2025-05-19 NOTE — FLOWSHEET NOTE
[facility] - Outpatient Rehabilitation and Therapy: [address] [place] [address] office: [phone] fax: [phone]       Physical Therapy: TREATMENT/PROGRESS NOTE   Patient: Jillian Stinson (75 y.o. female)   Examination Date: 2025   :  1950 MRN: [id number]   Visit #: 3   Insurance Allowable Auth Needed   bomn []Yes    [x]No    Insurance: Payor: AET MEDICARE / Plan: [employer] [place] MEDICARE-ADVANTAGE PPO / Product Type: Medicare /   Insurance ID: [id number] - (Medicare Managed)  Secondary Insurance (if applicable):    Treatment Diagnosis:     ICD-10-CM    1. Chronic bilateral low back pain without sciatica  M54.50     G89.29       2. Impaired mobility and ADLs  Z74.09     Z78.9       3. Joint stiffness  M25.60       4. Gait difficulty  R26.9       5. Weakness of both lower extremities  R29.898          Medical Diagnosis:  Spondylolisthesis, lumbar region [M43.16]   Referring Physician: Trey Gaytan MD  PCP: Rylee Scott APRN - NP     Plan of care signed (Y/N): faxed     Date of Patient follow up with Physician:      Plan of Care Report: NO  POC update due: (10 visits /OR AUTH LIMITS, whichever is less)  2025                                             Medical History:  Comorbidities:  Diabetes (Type I or II)  Hypertension  Osteoarthritis  Depression  Other Cardio/Pulmonary Conditions: asthma, H/O blood clots and PE  Prior Surgeries: Cervical Fusion C4-5, C6-7; Lumbar laminectomy L3-4, L4-5 2023; lumbar laminectomy L2-3, L3-4 2024 ; R RTSR                                         Precautions/ Contra-indications:           Latex allergy:  NO  Pacemaker:    NO  Contraindications for Manipulation: None  Date of Surgery: NA  Other:    Red Flags:  None    Suicide Screening:   The patient did not verbalize a primary behavioral concern, suicidal ideation, suicidal intent, or demonstrate suicidal behaviors.    Preferred Language for Healthcare:   [x] English       []

## 2025-05-22 ENCOUNTER — HOSPITAL ENCOUNTER (OUTPATIENT)
Dept: PHYSICAL THERAPY | Age: 75
Setting detail: THERAPIES SERIES
Discharge: HOME | End: 2025-05-22
Payer: MEDICARE

## 2025-05-22 PROCEDURE — 97110 THERAPEUTIC EXERCISES: CPT

## 2025-05-22 PROCEDURE — 97140 MANUAL THERAPY 1/> REGIONS: CPT

## 2025-05-22 NOTE — FLOWSHEET NOTE
[facility] - [facility]: [address] [place] [address] office: [phone] fax: [phone]       Physical Therapy: TREATMENT/PROGRESS NOTE   Patient: Jillian Stinson (75 y.o. female)   Examination Date: 2025   :  1950 MRN: [id number]   Visit #: 4   Insurance Allowable Auth Needed   bomn []Yes    [x]No    Insurance: Payor: AET MEDICARE / Plan: [id number] MEDICARE-ADVANTAGE PPO / Product Type: Medicare /   Insurance ID: [id number] - (Medicare Managed)  Secondary Insurance (if applicable):    Treatment Diagnosis:     ICD-10-CM    1. Chronic bilateral low back pain without sciatica  M54.50     G89.29       2. Impaired mobility and ADLs  Z74.09     Z78.9       3. Joint stiffness  M25.60       4. Gait difficulty  R26.9       5. Weakness of both lower extremities  R29.898          Medical Diagnosis:  Spondylolisthesis, lumbar region [M43.16]   Referring Physician: Trey Gaytan MD  PCP: Rylee Scott APRN - NP     Plan of care signed (Y/N): faxed     Date of Patient follow up with Physician:      Plan of Care Report: NO  POC update due: (10 visits /OR AUTH LIMITS, whichever is less)  2025                                             Medical History:  Comorbidities:  Diabetes (Type I or II)  Hypertension  Osteoarthritis  Depression  Other Cardio/Pulmonary Conditions: asthma, H/O blood clots and PE  Prior Surgeries: Cervical Fusion C4-5, C6-7; Lumbar laminectomy L3-4, L4-5 2023; lumbar laminectomy L2-3, L3-4 2024 ; R RTSR                                         Precautions/ Contra-indications:           Latex allergy:  NO  Pacemaker:    NO  Contraindications for Manipulation: None  Date of Surgery: NA  Other:    Red Flags:  None    Suicide Screening:   The patient did not verbalize a primary behavioral concern, suicidal ideation, suicidal intent, or demonstrate suicidal behaviors.    Preferred Language for Healthcare:   [x] English       []

## 2025-05-28 ENCOUNTER — HOSPITAL ENCOUNTER (OUTPATIENT)
Dept: PHYSICAL THERAPY | Age: 75
Setting detail: THERAPIES SERIES
Discharge: HOME | End: 2025-05-28
Payer: MEDICARE

## 2025-05-28 PROCEDURE — 97140 MANUAL THERAPY 1/> REGIONS: CPT

## 2025-05-28 PROCEDURE — 97110 THERAPEUTIC EXERCISES: CPT

## 2025-05-28 NOTE — FLOWSHEET NOTE
64% disability    Other:              Pain:  Pain location: B LB with occasional R LE pain down lateral side  Patient describes pain to be constant, aching, and throbbing  Pain decreases with: Sitting  Pain increases with: Activity and Movement, Standing, and Walking     OBJECTIVE EXAMINATION     ROM:  Date Lumbar Flex Lumbar Ext Lumbar L SB Lumbar R SB Lumbar L Rot Lumbar R Rot   Eval 5/12 FT to right above ankles To neutral only FT to knee jt line FT to knee jt line Min decreased  Min decreased                Strength:  Date Hip flexion Hip abduction Hip extension Quad Hamstring Ankle DF Ankle PF   Eval 5/12 3/5 3/5 3-/5 4+/5 5/5 5/5 5/5                 Palpation:   Patient reported tenderness with palpation  Location:B lumbar paraspinals    Posture:   increased thoracic kyphosis      Specific Joint Mobility Testing/Accessory Motions:      Lumbar: hypomobility of L2 L3 L4 L5    Gait:    Pattern: antalgic pattern, decreased gerber, decreased step length, and forward flexed posture  Assistive Device Used: no AD      Exercises/Interventions     Therapeutic Ex (38908)  resistance Sets/time Reps Notes/Cues/Progressions                 Nu step L1 X5 mins     HS step stretch  20 sec 3x L/R    Standing hip abd                       extension Light green  Light green 1  1 10x L/R  10x L/R    Seated FAQ 4# 2 10x B    Seated flexion stretch w/ swiss ball   1 5x each   Fwd/diagonal   Seated leg press 30# 2 10    Seated Tband HS curls Red  1 20x L/R           SKTC  10 sec  5x    Supine LTR  1 10x    Hooklying tband hip abd Red 2 10x    SL clamshell Red  1 10x           Manual Intervention (65667)  TIME            Lumbar manual traction with swiss ball - intermittent  X8 mins                          NMR re-education (53348) resistance Sets/time Reps CUES NEEDED          Supine posterior pelvic tilt  5 secs 10x Tactile cueing required   bridge  1 10x    Tband multifidus walkouts Add              Therapeutic Activity (29250)

## 2025-05-30 ENCOUNTER — HOSPITAL ENCOUNTER (OUTPATIENT)
Dept: WOMENS IMAGING | Age: 75
Discharge: HOME | End: 2025-05-30
Attending: NEUROLOGICAL SURGERY
Payer: MEDICARE

## 2025-05-30 ENCOUNTER — HOSPITAL ENCOUNTER (OUTPATIENT)
Dept: CT IMAGING | Age: 75
Discharge: HOME | End: 2025-05-30
Attending: NEUROLOGICAL SURGERY
Payer: MEDICARE

## 2025-05-30 DIAGNOSIS — M48.062 LUMBAR STENOSIS WITH NEUROGENIC CLAUDICATION: ICD-10-CM

## 2025-05-30 DIAGNOSIS — M81.8 OTHER OSTEOPOROSIS, UNSPECIFIED PATHOLOGICAL FRACTURE PRESENCE: ICD-10-CM

## 2025-05-30 DIAGNOSIS — M54.14 THORACIC RADICULOPATHY: ICD-10-CM

## 2025-05-30 DIAGNOSIS — M43.16 SPONDYLOLISTHESIS OF LUMBAR REGION: ICD-10-CM

## 2025-05-30 PROCEDURE — 77080 DXA BONE DENSITY AXIAL: CPT

## 2025-05-30 PROCEDURE — 72131 CT LUMBAR SPINE W/O DYE: CPT

## 2025-05-30 PROCEDURE — 72128 CT CHEST SPINE W/O DYE: CPT

## 2025-06-02 ENCOUNTER — HOSPITAL ENCOUNTER (OUTPATIENT)
Dept: PHYSICAL THERAPY | Age: 75
Setting detail: THERAPIES SERIES
Discharge: HOME | End: 2025-06-02
Payer: MEDICARE

## 2025-06-02 PROCEDURE — 97112 NEUROMUSCULAR REEDUCATION: CPT

## 2025-06-02 PROCEDURE — 97110 THERAPEUTIC EXERCISES: CPT

## 2025-06-02 NOTE — FLOWSHEET NOTE
HEP and progression per patient tolerance, in order to prevent re-injury.   [] Progressing: [] Met: [] Not Met: [] Adjusted  Patient will have a decrease in pain by 40% to facilitate improvement in movement, function, and ADLs as indicated by Functional Deficits.  [] Progressing: [] Met: [] Not Met: [] Adjusted    Long Term Goals: To be achieved in: at discharge  Disability index score of 40% or less for the Modified Oswestry to assist with reaching prior level of function with activities such as sit to stand transfers.  [] Progressing: [] Met: [] Not Met: [] Adjusted  Patient will demonstrate increased AROM of Lspine flexion to WNL without pain to allow for proper joint functioning to enable patient to picking items off the floor.   [] Progressing: [] Met: [] Not Met: [] Adjusted  Patient will demonstrate increased Strength of B proximal hip strength to at least 4/5 throughout without pain to allow for proper functional mobility to enable patient to return to doing light cooking.   [] Progressing: [] Met: [] Not Met: [] Adjusted  Patient will return to doing light housework with 25% increased ease.    [] Progressing: [] Met: [] Not Met: [] Adjusted  Patient will report 25% increased ease with going up and down stairs.   [] Progressing: [] Met: [] Not Met: [] Adjusted       Overall Progression Towards Functional goals/ Treatment Progress Update:  [] Patient is progressing as expected towards functional goals listed.    [] Progression is slowed due to complexities/Impairments listed.  [] Progression has been slowed due to co-morbidities.  [x] Plan just implemented, too soon (<30days) to assess goals progression   [] Goals require adjustment due to lack of progress  [] Patient is not progressing as expected and requires additional follow up with physician  [] Other:     TREATMENT PLAN     Plan: Cont POC- Continue emphasis/focus on exercise progression, improving proper muscle recruitment and activation/motor control

## 2025-06-04 ENCOUNTER — HOSPITAL ENCOUNTER (OUTPATIENT)
Dept: PHYSICAL THERAPY | Age: 75
Setting detail: THERAPIES SERIES
Discharge: HOME | End: 2025-06-04
Payer: MEDICARE

## 2025-06-04 PROCEDURE — 97110 THERAPEUTIC EXERCISES: CPT

## 2025-06-04 PROCEDURE — 97112 NEUROMUSCULAR REEDUCATION: CPT

## 2025-06-04 NOTE — FLOWSHEET NOTE
[facility] - Out[facility] Therapy: [address] [place] [address] office: [phone] fax: [phone]       Physical Therapy: TREATMENT/PROGRESS NOTE   Patient: Jillian Stinson (75 y.o. female)   Examination Date: 2025   :  1950 MRN: [id number]   Visit #: 7   Insurance Allowable Auth Needed   bomn []Yes    [x]No    Insurance: Payor: AET MEDICARE / Plan: AET[employer]box MEDICARE-ADVANTAGE PPO / Product Type: Medicare /   Insurance ID: [id number] - (Medicare Managed)  Secondary Insurance (if applicable):    Treatment Diagnosis:     ICD-10-CM    1. Chronic bilateral low back pain without sciatica  M54.50     G89.29       2. Impaired mobility and ADLs  Z74.09     Z78.9       3. Joint stiffness  M25.60       4. Gait difficulty  R26.9       5. Weakness of both lower extremities  R29.898          Medical Diagnosis:  Spondylolisthesis, lumbar region [M43.16]   Referring Physician: Trey Gaytan MD  PCP: Rylee Scott APRN - NP     Plan of care signed (Y/N): faxed     Date of Patient follow up with Physician:      Plan of Care Report: NO  POC update due: (10 visits /OR AUTH LIMITS, whichever is less)  2025                                             Medical History:  Comorbidities:  Diabetes (Type I or II)  Hypertension  Osteoarthritis  Depression  Other Cardio/Pulmonary Conditions: asthma, H/O blood clots and PE  Prior Surgeries: Cervical Fusion C4-5, C6-7; Lumbar laminectomy L3-4, L4-5 2023; lumbar laminectomy L2-3, L3-4 2024 ; R RTSR                                         Precautions/ Contra-indications:           Latex allergy:  NO  Pacemaker:    NO  Contraindications for Manipulation: None  Date of Surgery: NA  Other:    Red Flags:  None    Suicide Screening:   The patient did not verbalize a primary behavioral concern, suicidal ideation, suicidal intent, or demonstrate suicidal behaviors.    Preferred Language for Healthcare:   [x] English       []

## 2025-06-09 ENCOUNTER — HOSPITAL ENCOUNTER (OUTPATIENT)
Dept: PHYSICAL THERAPY | Age: 75
Setting detail: THERAPIES SERIES
Discharge: HOME | End: 2025-06-09
Payer: MEDICARE

## 2025-06-09 PROCEDURE — 97110 THERAPEUTIC EXERCISES: CPT

## 2025-06-09 NOTE — THERAPY DISCHARGE
[facility] - [facility] Therapy: [address] [place] [address] office: [phone] fax: [phone]     Physical Therapy Discharge Summary    Dear Trey Gaytan MD   ,    We had the pleasure of treating the following patient for physical therapy services at [facility].  A summary of our findings can be found in the discharge summary below.  If you have any questions or concerns regarding these findings, please do not hesitate to contact me at the office phone number checked above.  Thank you for the referral.     Assessment: PT with minimal to no changes noted after 8 PT sessions.  Pt continues to have high pain levels reported at 6-10/10 depending on activity.  Tolerance to normal activities such as light housework, going up/down stairs remains the same.  Slight decrease in Lumbar AROM due to c/o pain noted during re-eval.  Pt does demonstrate some minimal gains in proximal hip strength since starting PT.  Due to lack of progress with PT, believe patient would benefit from return to MD.  Updated HEP for patient to continue with at home with focus on flexion biased stretches and proximal hip strengthening.     Total Visits: 8     Recommendation:   []   [x] Discharge to HEP. Follow up with PT or MD PRN.     Reason for Discharge:  No Subjective or Objective improvements / Plateaued and no longer responding to skilled intervention.        Physical Therapy: TREATMENT/PROGRESS NOTE   Patient: Jillian Stinson (75 y.o. female)   Examination Date: 2025   :  1950 MRN: [id number]   Visit #: 8   Insurance Allowable Auth Needed   bomn []Yes    [x]No    Insurance: Payor: [employer] MEDICARE / Plan: [employer] MEDICARE-ADVANTAGE PPO / Product Type: Medicare /   Insurance ID: [id number] - (Medicare Managed)  Secondary Insurance (if applicable):    Treatment Diagnosis:     ICD-10-CM    1. Chronic bilateral low back pain without sciatica  M54.50     G89.29

## 2025-06-18 ENCOUNTER — TELEPHONE (OUTPATIENT)
Dept: CARDIOLOGY CLINIC | Age: 75
End: 2025-06-18

## 2025-06-18 NOTE — TELEPHONE ENCOUNTER
CARDIAC CLEARANCE     What type of procedure are you having?  L4-S1 Anterior, L2-4 Lateral Interbody Fusions and T10 to the pelvis fixation and SI fusion under general  Which physician is performing your procedure?  Dr. Jarett May  When is your procedure scheduled?  TBD  Where are you having this procedure?  [facility]  Are you taking Blood Thinners?   If so what? (Name/dose/frequesncy)     Does the surgeon want you to stop your blood thinner?  If so for how long?   N/A  Phone Number and Contact Name for Physicians office:  N/A  Fax number to send information:  [phone]  Cardiac History:  Last office visit with Cardiologist:3/7/2025

## 2025-06-18 NOTE — TELEPHONE ENCOUNTER
Clearance from [facility] brought into office by daughter Stephanie. Scanned to chart for review.     Please review and complete.

## 2025-06-19 NOTE — TELEPHONE ENCOUNTER
Called spoke with patient patient states yes she takes Coumadin.   Coumadin 2 mg takes 1 tablet daily  Coumadin 1 mg takes 0.5 tablet daily.     As I was talking to patient regarding the cardiac clearance request from [facility]. She states she was confused and didn't know she was having this procedure.   States will contact [facility]. Will call back on weather to proceed with CC letter.

## 2025-06-19 NOTE — TELEPHONE ENCOUNTER
Patient is cleared for surgery from cardiac standpoint.  Okay to hold Coumadin for 7 days prior to surgery as requested

## 2025-06-19 NOTE — TELEPHONE ENCOUNTER
Jillian returning call - she states she spoke with [facility] and she will proceed with procedure. Jillian also shared that on her procedure information packet it says to hold coumadin 7 days prior.

## 2025-06-19 NOTE — TELEPHONE ENCOUNTER
Preoperative risk assessment    Patient is planning to have L4-S1 Anterior, L2-4 Lateral Interbody Fusions and T10 to the pelvis fixation and SI fusion under general     They are requesting to hold warfain x 7 days.(Not prescribed by you)    Last office visit 3/7/2025    Hx PE,systolic murmur, dilated ascending aorta, HTN, diabetes

## 2025-06-26 ENCOUNTER — HOSPITAL ENCOUNTER (OUTPATIENT)
Dept: MRI IMAGING | Age: 75
Discharge: HOME | End: 2025-06-26
Attending: NEUROLOGICAL SURGERY
Payer: MEDICARE

## 2025-06-26 DIAGNOSIS — M41.25 IDIOPATHIC SCOLIOSIS OF THORACOLUMBAR REGION: ICD-10-CM

## 2025-06-26 DIAGNOSIS — M43.16 SPONDYLOLISTHESIS OF LUMBAR REGION: ICD-10-CM

## 2025-06-26 PROCEDURE — 72146 MRI CHEST SPINE W/O DYE: CPT

## 2025-06-26 PROCEDURE — 72148 MRI LUMBAR SPINE W/O DYE: CPT

## 2025-07-03 ENCOUNTER — HOSPITAL ENCOUNTER (EMERGENCY)
Age: 75
Discharge: HOME | End: 2025-07-03
Attending: EMERGENCY MEDICINE
Payer: MEDICARE

## 2025-07-03 ENCOUNTER — APPOINTMENT (OUTPATIENT)
Dept: GENERAL RADIOLOGY | Age: 75
End: 2025-07-03
Payer: MEDICARE

## 2025-07-03 VITALS
TEMPERATURE: 97.9 F | OXYGEN SATURATION: 99 % | DIASTOLIC BLOOD PRESSURE: 84 MMHG | BODY MASS INDEX: 30.49 KG/M2 | WEIGHT: 178.57 LBS | RESPIRATION RATE: 16 BRPM | HEIGHT: 64 IN | SYSTOLIC BLOOD PRESSURE: 161 MMHG | HEART RATE: 91 BPM

## 2025-07-03 DIAGNOSIS — S20.211A CONTUSION OF RIB ON RIGHT SIDE, INITIAL ENCOUNTER: Primary | ICD-10-CM

## 2025-07-03 PROCEDURE — 99283 EMERGENCY DEPT VISIT LOW MDM: CPT

## 2025-07-03 PROCEDURE — 71101 X-RAY EXAM UNILAT RIBS/CHEST: CPT

## 2025-07-03 PROCEDURE — 6370000000 HC RX 637 (ALT 250 FOR IP): Performed by: EMERGENCY MEDICINE

## 2025-07-03 RX ORDER — HYDROCODONE BITARTRATE AND ACETAMINOPHEN 5; 325 MG/1; MG/1
1 TABLET ORAL ONCE
Status: COMPLETED | OUTPATIENT
Start: 2025-07-03 | End: 2025-07-03

## 2025-07-03 RX ORDER — HYDROCODONE BITARTRATE AND ACETAMINOPHEN 5; 325 MG/1; MG/1
1 TABLET ORAL EVERY 6 HOURS PRN
Qty: 12 TABLET | Refills: 0 | Status: SHIPPED | OUTPATIENT
Start: 2025-07-03 | End: 2025-07-06

## 2025-07-03 RX ADMIN — HYDROCODONE BITARTRATE AND ACETAMINOPHEN 1 TABLET: 5; 325 TABLET ORAL at 21:42

## 2025-07-03 ASSESSMENT — PAIN DESCRIPTION - LOCATION
LOCATION: CHEST
LOCATION: RIB CAGE

## 2025-07-03 ASSESSMENT — LIFESTYLE VARIABLES
HOW OFTEN DO YOU HAVE A DRINK CONTAINING ALCOHOL: NEVER
HOW MANY STANDARD DRINKS CONTAINING ALCOHOL DO YOU HAVE ON A TYPICAL DAY: PATIENT DOES NOT DRINK

## 2025-07-03 ASSESSMENT — PAIN DESCRIPTION - FREQUENCY: FREQUENCY: INTERMITTENT

## 2025-07-03 ASSESSMENT — PAIN DESCRIPTION - ORIENTATION
ORIENTATION: RIGHT
ORIENTATION: RIGHT

## 2025-07-03 ASSESSMENT — PAIN SCALES - GENERAL
PAINLEVEL_OUTOF10: 8
PAINLEVEL_OUTOF10: 5
PAINLEVEL_OUTOF10: 9

## 2025-07-03 ASSESSMENT — PAIN DESCRIPTION - DESCRIPTORS
DESCRIPTORS: ACHING
DESCRIPTORS: SHARP

## 2025-07-03 ASSESSMENT — PAIN - FUNCTIONAL ASSESSMENT: PAIN_FUNCTIONAL_ASSESSMENT: PREVENTS OR INTERFERES SOME ACTIVE ACTIVITIES AND ADLS

## 2025-07-03 ASSESSMENT — PAIN DESCRIPTION - PAIN TYPE: TYPE: ACUTE PAIN

## 2025-07-03 ASSESSMENT — PAIN DESCRIPTION - ONSET: ONSET: ON-GOING

## 2025-07-04 NOTE — ED PROVIDER NOTES
[facility] EMERGENCY DEPARTMENT  EMERGENCY DEPARTMENT ENCOUNTER      Pt Name: Jillian Stinson  MRN: [id number]  Birthdate 1950  Date of evaluation: 7/3/2025  Provider: LOKI JAIMES DO    CHIEF COMPLAINT  Chief Complaint   Patient presents with    Rib Pain (injury)     R sided rib pain after pt fell back in lawn chair Sunday and hit ribs, states was starting to feel better but sneezed hard today and hard a crack to the R side of her ribs; denies blood thinners or SOB         This patient is at risk for a communicable infection.  Therefore, personal protection equipment consisting of a mask was worn for the exam.    HPI  Jillian Stinson is a 75 y.o. female who presents with a fall backwards in a lawn chair 5 days prior to arrival (Sunday).  She was sore over but got better.  She was having some right-sided chest pain but that got better.  She sneezed today and felt sudden sharp pain in her right side and felt a pop.  She denies any shortness of breath but it hurts to take deep breath.  She denies any other complaints at this time.  She denies coughing up any blood.  She denies any other injuries at this time.  She denies hit her head and did not lose consciousness.  ?  REVIEW OF SYSTEMS  All systems negative except as noted in the HPI.  Reviewed Nurses' notes and concur.    No LMP recorded. Patient has had a hysterectomy.    PAST MEDICAL HISTORY  Past Medical History:   Diagnosis Date    Asthma     Colon polyp     Depression     DVT (deep venous thrombosis) (HCC)     as a teenager    Esophageal reflux     GERD with stricture     Hx of blood clots     Hyperlipidemia     Hypertension     Insomnia     Migraine 12/29/1995    Osteoarthritis     Protein deficiency     protein S defic.    Pulmonary embolism (HCC)     x`s 2 post-op after Hysterectomy & Gall Bladder    Thyroid adenoma     NO MEDS    Type II or unspecified type diabetes mellitus without mention of complication, not stated as uncontrolled

## 2025-07-04 NOTE — DISCHARGE INSTRUCTIONS
Stop taking tramadol while you are taking hydrocodone.    You have been administered medication(s) (hydrocodone) in the emergency department that may cause drowsiness. Do not be driving, operating heavy machinery, swimming, caring for small children, or engaging in dangerous activities of any type until these medications have worn off. This may be as long as 6-8 hours.    You have been prescribed medication(s) (hydrocodone) that may cause drowsiness. Do not be driving, operating heavy machinery, swimming, caring for small children, or engaging in dangerous activities of any type until these medications have worn off. This may be as long as 6-8 hours.

## 2025-07-04 NOTE — ED NOTES
D/C: Order noted for d/c. Pt confirmed d/c paperwork  have correct name. Discharge and education instructions reviewed with patient. Teach-back successful.  Pt verbalized understanding. Pt denied questions at this time. No acute distress noted. Patient instructed to follow-up as noted - return to emergency department if symptoms worsen. Patient verbalized understanding. Discharged per EDMD with discharge instructions. Pt discharged  to private vehicle. Patient stable upon departure. Thanked patient for choosing [facility] for care.

## (undated) DEVICE — BLADE ES ELASTOMERIC COAT INSUL DURABLE BEND UPTO 90DEG

## (undated) DEVICE — GOWN SIRUS NONREIN LG W/TWL: Brand: MEDLINE INDUSTRIES, INC.

## (undated) DEVICE — DRAPE MICSCP W54XL150IN W/ 4 BINOC GLS LENS LEICA

## (undated) DEVICE — SUTURE VICRYL + SZ 3-0 L18IN ABSRB UD SH 1/2 CIR TAPERCUT NDL VCP864D

## (undated) DEVICE — SPONGE GZ W4XL4IN COT 12 PLY TYP VII WVN C FLD DSGN

## (undated) DEVICE — Device

## (undated) DEVICE — RESERVOIR,SUCTION,100CC,SILICONE: Brand: MEDLINE

## (undated) DEVICE — E-Z CLEAN, NON-STICK, PTFE COATED, ELECTROSURGICAL BLADE ELECTRODE, MODIFIED EXTENDED INSULATION, 2.5 INCH (6.35 CM): Brand: MEGADYNE

## (undated) DEVICE — GLOVE SURG SZ 85 L12IN FNGR THK94MIL STD WHT LTX FREE

## (undated) DEVICE — 3M™ STERI-DRAPE™ U-DRAPE 1015: Brand: STERI-DRAPE™

## (undated) DEVICE — SUTURE MONOCRYL + SZ 4-0 L18IN ABSRB UD L19MM PS-2 3/8 CIR MCP496G

## (undated) DEVICE — LIMB HOLDER, WRIST OR ANKLE: Brand: DEROYAL

## (undated) DEVICE — SOLUTION IV IRRIG POUR BRL 0.9% SODIUM CHL 2F7124

## (undated) DEVICE — SUTURE VICRYL + SZ 3-0 L18IN CT 1 CR ABSRB VCP838D

## (undated) DEVICE — TZ MEDICAL TITANIUM DISTRACTION PINS 14MM: Brand: TZ MEDICAL TITANIUM DISTRACTION PINS

## (undated) DEVICE — TUBING, SUCTION, 1/4" X 12', STRAIGHT: Brand: MEDLINE

## (undated) DEVICE — SYRINGE IRRIG 60ML SFT PLIABLE BLB EZ TO GRP 1 HND USE W/

## (undated) DEVICE — MERCY HEALTH WEST TURNOVER: Brand: MEDLINE INDUSTRIES, INC.

## (undated) DEVICE — SYRINGE MED 30ML STD CLR PLAS LUERLOCK TIP N CTRL DISP

## (undated) DEVICE — SYRINGE 20ML LL S/C 50

## (undated) DEVICE — DRAPE,SPLIT ,77X120: Brand: MEDLINE

## (undated) DEVICE — HANDPIECE SET WITH HIGH FLOW TIP AND SUCTION TUBE: Brand: INTERPULSE

## (undated) DEVICE — FORCEPS BX 240CM 2.4MM L NDL RAD JAW 4 M00513334

## (undated) DEVICE — KIT JACK TBL PT CARE

## (undated) DEVICE — AGENT HEMOSTATIC SURGIFLOW MATRIX KIT W/THROMBIN

## (undated) DEVICE — GLOVE SURG SZ 65 L12IN FNGR THK87MIL WHT LTX FREE

## (undated) DEVICE — STOCKINETTE: Brand: CONVERTORS

## (undated) DEVICE — ELECTRODE BLDE L4IN NONINSULATED EDGE

## (undated) DEVICE — SUTURE VCRL + 1 L27IN ABSRB UD CT-1 L36MM 1/2 CIR TAPR PNT VCP261H

## (undated) DEVICE — SHEET,DRAPE,53X77,STERILE: Brand: MEDLINE

## (undated) DEVICE — STRIP,CLOSURE,WOUND,MEDI-STRIP,1/2X4: Brand: MEDLINE

## (undated) DEVICE — SYRINGE MED 10ML LUERLOCK TIP W/O SFTY DISP

## (undated) DEVICE — DRAIN SURG L49IN DIA1/8IN 10IN H SIL W/O TRCR END PERF

## (undated) DEVICE — GOWN,SIRUS,POLYRNF,BRTHSLV,XL,30/CS: Brand: MEDLINE

## (undated) DEVICE — TOTAL BASIC: Brand: MEDLINE INDUSTRIES, INC.

## (undated) DEVICE — GLOVE SURG SZ 65 L12IN FNGR THK94MIL STD WHT ISOLEX LTX

## (undated) DEVICE — SOLUTION IV IRRIG 500ML 0.9% SODIUM CHL 2F7123

## (undated) DEVICE — SUTURE MCRYL + SZ 4-0 L18IN ABSRB UD L19MM PS-2 3/8 CIR MCP496G

## (undated) DEVICE — INTENDED TO SUPPORT AND MAINTAIN THE POSITION OF AN ANESTHETIZED PATIENT DURING SURGERY: Brand: ERIN BEACH CHAIR FACE MASK

## (undated) DEVICE — ENDOSCOPY KIT: Brand: MEDLINE INDUSTRIES, INC.

## (undated) DEVICE — GUIDE GLEN REVERSED BLUEPRINT
Type: IMPLANTABLE DEVICE | Site: SHOULDER | Status: NON-FUNCTIONAL
Removed: 2022-06-13

## (undated) DEVICE — 3M™ TEGADERM™ TRANSPARENT FILM DRESSING FRAME STYLE, 1626W, 4 IN X 4-3/4 IN (10 CM X 12 CM), 50/CT 4CT/CASE: Brand: 3M™ TEGADERM™

## (undated) DEVICE — INTENDED FOR TISSUE SEPARATION, AND OTHER PROCEDURES THAT REQUIRE A SHARP SURGICAL BLADE TO PUNCTURE OR CUT.: Brand: BARD-PARKER ® STAINLESS STEEL BLADES

## (undated) DEVICE — NEEDLE HYPO 25GA L1.5IN BVL ORIENTED ECLIPSE

## (undated) DEVICE — CONTAINER SPEC 480ML CLR POLYSTYR 10% NEUT BUFF FRMLN ZN

## (undated) DEVICE — GAUZE,SPONGE,2"X2",8PLY,STERILE,LF,2'S: Brand: MEDLINE

## (undated) DEVICE — SINGLE-USE POLYPECTOMY SNARE: Brand: CAPTIFLEX

## (undated) DEVICE — DRAIN SURG 10FR END PERF 1/8IN SIL RND SMOOTH HEAT POLISHED

## (undated) DEVICE — COVER,MAYO STAND,XL,STERILE: Brand: MEDLINE

## (undated) DEVICE — LIQUIBAND RAPID ADHESIVE 36/CS 0.8ML: Brand: MEDLINE

## (undated) DEVICE — SOLUTION IRRIG 500ML 0.9% SOD CHLO USP POUR PLAS BTL

## (undated) DEVICE — SUTURE PERMA-HAND SZ 2-0 L30IN NONABSORBABLE BLK L26MM SH K833H

## (undated) DEVICE — GLOVE SURG SZ 7 CRM LTX FREE POLYISOPRENE POLYMER BEAD ANTI

## (undated) DEVICE — ELECTRODE PT RET AD L9FT HI MOIST COND ADH HYDRGEL CORDED

## (undated) DEVICE — SUTURE VCRL SZ 3-0 L18IN ABSRB UD L26MM SH 1/2 CIR J864D

## (undated) DEVICE — LOTION PREP REMV 5OZ IODO CLR TINC OF BENZ DURAPREP

## (undated) DEVICE — STERILE-Z STAND AND BACK TABLE COVER 66IN X 95IN: Brand: STERILE-Z

## (undated) DEVICE — TOWEL,OR,DSP,ST,BLUE,STD,4/PK,20PK/CS: Brand: MEDLINE

## (undated) DEVICE — GLOVE ORANGE PI 8 1/2   MSG9085

## (undated) DEVICE — 1010 S-DRAPE TOWEL DRAPE 10/BX: Brand: STERI-DRAPE™

## (undated) DEVICE — NEEDLE 25GA X 1.5 IN ECLIPSE

## (undated) DEVICE — BASIC SINGLE BASIN 1-LF: Brand: MEDLINE INDUSTRIES, INC.

## (undated) DEVICE — DRAPE,U/SHT,SPLIT,FILM,60X84,STERILE: Brand: MEDLINE

## (undated) DEVICE — 450 ML BOTTLE OF 0.05% CHLORHEXIDINE GLUCONATE IN 99.95% STERILE WATER FOR IRRIGATION, USP AND APPLICATOR.: Brand: IRRISEPT ANTIMICROBIAL WOUND LAVAGE

## (undated) DEVICE — SUTURE VCRL + SZ 2-0 L27IN ABSRB CLR CT-1 1/2 CIR TAPERCUT VCP259H

## (undated) DEVICE — 3M™ STERI-DRAPE™ INSTRUMENT POUCH 1018: Brand: STERI-DRAPE™

## (undated) DEVICE — TRANSFER SET 3": Brand: MEDLINE INDUSTRIES, INC.

## (undated) DEVICE — DRAPE C ARM UNIV W41XL74IN CLR PLAS XR VELC CLSR POLY STRP

## (undated) DEVICE — SUTURE ETHBND EXCEL SZ 2 L30IN NONABSORBABLE GRN L40MM V-37 MX69G

## (undated) DEVICE — SKIN MARKER REGULAR TIP WITH RULER CAP AND LABELS: Brand: DEVON

## (undated) DEVICE — SUTURE VICRYL + SZ 0 L27IN ABSRB WHT CT-2 1/2 CIR TAPERPOINT VCP270H

## (undated) DEVICE — SUTURE NONABSORBABLE MONOFILAMENT 4-0 RB-1 36 IN BLU PROLENE 8557H

## (undated) DEVICE — SURGICAL SUCTION CONNECTING TUBE WITH MALE CONNECTOR AND SUCTION CLAMP, 2 FT. LONG (.6 M), 5 MM I.D.: Brand: CONMED

## (undated) DEVICE — GLOVE ORANGE PI 7 1/2   MSG9075

## (undated) DEVICE — 4-PORT MANIFOLD: Brand: NEPTUNE 2

## (undated) DEVICE — SHEET, T, LAPAROTOMY, STERILE: Brand: MEDLINE

## (undated) DEVICE — GLOVE,SURG,SENSICARE SLT,LF,PF,6: Brand: MEDLINE

## (undated) DEVICE — MICRO KOVER: Brand: UNBRANDED

## (undated) DEVICE — COVER LT HNDL BLU PLAS

## (undated) DEVICE — GAUZE,SPONGE,4"X4",16PLY,XRAY,STRL,LF: Brand: MEDLINE

## (undated) DEVICE — SPONGE LAP W18XL18IN WHT COT 4 PLY FLD STRUNG RADPQ DISP ST

## (undated) DEVICE — 3M™ IOBAN™ 2 ANTIMICROBIAL INCISE DRAPE 6640EZ: Brand: IOBAN™ 2

## (undated) DEVICE — NEURO SPINE: Brand: MEDLINE INDUSTRIES, INC.

## (undated) DEVICE — GLOVE,SURG,SENSICARE SLT,LF,PF,7.5: Brand: MEDLINE

## (undated) DEVICE — GUIDEPIN ORTH 3X75 MM SS SIMPLICITI

## (undated) DEVICE — SUTURE PERMAHAND SZ 2-0 L30IN NONABSORBABLE BLK SILK W/O A305H

## (undated) DEVICE — C-ARM: Brand: UNBRANDED

## (undated) DEVICE — 3M™ STERI-STRIP™ COMPOUND BENZOIN TINCTURE 40 BAGS/CARTON 4 CARTONS/CASE C1544: Brand: 3M™ STERI-STRIP™

## (undated) DEVICE — IMMOBILIZER SHLDR XL L20IN D10IN UNIV POLY COT W/ FOAM STRP

## (undated) DEVICE — TOOL 14MH30 LEGEND 14CM 3MM: Brand: MIDAS REX ™

## (undated) DEVICE — SUTURE MCRYL SZ 4-0 L18IN ABSRB UD L19MM PS-2 3/8 CIR PRIM Y496G

## (undated) DEVICE — TOOL MR8-14MH30 MR8 14CM MATCH 3MM: Brand: MIDAS REX MR8

## (undated) DEVICE — SUTURE ABSORBABLE MONOFILAMENT 0 CTX 60 IN VIO PDS + PDP990G

## (undated) DEVICE — PAD DRY FLOOR ABS 32X58IN GRN

## (undated) DEVICE — SUTURE VCRL + SZ 3-0 L18IN ABSRB UD SH 1/2 CIR TAPERCUT NDL VCP864D

## (undated) DEVICE — SUTURE VICRYL COAT SZ 4-0 L18IN ABSRB UD L19MM PS-2 1/2 CIR J496G

## (undated) DEVICE — PAD,ABDOMINAL,8"X7.5",STERILE,LF,1/PK: Brand: MEDLINE

## (undated) DEVICE — CANISTER, RIGID, 1200CC: Brand: MEDLINE INDUSTRIES, INC.

## (undated) DEVICE — GUIDEWIRE ORTHOPEDIC 2.5X220 MM GLEN AEQUALIS PERFORM
Type: IMPLANTABLE DEVICE | Site: SHOULDER | Status: NON-FUNCTIONAL
Removed: 2022-06-13

## (undated) DEVICE — TRAP POLYP ETRAP

## (undated) DEVICE — NEPTUNE E-SEP SMOKE EVACUATION PENCIL, COATED, 70MM BLADE, PUSH BUTTON SWITCH: Brand: NEPTUNE E-SEP

## (undated) DEVICE — GLOVE SURG SZ 65 CRM LTX FREE POLYISOPRENE POLYMER BEAD ANTI

## (undated) DEVICE — KIT EVAC 0.13IN RECT TB DIA10FR 400CC PVC 3 SPR Y CONN DRN

## (undated) DEVICE — GOWN SIRUS NONREIN XL W/TWL: Brand: MEDLINE INDUSTRIES, INC.

## (undated) DEVICE — SUTURE VCRL + SZ 0 L27IN ABSRB WHT CT-2 1/2 CIR TAPERPOINT VCP270H